# Patient Record
Sex: FEMALE | Race: WHITE | Employment: OTHER | URBAN - METROPOLITAN AREA
[De-identification: names, ages, dates, MRNs, and addresses within clinical notes are randomized per-mention and may not be internally consistent; named-entity substitution may affect disease eponyms.]

---

## 2017-08-09 ENCOUNTER — CASE MANAGEMENT (OUTPATIENT)
Dept: EMERGENCY DEPT | Age: 57
End: 2017-08-09

## 2018-09-13 ENCOUNTER — HOSPITAL ENCOUNTER (INPATIENT)
Age: 58
LOS: 4 days | Discharge: HOME | DRG: 291 | End: 2018-09-17
Attending: EMERGENCY MEDICINE | Admitting: INTERNAL MEDICINE
Payer: MEDICARE

## 2018-09-13 ENCOUNTER — APPOINTMENT (OUTPATIENT)
Dept: GENERAL RADIOLOGY | Age: 58
DRG: 291 | End: 2018-09-13
Payer: MEDICARE

## 2018-09-13 DIAGNOSIS — G89.29 OTHER CHRONIC PAIN: ICD-10-CM

## 2018-09-13 DIAGNOSIS — J81.0 ACUTE PULMONARY EDEMA (HCC): ICD-10-CM

## 2018-09-13 DIAGNOSIS — I50.9 ACUTE CONGESTIVE HEART FAILURE, UNSPECIFIED HEART FAILURE TYPE (HCC): ICD-10-CM

## 2018-09-13 DIAGNOSIS — J96.00 ACUTE RESPIRATORY FAILURE, UNSPECIFIED WHETHER WITH HYPOXIA OR HYPERCAPNIA (HCC): Primary | ICD-10-CM

## 2018-09-13 PROBLEM — R06.02 SHORTNESS OF BREATH: Status: ACTIVE | Noted: 2018-09-13

## 2018-09-13 LAB
A/G RATIO: 0.7 (ref 1.1–2.2)
ALBUMIN SERPL-MCNC: 3 G/DL (ref 3.4–5)
ALP BLD-CCNC: 235 U/L (ref 40–129)
ALT SERPL-CCNC: 23 U/L (ref 10–40)
ANION GAP SERPL CALCULATED.3IONS-SCNC: 11 MMOL/L (ref 3–16)
AST SERPL-CCNC: 32 U/L (ref 15–37)
BASOPHILS ABSOLUTE: 0.2 K/UL (ref 0–0.2)
BASOPHILS RELATIVE PERCENT: 1.2 %
BILIRUB SERPL-MCNC: 0.5 MG/DL (ref 0–1)
BUN BLDV-MCNC: 10 MG/DL (ref 7–20)
CALCIUM SERPL-MCNC: 8.9 MG/DL (ref 8.3–10.6)
CHLORIDE BLD-SCNC: 100 MMOL/L (ref 99–110)
CO2: 25 MMOL/L (ref 21–32)
CREAT SERPL-MCNC: 0.6 MG/DL (ref 0.6–1.1)
EOSINOPHILS ABSOLUTE: 0.1 K/UL (ref 0–0.6)
EOSINOPHILS RELATIVE PERCENT: 0.4 %
GFR AFRICAN AMERICAN: >60
GFR NON-AFRICAN AMERICAN: >60
GLOBULIN: 4.2 G/DL
GLUCOSE BLD-MCNC: 147 MG/DL (ref 70–99)
HCT VFR BLD CALC: 33 % (ref 36–48)
HEMOGLOBIN: 10.5 G/DL (ref 12–16)
LACTIC ACID: 1.4 MMOL/L (ref 0.4–2)
LYMPHOCYTES ABSOLUTE: 1.2 K/UL (ref 1–5.1)
LYMPHOCYTES RELATIVE PERCENT: 9.2 %
MCH RBC QN AUTO: 24.9 PG (ref 26–34)
MCHC RBC AUTO-ENTMCNC: 31.9 G/DL (ref 31–36)
MCV RBC AUTO: 78.1 FL (ref 80–100)
MONOCYTES ABSOLUTE: 0.4 K/UL (ref 0–1.3)
MONOCYTES RELATIVE PERCENT: 3.3 %
NEUTROPHILS ABSOLUTE: 11.4 K/UL (ref 1.7–7.7)
NEUTROPHILS RELATIVE PERCENT: 85.9 %
PDW BLD-RTO: 17.9 % (ref 12.4–15.4)
PLATELET # BLD: 283 K/UL (ref 135–450)
PMV BLD AUTO: 8.5 FL (ref 5–10.5)
POTASSIUM SERPL-SCNC: 4.1 MMOL/L (ref 3.5–5.1)
PRO-BNP: 2053 PG/ML (ref 0–124)
RBC # BLD: 4.22 M/UL (ref 4–5.2)
SODIUM BLD-SCNC: 136 MMOL/L (ref 136–145)
TOTAL PROTEIN: 7.2 G/DL (ref 6.4–8.2)
WBC # BLD: 13.3 K/UL (ref 4–11)

## 2018-09-13 PROCEDURE — 94664 DEMO&/EVAL PT USE INHALER: CPT

## 2018-09-13 PROCEDURE — 83880 ASSAY OF NATRIURETIC PEPTIDE: CPT

## 2018-09-13 PROCEDURE — 83605 ASSAY OF LACTIC ACID: CPT

## 2018-09-13 PROCEDURE — 85025 COMPLETE CBC W/AUTO DIFF WBC: CPT

## 2018-09-13 PROCEDURE — 96361 HYDRATE IV INFUSION ADD-ON: CPT

## 2018-09-13 PROCEDURE — 2700000000 HC OXYGEN THERAPY PER DAY

## 2018-09-13 PROCEDURE — 99285 EMERGENCY DEPT VISIT HI MDM: CPT

## 2018-09-13 PROCEDURE — 94640 AIRWAY INHALATION TREATMENT: CPT

## 2018-09-13 PROCEDURE — 2580000003 HC RX 258

## 2018-09-13 PROCEDURE — 94761 N-INVAS EAR/PLS OXIMETRY MLT: CPT

## 2018-09-13 PROCEDURE — 6370000000 HC RX 637 (ALT 250 FOR IP): Performed by: EMERGENCY MEDICINE

## 2018-09-13 PROCEDURE — 6360000002 HC RX W HCPCS: Performed by: EMERGENCY MEDICINE

## 2018-09-13 PROCEDURE — 80053 COMPREHEN METABOLIC PANEL: CPT

## 2018-09-13 PROCEDURE — 87040 BLOOD CULTURE FOR BACTERIA: CPT

## 2018-09-13 PROCEDURE — 6370000000 HC RX 637 (ALT 250 FOR IP): Performed by: INTERNAL MEDICINE

## 2018-09-13 PROCEDURE — 96375 TX/PRO/DX INJ NEW DRUG ADDON: CPT

## 2018-09-13 PROCEDURE — 96374 THER/PROPH/DIAG INJ IV PUSH: CPT

## 2018-09-13 PROCEDURE — 1200000000 HC SEMI PRIVATE

## 2018-09-13 PROCEDURE — 71045 X-RAY EXAM CHEST 1 VIEW: CPT

## 2018-09-13 RX ORDER — ROPINIROLE 1 MG/1
2 TABLET, FILM COATED ORAL 3 TIMES DAILY
Status: DISCONTINUED | OUTPATIENT
Start: 2018-09-13 | End: 2018-09-17 | Stop reason: HOSPADM

## 2018-09-13 RX ORDER — ASPIRIN 81 MG/1
81 TABLET ORAL DAILY
Status: DISCONTINUED | OUTPATIENT
Start: 2018-09-14 | End: 2018-09-16

## 2018-09-13 RX ORDER — SODIUM CHLORIDE 0.9 % (FLUSH) 0.9 %
10 SYRINGE (ML) INJECTION PRN
Status: DISCONTINUED | OUTPATIENT
Start: 2018-09-13 | End: 2018-09-17 | Stop reason: HOSPADM

## 2018-09-13 RX ORDER — FUROSEMIDE 10 MG/ML
40 INJECTION INTRAMUSCULAR; INTRAVENOUS 2 TIMES DAILY
Status: DISCONTINUED | OUTPATIENT
Start: 2018-09-14 | End: 2018-09-15

## 2018-09-13 RX ORDER — SODIUM CHLORIDE 0.9 % (FLUSH) 0.9 %
10 SYRINGE (ML) INJECTION EVERY 12 HOURS SCHEDULED
Status: DISCONTINUED | OUTPATIENT
Start: 2018-09-13 | End: 2018-09-17 | Stop reason: HOSPADM

## 2018-09-13 RX ORDER — ONDANSETRON 2 MG/ML
4 INJECTION INTRAMUSCULAR; INTRAVENOUS EVERY 6 HOURS PRN
Status: DISCONTINUED | OUTPATIENT
Start: 2018-09-13 | End: 2018-09-17 | Stop reason: HOSPADM

## 2018-09-13 RX ORDER — FUROSEMIDE 10 MG/ML
60 INJECTION INTRAMUSCULAR; INTRAVENOUS ONCE
Status: COMPLETED | OUTPATIENT
Start: 2018-09-13 | End: 2018-09-13

## 2018-09-13 RX ORDER — DOXYCYCLINE HYCLATE 100 MG
100 TABLET ORAL EVERY 12 HOURS
Status: DISCONTINUED | OUTPATIENT
Start: 2018-09-13 | End: 2018-09-17 | Stop reason: HOSPADM

## 2018-09-13 RX ORDER — HYDRALAZINE HYDROCHLORIDE 50 MG/1
50 TABLET, FILM COATED ORAL 2 TIMES DAILY
Status: DISCONTINUED | OUTPATIENT
Start: 2018-09-13 | End: 2018-09-16

## 2018-09-13 RX ORDER — IPRATROPIUM BROMIDE AND ALBUTEROL SULFATE 2.5; .5 MG/3ML; MG/3ML
1 SOLUTION RESPIRATORY (INHALATION) ONCE
Status: COMPLETED | OUTPATIENT
Start: 2018-09-13 | End: 2018-09-13

## 2018-09-13 RX ORDER — SODIUM CHLORIDE 9 MG/ML
INJECTION, SOLUTION INTRAVENOUS
Status: COMPLETED
Start: 2018-09-13 | End: 2018-09-13

## 2018-09-13 RX ORDER — IPRATROPIUM BROMIDE AND ALBUTEROL SULFATE 2.5; .5 MG/3ML; MG/3ML
1 SOLUTION RESPIRATORY (INHALATION)
Status: DISCONTINUED | OUTPATIENT
Start: 2018-09-14 | End: 2018-09-13

## 2018-09-13 RX ORDER — METOPROLOL TARTRATE 50 MG/1
100 TABLET, FILM COATED ORAL 2 TIMES DAILY
Status: DISCONTINUED | OUTPATIENT
Start: 2018-09-13 | End: 2018-09-17 | Stop reason: HOSPADM

## 2018-09-13 RX ORDER — METHYLPREDNISOLONE SODIUM SUCCINATE 125 MG/2ML
125 INJECTION, POWDER, LYOPHILIZED, FOR SOLUTION INTRAMUSCULAR; INTRAVENOUS ONCE
Status: COMPLETED | OUTPATIENT
Start: 2018-09-13 | End: 2018-09-13

## 2018-09-13 RX ORDER — ATORVASTATIN CALCIUM 40 MG/1
40 TABLET, FILM COATED ORAL DAILY
Status: DISCONTINUED | OUTPATIENT
Start: 2018-09-14 | End: 2018-09-17 | Stop reason: HOSPADM

## 2018-09-13 RX ORDER — FLUOXETINE HYDROCHLORIDE 20 MG/1
40 CAPSULE ORAL DAILY
Status: DISCONTINUED | OUTPATIENT
Start: 2018-09-14 | End: 2018-09-17 | Stop reason: HOSPADM

## 2018-09-13 RX ORDER — ALBUTEROL SULFATE 2.5 MG/3ML
2.5 SOLUTION RESPIRATORY (INHALATION)
Status: DISCONTINUED | OUTPATIENT
Start: 2018-09-13 | End: 2018-09-13

## 2018-09-13 RX ORDER — SODIUM CHLORIDE 9 MG/ML
INJECTION, SOLUTION INTRAVENOUS CONTINUOUS
Status: DISCONTINUED | OUTPATIENT
Start: 2018-09-13 | End: 2018-09-13

## 2018-09-13 RX ORDER — IPRATROPIUM BROMIDE AND ALBUTEROL SULFATE 2.5; .5 MG/3ML; MG/3ML
1 SOLUTION RESPIRATORY (INHALATION)
Status: DISCONTINUED | OUTPATIENT
Start: 2018-09-13 | End: 2018-09-16

## 2018-09-13 RX ORDER — METHYLPREDNISOLONE SODIUM SUCCINATE 40 MG/ML
40 INJECTION, POWDER, LYOPHILIZED, FOR SOLUTION INTRAMUSCULAR; INTRAVENOUS EVERY 8 HOURS
Status: DISCONTINUED | OUTPATIENT
Start: 2018-09-14 | End: 2018-09-14

## 2018-09-13 RX ORDER — ALBUTEROL SULFATE 2.5 MG/3ML
2.5 SOLUTION RESPIRATORY (INHALATION) ONCE
Status: COMPLETED | OUTPATIENT
Start: 2018-09-13 | End: 2018-09-13

## 2018-09-13 RX ORDER — ALBUTEROL SULFATE 2.5 MG/3ML
2.5 SOLUTION RESPIRATORY (INHALATION)
Status: DISCONTINUED | OUTPATIENT
Start: 2018-09-13 | End: 2018-09-17 | Stop reason: HOSPADM

## 2018-09-13 RX ORDER — ALBUTEROL SULFATE 2.5 MG/3ML
2.5 SOLUTION RESPIRATORY (INHALATION)
Status: DISCONTINUED | OUTPATIENT
Start: 2018-10-03 | End: 2018-09-13

## 2018-09-13 RX ORDER — GABAPENTIN 300 MG/1
300 CAPSULE ORAL 3 TIMES DAILY
Status: DISCONTINUED | OUTPATIENT
Start: 2018-09-13 | End: 2018-09-17 | Stop reason: HOSPADM

## 2018-09-13 RX ADMIN — METHYLPREDNISOLONE SODIUM SUCCINATE 125 MG: 125 INJECTION, POWDER, FOR SOLUTION INTRAMUSCULAR; INTRAVENOUS at 20:24

## 2018-09-13 RX ADMIN — IPRATROPIUM BROMIDE AND ALBUTEROL SULFATE 1 AMPULE: .5; 3 SOLUTION RESPIRATORY (INHALATION) at 20:24

## 2018-09-13 RX ADMIN — SODIUM CHLORIDE: 9 INJECTION, SOLUTION INTRAVENOUS at 20:23

## 2018-09-13 RX ADMIN — FUROSEMIDE 60 MG: 10 INJECTION, SOLUTION INTRAMUSCULAR; INTRAVENOUS at 21:20

## 2018-09-13 RX ADMIN — IPRATROPIUM BROMIDE AND ALBUTEROL SULFATE 1 AMPULE: .5; 3 SOLUTION RESPIRATORY (INHALATION) at 23:30

## 2018-09-13 RX ADMIN — ALBUTEROL SULFATE 2.5 MG: 2.5 SOLUTION RESPIRATORY (INHALATION) at 20:24

## 2018-09-13 RX ADMIN — NITROGLYCERIN 0.5 INCH: 20 OINTMENT TOPICAL at 21:20

## 2018-09-14 LAB
ANION GAP SERPL CALCULATED.3IONS-SCNC: 12 MMOL/L (ref 3–16)
BUN BLDV-MCNC: 13 MG/DL (ref 7–20)
CALCIUM SERPL-MCNC: 9 MG/DL (ref 8.3–10.6)
CHLORIDE BLD-SCNC: 101 MMOL/L (ref 99–110)
CO2: 25 MMOL/L (ref 21–32)
CREAT SERPL-MCNC: 0.7 MG/DL (ref 0.6–1.1)
GFR AFRICAN AMERICAN: >60
GFR NON-AFRICAN AMERICAN: >60
GLUCOSE BLD-MCNC: 178 MG/DL (ref 70–99)
HCT VFR BLD CALC: 32.3 % (ref 36–48)
HEMOGLOBIN: 10.5 G/DL (ref 12–16)
LV EF: 58 %
LVEF MODALITY: NORMAL
MCH RBC QN AUTO: 25.2 PG (ref 26–34)
MCHC RBC AUTO-ENTMCNC: 32.5 G/DL (ref 31–36)
MCV RBC AUTO: 77.6 FL (ref 80–100)
PDW BLD-RTO: 17.7 % (ref 12.4–15.4)
PLATELET # BLD: 317 K/UL (ref 135–450)
PMV BLD AUTO: 8.3 FL (ref 5–10.5)
POTASSIUM REFLEX MAGNESIUM: 3.8 MMOL/L (ref 3.5–5.1)
RBC # BLD: 4.17 M/UL (ref 4–5.2)
SODIUM BLD-SCNC: 138 MMOL/L (ref 136–145)
TROPONIN: <0.01 NG/ML
TROPONIN: <0.01 NG/ML
TSH REFLEX FT4: 0.43 UIU/ML (ref 0.27–4.2)
WBC # BLD: 13.2 K/UL (ref 4–11)

## 2018-09-14 PROCEDURE — 93306 TTE W/DOPPLER COMPLETE: CPT

## 2018-09-14 PROCEDURE — 83036 HEMOGLOBIN GLYCOSYLATED A1C: CPT

## 2018-09-14 PROCEDURE — 2580000003 HC RX 258: Performed by: INTERNAL MEDICINE

## 2018-09-14 PROCEDURE — 2060000000 HC ICU INTERMEDIATE R&B

## 2018-09-14 PROCEDURE — 6360000002 HC RX W HCPCS: Performed by: INTERNAL MEDICINE

## 2018-09-14 PROCEDURE — 6370000000 HC RX 637 (ALT 250 FOR IP): Performed by: INTERNAL MEDICINE

## 2018-09-14 PROCEDURE — 83550 IRON BINDING TEST: CPT

## 2018-09-14 PROCEDURE — 94761 N-INVAS EAR/PLS OXIMETRY MLT: CPT

## 2018-09-14 PROCEDURE — 99233 SBSQ HOSP IP/OBS HIGH 50: CPT | Performed by: INTERNAL MEDICINE

## 2018-09-14 PROCEDURE — 94664 DEMO&/EVAL PT USE INHALER: CPT

## 2018-09-14 PROCEDURE — 2700000000 HC OXYGEN THERAPY PER DAY

## 2018-09-14 PROCEDURE — 80048 BASIC METABOLIC PNL TOTAL CA: CPT

## 2018-09-14 PROCEDURE — 84443 ASSAY THYROID STIM HORMONE: CPT

## 2018-09-14 PROCEDURE — 85027 COMPLETE CBC AUTOMATED: CPT

## 2018-09-14 PROCEDURE — 83540 ASSAY OF IRON: CPT

## 2018-09-14 PROCEDURE — 36415 COLL VENOUS BLD VENIPUNCTURE: CPT

## 2018-09-14 PROCEDURE — 84484 ASSAY OF TROPONIN QUANT: CPT

## 2018-09-14 PROCEDURE — 99255 IP/OBS CONSLTJ NEW/EST HI 80: CPT | Performed by: INTERNAL MEDICINE

## 2018-09-14 PROCEDURE — 94640 AIRWAY INHALATION TREATMENT: CPT

## 2018-09-14 RX ORDER — PREDNISONE 20 MG/1
40 TABLET ORAL DAILY
Status: DISCONTINUED | OUTPATIENT
Start: 2018-09-14 | End: 2018-09-17 | Stop reason: HOSPADM

## 2018-09-14 RX ORDER — OXYCODONE AND ACETAMINOPHEN 7.5; 325 MG/1; MG/1
1 TABLET ORAL EVERY 6 HOURS PRN
Status: DISCONTINUED | OUTPATIENT
Start: 2018-09-14 | End: 2018-09-17 | Stop reason: HOSPADM

## 2018-09-14 RX ORDER — GUAIFENESIN 600 MG/1
600 TABLET, EXTENDED RELEASE ORAL 2 TIMES DAILY
Status: DISCONTINUED | OUTPATIENT
Start: 2018-09-14 | End: 2018-09-17 | Stop reason: HOSPADM

## 2018-09-14 RX ADMIN — METOPROLOL TARTRATE 100 MG: 50 TABLET ORAL at 20:29

## 2018-09-14 RX ADMIN — GABAPENTIN 300 MG: 300 CAPSULE ORAL at 00:48

## 2018-09-14 RX ADMIN — IPRATROPIUM BROMIDE AND ALBUTEROL SULFATE 1 AMPULE: .5; 3 SOLUTION RESPIRATORY (INHALATION) at 19:25

## 2018-09-14 RX ADMIN — ROPINIROLE HYDROCHLORIDE 2 MG: 1 TABLET, FILM COATED ORAL at 09:21

## 2018-09-14 RX ADMIN — PREDNISONE 40 MG: 20 TABLET ORAL at 09:21

## 2018-09-14 RX ADMIN — ASPIRIN 81 MG: 81 TABLET, COATED ORAL at 09:21

## 2018-09-14 RX ADMIN — GUAIFENESIN 600 MG: 600 TABLET, EXTENDED RELEASE ORAL at 18:21

## 2018-09-14 RX ADMIN — ROPINIROLE HYDROCHLORIDE 2 MG: 1 TABLET, FILM COATED ORAL at 13:03

## 2018-09-14 RX ADMIN — HYDRALAZINE HYDROCHLORIDE 50 MG: 50 TABLET, FILM COATED ORAL at 09:21

## 2018-09-14 RX ADMIN — METOPROLOL TARTRATE 100 MG: 50 TABLET ORAL at 00:48

## 2018-09-14 RX ADMIN — DOXYCYCLINE HYCLATE 100 MG: 100 TABLET, FILM COATED ORAL at 12:42

## 2018-09-14 RX ADMIN — ROPINIROLE HYDROCHLORIDE 2 MG: 1 TABLET, FILM COATED ORAL at 20:29

## 2018-09-14 RX ADMIN — ESTROGENS, CONJUGATED 0.62 MG: 0.62 TABLET, FILM COATED ORAL at 09:26

## 2018-09-14 RX ADMIN — OXYCODONE HYDROCHLORIDE AND ACETAMINOPHEN 1 TABLET: 7.5; 325 TABLET ORAL at 19:12

## 2018-09-14 RX ADMIN — IPRATROPIUM BROMIDE AND ALBUTEROL SULFATE 1 AMPULE: .5; 3 SOLUTION RESPIRATORY (INHALATION) at 07:16

## 2018-09-14 RX ADMIN — Medication 10 ML: at 09:26

## 2018-09-14 RX ADMIN — DOXYCYCLINE HYCLATE 100 MG: 100 TABLET, FILM COATED ORAL at 00:47

## 2018-09-14 RX ADMIN — OXYCODONE HYDROCHLORIDE AND ACETAMINOPHEN 1 TABLET: 7.5; 325 TABLET ORAL at 06:53

## 2018-09-14 RX ADMIN — GABAPENTIN 300 MG: 300 CAPSULE ORAL at 20:29

## 2018-09-14 RX ADMIN — GABAPENTIN 300 MG: 300 CAPSULE ORAL at 13:03

## 2018-09-14 RX ADMIN — IPRATROPIUM BROMIDE AND ALBUTEROL SULFATE 1 AMPULE: .5; 3 SOLUTION RESPIRATORY (INHALATION) at 23:25

## 2018-09-14 RX ADMIN — Medication 10 ML: at 20:30

## 2018-09-14 RX ADMIN — OXYCODONE HYDROCHLORIDE AND ACETAMINOPHEN 1 TABLET: 7.5; 325 TABLET ORAL at 00:48

## 2018-09-14 RX ADMIN — HYDRALAZINE HYDROCHLORIDE 50 MG: 50 TABLET, FILM COATED ORAL at 00:48

## 2018-09-14 RX ADMIN — OXYCODONE HYDROCHLORIDE AND ACETAMINOPHEN 1 TABLET: 7.5; 325 TABLET ORAL at 13:03

## 2018-09-14 RX ADMIN — FLUOXETINE HYDROCHLORIDE 40 MG: 20 CAPSULE ORAL at 09:21

## 2018-09-14 RX ADMIN — ROPINIROLE HYDROCHLORIDE 2 MG: 1 TABLET, FILM COATED ORAL at 00:47

## 2018-09-14 RX ADMIN — FUROSEMIDE 40 MG: 10 INJECTION, SOLUTION INTRAMUSCULAR; INTRAVENOUS at 09:21

## 2018-09-14 RX ADMIN — FUROSEMIDE 40 MG: 10 INJECTION, SOLUTION INTRAMUSCULAR; INTRAVENOUS at 18:21

## 2018-09-14 RX ADMIN — METOPROLOL TARTRATE 100 MG: 50 TABLET ORAL at 09:21

## 2018-09-14 RX ADMIN — HYDRALAZINE HYDROCHLORIDE 50 MG: 50 TABLET, FILM COATED ORAL at 20:29

## 2018-09-14 RX ADMIN — ENOXAPARIN SODIUM 40 MG: 40 INJECTION SUBCUTANEOUS at 09:21

## 2018-09-14 RX ADMIN — ATORVASTATIN CALCIUM 40 MG: 40 TABLET, FILM COATED ORAL at 09:21

## 2018-09-14 RX ADMIN — DOXYCYCLINE HYCLATE 100 MG: 100 TABLET, FILM COATED ORAL at 23:40

## 2018-09-14 RX ADMIN — GABAPENTIN 300 MG: 300 CAPSULE ORAL at 09:21

## 2018-09-14 RX ADMIN — IPRATROPIUM BROMIDE AND ALBUTEROL SULFATE 1 AMPULE: .5; 3 SOLUTION RESPIRATORY (INHALATION) at 15:09

## 2018-09-14 ASSESSMENT — PAIN DESCRIPTION - DESCRIPTORS
DESCRIPTORS: ACHING;CONSTANT
DESCRIPTORS: SHARP;PENETRATING
DESCRIPTORS: CONSTANT;ACHING

## 2018-09-14 ASSESSMENT — PAIN DESCRIPTION - LOCATION
LOCATION: HIP;BACK
LOCATION: BACK;KNEE;LEG
LOCATION: HIP;BACK
LOCATION: BACK;HIP;KNEE
LOCATION: BACK;KNEE;LEG

## 2018-09-14 ASSESSMENT — PAIN SCALES - GENERAL
PAINLEVEL_OUTOF10: 6
PAINLEVEL_OUTOF10: 9
PAINLEVEL_OUTOF10: 10
PAINLEVEL_OUTOF10: 4
PAINLEVEL_OUTOF10: 8
PAINLEVEL_OUTOF10: 8
PAINLEVEL_OUTOF10: 10

## 2018-09-14 ASSESSMENT — PAIN DESCRIPTION - PROGRESSION
CLINICAL_PROGRESSION: NOT CHANGED
CLINICAL_PROGRESSION: NOT CHANGED

## 2018-09-14 ASSESSMENT — PAIN DESCRIPTION - ORIENTATION
ORIENTATION: LOWER

## 2018-09-14 ASSESSMENT — PAIN DESCRIPTION - PAIN TYPE
TYPE: CHRONIC PAIN

## 2018-09-14 ASSESSMENT — PAIN DESCRIPTION - FREQUENCY
FREQUENCY: CONTINUOUS

## 2018-09-14 ASSESSMENT — PAIN DESCRIPTION - ONSET: ONSET: ON-GOING

## 2018-09-14 NOTE — PROGRESS NOTES
Shift report given to Marcela Schwartz RN. Respiratory therapy with patient at this time. Call light in reach, patient is pleasant and reports feeling better.

## 2018-09-14 NOTE — H&P
mouth 2 times daily as needed   Yes Historical Provider, MD   metoprolol (LOPRESSOR) 100 MG tablet Take 100 mg by mouth 2 times daily   Yes Historical Provider, MD   rOPINIRole (REQUIP) 2 MG tablet Take 2 mg by mouth 3 times daily   Yes Historical Provider, MD   gabapentin (NEURONTIN) 300 MG capsule Take 300 mg by mouth 3 times daily   Yes Historical Provider, MD   aspirin 81 MG EC tablet Take 81 mg by mouth daily   Yes Historical Provider, MD   atorvastatin (LIPITOR) 20 MG tablet Take 40 mg by mouth daily    Yes Historical Provider, MD   omeprazole (PRILOSEC) 20 MG capsule Take 40 mg by mouth Daily    Yes Historical Provider, MD   estrogens, conjugated, (PREMARIN) 0.625 MG tablet Take 0.625 mg by mouth daily. Yes Historical Provider, MD   FLUoxetine (PROZAC) 40 MG capsule Take 40 mg by mouth daily. Yes Historical Provider, MD   methocarbamol (ROBAXIN) 750 MG tablet Take 750 mg by mouth 3 times daily    Yes Historical Provider, MD       Allergies:  Codeine; Cymbalta [duloxetine hcl]; Penicillins; and Sulfa antibiotics    Social History:    TOBACCO:   reports that she has been smoking Cigarettes. She has a 35.00 pack-year smoking history. She has never used smokeless tobacco.  ETOH:   reports that she drinks alcohol. Family History:    History reviewed. No pertinent family history. REVIEW OF SYSTEMS:   Pertinent positives as noted in the HPI. All other systems reviewed and negative. PHYSICAL EXAM:    BP (!) 182/92   Pulse 84   Temp 98.3 °F (36.8 °C) (Oral)   Resp 18   Ht 5' 7\" (1.702 m)   Wt 250 lb 12.8 oz (113.8 kg)   SpO2 92%   BMI 39.28 kg/m²     General appearance:  No apparent distress, appears stated age and cooperative. HEENT:  Normal cephalic, atraumatic without obvious deformity. Pupils equal, round, and reactive to light. Extra ocular muscles intact. Conjunctivae/corneas clear. Neck: Supple, with full range of motion. No jugular venous distention. Trachea midline.   Respiratory:

## 2018-09-14 NOTE — CONSULTS
[address] [facility]  [phone]        Reason for Consultation/Chief Complaint: \"I have been having  Increasing shortness of breath for a week. \"    History of Present Illness:  Lex Hess is a 66 y.o. patient who presented to the hospital with complaints of shortness of breath for a week. Symptoms worsening. Exertion makes it worse. Rest makes it better. No orthopnea. No PND. She finds it hard to take a deep breath due to pressure in chest.  She has cough for 4 weeks got better with antibiotics but worse again she is productive of green sputum occasional flakes of blood. Legs swelling for 2 yrs. This admission chest xray shows pulmonary edema and her EKG shows old septal infarct. Elevated EDP on echo. Pro BNP is elevated. I have been asked to provide consultation regarding further management and testing. Past Medical History:   has a past medical history of Class 2 obesity with serious comorbidity and body mass index (BMI) of 39.0 to 39.9 in adult; Depression; Hyperlipidemia; Hypertension; and Kidney stone. Surgical History:   has a past surgical history that includes Hysterectomy; knee surgery; Foot surgery; Cholecystectomy; and joint replacement. Social History:  30 pack yr smoker now quit denies excessive alcohol. reports that she has been smoking Cigarettes. She has a 35.00 pack-year smoking history. She has never used smokeless tobacco. She reports that she drinks alcohol. She reports that she uses drugs, including Marijuana. Family History:  Father MI age 48  family history is not on file. Home Medications:  Were reviewed and are listed in nursing record. and/or listed below  Prior to Admission medications    Medication Sig Start Date End Date Taking? Authorizing Provider   OxyCODONE ER 9 MG C12A Take 9 mg by mouth. .   Yes Historical Provider, MD   lactobacillus (CULTURELLE) capsule Take 1 capsule by mouth 2 times daily 8/10/17  Yes Nayla Martinez MD albuterol sulfate  (90 Base) MCG/ACT inhaler Inhale 2 puffs into the lungs 4 times daily 8/10/17  Yes Doris Sanders MD   furosemide (LASIX) 20 MG tablet Take 20 mg by mouth daily   Yes Historical Provider, MD   hydrALAZINE (APRESOLINE) 50 MG tablet Take 50 mg by mouth 2 times daily   Yes Historical Provider, MD   oxybutynin (DITROPAN) 5 MG tablet Take 5 mg by mouth 3 times daily   Yes Historical Provider, MD   tiZANidine (ZANAFLEX) 4 MG tablet Take 4 mg by mouth 2 times daily as needed   Yes Historical Provider, MD   metoprolol (LOPRESSOR) 100 MG tablet Take 100 mg by mouth 2 times daily   Yes Historical Provider, MD   rOPINIRole (REQUIP) 2 MG tablet Take 2 mg by mouth 3 times daily   Yes Historical Provider, MD   gabapentin (NEURONTIN) 300 MG capsule Take 300 mg by mouth 3 times daily   Yes Historical Provider, MD   aspirin 81 MG EC tablet Take 81 mg by mouth daily   Yes Historical Provider, MD   atorvastatin (LIPITOR) 20 MG tablet Take 40 mg by mouth daily    Yes Historical Provider, MD   omeprazole (PRILOSEC) 20 MG capsule Take 40 mg by mouth Daily    Yes Historical Provider, MD   estrogens, conjugated, (PREMARIN) 0.625 MG tablet Take 0.625 mg by mouth daily. Yes Historical Provider, MD   FLUoxetine (PROZAC) 40 MG capsule Take 40 mg by mouth daily. Yes Historical Provider, MD   methocarbamol (ROBAXIN) 750 MG tablet Take 750 mg by mouth 3 times daily    Yes Historical Provider, MD        Allergies:  Codeine; Cymbalta [duloxetine hcl];  Penicillins; and Sulfa antibiotics     Review of Systems: 12 point ROS negative in all areas as listed below except as in [place]  Constitutional, EENT, Cardiovascular, pulmonary, GI, , Musculoskeletal, skin, neurological, hematological, endocrine, Psychiatric  Physical Examination:    Vitals:    09/14/18 1200   BP: (!) 171/85   Pulse: 71   Resp: 18   Temp: 98.4 °F (36.9 °C)   SpO2: 92%    Weight: 250 lb 12.8 oz (113.8 kg)         General Appearance:  Alert, cooperative, no distress, appears stated age   Head:  Normocephalic, without obvious abnormality, atraumatic   Eyes:  PERRL, conjunctiva/corneas clear       Nose: Nares normal, no drainage or sinus tenderness   Throat: Lips, mucosa, and tongue normal   Neck: Supple, symmetrical, trachea midline, no adenopathy, thyroid: not enlarged, symmetric, no tenderness/mass/nodules, no carotid bruit or JVD       Lungs:   Diffuse crackles to auscultation bilaterally, respirations unlabored   Chest Wall:  No tenderness or deformity   Heart:  Regular rate and rhythm, S1, S2 normal, no murmur, rub or gallop   Abdomen:   Soft, non-tender, bowel sounds active all four quadrants,  no masses, no organomegaly           Extremities: Extremities normal, atraumatic, no cyanosis or edema   Pulses: 2+ and symmetric   Skin: Skin color, texture, turgor normal, no rashes or lesions   Pysch: Normal mood and affect   Neurologic: Normal gross motor and sensory exam.         Labs  CBC:   Lab Results   Component Value Date    WBC 13.2 09/14/2018    RBC 4.17 09/14/2018    HGB 10.5 09/14/2018    HCT 32.3 09/14/2018    MCV 77.6 09/14/2018    RDW 17.7 09/14/2018     09/14/2018     CMP:    Lab Results   Component Value Date     09/14/2018    K 3.8 09/14/2018     09/14/2018    CO2 25 09/14/2018    BUN 13 09/14/2018    CREATININE 0.7 09/14/2018    GFRAA >60 09/14/2018    GFRAA >60 09/15/2012    AGRATIO 0.7 09/13/2018    LABGLOM >60 09/14/2018    GLUCOSE 178 09/14/2018    PROT 7.2 09/13/2018    PROT 7.5 09/15/2012    CALCIUM 9.0 09/14/2018    BILITOT 0.5 09/13/2018    ALKPHOS 235 09/13/2018    AST 32 09/13/2018    ALT 23 09/13/2018     PT/INR:  No results found for: [email or web address]  Lab Results   Component Value Date    CKTOTAL 27 01/22/2014    TROPONINI <0.01 12/18/2017   ProBNP 2053  Chest xray 9.13.18  Findings likely represent pulmonary edema, though superimposed pneumonia   remains a possibility.        EKG:  I have reviewed EKG with the following interpretation:  Impression:       Final 12/18/2017  7:45 PM 14   Sinus bradycardia   Septal infarct , age undetermined   Abnormal ECG   No previous ECGs available   Confirmed by Sonja Aguero MD, [address] ([id number]) on 12/19/2017 9:45:46 PM      Echo doppler of heart:  9.14.18   Normal left ventricular systolic function with an estimated ejection   fraction of 55-60%.   There is mild concentric left ventricular hypertrophy.   Grade II diastolic dysfunction with elevated filing pressure.   The left atrium is mildly dilated.   Mild mitral regurgitation.   Mild tricuspid regurgitation.   Systolic pulmonary artery pressure (SPAP) estimated at 46 mmHg (RA pressure   8 mmHg), consistent with mild pulmonary hypertension.     Assessment  Acute diastolic CHF  Chest pressure rule out coronary insufficiency  Anemia microcytic hypochromic rule out iron deficiency. Obesity rule out sleep apnea   Possible diabetes  hypertension  Patient Active Problem List   Diagnosis    Shortness of breath    Hypertension    Hyperlipidemia    Depression    Class 2 obesity with serious comorbidity and body mass index (BMI) of 39.0 to 39.9 in adult    Acute respiratory failure (HCC)    Acute pulmonary edema (HCC)         Plan:    I had the opportunity to review the clinical symptoms and presentation of Miguel Chowdhury. I recommend that the patient undergo stress testing  Continue HBP meds diurese  Check thyroid function and iron levels A1 C  Stool occult blood   Consider sleep apnea eval  Low salt diet fluid management    I will address the patient's cardiac risk factors and adjusted pharmacologic treatment as needed. In addition, I have reinforced the need for patient directed risk factor modification. Tobacco use was discussed with the patient and educated on the negative effects. I have asked the patient to not utilize these agents. Thank you for allowing to us to participate in the care or Miguel Chowdhury.  Further evaluation will

## 2018-09-14 NOTE — CARE COORDINATION
Case Management Assessment  Initial Evaluation    Date/Time of Evaluation: 9/14/2018 4:53 PM  Assessment Completed by: Joe Tripp    Patient Name: Erna Ivey  YOB: 1960  Diagnosis: Shortness of breath [R06.02]  Date / Time: 9/13/2018  7:35 PM  Admission status/Date:  9/13  Chart Reviewed: Yes      Patient Interviewed: Yes   Family Interviewed:  No      Hospitalization in the last 30 days:  No    Contacts  : Shiv Foster   Relationship to Patient:spouse  Phone Number: [phone]  Alternate Contact: Kristie Moreno   Relationship to Patient: son   Phone Number: [phone]    Current PCP - Gloria Nicholson    Financial  Commercial    ADLS  Support Systems: Spouse/Significant Other  Transportation: self    Meal Preparation: self     Housing  Home Environment: Lives w/father, daughter, ONEL. IPTA   Steps: 2 story 13 to bedroom. Plans to Return to Present Housing: Yes  Other Identified Issues:     Audrey Restrepo  Currently active with [address] : No  Type of Home Care Services: None  Passport/Waiver : No  Passport/Waiver Services:     Durable Medical Equipment   DME Provider: none  Equipment: walker & cane    Has Home O2 in place on admit:  No  Informed of need to bring portable home O2 tank on day of discharge for nursing to connect prior to leaving:   Not Indicated  Verbalized agreement/Understanding:   Not Indicated    Community Service Affiliation  Dialysis:  No  Outpatient PT/OT: No    Cancer Center: No     CHF Clinic: No     Pulmonary Rehab: No  Pain Clinic: Yes - [address] [place] [address]: No    Wound Clinic: No     Other:     DISCHARGE PLAN: Chart reviewed and met with pt, role of CM explained. Pt lives at home with family & plan is to return home. IPTA & denies needs. CM will follow for possible home O2. Requests [facility] if O2 ordered but states she is hopeful it will not be needed.

## 2018-09-14 NOTE — PROGRESS NOTES
Progress Note    Admit Date:  9/13/2018    Subjective:  Ms. Brianna Rios was admitted for shortness of breath. She is a smoker. She quit smoking 3 years ago. She does not have a history of COPD or CHF. She had normal stress test 3 weeks ago. She has noted some edema in her legs. Denies any chest pain although she did complain of some atypical chest Pain to her PCP and was referred to cardiology. She's had a cough for the last few days. She also noticed increased shortness of breath. No abdominal pain nausea vomiting. She is not on oxygen at home. Objective:   BP (!) 148/94   Pulse 78   Temp 97.5 °F (36.4 °C) (Oral)   Resp 18   Ht 5' 7\" (1.702 m)   Wt 250 lb 12.8 oz (113.8 kg)   SpO2 94%   BMI 39.28 kg/m²            Intake/Output Summary (Last 24 hours) at 09/14/18 1003  Last data filed at 09/14/18 0609   Gross per 24 hour   Intake                0 ml   Output             2945 ml   Net            -2945 ml       Physical Exam:    General appearance: alert, appears stated age and cooperative  Head: Normocephalic, without obvious abnormality, atraumatic  Eyes: conjunctivae/corneas clear. PERRL, EOM's intact.   Neck: no adenopathy, no carotid bruit, no JVD, supple, symmetrical, trachea midline and thyroid not enlarged, symmetric, no tenderness/mass/nodules  Lungs: mild accessory muscle use, diminished BS, no wheezes or crackles  Heart: regular rate and rhythm, S1, S2 normal, no murmur, click, rub or gallop  Abdomen: soft, non-tender; bowel sounds normal; no masses,  no organomegaly  Extremities: extremities normal, atraumatic, no cyanosis + edema  Pulses: 2+ and symmetric  Skin: Skin color, texture, turgor normal. No rashes or lesions  Neurologic: Grossly normal    Scheduled Meds:   predniSONE  40 mg Oral Daily    aspirin  81 mg Oral Daily    atorvastatin  40 mg Oral Daily    estrogens (conjugated)  0.625 mg Oral Daily    FLUoxetine  40 mg Oral Daily    gabapentin  300 mg Oral TID    hydrALAZINE  50 mg

## 2018-09-14 NOTE — ED PROVIDER NOTES
Emergency Physician Note    Chief Complaint  Shortness of Breath (Pt has cough and SOB x3 days. 89% on RA when EMS got to home. Pt placed on 2L but did not bring up per EMS. Pt given breathing treatment en route, on 8L per EMS. 69% on RA here. Pt placed on non-rebreather.)       History of Present Illness  Erna Ivey is a 66 y.o. female who presents to the ED for shortness of breath. Patient had a nonproductive cough and shortness of breath for the past 3 days. EMS reports that she was at 89% when they got there. She is placed on 2 L of nasal cannula but it did not improve. Patient was given a breathing treatment in route and they upped her nasal cannula to 8 L. Patient was at 69% on room air when she arrived here. She improved with nonrebreather and currently on a 5 L nasal cannula. Patient states at home she does not use oxygen nor has she ever been prescribed oxygen at home. She does use inhalers at home but no nebulizers. She does have a history of lower extremity edema for which she takes Lasix which she's never been told that she has congestive heart failure. Patient reports chest tightness of the anterior portion of her chest but other than that no other symptoms. She denies any increased bilateral lower extremity edema    Denies fever, chills, malaise, abdominal pain, nausea, vomiting, diarrhea, headache, sore throat, dysuria, back pain, rash. No palliative/provocative factors. Positives and pertinent negatives as per HPI. All other of the 10 systems were reviewed and are negative. I have reviewed the following from the nursing documentation:      Prior to Admission medications    Medication Sig Start Date End Date Taking?  Authorizing Provider   lactobacillus (CULTURELLE) capsule Take 1 capsule by mouth 2 times daily 8/10/17  Yes Roseline Garcia MD   albuterol sulfate  (90 Base) MCG/ACT inhaler Inhale 2 puffs into the lungs 4 times daily 8/10/17  Yes Roseline Garcia MD extremities x4 with purpose. SKIN: Warm, dry and intact. NEUROLOGIC: Normal mental status. Moving all extremities to command. Alert and oriented x4 without focal deficit or gross sensory deficit. Normal speech. PSYCHIATRIC: Not anxious, normal mood and affect, thoughts are linear and organized, without delusions/hallucinations, responds appropriately to questions      LABS and DIAGNOSTIC RESULTS      RADIOLOGY  X-RAYS:  I have reviewed radiologic plain film image(s). ALL OTHER NON-PLAIN FILM IMAGES SUCH AS CT, ULTRASOUND AND MRI HAVE BEEN READ BY THE RADIOLOGIST. XR CHEST PORTABLE   Final Result   Findings likely represent pulmonary edema, though superimposed pneumonia   remains a possibility.               Chest X-Ray    Interpreted by: Emergency Department Physician    View: Portable chest xray    Findings: Congestive heart failure, Cardiomegaly    LABS  Results for orders placed or performed during the hospital encounter of 09/13/18   CBC Auto Differential   Result Value Ref Range    WBC 13.3 (H) 4.0 - 11.0 K/uL    RBC 4.22 4.00 - 5.20 M/uL    Hemoglobin 10.5 (L) 12.0 - 16.0 g/dL    Hematocrit 33.0 (L) 36.0 - 48.0 %    MCV 78.1 (L) 80.0 - 100.0 fL    MCH 24.9 (L) 26.0 - 34.0 pg    MCHC 31.9 31.0 - 36.0 g/dL    RDW 17.9 (H) 12.4 - 15.4 %    Platelets 408 373 - 703 K/uL    MPV 8.5 5.0 - 10.5 fL    Neutrophils % 85.9 %    Lymphocytes % 9.2 %    Monocytes % 3.3 %    Eosinophils % 0.4 %    Basophils % 1.2 %    Neutrophils # 11.4 (H) 1.7 - 7.7 K/uL    Lymphocytes # 1.2 1.0 - 5.1 K/uL    Monocytes # 0.4 0.0 - 1.3 K/uL    Eosinophils # 0.1 0.0 - 0.6 K/uL    Basophils # 0.2 0.0 - 0.2 K/uL   Comprehensive Metabolic Panel   Result Value Ref Range    Sodium 136 136 - 145 mmol/L    Potassium 4.1 3.5 - 5.1 mmol/L    Chloride 100 99 - 110 mmol/L    CO2 25 21 - 32 mmol/L    Anion Gap 11 3 - 16    Glucose 147 (H) 70 - 99 mg/dL    BUN 10 7 - 20 mg/dL    CREATININE 0.6 0.6 - 1.1 mg/dL    GFR Non-African American >60 >60 GFR African American >60 >60    Calcium 8.9 8.3 - 10.6 mg/dL    Total Protein 7.2 6.4 - 8.2 g/dL    Alb 3.0 (L) 3.4 - 5.0 g/dL    Albumin/Globulin Ratio 0.7 (L) 1.1 - 2.2    Total Bilirubin 0.5 0.0 - 1.0 mg/dL    Alkaline Phosphatase 235 (H) 40 - 129 U/L    ALT 23 10 - 40 U/L    AST 32 15 - 37 U/L    Globulin 4.2 g/dL   Lactic acid, plasma   Result Value Ref Range    Lactic Acid 1.4 0.4 - 2.0 mmol/L   Brain Natriuretic Peptide   Result Value Ref Range    Pro-BNP 2,053 (H) 0 - 124 pg/mL       PROCEDURES      MEDICAL DECISION MAKING    Results for orders placed or performed during the hospital encounter of 09/13/18   CBC Auto Differential   Result Value Ref Range    WBC 13.3 (H) 4.0 - 11.0 K/uL    RBC 4.22 4.00 - 5.20 M/uL    Hemoglobin 10.5 (L) 12.0 - 16.0 g/dL    Hematocrit 33.0 (L) 36.0 - 48.0 %    MCV 78.1 (L) 80.0 - 100.0 fL    MCH 24.9 (L) 26.0 - 34.0 pg    MCHC 31.9 31.0 - 36.0 g/dL    RDW 17.9 (H) 12.4 - 15.4 %    Platelets 446 278 - 980 K/uL    MPV 8.5 5.0 - 10.5 fL    Neutrophils % 85.9 %    Lymphocytes % 9.2 %    Monocytes % 3.3 %    Eosinophils % 0.4 %    Basophils % 1.2 %    Neutrophils # 11.4 (H) 1.7 - 7.7 K/uL    Lymphocytes # 1.2 1.0 - 5.1 K/uL    Monocytes # 0.4 0.0 - 1.3 K/uL    Eosinophils # 0.1 0.0 - 0.6 K/uL    Basophils # 0.2 0.0 - 0.2 K/uL   Comprehensive Metabolic Panel   Result Value Ref Range    Sodium 136 136 - 145 mmol/L    Potassium 4.1 3.5 - 5.1 mmol/L    Chloride 100 99 - 110 mmol/L    CO2 25 21 - 32 mmol/L    Anion Gap 11 3 - 16    Glucose 147 (H) 70 - 99 mg/dL    BUN 10 7 - 20 mg/dL    CREATININE 0.6 0.6 - 1.1 mg/dL    GFR Non-African American >60 >60    GFR African American >60 >60    Calcium 8.9 8.3 - 10.6 mg/dL    Total Protein 7.2 6.4 - 8.2 g/dL    Alb 3.0 (L) 3.4 - 5.0 g/dL    Albumin/Globulin Ratio 0.7 (L) 1.1 - 2.2    Total Bilirubin 0.5 0.0 - 1.0 mg/dL    Alkaline Phosphatase 235 (H) 40 - 129 U/L    ALT 23 10 - 40 U/L    AST 32 15 - 37 U/L    Globulin 4.2 g/dL   Lactic acid,

## 2018-09-14 NOTE — PROGRESS NOTES
Hand off report given to Katya Mcghee RN and TUCKER Levin. Pt is in stable condition at this time. Call light and bed side table within reach. Care transferred at this time.  Electronically signed by James Medeiros RN on 9/14/2018 at 7:11 PM

## 2018-09-14 NOTE — PROGRESS NOTES
Pain medication given at this time. Will f/u approppriately. Call light in reach. Resting quietly, telemetry showing NSR, HR 80's.

## 2018-09-14 NOTE — PROGRESS NOTES
Pt family member has been in and out of the room several times t/o shift, bringing in drinks each time. Pt denies the drinks are for her however, family member is no longer in room and Pt has bottle of coke.  Electronically signed by Geovanna Magana RN on 9/14/2018 at 6:51 PM

## 2018-09-14 NOTE — PROGRESS NOTES
RESPIRATORY THERAPY ASSESSMENT    Name:  Erna Ivey  Medical Record Number:  [id number]  Age: 66 y.o. Gender: female  : 1960  Today's Date:  2018  Room:  0232/0232-01    Assessment     Is the patient being admitted for a COPD or Asthma exacerbation? No   (If yes the patient will be seen every 4 hours for the first 24 hours and then reassessed)    Patient Admission Diagnosis      Allergies  Allergies   Allergen Reactions    Codeine     Cymbalta [Duloxetine Hcl] Other (See Comments)     SI thoughts     Penicillins Itching    Sulfa Antibiotics        Minimum Predicted Vital Capacity:     MPVT    432       Actual Vital Capacity:      MPVT 1000          Pulmonary History:No history  Home Oxygen Therapy:  room air  Home Respiratory Therapy:Albuterol   Current Respiratory Therapy:  duoneb Q4WA          Respiratory Severity Index(RSI)   Patients with orders for inhalation medications, oxygen, or any therapeutic treatment modality will be placed on Respiratory Protocol. They will be assessed with the first treatment and at least every 72 hours thereafter. The following severity scale will be used to determine frequency of treatment intervention.     Smoking History: Mild Exacerbation = 3    Social History  Social History   Substance Use Topics    Smoking status: Current Some Day Smoker     Packs/day: 1.00     Years: 35.00     Types: Cigarettes    Smokeless tobacco: Never Used      Comment: using chantix    Alcohol use Yes      Comment: occasionally       Recent Surgical History: None = 0  Past Surgical History  Past Surgical History:   Procedure Laterality Date    CHOLECYSTECTOMY      FOOT SURGERY      HYSTERECTOMY      JOINT REPLACEMENT      KNEE SURGERY         Level of Consciousness: Alert, Oriented, and Cooperative = 0    Level of Activity: Mostly sedentary, minimal walking = 2    Respiratory Pattern: Dyspnea with exertion;Irregular pattern;or RR less than 6 = 2    Breath Sounds: via Hand Held Nebulizer [facility]) to patients when ANY of the following criteria are met  a. Incognizant or uncooperative          b. Patients treated with HHN at Home        c. Unable to demonstrate proper use of MDI with spacer     d. RR > 30 bpm   5. Bronchodilators will be delivered via Metered Dose Inhaler (MDI), HHN, Aerogen to intubated patients on mechanical ventilation. 6. Inhalation medication orders will be delivered and/or substituted as outlined below. Aerosolized Medications Ordering and Administration Guidelines:    1. All Medications will be ordered by a physician, and their frequency and/or modality will be adjusted as defined by the patients Respiratory Severity Index (RSI) score. 2. If the patient does not have documented COPD, consider discontinuing anticholinergics when RSI is less than 9.  3. If the bronchospasm worsens (increased RSI), then the bronchodilator frequency can be increased to a maximum of every 4 hours. If greater than every 4 hours is required, the physician will be contacted. 4. If the bronchospasm improves, the frequency of the bronchodilator can be decreased, based on the patient's RSI, but not less than home treatment regimen frequency. 5. Bronchodilator(s) will be discontinued if patient has a RSI less than 9 and has received no scheduled or as needed treatment for 72  Hrs. Patients Ordered on a Mucolytic Agent:    1. Must always be administered with a bronchodilator. 2. Discontinue if patient experiences worsened bronchospasm, or secretions have lessened to the point that the patient is able to clear them with a cough. Anti-inflammatory and Combination Medications:    1. If the patient lacks prior history of lung disease, is not using inhaled anti-inflammatory medication at home, and lacks wheezing by examination or by history for at least 24 hours, contact physician for possible discontinuation.

## 2018-09-15 ENCOUNTER — APPOINTMENT (OUTPATIENT)
Dept: NUCLEAR MEDICINE | Age: 58
DRG: 291 | End: 2018-09-15
Payer: MEDICARE

## 2018-09-15 LAB
ESTIMATED AVERAGE GLUCOSE: 125.5 MG/DL
HBA1C MFR BLD: 6 %
IRON SATURATION: 9 % (ref 15–50)
IRON: 26 UG/DL (ref 37–145)
LV EF: 69 %
LVEF MODALITY: NORMAL
TOTAL IRON BINDING CAPACITY: 281 UG/DL (ref 260–445)

## 2018-09-15 PROCEDURE — 93017 CV STRESS TEST TRACING ONLY: CPT

## 2018-09-15 PROCEDURE — 6370000000 HC RX 637 (ALT 250 FOR IP): Performed by: INTERNAL MEDICINE

## 2018-09-15 PROCEDURE — 2700000000 HC OXYGEN THERAPY PER DAY

## 2018-09-15 PROCEDURE — 99233 SBSQ HOSP IP/OBS HIGH 50: CPT | Performed by: INTERNAL MEDICINE

## 2018-09-15 PROCEDURE — 6360000002 HC RX W HCPCS: Performed by: INTERNAL MEDICINE

## 2018-09-15 PROCEDURE — A9502 TC99M TETROFOSMIN: HCPCS | Performed by: INTERNAL MEDICINE

## 2018-09-15 PROCEDURE — 2580000003 HC RX 258: Performed by: INTERNAL MEDICINE

## 2018-09-15 PROCEDURE — 2060000000 HC ICU INTERMEDIATE R&B

## 2018-09-15 PROCEDURE — 94762 N-INVAS EAR/PLS OXIMTRY CONT: CPT

## 2018-09-15 PROCEDURE — 78452 HT MUSCLE IMAGE SPECT MULT: CPT

## 2018-09-15 PROCEDURE — G0328 FECAL BLOOD SCRN IMMUNOASSAY: HCPCS

## 2018-09-15 PROCEDURE — 3430000000 HC RX DIAGNOSTIC RADIOPHARMACEUTICAL: Performed by: INTERNAL MEDICINE

## 2018-09-15 PROCEDURE — 94640 AIRWAY INHALATION TREATMENT: CPT

## 2018-09-15 RX ORDER — FUROSEMIDE 40 MG/1
40 TABLET ORAL DAILY
Status: DISCONTINUED | OUTPATIENT
Start: 2018-09-15 | End: 2018-09-17 | Stop reason: HOSPADM

## 2018-09-15 RX ADMIN — METOPROLOL TARTRATE 100 MG: 50 TABLET ORAL at 10:42

## 2018-09-15 RX ADMIN — FUROSEMIDE 40 MG: 40 TABLET ORAL at 10:42

## 2018-09-15 RX ADMIN — METOPROLOL TARTRATE 100 MG: 50 TABLET ORAL at 20:12

## 2018-09-15 RX ADMIN — OXYCODONE HYDROCHLORIDE AND ACETAMINOPHEN 1 TABLET: 7.5; 325 TABLET ORAL at 07:34

## 2018-09-15 RX ADMIN — DOXYCYCLINE HYCLATE 100 MG: 100 TABLET, FILM COATED ORAL at 10:41

## 2018-09-15 RX ADMIN — IPRATROPIUM BROMIDE AND ALBUTEROL SULFATE 1 AMPULE: .5; 3 SOLUTION RESPIRATORY (INHALATION) at 20:06

## 2018-09-15 RX ADMIN — ESTROGENS, CONJUGATED 0.62 MG: 0.62 TABLET, FILM COATED ORAL at 10:43

## 2018-09-15 RX ADMIN — REGADENOSON 0.4 MG: 0.08 INJECTION, SOLUTION INTRAVENOUS at 08:50

## 2018-09-15 RX ADMIN — ROPINIROLE HYDROCHLORIDE 2 MG: 1 TABLET, FILM COATED ORAL at 14:45

## 2018-09-15 RX ADMIN — IPRATROPIUM BROMIDE AND ALBUTEROL SULFATE 1 AMPULE: .5; 3 SOLUTION RESPIRATORY (INHALATION) at 15:37

## 2018-09-15 RX ADMIN — ROPINIROLE HYDROCHLORIDE 2 MG: 1 TABLET, FILM COATED ORAL at 10:41

## 2018-09-15 RX ADMIN — TETROFOSMIN 11 MILLICURIE: 0.23 INJECTION, POWDER, LYOPHILIZED, FOR SOLUTION INTRAVENOUS at 07:59

## 2018-09-15 RX ADMIN — Medication 10 ML: at 20:12

## 2018-09-15 RX ADMIN — HYDRALAZINE HYDROCHLORIDE 50 MG: 50 TABLET, FILM COATED ORAL at 10:41

## 2018-09-15 RX ADMIN — ROPINIROLE HYDROCHLORIDE 2 MG: 1 TABLET, FILM COATED ORAL at 20:12

## 2018-09-15 RX ADMIN — GABAPENTIN 300 MG: 300 CAPSULE ORAL at 20:11

## 2018-09-15 RX ADMIN — Medication 10 ML: at 10:42

## 2018-09-15 RX ADMIN — GUAIFENESIN 600 MG: 600 TABLET, EXTENDED RELEASE ORAL at 20:12

## 2018-09-15 RX ADMIN — FLUOXETINE HYDROCHLORIDE 40 MG: 20 CAPSULE ORAL at 10:41

## 2018-09-15 RX ADMIN — OXYCODONE HYDROCHLORIDE AND ACETAMINOPHEN 1 TABLET: 7.5; 325 TABLET ORAL at 15:00

## 2018-09-15 RX ADMIN — ATORVASTATIN CALCIUM 40 MG: 40 TABLET, FILM COATED ORAL at 10:41

## 2018-09-15 RX ADMIN — OXYCODONE HYDROCHLORIDE AND ACETAMINOPHEN 1 TABLET: 7.5; 325 TABLET ORAL at 21:26

## 2018-09-15 RX ADMIN — IPRATROPIUM BROMIDE AND ALBUTEROL SULFATE 1 AMPULE: .5; 3 SOLUTION RESPIRATORY (INHALATION) at 06:57

## 2018-09-15 RX ADMIN — IPRATROPIUM BROMIDE AND ALBUTEROL SULFATE 1 AMPULE: .5; 3 SOLUTION RESPIRATORY (INHALATION) at 23:30

## 2018-09-15 RX ADMIN — TETROFOSMIN 35 MILLICURIE: 0.23 INJECTION, POWDER, LYOPHILIZED, FOR SOLUTION INTRAVENOUS at 08:50

## 2018-09-15 RX ADMIN — GABAPENTIN 300 MG: 300 CAPSULE ORAL at 10:42

## 2018-09-15 RX ADMIN — HYDRALAZINE HYDROCHLORIDE 50 MG: 50 TABLET, FILM COATED ORAL at 20:11

## 2018-09-15 RX ADMIN — OXYCODONE HYDROCHLORIDE AND ACETAMINOPHEN 1 TABLET: 7.5; 325 TABLET ORAL at 01:19

## 2018-09-15 RX ADMIN — DOXYCYCLINE HYCLATE 100 MG: 100 TABLET, FILM COATED ORAL at 23:54

## 2018-09-15 RX ADMIN — ENOXAPARIN SODIUM 40 MG: 40 INJECTION SUBCUTANEOUS at 10:42

## 2018-09-15 RX ADMIN — PREDNISONE 40 MG: 20 TABLET ORAL at 10:41

## 2018-09-15 RX ADMIN — IPRATROPIUM BROMIDE AND ALBUTEROL SULFATE 1 AMPULE: .5; 3 SOLUTION RESPIRATORY (INHALATION) at 10:55

## 2018-09-15 RX ADMIN — GUAIFENESIN 600 MG: 600 TABLET, EXTENDED RELEASE ORAL at 10:41

## 2018-09-15 RX ADMIN — MAGNESIUM HYDROXIDE 30 ML: 400 SUSPENSION ORAL at 20:30

## 2018-09-15 RX ADMIN — GABAPENTIN 300 MG: 300 CAPSULE ORAL at 14:45

## 2018-09-15 RX ADMIN — ASPIRIN 81 MG: 81 TABLET, COATED ORAL at 10:42

## 2018-09-15 ASSESSMENT — PAIN SCALES - GENERAL
PAINLEVEL_OUTOF10: 8
PAINLEVEL_OUTOF10: 9
PAINLEVEL_OUTOF10: 8
PAINLEVEL_OUTOF10: 8

## 2018-09-15 ASSESSMENT — PAIN DESCRIPTION - ORIENTATION
ORIENTATION: LOWER
ORIENTATION: LOWER

## 2018-09-15 ASSESSMENT — PAIN DESCRIPTION - FREQUENCY: FREQUENCY: CONTINUOUS

## 2018-09-15 ASSESSMENT — PAIN DESCRIPTION - LOCATION
LOCATION: BACK;LEG
LOCATION: BACK

## 2018-09-15 ASSESSMENT — PAIN DESCRIPTION - PAIN TYPE
TYPE: CHRONIC PAIN
TYPE: CHRONIC PAIN

## 2018-09-15 ASSESSMENT — PAIN DESCRIPTION - PROGRESSION: CLINICAL_PROGRESSION: NOT CHANGED

## 2018-09-15 ASSESSMENT — PAIN DESCRIPTION - DESCRIPTORS: DESCRIPTORS: CONSTANT;ACHING

## 2018-09-15 ASSESSMENT — PAIN - FUNCTIONAL ASSESSMENT: PAIN_FUNCTIONAL_ASSESSMENT: 0-10

## 2018-09-15 ASSESSMENT — PAIN DESCRIPTION - ONSET: ONSET: ON-GOING

## 2018-09-15 NOTE — PROGRESS NOTES
Pt back from stress test. Ok to re order diet per Dr Wojciech Ochoa. Scheduled medications given per MAR. VSS on 4 lpm. Denies any chest pain or sob. Educated on fluid restriction, visual aid placed in pt's room. Verbalized understanding. Attempted to educate on CHF, pt not seemingly ready for education at this time, will re attempt later today. Sig other at bedside. Will monitor.  Asher Holt RN

## 2018-09-15 NOTE — PROGRESS NOTES
Patient awake and quiet in bed.  at bedside visiting. No s/s of distress noted. Pt rates pain a 6/10 when reassessed after PRN pain med intervention completed. Pt satisfied with this. She states her pain is a chronic pain she manages at home as well. Shift assessment completed. Call light within reach. Will continue to monitor.

## 2018-09-15 NOTE — PLAN OF CARE
Problem: OXYGENATION/RESPIRATORY FUNCTION  Goal: Patient will maintain patent airway  Outcome: Ongoing  Patient's EF (Ejection Fraction) is greater than 40%    Patient has a past medical history of Class 2 obesity with serious comorbidity and body mass index (BMI) of 39.0 to 39.9 in adult; Depression; Hyperlipidemia; Hypertension; and Kidney stone. Comorbidities reviewed and education provided. Patient and family's stated goal of care: better understand heart failure and disease management prior to discharge    Patient's current functional capacity:  Marked limitation of physical activity. Comfortable at rest. Less than ordinary activity causes fatigue, palpitation, or dyspnea. Pt sitting in bed at this time on 4 L O2. Pt with complaints of shortness of breath. Pt without lower extremity edema. Patient's weights and intake/output reviewed:    Patient Vitals for the past 96 hrs (Last 3 readings):   Weight   09/15/18 0335 248 lb 9.6 oz (112.8 kg)   09/13/18 2230 250 lb 12.8 oz (113.8 kg)   09/13/18 1945 250 lb (113.4 kg)       Intake/Output Summary (Last 24 hours) at 09/15/18 1525  Last data filed at 09/15/18 1359   Gross per 24 hour   Intake              923 ml   Output             2775 ml   Net            -1852 ml       Patient provided with education on CHF signs/symptoms, causes, discharge medications, daily weights, low sodium diet, activity, and follow-up. Notified patient to call the doctor post discharge if patient experiences shortness of breath, chest pain, swelling, cough, or weight gain of three pounds in a day/five pounds in a week. Also notified patient to call the doctor with dizziness, increased fatigue, decreased or difficulty urinating. Pt verbalized understanding. No additional questions at this time.     Education Time: 10 Minutes

## 2018-09-15 NOTE — PROGRESS NOTES
Shift handoff given to [facility], [facility]. Care transferred.

## 2018-09-15 NOTE — PROGRESS NOTES
Progress Note    Admit Date:  9/13/2018       Admitted for shortness of breath. She is a smoker. She quit smoking 3 years ago. She does not have a history of COPD or CHF. She had normal stress test in the past . She has noted some edema in her legs. Denies any chest pain although she did complain of some atypical chest pain to her PCP and was referred to cardiology. She's had a cough for the last few days. She also noticed increased shortness of breath. No abdominal pain nausea vomiting. She is not on oxygen at home. Subjective:  Ms. Jenkins Epp remain short of breath, remains on oxygen 4 L. Does not use any home O2. Seen by cardiology, diuresed well with IV Lasix, negative 4.8 L, weight decreased 2 pounds since admission. Stress test pending today  Being also treated for COPD exacerbation, on nebulizers and steroids  Echocardiogram showed grade 2 diastolic dysfunction normal EF. Objective:   BP (!) 184/92   Pulse 74   Temp 97.1 °F (36.2 °C) (Oral)   Resp 16   Ht 5' 7\" (1.702 m)   Wt 248 lb 9.6 oz (112.8 kg)   SpO2 90%   BMI 38.94 kg/m²          Intake/Output Summary (Last 24 hours) at 09/15/18 1311  Last data filed at 09/15/18 1045   Gross per 24 hour   Intake              687 ml   Output             2175 ml   Net            -1488 ml       Physical Exam:    General appearance: alert, appears stated age and cooperative  Head: Normocephalic, without obvious abnormality, atraumatic  Eyes: conjunctivae/corneas clear. PERRL, EOM's intact.   Neck: no adenopathy, no carotid bruit, no JVD, supple, symmetrical, trachea midline and thyroid not enlarged, symmetric, no tenderness/mass/nodules  Lungs: No accessory muscle use, diminished BS, no wheezes or crackles  Heart: regular rate and rhythm, S1, S2 normal, no murmur, click, rub or gallop  Abdomen: soft, non-tender; bowel sounds normal; no masses,  no organomegaly  Extremities: extremities normal, atraumatic, no cyanosis , no leg edema today  Pulses: 2+ and symmetric  Skin: Skin color, texture, turgor normal. No rashes or lesions  Neurologic: Grossly normal    Scheduled Meds:   furosemide  40 mg Oral Daily    predniSONE  40 mg Oral Daily    guaiFENesin  600 mg Oral BID    aspirin  81 mg Oral Daily    atorvastatin  40 mg Oral Daily    estrogens (conjugated)  0.625 mg Oral Daily    FLUoxetine  40 mg Oral Daily    gabapentin  300 mg Oral TID    hydrALAZINE  50 mg Oral BID    metoprolol  100 mg Oral BID    rOPINIRole  2 mg Oral TID    sodium chloride flush  10 mL Intravenous 2 times per day    enoxaparin  40 mg Subcutaneous Daily    doxycycline hyclate  100 mg Oral Q12H    ipratropium-albuterol  1 ampule Inhalation Q4H While awake       Continuous Infusions:      PRN Meds:  oxyCODONE-acetaminophen, sodium chloride flush, magnesium hydroxide, ondansetron, albuterol      Data:  CBC:   Recent Labs      09/13/18 2005 09/14/18   0548   WBC  13.3*  13.2*   HGB  10.5*  10.5*   HCT  33.0*  32.3*   MCV  78.1*  77.6*   PLT  283  317     BMP:   Recent Labs      09/13/18 2005 09/14/18   0548   NA  136  138   K  4.1  3.8   CL  100  101   CO2  25  25   BUN  10  13   CREATININE  0.6  0.7     LIVER PROFILE:   Recent Labs      09/13/18 2005   AST  32   ALT  23   BILITOT  0.5   ALKPHOS  235*     Results for Lukas Wellington (MRN [id number]) as of 9/14/2018 09:53   Ref. Range 9/13/2018 20:25   Pro-BNP Latest Ref Range: 0 - 124 pg/mL 2,053 (H)     PT/INR: No results for input(s): PROTIME, INR in the last 72 hours. Cultures: pending      Radiology    Stress/Rest NM Myocardial SPECT RX   Final Result      XR CHEST PORTABLE   Final Result   Findings likely represent pulmonary edema, though superimposed pneumonia   remains a possibility.             Stress test 3/2015 negative    Assessment:  Principal Problem:    Shortness of breath  Active Problems:    Hypertension    Hyperlipidemia    Depression    Class 2 obesity with serious comorbidity and body mass index (BMI) of 39.0 to 39.9 in adult    Acute respiratory failure (HCC)    Acute pulmonary edema (HCC)    Acute congestive heart failure (HCC)    Iron deficiency anemia  Resolved Problems:    * No resolved hospital problems. *      Plan:      #  Acute hypoxic respiratory failure   Patient presented to the emergency room with shortness of breath or cough. She is a smoker. She does not carry a history of asthma or COPD. She has no history of CHF. Chest x-ray showed pulmonary edema. BNP was elevated . She does not have a fever. Her white cell count is mildly elevated. History of tobacco abuse . She is admitted to the hospital for further workup. -Multifactorial .  Secondary to COPD exacerbation and acute diastolic CHF  -Continues to require supplemental oxygen 4 L. Wean as tolerated. # COPD exacerbation  -  on nebulizers and steroids. Continue empiric antibiotics doxycycline     # Acute diastolic CHF  - Seen by cardiology  - Echocardiogram showed grade 2 diastolic dysfunction normal EF.  -TSH normal  -  diuresed well with IV Lasix, negative 4.8 L, weight decreased 2 pounds since admission. Switch to oral Lasix today. - Stress test pending today  - Monitor I&O. #  Hypertension. Under fair control.  - She takes Lopressor and hydralazine at home. Hydralazine may be causing some of her peripheral edema. #  Hyperlipidemia.   -Continue with Lipitor. #  Depression.   -Continue with Prozac. #  Tobacco abuse   - Counseled on cessation of smoking. She has not smoked in 3 weeks. #  Obesity. -BMI 39. Weight loss recommended. - probable LISA , will need outpt sleep study      # Anemia  - iron studies pending     #  Lovenox for DVT prophylaxis.       DIET LOW SODIUM 2 GM; 1500 ml   Full Code      Thora Crigler, MD 9/15/2018 1:11 PM

## 2018-09-15 NOTE — PROGRESS NOTES
AM assessment completed. NPO for stress test, PRN pain medication given per request. VSS on 4 lpm. A/O x4. Denies any needs, call light in reach. Will monitor. Taken off floor for stress testing.

## 2018-09-15 NOTE — PROGRESS NOTES
Pt had Lexiscan Myoview stress test, pt janeen well , pt had no chest pain, report called to [facility] on pcu aware pt in good condition, resp easy/even.

## 2018-09-16 LAB — OCCULT BLOOD SCREENING: NORMAL

## 2018-09-16 PROCEDURE — 6370000000 HC RX 637 (ALT 250 FOR IP): Performed by: INTERNAL MEDICINE

## 2018-09-16 PROCEDURE — G0328 FECAL BLOOD SCRN IMMUNOASSAY: HCPCS

## 2018-09-16 PROCEDURE — 6360000002 HC RX W HCPCS: Performed by: INTERNAL MEDICINE

## 2018-09-16 PROCEDURE — 2700000000 HC OXYGEN THERAPY PER DAY

## 2018-09-16 PROCEDURE — 2580000003 HC RX 258: Performed by: INTERNAL MEDICINE

## 2018-09-16 PROCEDURE — 94640 AIRWAY INHALATION TREATMENT: CPT

## 2018-09-16 PROCEDURE — 2060000000 HC ICU INTERMEDIATE R&B

## 2018-09-16 PROCEDURE — 99233 SBSQ HOSP IP/OBS HIGH 50: CPT | Performed by: INTERNAL MEDICINE

## 2018-09-16 PROCEDURE — 94762 N-INVAS EAR/PLS OXIMTRY CONT: CPT

## 2018-09-16 RX ORDER — HYDRALAZINE HYDROCHLORIDE 50 MG/1
50 TABLET, FILM COATED ORAL EVERY 8 HOURS SCHEDULED
Status: DISCONTINUED | OUTPATIENT
Start: 2018-09-16 | End: 2018-09-17 | Stop reason: HOSPADM

## 2018-09-16 RX ORDER — SPIRONOLACTONE 25 MG/1
25 TABLET ORAL DAILY
Status: DISCONTINUED | OUTPATIENT
Start: 2018-09-16 | End: 2018-09-17 | Stop reason: HOSPADM

## 2018-09-16 RX ORDER — FERROUS SULFATE 325(65) MG
325 TABLET ORAL 2 TIMES DAILY WITH MEALS
Status: DISCONTINUED | OUTPATIENT
Start: 2018-09-16 | End: 2018-09-17 | Stop reason: HOSPADM

## 2018-09-16 RX ORDER — LISINOPRIL 10 MG/1
10 TABLET ORAL DAILY
Status: DISCONTINUED | OUTPATIENT
Start: 2018-09-16 | End: 2018-09-16

## 2018-09-16 RX ORDER — IPRATROPIUM BROMIDE AND ALBUTEROL SULFATE 2.5; .5 MG/3ML; MG/3ML
1 SOLUTION RESPIRATORY (INHALATION) 3 TIMES DAILY
Status: DISCONTINUED | OUTPATIENT
Start: 2018-09-17 | End: 2018-09-17 | Stop reason: HOSPADM

## 2018-09-16 RX ADMIN — ROPINIROLE HYDROCHLORIDE 2 MG: 1 TABLET, FILM COATED ORAL at 08:09

## 2018-09-16 RX ADMIN — LISINOPRIL 10 MG: 10 TABLET ORAL at 08:09

## 2018-09-16 RX ADMIN — Medication 10 ML: at 08:10

## 2018-09-16 RX ADMIN — HYDRALAZINE HYDROCHLORIDE 50 MG: 50 TABLET, FILM COATED ORAL at 21:27

## 2018-09-16 RX ADMIN — DOXYCYCLINE HYCLATE 100 MG: 100 TABLET, FILM COATED ORAL at 22:59

## 2018-09-16 RX ADMIN — FLUOXETINE HYDROCHLORIDE 40 MG: 20 CAPSULE ORAL at 08:09

## 2018-09-16 RX ADMIN — ROPINIROLE HYDROCHLORIDE 2 MG: 1 TABLET, FILM COATED ORAL at 21:27

## 2018-09-16 RX ADMIN — GUAIFENESIN 600 MG: 600 TABLET, EXTENDED RELEASE ORAL at 21:27

## 2018-09-16 RX ADMIN — IPRATROPIUM BROMIDE AND ALBUTEROL SULFATE 1 AMPULE: .5; 3 SOLUTION RESPIRATORY (INHALATION) at 10:47

## 2018-09-16 RX ADMIN — Medication 10 ML: at 21:27

## 2018-09-16 RX ADMIN — ATORVASTATIN CALCIUM 40 MG: 40 TABLET, FILM COATED ORAL at 08:09

## 2018-09-16 RX ADMIN — DOXYCYCLINE HYCLATE 100 MG: 100 TABLET, FILM COATED ORAL at 10:44

## 2018-09-16 RX ADMIN — GUAIFENESIN 600 MG: 600 TABLET, EXTENDED RELEASE ORAL at 08:09

## 2018-09-16 RX ADMIN — IPRATROPIUM BROMIDE AND ALBUTEROL SULFATE 1 AMPULE: .5; 3 SOLUTION RESPIRATORY (INHALATION) at 06:42

## 2018-09-16 RX ADMIN — GABAPENTIN 300 MG: 300 CAPSULE ORAL at 21:27

## 2018-09-16 RX ADMIN — FERROUS SULFATE TAB 325 MG (65 MG ELEMENTAL FE) 325 MG: 325 (65 FE) TAB at 16:50

## 2018-09-16 RX ADMIN — OXYCODONE HYDROCHLORIDE AND ACETAMINOPHEN 1 TABLET: 7.5; 325 TABLET ORAL at 16:50

## 2018-09-16 RX ADMIN — HYDRALAZINE HYDROCHLORIDE 50 MG: 50 TABLET, FILM COATED ORAL at 08:09

## 2018-09-16 RX ADMIN — GABAPENTIN 300 MG: 300 CAPSULE ORAL at 13:01

## 2018-09-16 RX ADMIN — METOPROLOL TARTRATE 100 MG: 50 TABLET ORAL at 08:09

## 2018-09-16 RX ADMIN — METOPROLOL TARTRATE 100 MG: 50 TABLET ORAL at 21:27

## 2018-09-16 RX ADMIN — HYDRALAZINE HYDROCHLORIDE 50 MG: 50 TABLET, FILM COATED ORAL at 15:08

## 2018-09-16 RX ADMIN — OXYCODONE HYDROCHLORIDE AND ACETAMINOPHEN 1 TABLET: 7.5; 325 TABLET ORAL at 10:43

## 2018-09-16 RX ADMIN — IPRATROPIUM BROMIDE AND ALBUTEROL SULFATE 1 AMPULE: .5; 3 SOLUTION RESPIRATORY (INHALATION) at 14:51

## 2018-09-16 RX ADMIN — FERROUS SULFATE TAB 325 MG (65 MG ELEMENTAL FE) 325 MG: 325 (65 FE) TAB at 10:44

## 2018-09-16 RX ADMIN — IPRATROPIUM BROMIDE AND ALBUTEROL SULFATE 1 AMPULE: .5; 3 SOLUTION RESPIRATORY (INHALATION) at 19:32

## 2018-09-16 RX ADMIN — GABAPENTIN 300 MG: 300 CAPSULE ORAL at 08:09

## 2018-09-16 RX ADMIN — ROPINIROLE HYDROCHLORIDE 2 MG: 1 TABLET, FILM COATED ORAL at 13:01

## 2018-09-16 RX ADMIN — PREDNISONE 40 MG: 20 TABLET ORAL at 08:09

## 2018-09-16 RX ADMIN — FUROSEMIDE 40 MG: 40 TABLET ORAL at 08:09

## 2018-09-16 RX ADMIN — SPIRONOLACTONE 25 MG: 25 TABLET ORAL at 08:09

## 2018-09-16 RX ADMIN — OXYCODONE HYDROCHLORIDE AND ACETAMINOPHEN 1 TABLET: 7.5; 325 TABLET ORAL at 04:23

## 2018-09-16 RX ADMIN — OXYCODONE HYDROCHLORIDE AND ACETAMINOPHEN 1 TABLET: 7.5; 325 TABLET ORAL at 22:59

## 2018-09-16 RX ADMIN — ENOXAPARIN SODIUM 40 MG: 40 INJECTION SUBCUTANEOUS at 08:09

## 2018-09-16 ASSESSMENT — PAIN DESCRIPTION - ORIENTATION
ORIENTATION: MID
ORIENTATION: MID
ORIENTATION: LOWER

## 2018-09-16 ASSESSMENT — PAIN SCALES - GENERAL
PAINLEVEL_OUTOF10: 8
PAINLEVEL_OUTOF10: 8
PAINLEVEL_OUTOF10: 5
PAINLEVEL_OUTOF10: 8
PAINLEVEL_OUTOF10: 7

## 2018-09-16 ASSESSMENT — PAIN DESCRIPTION - DESCRIPTORS
DESCRIPTORS: ACHING;CONSTANT
DESCRIPTORS: ACHING;CONSTANT
DESCRIPTORS: ACHING

## 2018-09-16 ASSESSMENT — PAIN DESCRIPTION - ONSET
ONSET: ON-GOING
ONSET: ON-GOING

## 2018-09-16 ASSESSMENT — PAIN DESCRIPTION - PROGRESSION
CLINICAL_PROGRESSION: GRADUALLY WORSENING
CLINICAL_PROGRESSION: NOT CHANGED

## 2018-09-16 ASSESSMENT — PAIN DESCRIPTION - PAIN TYPE
TYPE: CHRONIC PAIN
TYPE: CHRONIC PAIN
TYPE: ACUTE PAIN;CHRONIC PAIN

## 2018-09-16 ASSESSMENT — PAIN DESCRIPTION - LOCATION
LOCATION: BACK;HEAD
LOCATION: BACK;HEAD
LOCATION: BACK;HIP

## 2018-09-16 ASSESSMENT — PAIN DESCRIPTION - FREQUENCY
FREQUENCY: CONTINUOUS

## 2018-09-16 NOTE — PROGRESS NOTES
[address] Daily Progress Note      Admit Date:  9/13/2018    Reason for Consultation/Chief Complaint: \"I have been having  Increasing shortness of breath for a week. \"Today she reports she is not short of breath or chest pain. She is still wearing oxygen by [place]. Cardiac stress test neg for ischemia BP is noted to be high  This is primary cause of heart failure. She was just put on premarin which will not help the uncontrolled HBP. History of Present Illness:  Ophelia Lilly is a 66 y.o. patient who presented to the hospital with complaints of shortness of breath for a week. Symptoms worsening. Exertion makes it worse. Rest makes it better. No orthopnea. No PND. She finds it hard to take a deep breath due to pressure in chest.  She has cough for 4 weeks got better with antibiotics but worse again she is productive of green sputum occasional flakes of blood. Legs swelling for 2 yrs. This admission chest xray shows pulmonary edema and her EKG shows old septal infarct. Elevated EDP on echo. Pro BNP is elevated. I have been asked to provide consultation regarding further management and testing.   ROS:  12 point ROS negative in all areas as listed below except as in [address]  Constitutional, EENT, Cardiovascular, pulmonary, GI, , Musculoskeletal, skin, neurological, hematological, endocrine, Psychiatric    Past Medical History:   Diagnosis Date    Class 2 obesity with serious comorbidity and body mass index (BMI) of 39.0 to 39.9 in adult 9/14/2018    Depression     Hyperlipidemia     Hypertension     Kidney stone      Past Surgical History:   Procedure Laterality Date    CHOLECYSTECTOMY      FOOT SURGERY      HYSTERECTOMY      JOINT REPLACEMENT      KNEE SURGERY         Objective:   BP (!) 201/95   Pulse 72   Temp 97.4 °F (36.3 °C) (Oral)   Resp 16   Ht 5' 7\" (1.702 m)   Wt 249 lb 11.2 oz (113.3 kg)   SpO2 98%   BMI 39.11 kg/m²       Intake/Output Summary (Last 24 hours) at 09/16/18 32   ALT  23   BILITOT  0.5   ALKPHOS  235*     PT/INR: No results for input(s): PROTIME, INR in the last 72 hours. APTT: No results for input(s): APTT in the last 72 hours. BNP:  No results for input(s): BNP in the last 72 hours. IMAGING:  Nuclear stress test 9.15.18    Summary   Normal LV size and systolic function.   Left ventricular ejection fraction of 69 %.   There is a very small sized mild intensity fixed defect of the anteroapical   wall. This is consistent with breast attenuation artifact.   No evidence of myocardium at risk for significant reversible ischemia.     Echo doppler of heart 9.14.18   Summary   Normal left ventricular systolic function with an estimated ejection   fraction of 55-60%.   There is mild concentric left ventricular hypertrophy.   Grade II diastolic dysfunction with elevated filing pressure.   The left atrium is mildly dilated.   Mild mitral regurgitation.   Mild tricuspid regurgitation.   Systolic pulmonary artery pressure (SPAP) estimated at 46 mmHg (RA pressure   8 mmHg), consistent with mild pulmonary hypertension. Assessment:  Acute diastolic CHF  COPD exacerbation  Hypertension refractory  Hyperlipidemia  Obesity  Body habitus is concerning for sleep apnea  Iron def anemia  Patient Active Problem List    Diagnosis Date Noted    Class 2 obesity with serious comorbidity and body mass index (BMI) of 39.0 to 39.9 in adult 09/14/2018    Hypertension     Hyperlipidemia     Depression     Acute respiratory failure (HCC)     Acute pulmonary edema (HCC)     Acute congestive heart failure (HCC)     Iron deficiency anemia     Shortness of breath 09/13/2018       Plan:  1. Iron replacement stool x1 neg for Blood  2. She is euthyroid  3. Does not appear to be fluid overloaded,  on Po lasix double dose than at home  4. She needs to be on a low salt low calorie diet  5.  Start Aldactone, stop premarin discussed with patient she had no objection, add lisinopril  Consider out

## 2018-09-16 NOTE — PROGRESS NOTES
2+ and symmetric  Skin: Skin color, texture, turgor normal. No rashes or lesions  Neurologic: Grossly normal    Scheduled Meds:   spironolactone  25 mg Oral Daily    ferrous sulfate  325 mg Oral BID WC    hydrALAZINE  50 mg Oral 3 times per day    furosemide  40 mg Oral Daily    predniSONE  40 mg Oral Daily    guaiFENesin  600 mg Oral BID    atorvastatin  40 mg Oral Daily    FLUoxetine  40 mg Oral Daily    gabapentin  300 mg Oral TID    metoprolol  100 mg Oral BID    rOPINIRole  2 mg Oral TID    sodium chloride flush  10 mL Intravenous 2 times per day    enoxaparin  40 mg Subcutaneous Daily    doxycycline hyclate  100 mg Oral Q12H    ipratropium-albuterol  1 ampule Inhalation Q4H While awake       Continuous Infusions:      PRN Meds:  oxyCODONE-acetaminophen, sodium chloride flush, magnesium hydroxide, ondansetron, albuterol      Data:  CBC:   Recent Labs      09/13/18 2005 09/14/18   0548   WBC  13.3*  13.2*   HGB  10.5*  10.5*   HCT  33.0*  32.3*   MCV  78.1*  77.6*   PLT  283  317     BMP:   Recent Labs      09/13/18 2005 09/14/18   0548   NA  136  138   K  4.1  3.8   CL  100  101   CO2  25  25   BUN  10  13   CREATININE  0.6  0.7     LIVER PROFILE:   Recent Labs      09/13/18 2005   AST  32   ALT  23   BILITOT  0.5   ALKPHOS  235*     Results for Lowella Boys (MRN [id number]) as of 9/14/2018 09:53   Ref. Range 9/13/2018 20:25   Pro-BNP Latest Ref Range: 0 - 124 pg/mL 2,053 (H)     PT/INR: No results for input(s): PROTIME, INR in the last 72 hours. Iron studies   Iron   26     Iron Saturation   9     TIBC   281         Blood Cultures:  NGTD      Radiology  Stress/Rest NM Myocardial SPECT RX   Final Result      Summary   Normal LV size and systolic function.   Left ventricular ejection fraction of 69 %.   There is a very small sized mild intensity fixed defect of the anteroapical   wall.  This is consistent with breast attenuation artifact.   No evidence of myocardium at risk for to oral Lasix now. - Stress test  negative  - Monitor I&O. #  Hypertension  - poorly controlled   - continue lopressor, increase dose of hydralazine   - premarin discontinued   - LISINOPRIL WAS ADDED TODAY, PATIENT NOW RECALLS THAT SHE HAD ALLERGIC REACTION TO LISINOPRIL WITH TONGUE SWELLING . DC LISINOPRIL, ADDED AS AN ALLERGY     #  Hyperlipidemia  -Continue with Lipitor. #  Depression  -Continue with Prozac. #  Tobacco abuse   - Counseled on cessation of smoking. She has not smoked in 3 weeks. #  Obesity  -BMI 39. Weight loss recommended. -probable LISA , will need outpt sleep study      # Anemia  - iron studies show iron deficiency anemia   - stool occult neg   - start ferrous sulfate     #  Lovenox for DVT prophylaxis.       DIET LOW SODIUM 2 GM; 1500 ml   Full Code       DC in AM if sats and BP improve       Raul Bermudez MD 9/16/2018 2:17 PM

## 2018-09-16 NOTE — PROGRESS NOTES
Patient sitting up in bed, watching tv. No s/s of distress noted. Pt rates chronic low back and Bilateral hip pain a 7/10 when assessed. Pt had PRN med for pain earlier. Repositioned for comfort with pillow support. Call light within reach. Shift assessment completed. Will continue to monitor.

## 2018-09-16 NOTE — PROGRESS NOTES
Bedside report given to [facility], RN care transferred.  Sandy Worley

## 2018-09-16 NOTE — PROGRESS NOTES
Patient resting in bed quietly. Denies any needs at this time. Call light in reach. Will monitor. Reviewed several videos with patient on CHF (daily weights and low Na+ diet). Pt verbalized understanding.

## 2018-09-17 VITALS
WEIGHT: 249.9 LBS | RESPIRATION RATE: 16 BRPM | TEMPERATURE: 98.1 F | OXYGEN SATURATION: 95 % | SYSTOLIC BLOOD PRESSURE: 130 MMHG | BODY MASS INDEX: 39.22 KG/M2 | HEART RATE: 68 BPM | DIASTOLIC BLOOD PRESSURE: 82 MMHG | HEIGHT: 67 IN

## 2018-09-17 PROBLEM — R06.02 SHORTNESS OF BREATH: Status: RESOLVED | Noted: 2018-09-13 | Resolved: 2018-09-17

## 2018-09-17 LAB — OCCULT BLOOD SCREENING: NORMAL

## 2018-09-17 PROCEDURE — 6370000000 HC RX 637 (ALT 250 FOR IP): Performed by: INTERNAL MEDICINE

## 2018-09-17 PROCEDURE — 2580000003 HC RX 258: Performed by: INTERNAL MEDICINE

## 2018-09-17 PROCEDURE — 6360000002 HC RX W HCPCS: Performed by: INTERNAL MEDICINE

## 2018-09-17 PROCEDURE — 94761 N-INVAS EAR/PLS OXIMETRY MLT: CPT

## 2018-09-17 PROCEDURE — 2700000000 HC OXYGEN THERAPY PER DAY

## 2018-09-17 PROCEDURE — 99238 HOSP IP/OBS DSCHRG MGMT 30/<: CPT | Performed by: INTERNAL MEDICINE

## 2018-09-17 PROCEDURE — 94640 AIRWAY INHALATION TREATMENT: CPT

## 2018-09-17 RX ORDER — PREDNISONE 10 MG/1
TABLET ORAL
Qty: 18 TABLET | Refills: 0 | Status: SHIPPED | OUTPATIENT
Start: 2018-09-17 | End: 2019-07-17

## 2018-09-17 RX ORDER — FERROUS SULFATE 325(65) MG
325 TABLET ORAL 2 TIMES DAILY WITH MEALS
Qty: 60 TABLET | Refills: 3 | Status: SHIPPED | OUTPATIENT
Start: 2018-09-17 | End: 2019-07-17

## 2018-09-17 RX ORDER — FUROSEMIDE 40 MG/1
40 TABLET ORAL DAILY
Qty: 30 TABLET | Refills: 3 | Status: SHIPPED | OUTPATIENT
Start: 2018-09-18

## 2018-09-17 RX ORDER — HYDRALAZINE HYDROCHLORIDE 50 MG/1
50 TABLET, FILM COATED ORAL EVERY 8 HOURS SCHEDULED
Qty: 90 TABLET | Refills: 3 | Status: SHIPPED | OUTPATIENT
Start: 2018-09-17 | End: 2019-10-02

## 2018-09-17 RX ORDER — SPIRONOLACTONE 25 MG/1
25 TABLET ORAL DAILY
Qty: 30 TABLET | Refills: 3 | Status: SHIPPED | OUTPATIENT
Start: 2018-09-18

## 2018-09-17 RX ORDER — OXYCODONE AND ACETAMINOPHEN 10; 325 MG/1; MG/1
1 TABLET ORAL EVERY 6 HOURS PRN
COMMUNITY
Start: 2018-09-17 | End: 2019-08-06

## 2018-09-17 RX ORDER — DOXYCYCLINE HYCLATE 100 MG
100 TABLET ORAL EVERY 12 HOURS
Qty: 6 TABLET | Refills: 0 | Status: SHIPPED | OUTPATIENT
Start: 2018-09-17 | End: 2018-09-20

## 2018-09-17 RX ADMIN — IPRATROPIUM BROMIDE AND ALBUTEROL SULFATE 1 AMPULE: .5; 3 SOLUTION RESPIRATORY (INHALATION) at 06:41

## 2018-09-17 RX ADMIN — HYDRALAZINE HYDROCHLORIDE 50 MG: 50 TABLET, FILM COATED ORAL at 05:17

## 2018-09-17 RX ADMIN — Medication 10 ML: at 08:15

## 2018-09-17 RX ADMIN — ROPINIROLE HYDROCHLORIDE 2 MG: 1 TABLET, FILM COATED ORAL at 13:56

## 2018-09-17 RX ADMIN — SPIRONOLACTONE 25 MG: 25 TABLET ORAL at 08:15

## 2018-09-17 RX ADMIN — FERROUS SULFATE TAB 325 MG (65 MG ELEMENTAL FE) 325 MG: 325 (65 FE) TAB at 08:14

## 2018-09-17 RX ADMIN — HYDRALAZINE HYDROCHLORIDE 50 MG: 50 TABLET, FILM COATED ORAL at 13:56

## 2018-09-17 RX ADMIN — GABAPENTIN 300 MG: 300 CAPSULE ORAL at 08:14

## 2018-09-17 RX ADMIN — PREDNISONE 40 MG: 20 TABLET ORAL at 08:14

## 2018-09-17 RX ADMIN — METOPROLOL TARTRATE 100 MG: 50 TABLET ORAL at 08:14

## 2018-09-17 RX ADMIN — ENOXAPARIN SODIUM 40 MG: 40 INJECTION SUBCUTANEOUS at 08:15

## 2018-09-17 RX ADMIN — FLUOXETINE HYDROCHLORIDE 40 MG: 20 CAPSULE ORAL at 08:14

## 2018-09-17 RX ADMIN — DOXYCYCLINE HYCLATE 100 MG: 100 TABLET, FILM COATED ORAL at 11:36

## 2018-09-17 RX ADMIN — IPRATROPIUM BROMIDE AND ALBUTEROL SULFATE 1 AMPULE: .5; 3 SOLUTION RESPIRATORY (INHALATION) at 13:11

## 2018-09-17 RX ADMIN — GABAPENTIN 300 MG: 300 CAPSULE ORAL at 13:56

## 2018-09-17 RX ADMIN — ROPINIROLE HYDROCHLORIDE 2 MG: 1 TABLET, FILM COATED ORAL at 08:14

## 2018-09-17 RX ADMIN — FUROSEMIDE 40 MG: 40 TABLET ORAL at 08:14

## 2018-09-17 RX ADMIN — MAGNESIUM HYDROXIDE 30 ML: 400 SUSPENSION ORAL at 08:21

## 2018-09-17 RX ADMIN — OXYCODONE HYDROCHLORIDE AND ACETAMINOPHEN 1 TABLET: 7.5; 325 TABLET ORAL at 11:36

## 2018-09-17 RX ADMIN — ATORVASTATIN CALCIUM 40 MG: 40 TABLET, FILM COATED ORAL at 08:14

## 2018-09-17 RX ADMIN — OXYCODONE HYDROCHLORIDE AND ACETAMINOPHEN 1 TABLET: 7.5; 325 TABLET ORAL at 05:28

## 2018-09-17 RX ADMIN — GUAIFENESIN 600 MG: 600 TABLET, EXTENDED RELEASE ORAL at 08:15

## 2018-09-17 ASSESSMENT — PAIN SCALES - GENERAL
PAINLEVEL_OUTOF10: 3
PAINLEVEL_OUTOF10: 7

## 2018-09-17 NOTE — PROGRESS NOTES
Shift assessment complete, see flow sheets. Patient is alert and oriented, c/o heartburn at this time. MOM given at this time. Vital signs stable, call light within reach. Will continue to monitor. Patient educated on need for 2 more occult stool specimens, clean hat placed in toilet.

## 2018-09-17 NOTE — PLAN OF CARE
Problem: Pain:  Goal: Pain level will decrease  Pain level will decrease   Outcome: Ongoing  PRN pain medication given for c/o chronic pain, see mar. Problem: Falls - Risk of:  Goal: Will remain free from falls  Will remain free from falls   Outcome: Ongoing  Bed in lowest position. Side rails x2. Nonskid socks on when out of bed. Room free of clutter. Call light in reach. Problem: OXYGENATION/RESPIRATORY FUNCTION  Goal: Patient will maintain patent airway  Outcome: Ongoing  Patient's EF (Ejection Fraction) is greater than 40%    Patient has a past medical history of Class 2 obesity with serious comorbidity and body mass index (BMI) of 39.0 to 39.9 in adult; Depression; Hyperlipidemia; Hypertension; and Kidney stone. Comorbidities reviewed and education provided. Patient and family's stated goal of care: increase activity tolerance prior to discharge    Patient's current functional capacity:  Slight limitation of physical activity. Comfortable at rest. Ordinary physical activity results in fatigue, palpitation, dyspnea. Pt resting in bed at this time on 1 L O2. Pt denies shortness of breath. Pt without lower extremity edema. Patient's weights and intake/output reviewed:    Patient Vitals for the past 96 hrs (Last 3 readings):   Weight   09/16/18 0432 249 lb 11.2 oz (113.3 kg)   09/15/18 0335 248 lb 9.6 oz (112.8 kg)   09/13/18 2230 250 lb 12.8 oz (113.8 kg)       Intake/Output Summary (Last 24 hours) at 09/16/18 2314  Last data filed at 09/16/18 2257   Gross per 24 hour   Intake             1470 ml   Output             2501 ml   Net            -1031 ml       Patient provided with education on CHF signs/symptoms, causes, discharge medications, daily weights, low sodium diet, activity, and follow-up. Notified patient to call the doctor post discharge if patient experiences shortness of breath, chest pain, swelling, cough, or weight gain of three pounds in a day/five pounds in a week.  Also notified

## 2018-09-17 NOTE — PROGRESS NOTES
RN attempted to wean pt of oxygen. Pt tolerated weaning to 0.5L from 1 L. When oxygen was removed pts Spo2 sustained in high 80s, pt returned to 1L. Will continue to monitor.

## 2018-09-17 NOTE — CARE COORDINATION
INTERDISCIPLINARY PLAN OF CARE CONFERENCE and Dc Note    Date/Time: 2018 11:10 AM  Completed by: Silvano Maharaj      Patient Name:  Carina Ireland  YOB: 1960  Admitting Diagnosis: Shortness of breath [R06.02]     Admit Date/Time:  2018  7:35 PM    Chart reviewed. Interdisciplinary team met to discuss patient progress and discharge plans. Disciplines included Case Management, Nursing, and Dietitian. Current Status:stable    Anticipated Discharge Date: tbd  Expected D/C Disposition:  Home  Confirmed plan with patient and/or family Yes  Discharge Plan Comments: Chart reviewed and role of dcp explained. Pt is from house with family and continues with plan to return. Pt on RA SpO2 95%. Pt denies HHC at this time. Home O2 in place on admit: No  Pt informed of need to bring portable home O2 tank on day of discharge for nursing to connect prior to leaving:  Not Indicated  Verbalized agreement/Understanding:  Not Indicated    DISCHARGE ORDER  Date/Time 2018 1:59 PM  Completed by: Silvano Maharaj    Patient Name: Carina Ireland    : 1960  Admitting Diagnosis: Shortness of breath [R06.02]  Admit Date/Time: 2018  7:35 PM    Noted discharge order. Confirmed discharge plan with patient / family (pt): Yes   Discharge Plan: Chart reviewed. Pt is returning home with family. Pt is IPTA. Denies HHC at this time. No dc needs voiced or identified. Discharge timeout done  with Sue Celeste RN. All discharge needs and concerns addressed.

## 2018-09-17 NOTE — PROGRESS NOTES
Patient's EF (Ejection Fraction) is greater than 40%    Patient has a past medical history of Class 2 obesity with serious comorbidity and body mass index (BMI) of 39.0 to 39.9 in adult; Depression; Hyperlipidemia; Hypertension; and Kidney stone. Comorbidities reviewed and education provided. Patient and family's stated goal of care: better understand heart failure and disease management prior to discharge    Patient's current functional capacity:  Slight limitation of physical activity. Comfortable at rest. Ordinary physical activity results in fatigue, palpitation, dyspnea. Pt resting in bed at this time on 1 L O2. Pt denies shortness of breath. Pt with nonpitting lower extremity edema. Patient's weights and intake/output reviewed:    Patient Vitals for the past 96 hrs (Last 3 readings):   Weight   09/17/18 0345 249 lb 14.4 oz (113.4 kg)   09/16/18 0432 249 lb 11.2 oz (113.3 kg)   09/15/18 0335 248 lb 9.6 oz (112.8 kg)       Intake/Output Summary (Last 24 hours) at 09/17/18 0939  Last data filed at 09/17/18 0843   Gross per 24 hour   Intake             1305 ml   Output             3200 ml   Net            -1895 ml       Patient provided with education on CHF signs/symptoms, causes, discharge medications, daily weights, low sodium diet, activity, and follow-up. Notified patient to call the doctor post discharge if patient experiences shortness of breath, chest pain, swelling, cough, or weight gain of three pounds in a day/five pounds in a week. Also notified patient to call the doctor with dizziness, increased fatigue, decreased or difficulty urinating. Pt with no evidence of learning. No additional questions at this time.     Education Time: 15 Minutes

## 2018-09-18 ENCOUNTER — TELEPHONE (OUTPATIENT)
Dept: TELEMETRY | Age: 58
End: 2018-09-18

## 2018-09-18 LAB
BLOOD CULTURE, ROUTINE: NORMAL
CULTURE, BLOOD 2: NORMAL

## 2018-09-18 NOTE — DISCHARGE SUMMARY
Name:  Raven Steward  Room:  /0209-10  MRN:    [id number]    Discharge Summary      This discharge summary is in conjunction with a complete physical exam done on the day of discharge. Discharging Physician: Dr. Plata Daughters: 9/13/2018  Discharge:  9/17/2018    HPI taken from admission H&P:    66 y.o. female who presented to the hospital with a chief complaint shortness of breath and cough x 2 days. The patient states that the past 3 days she has had a productive cough with greenish sputum and increased dyspnea on exertion and at rest. Today her symptoms got worse so she decided to call the EMS. She was found to be hypoxic on RA, satting 88%. She was placed on NC and transported to the ED. She remained hypoxic in the ED despite multiple nebulizer treatments. A CXR was obtained that showed interstitial markings and edema. She also had an elevated BNP concerning for CHF. She will be admitted for further medical evaluation. Diagnoses this Admission and Hospital Course     #  Acute hypoxic respiratory failure   Patient presented to the emergency room with shortness of breath or cough. She has H/O tobacco use, quit smoking 3 years ago . She does not carry a history of asthma or COPD. She has no history of CHF. Chest x-ray showed pulmonary edema. BNP was elevated. She does not have a fever. Her white cell count is mildly elevated. She is admitted to the hospital for further workup. - Hypoxia is multifactorial . Secondary to COPD exacerbation and acute diastolic CHF. Management as below   - Sats improving . O2 4 L --> RA at discharge      # COPD exacerbation  -  Placed on nebulizers, steroids and empiric antibiotics doxycycline - finish doxy course and prednisone taper at discharge      # Acute diastolic CHF  - due to Hypertensive Heart Disease   - Seen by cardiology  - Echocardiogram showed grade 2 diastolic dysfunction, LVH, normal EF.  - TSH normal  - Diuresed well with IV Lasix, negative 8.1 L. Switched to oral Lasix   - Stress test  negative     #  Hypertension  - poorly controlled   - continue lopressor, increased dose of hydralazine   - premarin discontinued   - LISINOPRIL WAS ADDED, THEN PATIENT RECALLED THAT SHE HAD ALLERGIC REACTION TO LISINOPRIL WITH TONGUE SWELLING . DISCONTINUED LISINOPRIL, ADDED AS AN ALLERGY      #  Hyperlipidemia  -Continue with Lipitor.     #  Depression  -Continue with Prozac.     #  Tobacco abuse   - Counseled on cessation of smoking. She has not smoked in 3 weeks.     #  Obesity  -BMI 39. Weight loss recommended. -probable LISA , will need outpt sleep study       # Anemia  - iron studies show iron deficiency anemia   - stool occult neg   - started ferrous sulfate        Procedures (Please Review Full Report for Details)  None     Consults    Cardiology     Physical Exam at Discharge:    /82   Pulse 68   Temp 98.1 °F (36.7 °C) (Oral)   Resp 16   Ht 5' 7\" (1.702 m)   Wt 249 lb 14.4 oz (113.4 kg)   SpO2 95%   BMI 39.14 kg/m²   General appearance: alert, appears stated age and cooperative  Head: Normocephalic, without obvious abnormality, atraumatic  Eyes: conjunctivae/corneas clear. PERRL, EOM's intact.   Neck: no adenopathy, no carotid bruit, no JVD, supple, symmetrical, trachea midline and thyroid not enlarged, symmetric, no tenderness/mass/nodules  Lungs: No accessory muscle use, diminished BS, no wheezes or crackles  Heart: regular rate and rhythm, S1, S2 normal, no murmur, click, rub or gallop  Abdomen: soft, non-tender; bowel sounds normal; no masses,  no organomegaly  Extremities: extremities normal, atraumatic, no cyanosis , no leg edema today  Pulses: 2+ and symmetric  Skin: Skin color, texture, turgor normal. No rashes or lesions  Neurologic: Grossly normal     Data:  CBC:        Recent Labs      09/13/18 2005 09/14/18   0548   WBC  13.3*  13.2*   HGB  10.5*  10.5*   HCT  33.0*  32.3*   MCV  78.1*  77.6*   PLT  283  317      BMP:        Recent Labs

## 2018-09-20 NOTE — TELEPHONE ENCOUNTER
3rd Attempt; No Answer- Left HIPAA compliant voicemail with Non-Urgent Heart Failure Resource Line number for call back.     Cee Callaway HF BSN-RN  Heart Failure Clinical Educator  [facility]  Marian Christianson  [phone]

## 2019-02-01 ENCOUNTER — HOSPITAL ENCOUNTER (EMERGENCY)
Age: 59
Discharge: HOME | End: 2019-02-01
Payer: MEDICARE

## 2019-02-01 ENCOUNTER — APPOINTMENT (OUTPATIENT)
Dept: GENERAL RADIOLOGY | Age: 59
End: 2019-02-01
Payer: MEDICARE

## 2019-02-01 VITALS
HEART RATE: 65 BPM | HEIGHT: 64 IN | SYSTOLIC BLOOD PRESSURE: 118 MMHG | OXYGEN SATURATION: 97 % | DIASTOLIC BLOOD PRESSURE: 75 MMHG | RESPIRATION RATE: 16 BRPM | BODY MASS INDEX: 43.54 KG/M2 | WEIGHT: 255 LBS | TEMPERATURE: 98.1 F

## 2019-02-01 DIAGNOSIS — S90.32XA CONTUSION OF LEFT FOOT, INITIAL ENCOUNTER: Primary | ICD-10-CM

## 2019-02-01 PROCEDURE — 99283 EMERGENCY DEPT VISIT LOW MDM: CPT

## 2019-02-01 PROCEDURE — 73630 X-RAY EXAM OF FOOT: CPT

## 2019-02-01 ASSESSMENT — ENCOUNTER SYMPTOMS
SHORTNESS OF BREATH: 0
ABDOMINAL PAIN: 0

## 2019-02-01 ASSESSMENT — PAIN DESCRIPTION - ORIENTATION: ORIENTATION: LEFT

## 2019-02-01 ASSESSMENT — PAIN DESCRIPTION - LOCATION: LOCATION: FOOT

## 2019-02-01 ASSESSMENT — PAIN DESCRIPTION - PAIN TYPE: TYPE: ACUTE PAIN

## 2019-02-01 ASSESSMENT — PAIN SCALES - GENERAL: PAINLEVEL_OUTOF10: 10

## 2019-07-17 ENCOUNTER — APPOINTMENT (OUTPATIENT)
Dept: GENERAL RADIOLOGY | Age: 59
DRG: 871 | End: 2019-07-17
Payer: MEDICARE

## 2019-07-17 ENCOUNTER — HOSPITAL ENCOUNTER (INPATIENT)
Age: 59
LOS: 2 days | Discharge: HOME | DRG: 871 | End: 2019-07-19
Attending: EMERGENCY MEDICINE | Admitting: INTERNAL MEDICINE
Payer: MEDICARE

## 2019-07-17 ENCOUNTER — APPOINTMENT (OUTPATIENT)
Dept: CT IMAGING | Age: 59
DRG: 871 | End: 2019-07-17
Payer: MEDICARE

## 2019-07-17 DIAGNOSIS — R06.4 HYPERVENTILATION: Primary | ICD-10-CM

## 2019-07-17 DIAGNOSIS — E83.42 HYPOMAGNESEMIA: ICD-10-CM

## 2019-07-17 DIAGNOSIS — E87.20 LACTIC ACIDOSIS: ICD-10-CM

## 2019-07-17 DIAGNOSIS — E87.6 HYPOKALEMIA: ICD-10-CM

## 2019-07-17 DIAGNOSIS — E87.3 RESPIRATORY ALKALOSIS: ICD-10-CM

## 2019-07-17 LAB
ALBUMIN SERPL-MCNC: 3.9 G/DL (ref 3.4–5)
ALP BLD-CCNC: 99 U/L (ref 40–129)
ALT SERPL-CCNC: 15 U/L (ref 10–40)
ANION GAP SERPL CALCULATED.3IONS-SCNC: 20 MMOL/L (ref 3–16)
AST SERPL-CCNC: 21 U/L (ref 15–37)
BASE EXCESS ARTERIAL: 6.6 MMOL/L (ref -3–3)
BASOPHILS ABSOLUTE: 0 K/UL (ref 0–0.2)
BASOPHILS RELATIVE PERCENT: 0 %
BILIRUB SERPL-MCNC: 0.8 MG/DL (ref 0–1)
BILIRUBIN DIRECT: <0.2 MG/DL (ref 0–0.3)
BILIRUBIN, INDIRECT: NORMAL MG/DL (ref 0–1)
BUN BLDV-MCNC: 12 MG/DL (ref 7–20)
CALCIUM SERPL-MCNC: 9.3 MG/DL (ref 8.3–10.6)
CARBOXYHEMOGLOBIN ARTERIAL: 1.4 % (ref 0–1.5)
CHLORIDE BLD-SCNC: 96 MMOL/L (ref 99–110)
CO2: 24 MMOL/L (ref 21–32)
CREAT SERPL-MCNC: 1 MG/DL (ref 0.6–1.1)
EOSINOPHILS ABSOLUTE: 0 K/UL (ref 0–0.6)
EOSINOPHILS RELATIVE PERCENT: 0 %
GFR AFRICAN AMERICAN: >60
GFR NON-AFRICAN AMERICAN: 57
GLUCOSE BLD-MCNC: 148 MG/DL (ref 70–99)
HCO3 ARTERIAL: 23.6 MMOL/L (ref 21–29)
HCT VFR BLD CALC: 46.4 % (ref 36–48)
HEMOGLOBIN, ART, EXTENDED: 15.7 G/DL (ref 12–16)
HEMOGLOBIN: 15.7 G/DL (ref 12–16)
LACTIC ACID, SEPSIS: 3.9 MMOL/L (ref 0.4–1.9)
LACTIC ACID, SEPSIS: 5 MMOL/L (ref 0.4–1.9)
LACTIC ACID: 1.7 MMOL/L (ref 0.4–2)
LYMPHOCYTES ABSOLUTE: 3.1 K/UL (ref 1–5.1)
LYMPHOCYTES RELATIVE PERCENT: 28 %
MAGNESIUM: 1.4 MG/DL (ref 1.8–2.4)
MCH RBC QN AUTO: 30.5 PG (ref 26–34)
MCHC RBC AUTO-ENTMCNC: 33.9 G/DL (ref 31–36)
MCV RBC AUTO: 90.1 FL (ref 80–100)
METHEMOGLOBIN ARTERIAL: 0.2 %
MONOCYTES ABSOLUTE: 0.6 K/UL (ref 0–1.3)
MONOCYTES RELATIVE PERCENT: 5 %
NEUTROPHILS ABSOLUTE: 7.5 K/UL (ref 1.7–7.7)
NEUTROPHILS RELATIVE PERCENT: 67 %
O2 CONTENT ARTERIAL: 22 ML/DL
O2 SAT, ARTERIAL: 98.9 %
O2 THERAPY: ABNORMAL
PCO2 ARTERIAL: 18.6 MMHG (ref 35–45)
PDW BLD-RTO: 14.5 % (ref 12.4–15.4)
PH ARTERIAL: 7.72 (ref 7.35–7.45)
PLATELET # BLD: 207 K/UL (ref 135–450)
PLATELET SLIDE REVIEW: ADEQUATE
PMV BLD AUTO: 8.8 FL (ref 5–10.5)
PO2 ARTERIAL: 105 MMHG (ref 75–108)
POTASSIUM REFLEX MAGNESIUM: 2.7 MMOL/L (ref 3.5–5.1)
PRO-BNP: 765 PG/ML (ref 0–124)
PROCALCITONIN: 0.02 NG/ML (ref 0–0.15)
RBC # BLD: 5.14 M/UL (ref 4–5.2)
SLIDE REVIEW: ABNORMAL
SODIUM BLD-SCNC: 140 MMOL/L (ref 136–145)
TCO2 ARTERIAL: 24.2 MMOL/L
TOTAL PROTEIN: 7.7 G/DL (ref 6.4–8.2)
TROPONIN: <0.01 NG/ML
WBC # BLD: 11.2 K/UL (ref 4–11)

## 2019-07-17 PROCEDURE — 80048 BASIC METABOLIC PNL TOTAL CA: CPT

## 2019-07-17 PROCEDURE — 6370000000 HC RX 637 (ALT 250 FOR IP): Performed by: INTERNAL MEDICINE

## 2019-07-17 PROCEDURE — 85025 COMPLETE CBC W/AUTO DIFF WBC: CPT

## 2019-07-17 PROCEDURE — 83880 ASSAY OF NATRIURETIC PEPTIDE: CPT

## 2019-07-17 PROCEDURE — 36415 COLL VENOUS BLD VENIPUNCTURE: CPT

## 2019-07-17 PROCEDURE — 2580000003 HC RX 258: Performed by: INTERNAL MEDICINE

## 2019-07-17 PROCEDURE — 96365 THER/PROPH/DIAG IV INF INIT: CPT

## 2019-07-17 PROCEDURE — 96375 TX/PRO/DX INJ NEW DRUG ADDON: CPT

## 2019-07-17 PROCEDURE — 87801 DETECT AGNT MULT DNA AMPLI: CPT

## 2019-07-17 PROCEDURE — 96361 HYDRATE IV INFUSION ADD-ON: CPT

## 2019-07-17 PROCEDURE — 93005 ELECTROCARDIOGRAM TRACING: CPT | Performed by: EMERGENCY MEDICINE

## 2019-07-17 PROCEDURE — 2500000003 HC RX 250 WO HCPCS: Performed by: INTERNAL MEDICINE

## 2019-07-17 PROCEDURE — 84145 PROCALCITONIN (PCT): CPT

## 2019-07-17 PROCEDURE — 84484 ASSAY OF TROPONIN QUANT: CPT

## 2019-07-17 PROCEDURE — 6360000004 HC RX CONTRAST MEDICATION: Performed by: EMERGENCY MEDICINE

## 2019-07-17 PROCEDURE — 2060000000 HC ICU INTERMEDIATE R&B

## 2019-07-17 PROCEDURE — 74177 CT ABD & PELVIS W/CONTRAST: CPT

## 2019-07-17 PROCEDURE — 71260 CT THORAX DX C+: CPT

## 2019-07-17 PROCEDURE — 2580000003 HC RX 258: Performed by: EMERGENCY MEDICINE

## 2019-07-17 PROCEDURE — 6360000002 HC RX W HCPCS: Performed by: EMERGENCY MEDICINE

## 2019-07-17 PROCEDURE — 99285 EMERGENCY DEPT VISIT HI MDM: CPT

## 2019-07-17 PROCEDURE — 6360000002 HC RX W HCPCS: Performed by: INTERNAL MEDICINE

## 2019-07-17 PROCEDURE — 87077 CULTURE AEROBIC IDENTIFY: CPT

## 2019-07-17 PROCEDURE — 80076 HEPATIC FUNCTION PANEL: CPT

## 2019-07-17 PROCEDURE — 36600 WITHDRAWAL OF ARTERIAL BLOOD: CPT

## 2019-07-17 PROCEDURE — 82803 BLOOD GASES ANY COMBINATION: CPT

## 2019-07-17 PROCEDURE — 6370000000 HC RX 637 (ALT 250 FOR IP): Performed by: EMERGENCY MEDICINE

## 2019-07-17 PROCEDURE — 83605 ASSAY OF LACTIC ACID: CPT

## 2019-07-17 PROCEDURE — 87040 BLOOD CULTURE FOR BACTERIA: CPT

## 2019-07-17 PROCEDURE — 71045 X-RAY EXAM CHEST 1 VIEW: CPT

## 2019-07-17 PROCEDURE — 83735 ASSAY OF MAGNESIUM: CPT

## 2019-07-17 RX ORDER — 0.9 % SODIUM CHLORIDE 0.9 %
1000 INTRAVENOUS SOLUTION INTRAVENOUS ONCE
Status: COMPLETED | OUTPATIENT
Start: 2019-07-17 | End: 2019-07-17

## 2019-07-17 RX ORDER — ACETAMINOPHEN 325 MG/1
650 TABLET ORAL EVERY 4 HOURS PRN
Status: DISCONTINUED | OUTPATIENT
Start: 2019-07-17 | End: 2019-07-19 | Stop reason: HOSPADM

## 2019-07-17 RX ORDER — SODIUM CHLORIDE 0.9 % (FLUSH) 0.9 %
10 SYRINGE (ML) INJECTION EVERY 12 HOURS SCHEDULED
Status: DISCONTINUED | OUTPATIENT
Start: 2019-07-17 | End: 2019-07-19 | Stop reason: HOSPADM

## 2019-07-17 RX ORDER — ALBUTEROL SULFATE 90 UG/1
2 AEROSOL, METERED RESPIRATORY (INHALATION) 4 TIMES DAILY
Status: DISCONTINUED | OUTPATIENT
Start: 2019-07-17 | End: 2019-07-19 | Stop reason: HOSPADM

## 2019-07-17 RX ORDER — ASPIRIN 81 MG/1
81 TABLET ORAL DAILY
Status: DISCONTINUED | OUTPATIENT
Start: 2019-07-17 | End: 2019-07-19 | Stop reason: HOSPADM

## 2019-07-17 RX ORDER — MORPHINE SULFATE 4 MG/ML
4 INJECTION, SOLUTION INTRAMUSCULAR; INTRAVENOUS ONCE
Status: COMPLETED | OUTPATIENT
Start: 2019-07-17 | End: 2019-07-17

## 2019-07-17 RX ORDER — MAGNESIUM SULFATE IN WATER 40 MG/ML
2 INJECTION, SOLUTION INTRAVENOUS ONCE
Status: COMPLETED | OUTPATIENT
Start: 2019-07-17 | End: 2019-07-17

## 2019-07-17 RX ORDER — SODIUM CHLORIDE 0.9 % (FLUSH) 0.9 %
10 SYRINGE (ML) INJECTION PRN
Status: DISCONTINUED | OUTPATIENT
Start: 2019-07-17 | End: 2019-07-19 | Stop reason: HOSPADM

## 2019-07-17 RX ORDER — HYDRALAZINE HYDROCHLORIDE 50 MG/1
50 TABLET, FILM COATED ORAL EVERY 8 HOURS SCHEDULED
Status: DISCONTINUED | OUTPATIENT
Start: 2019-07-17 | End: 2019-07-19 | Stop reason: HOSPADM

## 2019-07-17 RX ORDER — METHYLPREDNISOLONE SODIUM SUCCINATE 125 MG/2ML
125 INJECTION, POWDER, LYOPHILIZED, FOR SOLUTION INTRAMUSCULAR; INTRAVENOUS ONCE
Status: COMPLETED | OUTPATIENT
Start: 2019-07-17 | End: 2019-07-17

## 2019-07-17 RX ORDER — ONDANSETRON 2 MG/ML
4 INJECTION INTRAMUSCULAR; INTRAVENOUS EVERY 6 HOURS PRN
Status: DISCONTINUED | OUTPATIENT
Start: 2019-07-17 | End: 2019-07-19 | Stop reason: HOSPADM

## 2019-07-17 RX ORDER — MAGNESIUM SULFATE 1 G/100ML
1 INJECTION INTRAVENOUS PRN
Status: DISCONTINUED | OUTPATIENT
Start: 2019-07-17 | End: 2019-07-18 | Stop reason: SDUPTHER

## 2019-07-17 RX ORDER — SPIRONOLACTONE 25 MG/1
25 TABLET ORAL DAILY
Status: DISCONTINUED | OUTPATIENT
Start: 2019-07-18 | End: 2019-07-19 | Stop reason: HOSPADM

## 2019-07-17 RX ORDER — FLUOXETINE 10 MG/1
40 CAPSULE ORAL DAILY
Status: DISCONTINUED | OUTPATIENT
Start: 2019-07-17 | End: 2019-07-19 | Stop reason: HOSPADM

## 2019-07-17 RX ORDER — OXYCODONE AND ACETAMINOPHEN 10; 325 MG/1; MG/1
1 TABLET ORAL EVERY 6 HOURS PRN
Status: DISCONTINUED | OUTPATIENT
Start: 2019-07-17 | End: 2019-07-19 | Stop reason: HOSPADM

## 2019-07-17 RX ORDER — POTASSIUM CHLORIDE 7.45 MG/ML
10 INJECTION INTRAVENOUS PRN
Status: DISCONTINUED | OUTPATIENT
Start: 2019-07-17 | End: 2019-07-19 | Stop reason: HOSPADM

## 2019-07-17 RX ORDER — METOPROLOL TARTRATE 50 MG/1
100 TABLET, FILM COATED ORAL 2 TIMES DAILY
Status: DISCONTINUED | OUTPATIENT
Start: 2019-07-17 | End: 2019-07-19 | Stop reason: HOSPADM

## 2019-07-17 RX ORDER — TIZANIDINE 4 MG/1
4 TABLET ORAL 2 TIMES DAILY PRN
Status: DISCONTINUED | OUTPATIENT
Start: 2019-07-17 | End: 2019-07-19 | Stop reason: HOSPADM

## 2019-07-17 RX ORDER — DEXTROSE, SODIUM CHLORIDE, AND POTASSIUM CHLORIDE 5; .45; .15 G/100ML; G/100ML; G/100ML
INJECTION INTRAVENOUS CONTINUOUS
Status: DISCONTINUED | OUTPATIENT
Start: 2019-07-17 | End: 2019-07-18

## 2019-07-17 RX ORDER — OXYBUTYNIN CHLORIDE 5 MG/1
5 TABLET ORAL 3 TIMES DAILY
Status: DISCONTINUED | OUTPATIENT
Start: 2019-07-17 | End: 2019-07-19 | Stop reason: HOSPADM

## 2019-07-17 RX ORDER — POTASSIUM CHLORIDE 7.45 MG/ML
10 INJECTION INTRAVENOUS ONCE
Status: COMPLETED | OUTPATIENT
Start: 2019-07-17 | End: 2019-07-17

## 2019-07-17 RX ORDER — POTASSIUM CHLORIDE 20 MEQ/1
40 TABLET, EXTENDED RELEASE ORAL PRN
Status: DISCONTINUED | OUTPATIENT
Start: 2019-07-17 | End: 2019-07-19 | Stop reason: HOSPADM

## 2019-07-17 RX ORDER — POTASSIUM CHLORIDE 20 MEQ/1
40 TABLET, EXTENDED RELEASE ORAL ONCE
Status: COMPLETED | OUTPATIENT
Start: 2019-07-17 | End: 2019-07-17

## 2019-07-17 RX ORDER — ATORVASTATIN CALCIUM 40 MG/1
40 TABLET, FILM COATED ORAL DAILY
Status: DISCONTINUED | OUTPATIENT
Start: 2019-07-17 | End: 2019-07-19 | Stop reason: HOSPADM

## 2019-07-17 RX ORDER — ROPINIROLE 2 MG/1
2 TABLET, FILM COATED ORAL 4 TIMES DAILY
Status: DISCONTINUED | OUTPATIENT
Start: 2019-07-17 | End: 2019-07-19 | Stop reason: HOSPADM

## 2019-07-17 RX ORDER — POTASSIUM CHLORIDE 7.45 MG/ML
10 INJECTION INTRAVENOUS
Status: COMPLETED | OUTPATIENT
Start: 2019-07-17 | End: 2019-07-17

## 2019-07-17 RX ORDER — PANTOPRAZOLE SODIUM 40 MG/1
40 TABLET, DELAYED RELEASE ORAL
Status: DISCONTINUED | OUTPATIENT
Start: 2019-07-18 | End: 2019-07-19 | Stop reason: HOSPADM

## 2019-07-17 RX ORDER — GABAPENTIN 300 MG/1
300 CAPSULE ORAL 3 TIMES DAILY
Status: DISCONTINUED | OUTPATIENT
Start: 2019-07-17 | End: 2019-07-19 | Stop reason: HOSPADM

## 2019-07-17 RX ADMIN — POTASSIUM CHLORIDE 40 MEQ: 20 TABLET, EXTENDED RELEASE ORAL at 13:10

## 2019-07-17 RX ADMIN — MAGNESIUM SULFATE HEPTAHYDRATE 2 G: 40 INJECTION, SOLUTION INTRAVENOUS at 17:28

## 2019-07-17 RX ADMIN — ASPIRIN 81 MG: 81 TABLET, COATED ORAL at 18:50

## 2019-07-17 RX ADMIN — POTASSIUM CHLORIDE 10 MEQ: 7.46 INJECTION, SOLUTION INTRAVENOUS at 21:56

## 2019-07-17 RX ADMIN — METOPROLOL TARTRATE 100 MG: 50 TABLET ORAL at 20:54

## 2019-07-17 RX ADMIN — IOPAMIDOL 100 ML: 755 INJECTION, SOLUTION INTRAVENOUS at 14:54

## 2019-07-17 RX ADMIN — ROPINIROLE HYDROCHLORIDE 2 MG: 2 TABLET, FILM COATED ORAL at 21:54

## 2019-07-17 RX ADMIN — POTASSIUM CHLORIDE 10 MEQ: 7.46 INJECTION, SOLUTION INTRAVENOUS at 20:53

## 2019-07-17 RX ADMIN — SODIUM CHLORIDE 1000 ML: 9 INJECTION, SOLUTION INTRAVENOUS at 14:38

## 2019-07-17 RX ADMIN — METHYLPREDNISOLONE SODIUM SUCCINATE 125 MG: 125 INJECTION, POWDER, FOR SOLUTION INTRAMUSCULAR; INTRAVENOUS at 17:11

## 2019-07-17 RX ADMIN — POTASSIUM CHLORIDE, DEXTROSE MONOHYDRATE AND SODIUM CHLORIDE: 150; 5; 450 INJECTION, SOLUTION INTRAVENOUS at 18:50

## 2019-07-17 RX ADMIN — Medication 10 ML: at 11:45

## 2019-07-17 RX ADMIN — OXYCODONE HYDROCHLORIDE AND ACETAMINOPHEN 1 TABLET: 10; 325 TABLET ORAL at 18:50

## 2019-07-17 RX ADMIN — POTASSIUM CHLORIDE 10 MEQ: 7.46 INJECTION, SOLUTION INTRAVENOUS at 18:50

## 2019-07-17 RX ADMIN — ATORVASTATIN CALCIUM 40 MG: 40 TABLET, FILM COATED ORAL at 18:50

## 2019-07-17 RX ADMIN — HYDRALAZINE HYDROCHLORIDE 50 MG: 50 TABLET, FILM COATED ORAL at 21:54

## 2019-07-17 RX ADMIN — POTASSIUM CHLORIDE 10 MEQ: 7.46 INJECTION, SOLUTION INTRAVENOUS at 21:55

## 2019-07-17 RX ADMIN — OXYBUTYNIN CHLORIDE 5 MG: 5 TABLET ORAL at 20:54

## 2019-07-17 RX ADMIN — POTASSIUM CHLORIDE 10 MEQ: 7.46 INJECTION, SOLUTION INTRAVENOUS at 13:12

## 2019-07-17 RX ADMIN — FLUOXETINE 40 MG: 10 CAPSULE ORAL at 18:50

## 2019-07-17 RX ADMIN — ONDANSETRON 4 MG: 2 INJECTION INTRAMUSCULAR; INTRAVENOUS at 18:37

## 2019-07-17 RX ADMIN — GABAPENTIN 300 MG: 300 CAPSULE ORAL at 20:54

## 2019-07-17 RX ADMIN — Medication 10 ML: at 20:54

## 2019-07-17 RX ADMIN — MORPHINE SULFATE 4 MG: 4 INJECTION INTRAVENOUS at 14:38

## 2019-07-17 RX ADMIN — SODIUM CHLORIDE 1000 ML: 9 INJECTION, SOLUTION INTRAVENOUS at 13:12

## 2019-07-17 ASSESSMENT — PAIN DESCRIPTION - PROGRESSION
CLINICAL_PROGRESSION: NOT CHANGED

## 2019-07-17 ASSESSMENT — PAIN DESCRIPTION - FREQUENCY: FREQUENCY: CONTINUOUS

## 2019-07-17 ASSESSMENT — PAIN DESCRIPTION - LOCATION
LOCATION: GENERALIZED
LOCATION: BACK

## 2019-07-17 ASSESSMENT — PAIN SCALES - GENERAL
PAINLEVEL_OUTOF10: 10
PAINLEVEL_OUTOF10: 10
PAINLEVEL_OUTOF10: 8
PAINLEVEL_OUTOF10: 10

## 2019-07-17 ASSESSMENT — PAIN DESCRIPTION - DESCRIPTORS: DESCRIPTORS: ACHING;CONSTANT

## 2019-07-17 ASSESSMENT — PAIN - FUNCTIONAL ASSESSMENT: PAIN_FUNCTIONAL_ASSESSMENT: PREVENTS OR INTERFERES SOME ACTIVE ACTIVITIES AND ADLS

## 2019-07-17 ASSESSMENT — PAIN DESCRIPTION - PAIN TYPE: TYPE: CHRONIC PAIN;ACUTE PAIN

## 2019-07-17 ASSESSMENT — PAIN DESCRIPTION - ONSET: ONSET: ON-GOING

## 2019-07-17 NOTE — ED NOTES
Lab called with panics, pH = 7.721 and PCO2=18.6. Dr. Efren Cooper made aware at this time.       Rogerio Horton RN  07/17/19 4860

## 2019-07-17 NOTE — ED PROVIDER NOTES
[address]      Pt Name: Rocio Buchanan  MRN: [id number]  [place] 1960  Date of evaluation: 7/17/2019  Provider: Maryellen Lira DO    CHIEF COMPLAINT       Chief Complaint   Patient presents with    Shortness of Breath     past 3 days increased with exertion. pt was 90% per squad gave a duoneb in route unable to catch breath put on cpap. pt arrived on cpap    Emesis     past 3 days         HISTORY OF PRESENT ILLNESS   (Location/Symptom, Timing/Onset, Context/Setting, Quality, Duration, Modifying Factors, Severity)  Note limiting factors. Rocio Buchanan is a 64 y.o. female with a history of grade 2 diastolic dysfunction, morbid obesity who presents to the emergency department with complaint of shortness of breath. Patient reports that started last night prior to going to sleep. She is not able to sleep secondary to the shortness of breath. EMS arrived and state that she was satting around 90% on room air and was given a DuoNeb but patient continued to be tachypneic so placed on CPAP. Patient reports improvement on CPAP. She also reports she is unable to keep anything down the past 3 days and has been vomiting. Reports it is nonbloody nonbilious. Denies chest pain, cough, fever. Has used her inhaler at home with little relief. Nursing Notes were reviewed.       PAST MEDICAL HISTORY     Past Medical History:   Diagnosis Date    Class 2 obesity with serious comorbidity and body mass index (BMI) of 39.0 to 39.9 in adult 9/14/2018    Depression     Hyperlipidemia     Hypertension     Kidney stone          SURGICAL HISTORY       Past Surgical History:   Procedure Laterality Date    CHOLECYSTECTOMY      FACIAL COSMETIC SURGERY      FOOT SURGERY      HYSTERECTOMY      JOINT REPLACEMENT      KNEE SURGERY           CURRENT MEDICATIONS       Current Discharge Medication List      CONTINUE these medications which have NOT CHANGED    Details   hydrALAZINE (APRESOLINE) 50 MG tablet Take 1 tablet by mouth every 8 hours  Qty: 90 tablet, Refills: 3      furosemide (LASIX) 40 MG tablet Take 1 tablet by mouth daily  Qty: 30 tablet, Refills: 3      oxyCODONE-acetaminophen (PERCOCET)  MG per tablet Take 1 tablet by mouth every 6 hours as needed for Pain. .    Associated Diagnoses: Other chronic pain      spironolactone (ALDACTONE) 25 MG tablet Take 1 tablet by mouth daily  Qty: 30 tablet, Refills: 3      albuterol sulfate  (90 Base) MCG/ACT inhaler Inhale 2 puffs into the lungs 4 times daily  Qty: 1 Inhaler, Refills: 0      oxybutynin (DITROPAN) 5 MG tablet Take 5 mg by mouth 3 times daily      tiZANidine (ZANAFLEX) 4 MG tablet Take 4 mg by mouth 2 times daily as needed      metoprolol (LOPRESSOR) 100 MG tablet Take 100 mg by mouth 2 times daily      rOPINIRole (REQUIP) 2 MG tablet Take 2 mg by mouth 4 times daily       gabapentin (NEURONTIN) 300 MG capsule Take 300 mg by mouth 4 times daily. aspirin 81 MG EC tablet Take 81 mg by mouth daily      atorvastatin (LIPITOR) 20 MG tablet Take 40 mg by mouth daily       omeprazole (PRILOSEC) 20 MG capsule Take 40 mg by mouth Daily       FLUoxetine (PROZAC) 40 MG capsule Take 40 mg by mouth daily. ALLERGIES     Lisinopril; Codeine; Cymbalta [duloxetine hcl]; Penicillins; and Sulfa antibiotics    FAMILY HISTORY     History reviewed. No pertinent family history.        SOCIAL HISTORY       Social History     Socioeconomic History    Marital status:      Spouse name: None    Number of children: None    Years of education: None    Highest education level: None   Occupational History    None   Social Needs    Financial resource strain: None    Food insecurity:     Worry: None     Inability: None    Transportation needs:     Medical: None     Non-medical: None   Tobacco Use    Smoking status: Current Some Day Smoker     Packs/day: 1.00     Years: 35.00     Pack years: 35.00     Types: normocephalic, atraumatic, oropharynx moist, nares patent  Neck: normal range of motion, no tenderness, trachea midline, no stridor  Eyes: PERRLA, EOMI, conjunctiva normal  Respiratory: normal breath sounds, non labored breathing pattern  Cardiovascular: normal heart rate, normal rhythm  GI: nontender, bowel sounds normal, soft, nondistended, no pulsatile masses  Musculoskeletal: intact distal pulses, no clubbing, cyanosis, or edema. Good range of motion  Back: no tenderness  Integument: warm, dry, no erythema, no rash, < 2 second cap refill  Neurologic: alert and oriented ×3, no focal deficits appreciated    DIAGNOSTIC RESULTS     EKG: Normal sinus rhythm, rate 75, normal QRS, normal QT, no ST depression or elevation, normal T wave, EKG from December 18, 2017 shows bradycardia at 64 but otherwise no gross changes    All EKG's are interpreted by the Emergency Department Physician who either signs or Co-signs this chart in the absence of a cardiologist.      RADIOLOGY:   Interpretation per the Radiologist below, if available at the time of this note:    CT ABDOMEN PELVIS W IV CONTRAST Additional Contrast? None   Final Result   No acute intra-abdominal abnormality or explanation for pain. Small hiatal hernia. Nonobstructing nephrolithiasis on the left. CT Chest Pulmonary Embolism W Contrast   Final Result   No evidence of pulmonary embolus. Patchy ground-glass airspace disease nonspecific. This could represent   residual from pulmonary edema given prior chest film findings. Suspect underlying interstitial lung disease in the periphery of the lungs   bilaterally upper lobe predominant with small cystic air spaces and small   nodules. Several small nodules are present largest measuring 5 mm mean diameter. See   recommendation referenced below. This is likely benign since this is   essentially unchanged from prior CT 2 years earlier. RECOMMENDATIONS:   Multiple pulmonary nodules. unchanged from previous CT. Rest of lab work shows hypokalemia and hypomagnesemia which will be replaced here in the emergency room. Discussed with the hospitalist and will admit for electrolyte replacement and evaluation of tachypnea. CONSULTS:  None      FINAL IMPRESSION      1. Hyperventilation    2. Respiratory alkalosis    3. Hypokalemia    4. Lactic acidosis    5.  Hypomagnesemia          DISPOSITION/PLAN   DISPOSITION Admitted 07/17/2019 05:30:05 PM      PATIENT REFERRED TO:  Bo Singletary            DISCHARGE MEDICATIONS:  Current Discharge Medication List             (Please note that portions of this note were completed with a voice recognition program.  Efforts were made to edit the dictations but occasionally words are mis-transcribed.)    Lilo Craft DO (electronically signed)  Attending Emergency Physician      Lilo Craft DO  07/17/19 0865

## 2019-07-17 NOTE — ED NOTES
8773- Perfect serve sent Dr. Yessy Berry for admission        [phone]- Call was returned by Dr. Yessy Berry and spoke to Dr. Kleber Matias  07/17/19 Haja  07/17/19 0402

## 2019-07-17 NOTE — CARE COORDINATION
Case Management Assessment  Initial Evaluation    Date/Time of Evaluation: 7/17/2019 4:43 PM  Assessment Completed by: Kayla Rosenthal    Patient Name: Jeff Dubose  YOB: 1960  Diagnosis: No admission diagnoses are documented for this encounter. Date / Time: 7/17/2019 11:39 AM  Admission status/Date: Inpatient 7/17/2019  Chart Reviewed: Yes      Patient Interviewed: Yes   Family Interviewed:  No      Hospitalization in the last 30 days:  No    Contacts  :   Deysi Herrera  Relationship to Patient: SO  Phone Number:   DP  Alternate Contact:   Sarah Pedersen  Relationship to Patient:   son  Phone Number:   [phone]    Met with: patient and SO  Current PCP: Aaron Burns MD    [address] required for SNF : Y, N        3 night stay required - Y, N    ADLS  Support Systems:    Transportation: SO    Meal Preparation: self/SO    Housing  Home Environment: W/SO  Steps: NA  Plans to Return to Present Housing: Yes  Other Identified Issues: NA    Home Care Information  Currently active with [address] : No     Passport/Waiver : No  :                      Phone Number:    Passport/Waiver Services: NA          Durable Medical Equipment   DME Provider: LU[facility] ELENA  Equipment: NONE  Walker__Cane__RTS__ BSC__Shower Chair__  02__ HHN__ CPAP__  BiPap__  Hospital Bed__ W/C___ Other__________      Has Home O2 in place on admit:  No  Informed of need to bring portable home O2 tank on day of discharge for nursing to connect prior to leaving:   No  Verbalized agreement/Understanding:   No    Community Service Affiliation  Dialysis:  No    · Name:  · Location  · Dialysis Schedule:  · Phone:   · Fax: Outpatient PT/OT: No    Cancer Center: No     CHF Clinic: No     Pulmonary Rehab: No  Pain Clinic: No  Community Mental Health: No    Wound Clinic: No     Other:     DISCHARGE PLAN: Chart reviewed. Met with pt and SO at bedside and explained the role of the CM. Plan: IPTA.  Gilles

## 2019-07-17 NOTE — ED NOTES
Dr. Harsh Spencer at bed side updating pt and family on disposition at this time. Will continue to monitor.       Onel Helms RN  07/17/19 1393

## 2019-07-18 ENCOUNTER — APPOINTMENT (OUTPATIENT)
Dept: GENERAL RADIOLOGY | Age: 59
DRG: 871 | End: 2019-07-18
Payer: MEDICARE

## 2019-07-18 PROBLEM — E87.20 LACTIC ACIDOSIS: Status: ACTIVE | Noted: 2019-07-18

## 2019-07-18 PROBLEM — R11.2 NAUSEA & VOMITING: Status: ACTIVE | Noted: 2019-07-18

## 2019-07-18 LAB
ANION GAP SERPL CALCULATED.3IONS-SCNC: 11 MMOL/L (ref 3–16)
BASOPHILS ABSOLUTE: 0.1 K/UL (ref 0–0.2)
BASOPHILS RELATIVE PERCENT: 0.4 %
BUN BLDV-MCNC: 12 MG/DL (ref 7–20)
CALCIUM SERPL-MCNC: 8.8 MG/DL (ref 8.3–10.6)
CHLORIDE BLD-SCNC: 102 MMOL/L (ref 99–110)
CO2: 23 MMOL/L (ref 21–32)
CREAT SERPL-MCNC: 0.7 MG/DL (ref 0.6–1.1)
EKG ATRIAL RATE: 75 BPM
EKG DIAGNOSIS: NORMAL
EKG P AXIS: 54 DEGREES
EKG P-R INTERVAL: 148 MS
EKG Q-T INTERVAL: 428 MS
EKG QRS DURATION: 84 MS
EKG QTC CALCULATION (BAZETT): 477 MS
EKG R AXIS: 15 DEGREES
EKG T AXIS: 20 DEGREES
EKG VENTRICULAR RATE: 75 BPM
EOSINOPHILS ABSOLUTE: 0 K/UL (ref 0–0.6)
EOSINOPHILS RELATIVE PERCENT: 0 %
GFR AFRICAN AMERICAN: >60
GFR NON-AFRICAN AMERICAN: >60
GLUCOSE BLD-MCNC: 148 MG/DL (ref 70–99)
HCT VFR BLD CALC: 42.6 % (ref 36–48)
HEMOGLOBIN: 14.1 G/DL (ref 12–16)
LACTIC ACID: 1 MMOL/L (ref 0.4–2)
LACTIC ACID: 2.2 MMOL/L (ref 0.4–2)
LACTIC ACID: 3.1 MMOL/L (ref 0.4–2)
LACTIC ACID: 4.7 MMOL/L (ref 0.4–2)
LYMPHOCYTES ABSOLUTE: 1.5 K/UL (ref 1–5.1)
LYMPHOCYTES RELATIVE PERCENT: 13 %
MAGNESIUM: 2.3 MG/DL (ref 1.8–2.4)
MCH RBC QN AUTO: 30.4 PG (ref 26–34)
MCHC RBC AUTO-ENTMCNC: 33.2 G/DL (ref 31–36)
MCV RBC AUTO: 91.6 FL (ref 80–100)
MONOCYTES ABSOLUTE: 0.4 K/UL (ref 0–1.3)
MONOCYTES RELATIVE PERCENT: 3.5 %
NEUTROPHILS ABSOLUTE: 9.9 K/UL (ref 1.7–7.7)
NEUTROPHILS RELATIVE PERCENT: 83.1 %
PDW BLD-RTO: 14.9 % (ref 12.4–15.4)
PLATELET # BLD: 181 K/UL (ref 135–450)
PMV BLD AUTO: 8.9 FL (ref 5–10.5)
POTASSIUM REFLEX MAGNESIUM: 4.2 MMOL/L (ref 3.5–5.1)
RBC # BLD: 4.65 M/UL (ref 4–5.2)
SODIUM BLD-SCNC: 136 MMOL/L (ref 136–145)
WBC # BLD: 11.9 K/UL (ref 4–11)

## 2019-07-18 PROCEDURE — 93010 ELECTROCARDIOGRAM REPORT: CPT | Performed by: INTERNAL MEDICINE

## 2019-07-18 PROCEDURE — 83735 ASSAY OF MAGNESIUM: CPT

## 2019-07-18 PROCEDURE — 83036 HEMOGLOBIN GLYCOSYLATED A1C: CPT

## 2019-07-18 PROCEDURE — 80048 BASIC METABOLIC PNL TOTAL CA: CPT

## 2019-07-18 PROCEDURE — 2580000003 HC RX 258: Performed by: INTERNAL MEDICINE

## 2019-07-18 PROCEDURE — 94761 N-INVAS EAR/PLS OXIMETRY MLT: CPT

## 2019-07-18 PROCEDURE — 6370000000 HC RX 637 (ALT 250 FOR IP): Performed by: INTERNAL MEDICINE

## 2019-07-18 PROCEDURE — 85025 COMPLETE CBC W/AUTO DIFF WBC: CPT

## 2019-07-18 PROCEDURE — 36415 COLL VENOUS BLD VENIPUNCTURE: CPT

## 2019-07-18 PROCEDURE — 6370000000 HC RX 637 (ALT 250 FOR IP): Performed by: PHYSICIAN ASSISTANT

## 2019-07-18 PROCEDURE — 73630 X-RAY EXAM OF FOOT: CPT

## 2019-07-18 PROCEDURE — 6360000002 HC RX W HCPCS: Performed by: INTERNAL MEDICINE

## 2019-07-18 PROCEDURE — 94150 VITAL CAPACITY TEST: CPT

## 2019-07-18 PROCEDURE — 83605 ASSAY OF LACTIC ACID: CPT

## 2019-07-18 PROCEDURE — 99223 1ST HOSP IP/OBS HIGH 75: CPT | Performed by: INTERNAL MEDICINE

## 2019-07-18 PROCEDURE — 94640 AIRWAY INHALATION TREATMENT: CPT

## 2019-07-18 PROCEDURE — 2500000003 HC RX 250 WO HCPCS: Performed by: INTERNAL MEDICINE

## 2019-07-18 PROCEDURE — 2060000000 HC ICU INTERMEDIATE R&B

## 2019-07-18 RX ORDER — PREDNISONE 20 MG/1
40 TABLET ORAL DAILY
Status: DISCONTINUED | OUTPATIENT
Start: 2019-07-18 | End: 2019-07-19 | Stop reason: HOSPADM

## 2019-07-18 RX ORDER — SODIUM CHLORIDE 9 MG/ML
INJECTION, SOLUTION INTRAVENOUS CONTINUOUS
Status: DISCONTINUED | OUTPATIENT
Start: 2019-07-18 | End: 2019-07-19

## 2019-07-18 RX ORDER — MAGNESIUM SULFATE 1 G/100ML
1 INJECTION INTRAVENOUS PRN
Status: DISCONTINUED | OUTPATIENT
Start: 2019-07-18 | End: 2019-07-19 | Stop reason: HOSPADM

## 2019-07-18 RX ADMIN — OXYCODONE HYDROCHLORIDE AND ACETAMINOPHEN 1 TABLET: 10; 325 TABLET ORAL at 09:38

## 2019-07-18 RX ADMIN — OXYBUTYNIN CHLORIDE 5 MG: 5 TABLET ORAL at 13:23

## 2019-07-18 RX ADMIN — Medication 10 ML: at 21:56

## 2019-07-18 RX ADMIN — ROPINIROLE HYDROCHLORIDE 2 MG: 2 TABLET, FILM COATED ORAL at 13:24

## 2019-07-18 RX ADMIN — OXYCODONE HYDROCHLORIDE AND ACETAMINOPHEN 1 TABLET: 10; 325 TABLET ORAL at 15:32

## 2019-07-18 RX ADMIN — OXYCODONE HYDROCHLORIDE AND ACETAMINOPHEN 1 TABLET: 10; 325 TABLET ORAL at 02:39

## 2019-07-18 RX ADMIN — GABAPENTIN 300 MG: 300 CAPSULE ORAL at 13:23

## 2019-07-18 RX ADMIN — Medication 2 PUFF: at 00:34

## 2019-07-18 RX ADMIN — Medication 2 PUFF: at 06:50

## 2019-07-18 RX ADMIN — HYDRALAZINE HYDROCHLORIDE 50 MG: 50 TABLET, FILM COATED ORAL at 21:55

## 2019-07-18 RX ADMIN — ROPINIROLE HYDROCHLORIDE 2 MG: 2 TABLET, FILM COATED ORAL at 21:57

## 2019-07-18 RX ADMIN — ONDANSETRON 4 MG: 2 INJECTION INTRAMUSCULAR; INTRAVENOUS at 10:09

## 2019-07-18 RX ADMIN — ROPINIROLE HYDROCHLORIDE 2 MG: 2 TABLET, FILM COATED ORAL at 16:57

## 2019-07-18 RX ADMIN — POTASSIUM CHLORIDE, DEXTROSE MONOHYDRATE AND SODIUM CHLORIDE: 150; 5; 450 INJECTION, SOLUTION INTRAVENOUS at 08:10

## 2019-07-18 RX ADMIN — METOPROLOL TARTRATE 100 MG: 50 TABLET ORAL at 09:38

## 2019-07-18 RX ADMIN — FLUOXETINE 40 MG: 10 CAPSULE ORAL at 09:38

## 2019-07-18 RX ADMIN — Medication 2 PUFF: at 11:09

## 2019-07-18 RX ADMIN — PANTOPRAZOLE SODIUM 40 MG: 40 TABLET, DELAYED RELEASE ORAL at 07:03

## 2019-07-18 RX ADMIN — ATORVASTATIN CALCIUM 40 MG: 40 TABLET, FILM COATED ORAL at 09:38

## 2019-07-18 RX ADMIN — ROPINIROLE HYDROCHLORIDE 2 MG: 2 TABLET, FILM COATED ORAL at 09:39

## 2019-07-18 RX ADMIN — ASPIRIN 81 MG: 81 TABLET, COATED ORAL at 09:38

## 2019-07-18 RX ADMIN — GABAPENTIN 300 MG: 300 CAPSULE ORAL at 21:55

## 2019-07-18 RX ADMIN — SODIUM CHLORIDE: 9 INJECTION, SOLUTION INTRAVENOUS at 13:18

## 2019-07-18 RX ADMIN — HYDRALAZINE HYDROCHLORIDE 50 MG: 50 TABLET, FILM COATED ORAL at 07:03

## 2019-07-18 RX ADMIN — METOPROLOL TARTRATE 100 MG: 50 TABLET ORAL at 21:55

## 2019-07-18 RX ADMIN — PREDNISONE 40 MG: 20 TABLET ORAL at 11:51

## 2019-07-18 RX ADMIN — HYDRALAZINE HYDROCHLORIDE 50 MG: 50 TABLET, FILM COATED ORAL at 13:23

## 2019-07-18 RX ADMIN — Medication 2 PUFF: at 19:58

## 2019-07-18 RX ADMIN — OXYBUTYNIN CHLORIDE 5 MG: 5 TABLET ORAL at 21:55

## 2019-07-18 RX ADMIN — Medication 2 PUFF: at 15:07

## 2019-07-18 RX ADMIN — SPIRONOLACTONE 25 MG: 25 TABLET ORAL at 09:38

## 2019-07-18 RX ADMIN — ENOXAPARIN SODIUM 40 MG: 40 INJECTION SUBCUTANEOUS at 09:38

## 2019-07-18 RX ADMIN — OXYCODONE HYDROCHLORIDE AND ACETAMINOPHEN 1 TABLET: 10; 325 TABLET ORAL at 21:55

## 2019-07-18 RX ADMIN — GABAPENTIN 300 MG: 300 CAPSULE ORAL at 09:38

## 2019-07-18 RX ADMIN — OXYBUTYNIN CHLORIDE 5 MG: 5 TABLET ORAL at 09:40

## 2019-07-18 ASSESSMENT — PAIN DESCRIPTION - PAIN TYPE
TYPE: CHRONIC PAIN

## 2019-07-18 ASSESSMENT — PAIN DESCRIPTION - FREQUENCY
FREQUENCY: CONTINUOUS

## 2019-07-18 ASSESSMENT — PAIN DESCRIPTION - ORIENTATION
ORIENTATION: LOWER

## 2019-07-18 ASSESSMENT — PAIN SCALES - GENERAL
PAINLEVEL_OUTOF10: 3
PAINLEVEL_OUTOF10: 8
PAINLEVEL_OUTOF10: 8
PAINLEVEL_OUTOF10: 9
PAINLEVEL_OUTOF10: 3
PAINLEVEL_OUTOF10: 8

## 2019-07-18 ASSESSMENT — PAIN DESCRIPTION - ONSET
ONSET: ON-GOING

## 2019-07-18 ASSESSMENT — PAIN DESCRIPTION - PROGRESSION
CLINICAL_PROGRESSION: GRADUALLY WORSENING
CLINICAL_PROGRESSION: NOT CHANGED
CLINICAL_PROGRESSION: NOT CHANGED

## 2019-07-18 ASSESSMENT — PAIN - FUNCTIONAL ASSESSMENT
PAIN_FUNCTIONAL_ASSESSMENT: PREVENTS OR INTERFERES SOME ACTIVE ACTIVITIES AND ADLS
PAIN_FUNCTIONAL_ASSESSMENT: PREVENTS OR INTERFERES SOME ACTIVE ACTIVITIES AND ADLS
PAIN_FUNCTIONAL_ASSESSMENT: ACTIVITIES ARE NOT PREVENTED

## 2019-07-18 ASSESSMENT — PAIN DESCRIPTION - LOCATION
LOCATION: BACK

## 2019-07-18 ASSESSMENT — PAIN DESCRIPTION - DESCRIPTORS
DESCRIPTORS: ACHING
DESCRIPTORS: ACHING;CONSTANT
DESCRIPTORS: ACHING;CONSTANT
DESCRIPTORS: ACHING

## 2019-07-18 NOTE — H&P
Hospital Medicine History & Physical      PCP: Dimitry Alfredo    Date of Admission: 7/17/2019    Date of Service: Pt seen/examined on 7/18/2019    Chief Complaint:    Chief Complaint   Patient presents with    Shortness of Breath     past 3 days increased with exertion. pt was 90% per squad gave a duoneb in route unable to catch breath put on cpap. pt arrived on cpap    Emesis     past 3 days         History Of Present Illness: The patient is a 64 y.o. female with depression, HLD, HTN who presented to [facility] ED with complaint of SOB. Patient states that 3 days ago she started with N/V/D and SOB. She denies any associated abdominal pain and has a chronic cough from smoking but denies any CP as well. She states that she felt severely dehydrated and felt as though she couldn't catch her breath so they called EMS. She denies any fevers or sick contacts at home. She states that her diarrhea has resolved and she has not had any further episodes of vomiting but is still nauseous. No longer feels SOB. Past Medical History:        Diagnosis Date    Class 2 obesity with serious comorbidity and body mass index (BMI) of 39.0 to 39.9 in adult 9/14/2018    Depression     Hyperlipidemia     Hypertension     Kidney stone        Past Surgical History:        Procedure Laterality Date    CHOLECYSTECTOMY      FACIAL COSMETIC SURGERY      FOOT SURGERY      HYSTERECTOMY      JOINT REPLACEMENT      KNEE SURGERY         Medications Prior to Admission:    Prior to Admission medications    Medication Sig Start Date End Date Taking? Authorizing Provider   hydrALAZINE (APRESOLINE) 50 MG tablet Take 1 tablet by mouth every 8 hours 9/17/18  Yes Cem Kaur MD   furosemide (LASIX) 40 MG tablet Take 1 tablet by mouth daily 9/18/18  Yes Cem Kaur MD   oxyCODONE-acetaminophen (PERCOCET)  MG per tablet Take 1 tablet by mouth every 6 hours as needed for Pain. . 9/17/18  Yes Cem Kaur MD   spironolactone Psychiatric/Behavorial: Negative for anxiety    PHYSICAL EXAM:    BP (!) 184/85   Pulse 83   Temp 98 °F (36.7 °C) (Oral)   Resp 18   Ht 5' 7\" (1.702 m)   Wt 253 lb 8 oz (115 kg)   SpO2 90%   BMI 39.70 kg/m²   Gen: No distress. Alert. Eyes: PERRL. No sclera icterus. No conjunctival injection. ENT: No discharge. Pharynx clear. Dry mucous membranes   Neck: No JVD. No Carotid Bruit. Trachea midline. Resp: No accessory muscle use. No crackles. + wheezes. Faint rhonchi. CV: Regular rate. Regular rhythm. No murmur. No rub. No edema. Capillary Refill: Brisk,< 3 seconds   Peripheral Pulses: +2 palpable, equal bilaterally   GI: Non-tender. Non-distended. No masses. No organomegaly. Normal bowel sounds. No hernia. Skin: Warm and dry. Ecchymosis to the R foot/toes with TTP  M/S: TTP to dorsum of R foot   Neuro: Awake. Grossly nonfocal    Psych: Oriented x 3. No anxiety or agitation. CBC:   Recent Labs     07/17/19  1212 07/18/19  0659   WBC 11.2* 11.9*   HGB 15.7 14.1   HCT 46.4 42.6   MCV 90.1 91.6    181     BMP:   Recent Labs     07/17/19  1212 07/18/19  0659    136   K 2.7* 4.2   CL 96* 102   CO2 24 23   BUN 12 12   CREATININE 1.0 0.7     LIVER PROFILE:   Recent Labs     07/17/19  1212   AST 21   ALT 15   BILIDIR <0.2   BILITOT 0.8   ALKPHOS 99       CARDIAC ENZYMES  Recent Labs     07/17/19  1212   TROPONINI <0.01     Lactic Acid, Sepsis 5.0High Panic       Procalcitonin 0.02      Pro-BNP 765High       Lactic Acid, Sepsis 3. 9High       Lactic Acid 1.7      CULTURES  Blood Cx: pending     EKG:  I have reviewed the EKG with the following interpretation:   NSR, normal axis, normal interval, non-specific T wave changes with no acute ST segment changes concerning for acute ischemia     RADIOLOGY  CT ABDOMEN PELVIS W IV CONTRAST Additional Contrast? None   Final Result   No acute intra-abdominal abnormality or explanation for pain. Small hiatal hernia.       Nonobstructing

## 2019-07-18 NOTE — PROGRESS NOTES
 JOINT REPLACEMENT      KNEE SURGERY         Level of Consciousness: Alert, Oriented, and Cooperative = 0    Level of Activity: Walking unassisted = 0    Respiratory Pattern: Regular Pattern; RR 8-20 = 0    Breath Sounds: Diminshed bilaterally and/or crackles = 2    Sputum   ,  , Sputum How Obtained: Spontaneous cough  Cough: Strong, spontaneous, non-productive = 0    Vital Signs   BP (!) 169/81   Pulse 80   Temp 99.2 °F (37.3 °C) (Oral)   Resp 16   Ht 5' 7\" (1.702 m)   Wt 250 lb (113.4 kg)   SpO2 94%   BMI 39.16 kg/m²   SPO2 (COPD values may differ): Greater than or equal to 92% on room air = 0    Peak Flow (asthma only): not applicable = 0    RSI: 0-4 = See once and convert to home regimen or discontinue        Plan       Goals: medication delivery    Patient/caregiver was educated on the proper method of use for Respiratory Care Devices:  Yes      Level of patient/caregiver understanding able to:   ? Verbalize understanding   ? Demonstrate understanding       ? Teach back        ? Needs reinforcement       ? No available caregiver               ? Other:     Response to education:  Good     Is patient being placed on Home Treatment Regimen? Yes     Does the patient have everything they need prior to discharge? NA     Comments: Chart reviewed and patient assessed. Plan of Care: Albuterol 2PUFFS QID. Electronically signed by Krish Torres RCP on 7/18/2019 at 1:15 AM    Respiratory Protocol Guidelines     1. Assessment and treatment by Respiratory Therapy will be initiated for medication and therapeutic interventions upon initiation of aerosolized medication. 2. Physician will be contacted for respiratory rate (RR) greater than 35 breaths per minute. Therapy will be held for heart rate (HR) greater than 140 beats per minute, pending direction from physician. 3. Bronchodilators will be administered via Metered Dose Inhaler (MDI) with spacer when the following criteria are met:  a.  Alert and cooperative     b. HR < 140 bpm  c. RR < 30 bpm                d. Can demonstrate a 2-3 second inspiratory hold  4. Bronchodilators will be administered via Hand Held Nebulizer ZAYRA [facility]) to patients when ANY of the following criteria are met  a. Incognizant or uncooperative          b. Patients treated with HHN at Home        c. Unable to demonstrate proper use of MDI with spacer     d. RR > 30 bpm   5. Bronchodilators will be delivered via Metered Dose Inhaler (MDI), HHN, Aerogen to intubated patients on mechanical ventilation. 6. Inhalation medication orders will be delivered and/or substituted as outlined below. Aerosolized Medications Ordering and Administration Guidelines:    1. All Medications will be ordered by a physician, and their frequency and/or modality will be adjusted as defined by the patients Respiratory Severity Index (RSI) score. 2. If the patient does not have documented COPD, consider discontinuing anticholinergics when RSI is less than 9.  3. If the bronchospasm worsens (increased RSI), then the bronchodilator frequency can be increased to a maximum of every 4 hours. If greater than every 4 hours is required, the physician will be contacted. 4. If the bronchospasm improves, the frequency of the bronchodilator can be decreased, based on the patient's RSI, but not less than home treatment regimen frequency. 5. Bronchodilator(s) will be discontinued if patient has a RSI less than 9 and has received no scheduled or as needed treatment for 72  Hrs. Patients Ordered on a Mucolytic Agent:    1. Must always be administered with a bronchodilator. 2. Discontinue if patient experiences worsened bronchospasm, or secretions have lessened to the point that the patient is able to clear them with a cough. Anti-inflammatory and Combination Medications:    1.  If the patient lacks prior history of lung disease, is not using inhaled anti-inflammatory medication at home, and lacks wheezing by

## 2019-07-19 VITALS
OXYGEN SATURATION: 95 % | TEMPERATURE: 97.8 F | HEART RATE: 65 BPM | RESPIRATION RATE: 20 BRPM | WEIGHT: 254.4 LBS | BODY MASS INDEX: 39.93 KG/M2 | SYSTOLIC BLOOD PRESSURE: 180 MMHG | DIASTOLIC BLOOD PRESSURE: 98 MMHG | HEIGHT: 67 IN

## 2019-07-19 LAB
ANION GAP SERPL CALCULATED.3IONS-SCNC: 14 MMOL/L (ref 3–16)
BASOPHILS ABSOLUTE: 0 K/UL (ref 0–0.2)
BASOPHILS RELATIVE PERCENT: 0.2 %
BUN BLDV-MCNC: 13 MG/DL (ref 7–20)
CALCIUM SERPL-MCNC: 8.9 MG/DL (ref 8.3–10.6)
CHLORIDE BLD-SCNC: 106 MMOL/L (ref 99–110)
CO2: 19 MMOL/L (ref 21–32)
CREAT SERPL-MCNC: 0.8 MG/DL (ref 0.6–1.1)
EOSINOPHILS ABSOLUTE: 0 K/UL (ref 0–0.6)
EOSINOPHILS RELATIVE PERCENT: 0 %
ESTIMATED AVERAGE GLUCOSE: 122.6 MG/DL
GFR AFRICAN AMERICAN: >60
GFR NON-AFRICAN AMERICAN: >60
GLUCOSE BLD-MCNC: 101 MG/DL (ref 70–99)
HBA1C MFR BLD: 5.9 %
HCT VFR BLD CALC: 41.5 % (ref 36–48)
HEMOGLOBIN: 13.4 G/DL (ref 12–16)
LACTIC ACID: 1.7 MMOL/L (ref 0.4–2)
LYMPHOCYTES ABSOLUTE: 3.1 K/UL (ref 1–5.1)
LYMPHOCYTES RELATIVE PERCENT: 23.8 %
MCH RBC QN AUTO: 30.2 PG (ref 26–34)
MCHC RBC AUTO-ENTMCNC: 32.4 G/DL (ref 31–36)
MCV RBC AUTO: 93.3 FL (ref 80–100)
MONOCYTES ABSOLUTE: 1 K/UL (ref 0–1.3)
MONOCYTES RELATIVE PERCENT: 7.4 %
NEUTROPHILS ABSOLUTE: 9 K/UL (ref 1.7–7.7)
NEUTROPHILS RELATIVE PERCENT: 68.6 %
PDW BLD-RTO: 15.2 % (ref 12.4–15.4)
PLATELET # BLD: 162 K/UL (ref 135–450)
PMV BLD AUTO: 8.9 FL (ref 5–10.5)
POTASSIUM REFLEX MAGNESIUM: 4.5 MMOL/L (ref 3.5–5.1)
RBC # BLD: 4.45 M/UL (ref 4–5.2)
REPORT: NORMAL
SODIUM BLD-SCNC: 139 MMOL/L (ref 136–145)
WBC # BLD: 13.1 K/UL (ref 4–11)

## 2019-07-19 PROCEDURE — 2580000003 HC RX 258: Performed by: INTERNAL MEDICINE

## 2019-07-19 PROCEDURE — 6370000000 HC RX 637 (ALT 250 FOR IP): Performed by: INTERNAL MEDICINE

## 2019-07-19 PROCEDURE — 1800000000 HC LEAVE OF ABSENCE

## 2019-07-19 PROCEDURE — 85025 COMPLETE CBC W/AUTO DIFF WBC: CPT

## 2019-07-19 PROCEDURE — 6370000000 HC RX 637 (ALT 250 FOR IP): Performed by: PHYSICIAN ASSISTANT

## 2019-07-19 PROCEDURE — 36415 COLL VENOUS BLD VENIPUNCTURE: CPT

## 2019-07-19 PROCEDURE — 6360000002 HC RX W HCPCS: Performed by: INTERNAL MEDICINE

## 2019-07-19 PROCEDURE — 99238 HOSP IP/OBS DSCHRG MGMT 30/<: CPT | Performed by: INTERNAL MEDICINE

## 2019-07-19 PROCEDURE — 94640 AIRWAY INHALATION TREATMENT: CPT

## 2019-07-19 PROCEDURE — 80048 BASIC METABOLIC PNL TOTAL CA: CPT

## 2019-07-19 RX ORDER — NIFEDIPINE 30 MG/1
30 TABLET, EXTENDED RELEASE ORAL DAILY
Status: DISCONTINUED | OUTPATIENT
Start: 2019-07-19 | End: 2019-07-19 | Stop reason: HOSPADM

## 2019-07-19 RX ORDER — PREDNISONE 10 MG/1
TABLET ORAL
Qty: 30 TABLET | Refills: 0 | Status: ON HOLD | OUTPATIENT
Start: 2019-07-19 | End: 2019-08-01 | Stop reason: SDUPTHER

## 2019-07-19 RX ORDER — AZITHROMYCIN 250 MG/1
250 TABLET, FILM COATED ORAL DAILY
Status: DISCONTINUED | OUTPATIENT
Start: 2019-07-20 | End: 2019-07-19 | Stop reason: HOSPADM

## 2019-07-19 RX ORDER — AZITHROMYCIN 250 MG/1
250 TABLET, FILM COATED ORAL DAILY
Qty: 4 TABLET | Refills: 0 | Status: SHIPPED | OUTPATIENT
Start: 2019-07-20 | End: 2019-07-24

## 2019-07-19 RX ORDER — NIFEDIPINE 30 MG/1
30 TABLET, FILM COATED, EXTENDED RELEASE ORAL DAILY
Qty: 30 TABLET | Refills: 0 | Status: SHIPPED | OUTPATIENT
Start: 2019-07-19 | End: 2019-08-06

## 2019-07-19 RX ORDER — ONDANSETRON 4 MG/1
4 TABLET, FILM COATED ORAL EVERY 8 HOURS PRN
Qty: 20 TABLET | Refills: 0 | Status: SHIPPED | OUTPATIENT
Start: 2019-07-19 | End: 2019-12-07

## 2019-07-19 RX ORDER — FUROSEMIDE 40 MG/1
40 TABLET ORAL DAILY
Status: DISCONTINUED | OUTPATIENT
Start: 2019-07-19 | End: 2019-07-19 | Stop reason: HOSPADM

## 2019-07-19 RX ORDER — AZITHROMYCIN 250 MG/1
500 TABLET, FILM COATED ORAL DAILY
Status: COMPLETED | OUTPATIENT
Start: 2019-07-19 | End: 2019-07-19

## 2019-07-19 RX ADMIN — GABAPENTIN 300 MG: 300 CAPSULE ORAL at 12:21

## 2019-07-19 RX ADMIN — HYDRALAZINE HYDROCHLORIDE 50 MG: 50 TABLET, FILM COATED ORAL at 12:21

## 2019-07-19 RX ADMIN — METOPROLOL TARTRATE 100 MG: 50 TABLET ORAL at 09:15

## 2019-07-19 RX ADMIN — HYDRALAZINE HYDROCHLORIDE 50 MG: 50 TABLET, FILM COATED ORAL at 05:18

## 2019-07-19 RX ADMIN — FUROSEMIDE 40 MG: 40 TABLET ORAL at 14:43

## 2019-07-19 RX ADMIN — OXYCODONE HYDROCHLORIDE AND ACETAMINOPHEN 1 TABLET: 10; 325 TABLET ORAL at 11:50

## 2019-07-19 RX ADMIN — GABAPENTIN 300 MG: 300 CAPSULE ORAL at 09:15

## 2019-07-19 RX ADMIN — ATORVASTATIN CALCIUM 40 MG: 40 TABLET, FILM COATED ORAL at 09:15

## 2019-07-19 RX ADMIN — ROPINIROLE HYDROCHLORIDE 2 MG: 2 TABLET, FILM COATED ORAL at 12:20

## 2019-07-19 RX ADMIN — SODIUM CHLORIDE: 9 INJECTION, SOLUTION INTRAVENOUS at 01:50

## 2019-07-19 RX ADMIN — FLUOXETINE 40 MG: 10 CAPSULE ORAL at 09:14

## 2019-07-19 RX ADMIN — Medication 2 PUFF: at 11:17

## 2019-07-19 RX ADMIN — OXYBUTYNIN CHLORIDE 5 MG: 5 TABLET ORAL at 09:14

## 2019-07-19 RX ADMIN — PANTOPRAZOLE SODIUM 40 MG: 40 TABLET, DELAYED RELEASE ORAL at 05:18

## 2019-07-19 RX ADMIN — PREDNISONE 40 MG: 20 TABLET ORAL at 09:14

## 2019-07-19 RX ADMIN — Medication 2 PUFF: at 07:26

## 2019-07-19 RX ADMIN — AZITHROMYCIN MONOHYDRATE 500 MG: 250 TABLET ORAL at 12:36

## 2019-07-19 RX ADMIN — Medication 2 PUFF: at 15:43

## 2019-07-19 RX ADMIN — ASPIRIN 81 MG: 81 TABLET, COATED ORAL at 09:15

## 2019-07-19 RX ADMIN — NIFEDIPINE 30 MG: 30 TABLET, FILM COATED, EXTENDED RELEASE ORAL at 14:43

## 2019-07-19 RX ADMIN — OXYCODONE HYDROCHLORIDE AND ACETAMINOPHEN 1 TABLET: 10; 325 TABLET ORAL at 05:18

## 2019-07-19 RX ADMIN — OXYBUTYNIN CHLORIDE 5 MG: 5 TABLET ORAL at 12:21

## 2019-07-19 RX ADMIN — SPIRONOLACTONE 25 MG: 25 TABLET ORAL at 09:15

## 2019-07-19 RX ADMIN — ENOXAPARIN SODIUM 40 MG: 40 INJECTION SUBCUTANEOUS at 09:15

## 2019-07-19 RX ADMIN — ROPINIROLE HYDROCHLORIDE 2 MG: 2 TABLET, FILM COATED ORAL at 09:14

## 2019-07-19 ASSESSMENT — PAIN DESCRIPTION - PAIN TYPE
TYPE: CHRONIC PAIN
TYPE: CHRONIC PAIN

## 2019-07-19 ASSESSMENT — PAIN DESCRIPTION - DESCRIPTORS
DESCRIPTORS: ACHING
DESCRIPTORS: ACHING

## 2019-07-19 ASSESSMENT — PAIN SCALES - GENERAL
PAINLEVEL_OUTOF10: 8
PAINLEVEL_OUTOF10: 8

## 2019-07-19 ASSESSMENT — PAIN DESCRIPTION - PROGRESSION: CLINICAL_PROGRESSION: NOT CHANGED

## 2019-07-19 ASSESSMENT — PAIN DESCRIPTION - LOCATION
LOCATION: BACK
LOCATION: BACK;HIP

## 2019-07-19 ASSESSMENT — PAIN DESCRIPTION - ORIENTATION
ORIENTATION: LOWER
ORIENTATION: LOWER

## 2019-07-19 ASSESSMENT — PAIN DESCRIPTION - ONSET: ONSET: ON-GOING

## 2019-07-19 ASSESSMENT — PAIN - FUNCTIONAL ASSESSMENT: PAIN_FUNCTIONAL_ASSESSMENT: PREVENTS OR INTERFERES SOME ACTIVE ACTIVITIES AND ADLS

## 2019-07-19 ASSESSMENT — PAIN DESCRIPTION - FREQUENCY: FREQUENCY: CONTINUOUS

## 2019-07-19 NOTE — PROGRESS NOTES
Critical lab values sent to hospitalist, not on any antibiotics at this time. Awaiting orders.    Gram stain Aerobic bottle:   Gram positive cocci in clusters   resembling Staphylococcus

## 2019-07-19 NOTE — DISCHARGE SUMMARY
Name:  Aylin Null  Room:  /8243-42  MRN:    [id number]    Discharge Summary      This discharge summary is in conjunction with a complete physical exam done on the day of discharge. Discharging Physician: Dr. Turner   Discharge:  2019     HPI taken from admission H&P:    The patient is a 64 y.o. female with depression, HLD, HTN who presented to [facility] ED with complaint of SOB. Patient states that 3 days ago she started with N/V/D and SOB. She denies any associated abdominal pain and has a chronic cough from smoking but denies any CP as well. She states that she felt severely dehydrated and felt as though she couldn't catch her breath so they called EMS. She denies any fevers or sick contacts at home. She states that her diarrhea has resolved and she has not had any further episodes of vomiting but is still nauseous. No longer feels SOB.      Diagnoses this Admission and Hospital Course   Acute hypoxic respiratory failure  Respiratory Alkalosis   - pH on arrival 7.721, PCO2:18.6   - does not use supplemental O2 at home   - Required CPAP on EMS arrival   - labs consistent with respiratory alkalosis, likely 2/2 to hyperventilation   - Off CPAP, now on RA   - CTPA negative for PE, does show stable pulmonary nodules, and possible ILD--will refer for OP pulmonology follow up and patient aware of results   - Smoker with some mild wheezing, will start PO Prednisone-->D/c home on steroid taper  - Still coughing, will add Azithromycin to treat for acute bronchitis     N/V/D  - symptoms started 3 days ago   - Labs wnl  - No abdominal pain, CT A/P wnl   - PRN anti-emetics-->D/c home on Zofran      Hypokalemia   Hypomagnesemia   - sliding scale replacement      Lactic acidosis   - Was at 5 on arrival   - Resolved with IVF  - likely 2/2 N/V/D      Chronic dCHF   - Grade II DD on last echo   - No clinical evidence of volume overload   - Home diuretics held 2/2 electrolyte MG delayed release capsule  Commonly known as:  PRILOSEC     oxybutynin 5 MG tablet  Commonly known as:  DITROPAN  Notes to patient:  Oxybutynin (Ditropan®, Ditropan XL®, Oxytrol®)  Use: increases how much urine the bladder can hold,   decreases pain caused by spasm. Side effects: feeling lightheaded or sleepy, blurred vision,     constipation, dry mouth. PERCOCET  MG per tablet  Generic drug:  oxyCODONE-acetaminophen     PROZAC 40 MG capsule  Generic drug:  FLUoxetine     rOPINIRole 2 MG tablet  Commonly known as:  REQUIP     spironolactone 25 MG tablet  Commonly known as:  ALDACTONE  Take 1 tablet by mouth daily     tiZANidine 4 MG tablet  Commonly known as:  [place] taking these medications    ferrous sulfate 325 (65 Fe) MG tablet           Where to Get Your Medications      These medications were sent to [place] [id number]    Phone:  [phone]   · NIFEdipine 30 MG extended release tablet     You can get these medications from any pharmacy    Bring a paper prescription for each of these medications  · azithromycin 250 MG tablet  · ondansetron 4 MG tablet  · predniSONE 10 MG tablet       Discharged in stable condition to home    Follow Up: Follow up with PCP in 1 week, pulmonology OP      ALICIA Hope M.D.

## 2019-07-20 LAB
BLOOD CULTURE, ROUTINE: ABNORMAL
BLOOD CULTURE, ROUTINE: ABNORMAL
ORGANISM: ABNORMAL

## 2019-07-20 PROCEDURE — 1800000000 HC LEAVE OF ABSENCE

## 2019-07-21 PROCEDURE — 1800000000 HC LEAVE OF ABSENCE

## 2019-07-22 ENCOUNTER — TELEPHONE (OUTPATIENT)
Dept: PULMONOLOGY | Age: 59
End: 2019-07-22

## 2019-07-22 LAB — CULTURE, BLOOD 2: NORMAL

## 2019-07-22 PROCEDURE — 1800000000 HC LEAVE OF ABSENCE

## 2019-07-22 NOTE — TELEPHONE ENCOUNTER
Message   Received: Yesterday   Message Contents   Mojgan Anguiano MD  [facility], [place]             See this week    Previous Messages      ----- Message -----   From: Jamarcus Cloud MD   Sent: 7/20/2019  12:10 PM EDT   To: Mojgan Anguiano MD, [facility]     New nodule referral from ER.     ----- Message -----   From: Saul Reyes MD   Sent: 7/19/2019   5:53 PM EDT   To: Jamarcus Cloud MD

## 2019-07-23 PROCEDURE — 1800000000 HC LEAVE OF ABSENCE

## 2019-07-24 PROCEDURE — 1800000000 HC LEAVE OF ABSENCE

## 2019-07-25 PROCEDURE — 1800000000 HC LEAVE OF ABSENCE

## 2019-07-26 PROCEDURE — 1800000000 HC LEAVE OF ABSENCE

## 2019-07-27 ENCOUNTER — APPOINTMENT (OUTPATIENT)
Dept: GENERAL RADIOLOGY | Age: 59
DRG: 871 | End: 2019-07-27
Payer: MEDICARE

## 2019-07-27 ENCOUNTER — HOSPITAL ENCOUNTER (INPATIENT)
Age: 59
LOS: 5 days | Discharge: HOME | DRG: 871 | End: 2019-08-01
Attending: EMERGENCY MEDICINE | Admitting: INTERNAL MEDICINE
Payer: MEDICARE

## 2019-07-27 ENCOUNTER — APPOINTMENT (OUTPATIENT)
Dept: CT IMAGING | Age: 59
DRG: 871 | End: 2019-07-27
Payer: MEDICARE

## 2019-07-27 DIAGNOSIS — J44.1 COPD EXACERBATION (HCC): Primary | ICD-10-CM

## 2019-07-27 PROBLEM — R09.02 HYPOXIA: Status: ACTIVE | Noted: 2019-07-27

## 2019-07-27 LAB
A/G RATIO: 0.7 (ref 1.1–2.2)
ALBUMIN SERPL-MCNC: 2.9 G/DL (ref 3.4–5)
ALP BLD-CCNC: 86 U/L (ref 40–129)
ALT SERPL-CCNC: 16 U/L (ref 10–40)
ANION GAP SERPL CALCULATED.3IONS-SCNC: 15 MMOL/L (ref 3–16)
AST SERPL-CCNC: 18 U/L (ref 15–37)
BASE EXCESS ARTERIAL: 1.3 MMOL/L (ref -3–3)
BASOPHILS ABSOLUTE: 0 K/UL (ref 0–0.2)
BASOPHILS RELATIVE PERCENT: 0 %
BILIRUB SERPL-MCNC: 1.1 MG/DL (ref 0–1)
BUN BLDV-MCNC: 14 MG/DL (ref 7–20)
CALCIUM SERPL-MCNC: 9.8 MG/DL (ref 8.3–10.6)
CARBOXYHEMOGLOBIN ARTERIAL: 1.7 % (ref 0–1.5)
CHLORIDE BLD-SCNC: 98 MMOL/L (ref 99–110)
CO2: 22 MMOL/L (ref 21–32)
CREAT SERPL-MCNC: 0.8 MG/DL (ref 0.6–1.1)
D DIMER: 2996 NG/ML DDU (ref 0–229)
EKG ATRIAL RATE: 61 BPM
EKG DIAGNOSIS: NORMAL
EKG P AXIS: 44 DEGREES
EKG P-R INTERVAL: 136 MS
EKG Q-T INTERVAL: 422 MS
EKG QRS DURATION: 90 MS
EKG QTC CALCULATION (BAZETT): 424 MS
EKG R AXIS: -2 DEGREES
EKG T AXIS: 23 DEGREES
EKG VENTRICULAR RATE: 61 BPM
EOSINOPHILS ABSOLUTE: 0 K/UL (ref 0–0.6)
EOSINOPHILS RELATIVE PERCENT: 0.1 %
GFR AFRICAN AMERICAN: >60
GFR NON-AFRICAN AMERICAN: >60
GLOBULIN: 4.3 G/DL
GLUCOSE BLD-MCNC: 149 MG/DL (ref 70–99)
HCO3 ARTERIAL: 21.9 MMOL/L (ref 21–29)
HCT VFR BLD CALC: 43.6 % (ref 36–48)
HEMOGLOBIN, ART, EXTENDED: 15.7 G/DL (ref 12–16)
HEMOGLOBIN: 14.8 G/DL (ref 12–16)
LACTIC ACID, SEPSIS: 2.2 MMOL/L (ref 0.4–1.9)
LACTIC ACID, SEPSIS: 2.4 MMOL/L (ref 0.4–1.9)
LYMPHOCYTES ABSOLUTE: 0.8 K/UL (ref 1–5.1)
LYMPHOCYTES RELATIVE PERCENT: 5.5 %
MAGNESIUM: 1.5 MG/DL (ref 1.8–2.4)
MCH RBC QN AUTO: 30.6 PG (ref 26–34)
MCHC RBC AUTO-ENTMCNC: 33.8 G/DL (ref 31–36)
MCV RBC AUTO: 90.6 FL (ref 80–100)
METHEMOGLOBIN ARTERIAL: 0.3 %
MONOCYTES ABSOLUTE: 0.3 K/UL (ref 0–1.3)
MONOCYTES RELATIVE PERCENT: 2.3 %
NEUTROPHILS ABSOLUTE: 13.8 K/UL (ref 1.7–7.7)
NEUTROPHILS RELATIVE PERCENT: 92.1 %
O2 CONTENT ARTERIAL: 21 ML/DL
O2 SAT, ARTERIAL: 95.3 %
O2 THERAPY: ABNORMAL
PCO2 ARTERIAL: 25.7 MMHG (ref 35–45)
PDW BLD-RTO: 14.9 % (ref 12.4–15.4)
PH ARTERIAL: 7.55 (ref 7.35–7.45)
PLATELET # BLD: 253 K/UL (ref 135–450)
PMV BLD AUTO: 8.7 FL (ref 5–10.5)
PO2 ARTERIAL: 65 MMHG (ref 75–108)
POTASSIUM REFLEX MAGNESIUM: 3.5 MMOL/L (ref 3.5–5.1)
PRO-BNP: 187 PG/ML (ref 0–124)
RBC # BLD: 4.81 M/UL (ref 4–5.2)
SODIUM BLD-SCNC: 135 MMOL/L (ref 136–145)
TCO2 ARTERIAL: 22.7 MMOL/L
TOTAL PROTEIN: 7.2 G/DL (ref 6.4–8.2)
TROPONIN: <0.01 NG/ML
TROPONIN: <0.01 NG/ML
WBC # BLD: 15 K/UL (ref 4–11)

## 2019-07-27 PROCEDURE — 2700000000 HC OXYGEN THERAPY PER DAY

## 2019-07-27 PROCEDURE — 2060000000 HC ICU INTERMEDIATE R&B

## 2019-07-27 PROCEDURE — 85379 FIBRIN DEGRADATION QUANT: CPT

## 2019-07-27 PROCEDURE — 2580000003 HC RX 258

## 2019-07-27 PROCEDURE — 6360000002 HC RX W HCPCS: Performed by: NURSE PRACTITIONER

## 2019-07-27 PROCEDURE — 87040 BLOOD CULTURE FOR BACTERIA: CPT

## 2019-07-27 PROCEDURE — 87641 MR-STAPH DNA AMP PROBE: CPT

## 2019-07-27 PROCEDURE — 6360000004 HC RX CONTRAST MEDICATION: Performed by: INTERNAL MEDICINE

## 2019-07-27 PROCEDURE — 2580000003 HC RX 258: Performed by: NURSE PRACTITIONER

## 2019-07-27 PROCEDURE — 6370000000 HC RX 637 (ALT 250 FOR IP): Performed by: INTERNAL MEDICINE

## 2019-07-27 PROCEDURE — 96361 HYDRATE IV INFUSION ADD-ON: CPT

## 2019-07-27 PROCEDURE — 96374 THER/PROPH/DIAG INJ IV PUSH: CPT

## 2019-07-27 PROCEDURE — 94640 AIRWAY INHALATION TREATMENT: CPT

## 2019-07-27 PROCEDURE — 71260 CT THORAX DX C+: CPT

## 2019-07-27 PROCEDURE — 94150 VITAL CAPACITY TEST: CPT

## 2019-07-27 PROCEDURE — 82803 BLOOD GASES ANY COMBINATION: CPT

## 2019-07-27 PROCEDURE — 84484 ASSAY OF TROPONIN QUANT: CPT

## 2019-07-27 PROCEDURE — 36415 COLL VENOUS BLD VENIPUNCTURE: CPT

## 2019-07-27 PROCEDURE — 85025 COMPLETE CBC W/AUTO DIFF WBC: CPT

## 2019-07-27 PROCEDURE — 99285 EMERGENCY DEPT VISIT HI MDM: CPT

## 2019-07-27 PROCEDURE — 93005 ELECTROCARDIOGRAM TRACING: CPT | Performed by: NURSE PRACTITIONER

## 2019-07-27 PROCEDURE — 6370000000 HC RX 637 (ALT 250 FOR IP)

## 2019-07-27 PROCEDURE — 80053 COMPREHEN METABOLIC PANEL: CPT

## 2019-07-27 PROCEDURE — 6370000000 HC RX 637 (ALT 250 FOR IP): Performed by: NURSE PRACTITIONER

## 2019-07-27 PROCEDURE — 83735 ASSAY OF MAGNESIUM: CPT

## 2019-07-27 PROCEDURE — 71045 X-RAY EXAM CHEST 1 VIEW: CPT

## 2019-07-27 PROCEDURE — 6360000002 HC RX W HCPCS: Performed by: INTERNAL MEDICINE

## 2019-07-27 PROCEDURE — 83605 ASSAY OF LACTIC ACID: CPT

## 2019-07-27 PROCEDURE — 2580000003 HC RX 258: Performed by: INTERNAL MEDICINE

## 2019-07-27 PROCEDURE — 83880 ASSAY OF NATRIURETIC PEPTIDE: CPT

## 2019-07-27 PROCEDURE — 94761 N-INVAS EAR/PLS OXIMETRY MLT: CPT

## 2019-07-27 PROCEDURE — 93010 ELECTROCARDIOGRAM REPORT: CPT | Performed by: INTERNAL MEDICINE

## 2019-07-27 RX ORDER — ATORVASTATIN CALCIUM 40 MG/1
40 TABLET, FILM COATED ORAL DAILY
Status: DISCONTINUED | OUTPATIENT
Start: 2019-07-27 | End: 2019-07-27

## 2019-07-27 RX ORDER — TIZANIDINE 4 MG/1
4 TABLET ORAL 2 TIMES DAILY PRN
Status: DISCONTINUED | OUTPATIENT
Start: 2019-07-27 | End: 2019-08-01 | Stop reason: HOSPADM

## 2019-07-27 RX ORDER — ASPIRIN 81 MG/1
81 TABLET ORAL DAILY
Status: DISCONTINUED | OUTPATIENT
Start: 2019-07-27 | End: 2019-08-01 | Stop reason: HOSPADM

## 2019-07-27 RX ORDER — 0.9 % SODIUM CHLORIDE 0.9 %
1000 INTRAVENOUS SOLUTION INTRAVENOUS ONCE
Status: COMPLETED | OUTPATIENT
Start: 2019-07-27 | End: 2019-07-27

## 2019-07-27 RX ORDER — GABAPENTIN 300 MG/1
300 CAPSULE ORAL 4 TIMES DAILY
Status: DISCONTINUED | OUTPATIENT
Start: 2019-07-27 | End: 2019-08-01 | Stop reason: HOSPADM

## 2019-07-27 RX ORDER — OXYCODONE HYDROCHLORIDE AND ACETAMINOPHEN 5; 325 MG/1; MG/1
1 TABLET ORAL ONCE
Status: COMPLETED | OUTPATIENT
Start: 2019-07-27 | End: 2019-07-27

## 2019-07-27 RX ORDER — OXYBUTYNIN CHLORIDE 5 MG/1
5 TABLET ORAL 3 TIMES DAILY
Status: DISCONTINUED | OUTPATIENT
Start: 2019-07-27 | End: 2019-08-01 | Stop reason: HOSPADM

## 2019-07-27 RX ORDER — IPRATROPIUM BROMIDE AND ALBUTEROL SULFATE 2.5; .5 MG/3ML; MG/3ML
1 SOLUTION RESPIRATORY (INHALATION)
Status: DISCONTINUED | OUTPATIENT
Start: 2019-07-27 | End: 2019-07-27

## 2019-07-27 RX ORDER — SODIUM CHLORIDE 9 MG/ML
INJECTION, SOLUTION INTRAVENOUS
Status: DISPENSED
Start: 2019-07-27 | End: 2019-07-28

## 2019-07-27 RX ORDER — ONDANSETRON 2 MG/ML
4 INJECTION INTRAMUSCULAR; INTRAVENOUS EVERY 6 HOURS PRN
Status: DISCONTINUED | OUTPATIENT
Start: 2019-07-27 | End: 2019-08-01 | Stop reason: HOSPADM

## 2019-07-27 RX ORDER — PANTOPRAZOLE SODIUM 40 MG/1
40 TABLET, DELAYED RELEASE ORAL
Status: DISCONTINUED | OUTPATIENT
Start: 2019-07-28 | End: 2019-08-01 | Stop reason: HOSPADM

## 2019-07-27 RX ORDER — SPIRONOLACTONE 25 MG/1
25 TABLET ORAL DAILY
Status: DISCONTINUED | OUTPATIENT
Start: 2019-07-27 | End: 2019-07-28

## 2019-07-27 RX ORDER — HYDRALAZINE HYDROCHLORIDE 50 MG/1
50 TABLET, FILM COATED ORAL EVERY 8 HOURS SCHEDULED
Status: DISCONTINUED | OUTPATIENT
Start: 2019-07-27 | End: 2019-08-01 | Stop reason: HOSPADM

## 2019-07-27 RX ORDER — OXYCODONE AND ACETAMINOPHEN 10; 325 MG/1; MG/1
1 TABLET ORAL EVERY 6 HOURS PRN
Status: DISCONTINUED | OUTPATIENT
Start: 2019-07-27 | End: 2019-08-01 | Stop reason: HOSPADM

## 2019-07-27 RX ORDER — SODIUM CHLORIDE 0.9 % (FLUSH) 0.9 %
10 SYRINGE (ML) INJECTION EVERY 12 HOURS SCHEDULED
Status: DISCONTINUED | OUTPATIENT
Start: 2019-07-27 | End: 2019-08-01 | Stop reason: HOSPADM

## 2019-07-27 RX ORDER — IPRATROPIUM BROMIDE AND ALBUTEROL SULFATE 2.5; .5 MG/3ML; MG/3ML
SOLUTION RESPIRATORY (INHALATION)
Status: COMPLETED
Start: 2019-07-27 | End: 2019-07-27

## 2019-07-27 RX ORDER — FUROSEMIDE 40 MG/1
40 TABLET ORAL DAILY
Status: DISCONTINUED | OUTPATIENT
Start: 2019-07-27 | End: 2019-08-01 | Stop reason: HOSPADM

## 2019-07-27 RX ORDER — IPRATROPIUM BROMIDE AND ALBUTEROL SULFATE 2.5; .5 MG/3ML; MG/3ML
2 SOLUTION RESPIRATORY (INHALATION) ONCE
Status: COMPLETED | OUTPATIENT
Start: 2019-07-27 | End: 2019-07-27

## 2019-07-27 RX ORDER — ATORVASTATIN CALCIUM 40 MG/1
40 TABLET, FILM COATED ORAL NIGHTLY
Status: DISCONTINUED | OUTPATIENT
Start: 2019-07-27 | End: 2019-08-01 | Stop reason: HOSPADM

## 2019-07-27 RX ORDER — FLUOXETINE 10 MG/1
40 CAPSULE ORAL DAILY
Status: DISCONTINUED | OUTPATIENT
Start: 2019-07-27 | End: 2019-08-01 | Stop reason: HOSPADM

## 2019-07-27 RX ORDER — SODIUM CHLORIDE 0.9 % (FLUSH) 0.9 %
10 SYRINGE (ML) INJECTION PRN
Status: DISCONTINUED | OUTPATIENT
Start: 2019-07-27 | End: 2019-08-01 | Stop reason: HOSPADM

## 2019-07-27 RX ORDER — METHYLPREDNISOLONE SODIUM SUCCINATE 125 MG/2ML
125 INJECTION, POWDER, LYOPHILIZED, FOR SOLUTION INTRAMUSCULAR; INTRAVENOUS ONCE
Status: COMPLETED | OUTPATIENT
Start: 2019-07-27 | End: 2019-07-27

## 2019-07-27 RX ORDER — METHYLPREDNISOLONE SODIUM SUCCINATE 125 MG/2ML
INJECTION, POWDER, LYOPHILIZED, FOR SOLUTION INTRAMUSCULAR; INTRAVENOUS
Status: DISCONTINUED
Start: 2019-07-27 | End: 2019-07-27

## 2019-07-27 RX ORDER — MAGNESIUM SULFATE IN WATER 40 MG/ML
4 INJECTION, SOLUTION INTRAVENOUS ONCE
Status: COMPLETED | OUTPATIENT
Start: 2019-07-27 | End: 2019-07-27

## 2019-07-27 RX ORDER — LEVOFLOXACIN 5 MG/ML
500 INJECTION, SOLUTION INTRAVENOUS EVERY 24 HOURS
Status: DISCONTINUED | OUTPATIENT
Start: 2019-07-27 | End: 2019-07-30

## 2019-07-27 RX ORDER — ROPINIROLE 2 MG/1
2 TABLET, FILM COATED ORAL 4 TIMES DAILY
Status: DISCONTINUED | OUTPATIENT
Start: 2019-07-27 | End: 2019-08-01 | Stop reason: HOSPADM

## 2019-07-27 RX ORDER — SODIUM CHLORIDE 0.9 % (FLUSH) 0.9 %
SYRINGE (ML) INJECTION
Status: COMPLETED
Start: 2019-07-27 | End: 2019-07-27

## 2019-07-27 RX ORDER — IPRATROPIUM BROMIDE AND ALBUTEROL SULFATE 2.5; .5 MG/3ML; MG/3ML
1 SOLUTION RESPIRATORY (INHALATION) EVERY 4 HOURS
Status: DISCONTINUED | OUTPATIENT
Start: 2019-07-27 | End: 2019-07-28

## 2019-07-27 RX ORDER — METHYLPREDNISOLONE SODIUM SUCCINATE 40 MG/ML
40 INJECTION, POWDER, LYOPHILIZED, FOR SOLUTION INTRAMUSCULAR; INTRAVENOUS EVERY 8 HOURS
Status: DISCONTINUED | OUTPATIENT
Start: 2019-07-27 | End: 2019-07-28

## 2019-07-27 RX ORDER — METOPROLOL TARTRATE 50 MG/1
100 TABLET, FILM COATED ORAL 2 TIMES DAILY
Status: DISCONTINUED | OUTPATIENT
Start: 2019-07-27 | End: 2019-08-01 | Stop reason: HOSPADM

## 2019-07-27 RX ORDER — ACETAMINOPHEN 325 MG/1
650 TABLET ORAL EVERY 6 HOURS PRN
Status: DISCONTINUED | OUTPATIENT
Start: 2019-07-27 | End: 2019-08-01 | Stop reason: HOSPADM

## 2019-07-27 RX ADMIN — IPRATROPIUM BROMIDE AND ALBUTEROL SULFATE 1 AMPULE: .5; 3 SOLUTION RESPIRATORY (INHALATION) at 20:24

## 2019-07-27 RX ADMIN — GABAPENTIN 300 MG: 300 CAPSULE ORAL at 20:57

## 2019-07-27 RX ADMIN — OXYCODONE HYDROCHLORIDE AND ACETAMINOPHEN 1 TABLET: 5; 325 TABLET ORAL at 14:55

## 2019-07-27 RX ADMIN — ROPINIROLE HYDROCHLORIDE 2 MG: 2 TABLET, FILM COATED ORAL at 20:58

## 2019-07-27 RX ADMIN — ATORVASTATIN CALCIUM 40 MG: 40 TABLET, FILM COATED ORAL at 20:57

## 2019-07-27 RX ADMIN — IOPAMIDOL 85 ML: 755 INJECTION, SOLUTION INTRAVENOUS at 20:32

## 2019-07-27 RX ADMIN — IPRATROPIUM BROMIDE AND ALBUTEROL SULFATE 1 AMPULE: .5; 3 SOLUTION RESPIRATORY (INHALATION) at 15:18

## 2019-07-27 RX ADMIN — ENOXAPARIN SODIUM 40 MG: 40 INJECTION SUBCUTANEOUS at 16:22

## 2019-07-27 RX ADMIN — METHYLPREDNISOLONE SODIUM SUCCINATE 40 MG: 40 INJECTION, POWDER, FOR SOLUTION INTRAMUSCULAR; INTRAVENOUS at 20:50

## 2019-07-27 RX ADMIN — TIZANIDINE 4 MG: 4 TABLET ORAL at 22:40

## 2019-07-27 RX ADMIN — GABAPENTIN 300 MG: 300 CAPSULE ORAL at 16:22

## 2019-07-27 RX ADMIN — IPRATROPIUM BROMIDE AND ALBUTEROL SULFATE 1 AMPULE: .5; 3 SOLUTION RESPIRATORY (INHALATION) at 22:49

## 2019-07-27 RX ADMIN — MAGNESIUM SULFATE HEPTAHYDRATE 4 G: 40 INJECTION, SOLUTION INTRAVENOUS at 16:56

## 2019-07-27 RX ADMIN — HYDRALAZINE HYDROCHLORIDE 50 MG: 50 TABLET, FILM COATED ORAL at 22:39

## 2019-07-27 RX ADMIN — SODIUM CHLORIDE 1000 ML: 9 INJECTION, SOLUTION INTRAVENOUS at 12:22

## 2019-07-27 RX ADMIN — Medication 1.5 G: at 20:54

## 2019-07-27 RX ADMIN — Medication 10 ML: at 18:12

## 2019-07-27 RX ADMIN — IPRATROPIUM BROMIDE AND ALBUTEROL SULFATE 2 AMPULE: .5; 3 SOLUTION RESPIRATORY (INHALATION) at 12:10

## 2019-07-27 RX ADMIN — OXYBUTYNIN CHLORIDE 5 MG: 5 TABLET ORAL at 20:57

## 2019-07-27 RX ADMIN — IPRATROPIUM BROMIDE AND ALBUTEROL SULFATE 2 AMPULE: 2.5; .5 SOLUTION RESPIRATORY (INHALATION) at 12:10

## 2019-07-27 RX ADMIN — ROPINIROLE HYDROCHLORIDE 2 MG: 2 TABLET, FILM COATED ORAL at 16:58

## 2019-07-27 RX ADMIN — IOPAMIDOL 85 ML: 755 INJECTION, SOLUTION INTRAVENOUS at 19:53

## 2019-07-27 RX ADMIN — OXYCODONE HYDROCHLORIDE AND ACETAMINOPHEN 1 TABLET: 10; 325 TABLET ORAL at 20:57

## 2019-07-27 RX ADMIN — METHYLPREDNISOLONE SODIUM SUCCINATE 125 MG: 125 INJECTION, POWDER, FOR SOLUTION INTRAMUSCULAR; INTRAVENOUS at 12:20

## 2019-07-27 RX ADMIN — HYDRALAZINE HYDROCHLORIDE 50 MG: 50 TABLET, FILM COATED ORAL at 16:22

## 2019-07-27 RX ADMIN — LEVOFLOXACIN 500 MG: 5 INJECTION, SOLUTION INTRAVENOUS at 18:12

## 2019-07-27 RX ADMIN — Medication 10 ML: at 20:51

## 2019-07-27 RX ADMIN — METOPROLOL TARTRATE 100 MG: 50 TABLET ORAL at 20:57

## 2019-07-27 ASSESSMENT — PAIN DESCRIPTION - DESCRIPTORS: DESCRIPTORS: ACHING

## 2019-07-27 ASSESSMENT — PAIN SCALES - GENERAL
PAINLEVEL_OUTOF10: 8
PAINLEVEL_OUTOF10: 4
PAINLEVEL_OUTOF10: 10
PAINLEVEL_OUTOF10: 8

## 2019-07-27 ASSESSMENT — ENCOUNTER SYMPTOMS
DIARRHEA: 0
CHEST TIGHTNESS: 1
VOMITING: 0
COUGH: 1
NAUSEA: 0
ABDOMINAL PAIN: 0
SHORTNESS OF BREATH: 1

## 2019-07-27 ASSESSMENT — PAIN DESCRIPTION - LOCATION
LOCATION: BACK
LOCATION: BACK

## 2019-07-27 ASSESSMENT — PAIN DESCRIPTION - PAIN TYPE
TYPE: CHRONIC PAIN
TYPE: CHRONIC PAIN

## 2019-07-27 ASSESSMENT — PAIN DESCRIPTION - FREQUENCY: FREQUENCY: CONTINUOUS

## 2019-07-27 ASSESSMENT — PAIN DESCRIPTION - ORIENTATION: ORIENTATION: LOWER

## 2019-07-27 NOTE — ED PROVIDER NOTES
old records, review of multiple laboratory and radiographic studies, and specialist consultations, excluding separately billable procedures and family discussion, secondary to presenting complaint of acute respiratory distress with hypoxia, COPD exacerbation requiring nebulizer treatments, IV medications and admission for further management. Please refer to advanced practice provider note for additional information, final assessment, and disposition.      Michael Rousseau MD  07/27/19 1400

## 2019-07-27 NOTE — ED NOTES
Report given to [facility] RN  Pt went via   A&O  Vitals stable      Bhakti Romero RN  07/27/19 2506

## 2019-07-27 NOTE — PROGRESS NOTES
criteria are met:  a. Alert and cooperative     b. HR < 140 bpm  c. RR < 30 bpm                d. Can demonstrate a 2-3 second inspiratory hold  4. Bronchodilators will be administered via Hand Held Nebulizer ZAYRA [facility]) to patients when ANY of the following criteria are met  a. Incognizant or uncooperative          b. Patients treated with HHN at Home        c. Unable to demonstrate proper use of MDI with spacer     d. RR > 30 bpm   5. Bronchodilators will be delivered via Metered Dose Inhaler (MDI), HHN, Aerogen to intubated patients on mechanical ventilation. 6. Inhalation medication orders will be delivered and/or substituted as outlined below. Aerosolized Medications Ordering and Administration Guidelines:    1. All Medications will be ordered by a physician, and their frequency and/or modality will be adjusted as defined by the patients Respiratory Severity Index (RSI) score. 2. If the patient does not have documented COPD, consider discontinuing anticholinergics when RSI is less than 9.  3. If the bronchospasm worsens (increased RSI), then the bronchodilator frequency can be increased to a maximum of every 4 hours. If greater than every 4 hours is required, the physician will be contacted. 4. If the bronchospasm improves, the frequency of the bronchodilator can be decreased, based on the patient's RSI, but not less than home treatment regimen frequency. 5. Bronchodilator(s) will be discontinued if patient has a RSI less than 9 and has received no scheduled or as needed treatment for 72  Hrs. Patients Ordered on a Mucolytic Agent:    1. Must always be administered with a bronchodilator. 2. Discontinue if patient experiences worsened bronchospasm, or secretions have lessened to the point that the patient is able to clear them with a cough. Anti-inflammatory and Combination Medications:    1.  If the patient lacks prior history of lung disease, is not using inhaled anti-inflammatory medication at home,

## 2019-07-27 NOTE — ED PROVIDER NOTES
Negative for chills and fever. HENT: Negative. Respiratory: Positive for cough, chest tightness and shortness of breath. Cardiovascular: Negative for chest pain, palpitations and leg swelling. Gastrointestinal: Negative for abdominal pain, diarrhea, nausea and vomiting. Musculoskeletal: Negative. Negative for arthralgias and myalgias. Skin: Negative. Neurological: Negative for dizziness, weakness and headaches. Psychiatric/Behavioral: Negative. Positives and Pertinent negatives as per HPI. Except as noted abovein the ROS, all other systems were reviewed and negative. PAST MEDICAL HISTORY     Past Medical History:   Diagnosis Date    Class 2 obesity with serious comorbidity and body mass index (BMI) of 39.0 to 39.9 in adult 9/14/2018    Depression     Hyperlipidemia     Hypertension     Kidney stone          SURGICAL HISTORY     Past Surgical History:   Procedure Laterality Date    CHOLECYSTECTOMY      FACIAL COSMETIC SURGERY      FOOT SURGERY      HYSTERECTOMY      JOINT REPLACEMENT      KNEE SURGERY           CURRENTMEDICATIONS       Previous Medications    ALBUTEROL SULFATE  (90 BASE) MCG/ACT INHALER    Inhale 2 puffs into the lungs 4 times daily    ASPIRIN 81 MG EC TABLET    Take 81 mg by mouth daily    ATORVASTATIN (LIPITOR) 20 MG TABLET    Take 40 mg by mouth daily     FLUOXETINE (PROZAC) 40 MG CAPSULE    Take 40 mg by mouth daily. FUROSEMIDE (LASIX) 40 MG TABLET    Take 1 tablet by mouth daily    GABAPENTIN (NEURONTIN) 300 MG CAPSULE    Take 300 mg by mouth 4 times daily.      HYDRALAZINE (APRESOLINE) 50 MG TABLET    Take 1 tablet by mouth every 8 hours    METOPROLOL (LOPRESSOR) 100 MG TABLET    Take 100 mg by mouth 2 times daily    NIFEDIPINE (ADALAT CC) 30 MG EXTENDED RELEASE TABLET    Take 1 tablet by mouth daily    OMEPRAZOLE (PRILOSEC) 20 MG CAPSULE    Take 40 mg by mouth Daily     ONDANSETRON (ZOFRAN) 4 MG TABLET    Take 1 tablet by mouth every 8 hours as needed for Nausea    OXYBUTYNIN (DITROPAN) 5 MG TABLET    Take 5 mg by mouth 3 times daily    OXYCODONE-ACETAMINOPHEN (PERCOCET)  MG PER TABLET    Take 1 tablet by mouth every 6 hours as needed for Pain. Claudio Haley PREDNISONE (DELTASONE) 10 MG TABLET    4 tablets once daily for 3 days then 3 tablets once daily for 3 days then 2 tablets once daily for 3 days then 1 tablet once daily for 3 days. ROPINIROLE (REQUIP) 2 MG TABLET    Take 2 mg by mouth 4 times daily     SPIRONOLACTONE (ALDACTONE) 25 MG TABLET    Take 1 tablet by mouth daily    TIZANIDINE (ZANAFLEX) 4 MG TABLET    Take 4 mg by mouth 2 times daily as needed         ALLERGIES     Lisinopril; Codeine; Cymbalta [duloxetine hcl]; Penicillins; and Sulfa antibiotics    FAMILYHISTORY     History reviewed. No pertinent family history. SOCIAL HISTORY       Social History     Socioeconomic History    Marital status:       Spouse name: None    Number of children: None    Years of education: None    Highest education level: None   Occupational History    None   Social Needs    Financial resource strain: None    Food insecurity:     Worry: None     Inability: None    Transportation needs:     Medical: None     Non-medical: None   Tobacco Use    Smoking status: Former Smoker     Packs/day: 1.00     Years: 35.00     Pack years: 35.00     Types: Cigarettes     Last attempt to quit: 7/26/2019    Smokeless tobacco: Never Used    Tobacco comment: using chantix   Substance and Sexual Activity    Alcohol use: Yes     Comment: occasionally    Drug use: Not Currently     Types: Marijuana     Comment: occassionally    Sexual activity: None   Lifestyle    Physical activity:     Days per week: None     Minutes per session: None    Stress: None   Relationships    Social connections:     Talks on phone: None     Gets together: None     Attends Buddhism service: None     Active member of club or organization: None     Attends meetings of clubs dictation. EKG: All EKG's are interpreted by the Emergency Department Physician in the absence of a cardiologist.  Please see their note for interpretation of EKG. RADIOLOGY:   Non-plain film images such as CT, Ultrasound and MRI are read by the radiologist. Plain radiographic images are visualized andpreliminarily interpreted by the  ED Provider with the below findings:        Interpretation [facility] Radiologist below, if available at the time of this note:    XR CHEST PORTABLE   Final Result   Cardiomegaly with pulmonary vascular congestion           No results found. PROCEDURES   Unless otherwise noted below, none     Procedures    CRITICAL CARE TIME   N/A    CONSULTS:  IP CONSULT TO HOSPITALIST  IP CONSULT TO PULMONOLOGY      EMERGENCY DEPARTMENT COURSE and DIFFERENTIAL DIAGNOSIS/MDM:   Vitals:    Vitals:    07/27/19 1245 07/27/19 1304 07/27/19 1324 07/27/19 1344   BP: 134/72 (!) 154/89 (!) 164/82 (!) 142/102   Pulse: 70 75 74 73   Resp: 26 29 27 15   Temp:       TempSrc:       SpO2: 92% 92% (!) 85% (!) 87%   Weight:       Height:           Patient was given thefollowing medications:  Medications   0.9 % sodium chloride bolus (0 mLs Intravenous Stopped 7/27/19 1323)   methylPREDNISolone sodium (SOLU-MEDROL) injection 125 mg (125 mg Intravenous Given 7/27/19 1220)   ipratropium-albuterol (DUONEB) nebulizer solution 2 ampule (2 ampules Inhalation Given 7/27/19 1210)       Presented to the emergency department with complaints of cough and shortness of breath. She was just here at this hospital and admitted last week. She was discharged with Zithromax and a prednisone taper. She is still taking her prednisone but I did did complete the course of Zithromax. She presented to the emergency department tachypneic and on nonrebreather mask. Will be attempted to place her on room air her oxygen saturation did quickly desaturate to 77%. She was placed on 5 L nasal cannula.   When I listen to her she had GFR African American >60 >60    Calcium 9.8 8.3 - 10.6 mg/dL    Total Protein 7.2 6.4 - 8.2 g/dL    Alb 2.9 (L) 3.4 - 5.0 g/dL    Albumin/Globulin Ratio 0.7 (L) 1.1 - 2.2    Total Bilirubin 1.1 (H) 0.0 - 1.0 mg/dL    Alkaline Phosphatase 86 40 - 129 U/L    ALT 16 10 - 40 U/L    AST 18 15 - 37 U/L    Globulin 4.3 g/dL   Troponin   Result Value Ref Range    Troponin <0.01 <0.01 ng/mL   Brain Natriuretic Peptide   Result Value Ref Range    Pro- (H) 0 - 124 pg/mL   Lactate, Sepsis   Result Value Ref Range    Lactic Acid, Sepsis 2.4 (H) 0.4 - 1.9 mmol/L   Blood gas, arterial   Result Value Ref Range    pH, Arterial 7.549 (H) 7.350 - 7.450    pCO2, Arterial 25.7 (L) 35.0 - 45.0 mmHg    pO2, Arterial 65.0 (L) 75.0 - 108.0 mmHg    HCO3, Arterial 21.9 21.0 - 29.0 mmol/L    Base Excess, Arterial 1.3 -3.0 - 3.0 mmol/L    Hemoglobin, Art, Extended 15.7 12.0 - 16.0 g/dL    O2 Sat, Arterial 95.3 >92 %    Carboxyhgb, Arterial 1.7 (H) 0.0 - 1.5 %    Methemoglobin, Arterial 0.3 <1.5 %    TCO2, Arterial 22.7 Not Established mmol/L    O2 Content, Arterial 21 Not Established mL/dL    O2 Therapy Unknown    Magnesium   Result Value Ref Range    Magnesium 1.50 (L) 1.80 - 2.40 mg/dL   EKG 12 Lead   Result Value Ref Range    Ventricular Rate 61 BPM    Atrial Rate 61 BPM    P-R Interval 136 ms    QRS Duration 90 ms    Q-T Interval 422 ms    QTc Calculation (Bazett) 424 ms    P Axis 44 degrees    R Axis -2 degrees    T Axis 23 degrees    Diagnosis       Normal sinus rhythmMinimal voltage criteria for LVH, may be normal variantBorderline ECGNo previous ECGs available       I estimate there is LOW risk for PULMONARY EMBOLISM, ACUTE CORONARY SYNDROME, OR THORACIC AORTIC DISSECTION, thus I consider the discharge disposition reasonable. Agustina Quesada and I have discussed the diagnosis and risks, and we agree with discharging home to follow-up with their primary doctor.  We also discussed returning to the Emergency Department immediately if new or worsening symptoms occur. We have discussed the symptoms which are most concerning (e.g., bloody sputum, fever, worsening pain or shortness of breath, vomiting) that necessitate immediate return. FINAL Impression    1. COPD exacerbation ([facility])        Blood pressure (!) 142/102, pulse 73, temperature 98.4 °F (36.9 °C), temperature source Oral, resp. rate 15, height 5' 7\" (1.702 m), weight 250 lb (113.4 kg), SpO2 (!) 87 %.         FINAL IMPRESSION      1. COPD exacerbation ([facility] [address]          DISPOSITION/PLAN   DISPOSITION Admitted 07/27/2019 01:49:03 PM      PATIENT REFERREDTO:  Jarvis Collier            DISCHARGE MEDICATIONS:  New Prescriptions    No medications on file       DISCONTINUED MEDICATIONS:  Discontinued Medications    No medications on file              (Please note that portions ofthis note were completed with a voice recognition program.  Efforts were made to edit the dictations but occasionally words are mis-transcribed.)    HAIDER Garcia CNP (electronically signed)            HAIDER Garcia CNP  07/27/19 8160

## 2019-07-27 NOTE — PROGRESS NOTES
Pharmacy note:  Vancomycin Day #  1  Patient is a 65 yo female being treated for pneumonia. She was started on Levaquin. Pt was also started on Vancomycin to cover gram positive organisms, including MRSA. WBC           BUN/SCr            Calc. CrCl                Ht.             Wt.  15                14/0.8                 100 ml/min              5'7\"            112 kg    Adjusted dosing weight = 81.8 kg. Based on patient's age, weight, and renal function will start Vancomycin 1500 mg IVPB q12h and check trough at steady state. Will adjust as necessary. Vale Tamayo Pharm. D. 7/27/2019 6:29 PM

## 2019-07-27 NOTE — PROGRESS NOTES
This RN spoke with Lamine Seo from lab regarding patient's lactate level was ordered to be drawn 2 hours after initial result at 1220, lab stated they would be up to draw the labs ordered.

## 2019-07-27 NOTE — H&P
cultures. Tobacco use - . Nicotine patch 7       HTN - cont PTA regimen       Low Mg - replete 4g IV      DVT Prophylaxis: lovenox  Diet: DIET GENERAL;  Code Status: Full Code    Dispo - inpatient. Reynaldo Hartley MD    Thank you Khushboo Antoine for the opportunity to be involved in this patient's care. If you have any questions or concerns please feel free to contact me at [phone].

## 2019-07-27 NOTE — ED NOTES
Pt admitted to nurse about smoking a syntectic marijuana for 1 month        Raven Parry RN  07/27/19 7537

## 2019-07-28 LAB
ANION GAP SERPL CALCULATED.3IONS-SCNC: 13 MMOL/L (ref 3–16)
BUN BLDV-MCNC: 18 MG/DL (ref 7–20)
CALCIUM SERPL-MCNC: 9 MG/DL (ref 8.3–10.6)
CHLORIDE BLD-SCNC: 94 MMOL/L (ref 99–110)
CO2: 23 MMOL/L (ref 21–32)
CREAT SERPL-MCNC: 0.8 MG/DL (ref 0.6–1.1)
GFR AFRICAN AMERICAN: >60
GFR NON-AFRICAN AMERICAN: >60
GLUCOSE BLD-MCNC: 224 MG/DL (ref 70–99)
MRSA SCREEN RT-PCR: ABNORMAL
MRSA SCREEN RT-PCR: ABNORMAL
ORGANISM: ABNORMAL
POTASSIUM SERPL-SCNC: 3.9 MMOL/L (ref 3.5–5.1)
SODIUM BLD-SCNC: 130 MMOL/L (ref 136–145)
TROPONIN: <0.01 NG/ML

## 2019-07-28 PROCEDURE — 2700000000 HC OXYGEN THERAPY PER DAY

## 2019-07-28 PROCEDURE — 80048 BASIC METABOLIC PNL TOTAL CA: CPT

## 2019-07-28 PROCEDURE — 94761 N-INVAS EAR/PLS OXIMETRY MLT: CPT

## 2019-07-28 PROCEDURE — 6360000002 HC RX W HCPCS: Performed by: INTERNAL MEDICINE

## 2019-07-28 PROCEDURE — 99222 1ST HOSP IP/OBS MODERATE 55: CPT | Performed by: INTERNAL MEDICINE

## 2019-07-28 PROCEDURE — 6370000000 HC RX 637 (ALT 250 FOR IP): Performed by: INTERNAL MEDICINE

## 2019-07-28 PROCEDURE — 36415 COLL VENOUS BLD VENIPUNCTURE: CPT

## 2019-07-28 PROCEDURE — 2580000003 HC RX 258: Performed by: INTERNAL MEDICINE

## 2019-07-28 PROCEDURE — 94640 AIRWAY INHALATION TREATMENT: CPT

## 2019-07-28 PROCEDURE — 2060000000 HC ICU INTERMEDIATE R&B

## 2019-07-28 RX ORDER — LANOLIN ALCOHOL/MO/W.PET/CERES
3 CREAM (GRAM) TOPICAL NIGHTLY PRN
Status: DISCONTINUED | OUTPATIENT
Start: 2019-07-28 | End: 2019-08-01 | Stop reason: HOSPADM

## 2019-07-28 RX ORDER — METHYLPREDNISOLONE SODIUM SUCCINATE 40 MG/ML
40 INJECTION, POWDER, LYOPHILIZED, FOR SOLUTION INTRAMUSCULAR; INTRAVENOUS EVERY 12 HOURS
Status: DISCONTINUED | OUTPATIENT
Start: 2019-07-28 | End: 2019-08-01

## 2019-07-28 RX ORDER — IPRATROPIUM BROMIDE AND ALBUTEROL SULFATE 2.5; .5 MG/3ML; MG/3ML
1 SOLUTION RESPIRATORY (INHALATION)
Status: DISCONTINUED | OUTPATIENT
Start: 2019-07-28 | End: 2019-08-01 | Stop reason: HOSPADM

## 2019-07-28 RX ADMIN — TIZANIDINE 4 MG: 4 TABLET ORAL at 21:57

## 2019-07-28 RX ADMIN — HYDRALAZINE HYDROCHLORIDE 50 MG: 50 TABLET, FILM COATED ORAL at 05:34

## 2019-07-28 RX ADMIN — IPRATROPIUM BROMIDE AND ALBUTEROL SULFATE 1 AMPULE: .5; 3 SOLUTION RESPIRATORY (INHALATION) at 23:09

## 2019-07-28 RX ADMIN — IPRATROPIUM BROMIDE AND ALBUTEROL SULFATE 1 AMPULE: .5; 3 SOLUTION RESPIRATORY (INHALATION) at 10:50

## 2019-07-28 RX ADMIN — OXYBUTYNIN CHLORIDE 5 MG: 5 TABLET ORAL at 07:56

## 2019-07-28 RX ADMIN — GABAPENTIN 300 MG: 300 CAPSULE ORAL at 16:08

## 2019-07-28 RX ADMIN — GABAPENTIN 300 MG: 300 CAPSULE ORAL at 13:16

## 2019-07-28 RX ADMIN — OXYCODONE HYDROCHLORIDE AND ACETAMINOPHEN 1 TABLET: 10; 325 TABLET ORAL at 05:34

## 2019-07-28 RX ADMIN — TIZANIDINE 4 MG: 4 TABLET ORAL at 07:55

## 2019-07-28 RX ADMIN — FUROSEMIDE 40 MG: 40 TABLET ORAL at 07:56

## 2019-07-28 RX ADMIN — HYDRALAZINE HYDROCHLORIDE 50 MG: 50 TABLET, FILM COATED ORAL at 13:16

## 2019-07-28 RX ADMIN — SPIRONOLACTONE 25 MG: 25 TABLET ORAL at 07:56

## 2019-07-28 RX ADMIN — GABAPENTIN 300 MG: 300 CAPSULE ORAL at 07:56

## 2019-07-28 RX ADMIN — METOPROLOL TARTRATE 100 MG: 50 TABLET ORAL at 21:57

## 2019-07-28 RX ADMIN — HYDRALAZINE HYDROCHLORIDE 50 MG: 50 TABLET, FILM COATED ORAL at 21:57

## 2019-07-28 RX ADMIN — METOPROLOL TARTRATE 100 MG: 50 TABLET ORAL at 07:56

## 2019-07-28 RX ADMIN — ASPIRIN 81 MG: 81 TABLET, COATED ORAL at 07:55

## 2019-07-28 RX ADMIN — ATORVASTATIN CALCIUM 40 MG: 40 TABLET, FILM COATED ORAL at 21:57

## 2019-07-28 RX ADMIN — PANTOPRAZOLE SODIUM 40 MG: 40 TABLET, DELAYED RELEASE ORAL at 05:34

## 2019-07-28 RX ADMIN — LEVOFLOXACIN 500 MG: 5 INJECTION, SOLUTION INTRAVENOUS at 17:30

## 2019-07-28 RX ADMIN — Medication 1.5 G: at 21:56

## 2019-07-28 RX ADMIN — OXYCODONE HYDROCHLORIDE AND ACETAMINOPHEN 1 TABLET: 10; 325 TABLET ORAL at 21:57

## 2019-07-28 RX ADMIN — GABAPENTIN 300 MG: 300 CAPSULE ORAL at 21:56

## 2019-07-28 RX ADMIN — IPRATROPIUM BROMIDE AND ALBUTEROL SULFATE 1 AMPULE: .5; 3 SOLUTION RESPIRATORY (INHALATION) at 15:15

## 2019-07-28 RX ADMIN — IPRATROPIUM BROMIDE AND ALBUTEROL SULFATE 1 AMPULE: .5; 3 SOLUTION RESPIRATORY (INHALATION) at 19:28

## 2019-07-28 RX ADMIN — METHYLPREDNISOLONE SODIUM SUCCINATE 40 MG: 40 INJECTION, POWDER, FOR SOLUTION INTRAMUSCULAR; INTRAVENOUS at 16:08

## 2019-07-28 RX ADMIN — IPRATROPIUM BROMIDE AND ALBUTEROL SULFATE 1 AMPULE: .5; 3 SOLUTION RESPIRATORY (INHALATION) at 06:52

## 2019-07-28 RX ADMIN — OXYBUTYNIN CHLORIDE 5 MG: 5 TABLET ORAL at 21:57

## 2019-07-28 RX ADMIN — ROPINIROLE HYDROCHLORIDE 2 MG: 2 TABLET, FILM COATED ORAL at 09:33

## 2019-07-28 RX ADMIN — ACETAMINOPHEN 650 MG: 325 TABLET ORAL at 16:30

## 2019-07-28 RX ADMIN — ENOXAPARIN SODIUM 40 MG: 40 INJECTION SUBCUTANEOUS at 07:55

## 2019-07-28 RX ADMIN — ROPINIROLE HYDROCHLORIDE 2 MG: 2 TABLET, FILM COATED ORAL at 21:58

## 2019-07-28 RX ADMIN — Medication 10 ML: at 07:54

## 2019-07-28 RX ADMIN — OXYBUTYNIN CHLORIDE 5 MG: 5 TABLET ORAL at 13:16

## 2019-07-28 RX ADMIN — FLUOXETINE 40 MG: 10 CAPSULE ORAL at 07:55

## 2019-07-28 RX ADMIN — ROPINIROLE HYDROCHLORIDE 2 MG: 2 TABLET, FILM COATED ORAL at 13:17

## 2019-07-28 RX ADMIN — Medication 10 ML: at 17:29

## 2019-07-28 RX ADMIN — METHYLPREDNISOLONE SODIUM SUCCINATE 40 MG: 40 INJECTION, POWDER, FOR SOLUTION INTRAMUSCULAR; INTRAVENOUS at 04:01

## 2019-07-28 RX ADMIN — MELATONIN 3 MG ORAL TABLET 3 MG: 3 TABLET ORAL at 21:57

## 2019-07-28 RX ADMIN — Medication 1.5 G: at 08:02

## 2019-07-28 RX ADMIN — ROPINIROLE HYDROCHLORIDE 2 MG: 2 TABLET, FILM COATED ORAL at 16:10

## 2019-07-28 RX ADMIN — Medication 10 ML: at 21:55

## 2019-07-28 RX ADMIN — OXYCODONE HYDROCHLORIDE AND ACETAMINOPHEN 1 TABLET: 10; 325 TABLET ORAL at 13:16

## 2019-07-28 ASSESSMENT — PAIN SCALES - GENERAL
PAINLEVEL_OUTOF10: 9
PAINLEVEL_OUTOF10: 5
PAINLEVEL_OUTOF10: 9
PAINLEVEL_OUTOF10: 7
PAINLEVEL_OUTOF10: 8

## 2019-07-28 ASSESSMENT — PAIN DESCRIPTION - ORIENTATION
ORIENTATION: LOWER
ORIENTATION: MID

## 2019-07-28 ASSESSMENT — PAIN DESCRIPTION - LOCATION
LOCATION: BACK
LOCATION: HEAD

## 2019-07-28 ASSESSMENT — PAIN DESCRIPTION - DESCRIPTORS
DESCRIPTORS: ACHING
DESCRIPTORS: ACHING

## 2019-07-28 ASSESSMENT — PAIN DESCRIPTION - PAIN TYPE
TYPE: CHRONIC PAIN
TYPE: ACUTE PAIN

## 2019-07-28 ASSESSMENT — PAIN DESCRIPTION - ONSET: ONSET: ON-GOING

## 2019-07-28 ASSESSMENT — PAIN - FUNCTIONAL ASSESSMENT: PAIN_FUNCTIONAL_ASSESSMENT: ACTIVITIES ARE NOT PREVENTED

## 2019-07-28 NOTE — CONSULTS
Patient is being seen at the request of Dr. Venu Moon for a consultation for hypoxia, ILD    HISTORY OF PRESENT ILLNESS: This is a 65 yo WF with a h/o tobacco abuse and multiple hospitalization with hypoxemic respiratory failure due to varying causes such as pulmonary edema and pneumonia. She was treated 7/17/19 to 7/19/19 here for acute hypoxemic respiratory failure & sent home with steroids. She was initially better, but now returned to the emergency department with a 4 day h/o progressively worsening hypoxemia at home, no associated cough, similar to prior episodes of hypoxemia. She feels well today, but is on 6 L supplemental oxygen. PAST MEDICAL HISTORY:  Past Medical History:   Diagnosis Date    Class 2 obesity with serious comorbidity and body mass index (BMI) of 39.0 to 39.9 in adult 9/14/2018    Depression     History of left hip replacement 02/12/2018    Hyperlipidemia     Hypertension     Kidney stone      PAST SURGICAL HISTORY:  Past Surgical History:   Procedure Laterality Date    CHOLECYSTECTOMY      FACIAL COSMETIC SURGERY      FOOT SURGERY      HYSTERECTOMY      JOINT REPLACEMENT      KNEE SURGERY         FAMILY HISTORY:  family history is not on file. SOCIAL HISTORY:   reports that she quit smoking 2 days ago. Her smoking use included cigarettes. She has a 35.00 pack-year smoking history.  She has never used smokeless tobacco.    Scheduled Meds:   aspirin  81 mg Oral Daily    FLUoxetine  40 mg Oral Daily    furosemide  40 mg Oral Daily    gabapentin  300 mg Oral 4x Daily    hydrALAZINE  50 mg Oral 3 times per day    metoprolol  100 mg Oral BID    pantoprazole  40 mg Oral QAM AC    oxybutynin  5 mg Oral TID    rOPINIRole  2 mg Oral 4x Daily    spironolactone  25 mg Oral Daily    methylPREDNISolone  40 mg Intravenous Q8H    sodium chloride flush  10 mL Intravenous 2 times per day    enoxaparin  40 mg Subcutaneous Daily    ipratropium-albuterol  1 ampule Inhalation Q4H    atorvastatin  40 mg Oral Nightly    nicotine  1 patch Transdermal Daily    levofloxacin  500 mg Intravenous Q24H    vancomycin  1,500 mg Intravenous Q12H     Continuous Infusions:   sodium chloride       PRN Meds:  oxyCODONE-acetaminophen, tiZANidine, sodium chloride flush, magnesium hydroxide, ondansetron, acetaminophen    ALLERGIES:  Patient is allergic to lisinopril; codeine; cymbalta [duloxetine hcl]; penicillins; and sulfa antibiotics. REVIEW OF SYSTEMS:  Constitutional: Negative for fever  HENT: Negative for sore throat  Eyes: Negative for redness   Respiratory: + for dyspnea   Cardiovascular: Negative for chest pain  Gastrointestinal: Negative for vomiting, diarrhea   Genitourinary: Negative for hematuria   Musculoskeletal: Negative for arthralgias   Skin: Negative for rash  Neurological: Negative for syncope  Hematological: Negative for adenopathy  Psychiatric/Behavorial: Negative for anxiety    PHYSICAL EXAM:  Blood pressure (!) 140/79, pulse 76, temperature 98.4 °F (36.9 °C), temperature source Oral, resp. rate 20, height 5' 7\" (1.702 m), weight 249 lb 6.4 oz (113.1 kg), SpO2 91 %.' on 6 L  Gen: No distress. Eyes: PERRL. No sclera icterus. No conjunctival injection. ENT: No discharge. Pharynx clear. Neck: Trachea midline. No obvious mass. Resp: No accessory muscle use. Bibasilar crackles. No wheezes. No rhonchi. No dullness on percussion. CV: Regular rate. Regular rhythm. No murmur or rub. No edema. Peripheral pulses are 2+. Capillary refill is less than 3 seconds. GI: Non-tender. Non-distended. No hernia. Skin: Warm and dry. No nodule on exposed extremities. Lymph: No cervical LAD. No supraclavicular LAD. M/S: No cyanosis. No joint deformity. No clubbing. Neuro: Awake. Alert. Moves all four extremities. Psych: Oriented x 3. No anxiety.      LABS:  CBC:   Recent Labs     07/27/19  1220   WBC 15.0*   HGB 14.8   HCT 43.6   MCV 90.6        BMP:   Recent Labs  See   recommendation referenced below.  This is likely benign since this is   essentially unchanged from prior CT 2 years earlier. CTPA 12/18/17  FINDINGS:   Pulmonary Arteries: There is suboptimal opacification of the pulmonary   arteries due to contrast bolus timing.  No evidence for central or proximal   segmental filling defect to suggest embolus.  The distal segmental and   subsegmental vessels are not well evaluated.       Mediastinum: Borderline prominent right hilar lymph node identified measuring   1 cm in short axis.  The heart and pericardium demonstrate no acute   abnormality.  There is no acute abnormality of the thoracic aorta.  Periphery   calcified left lower lobe nodule measuring 2.4 cm.  If not previously worked   up, dedicated thyroid ultrasound is recommended.  Small hiatal hernia.       Lungs/pleura: Lungs demonstrate patchy, ill-defined ground-glass opacities   throughout the lungs bilaterally.  No superimposed fluid collection, pleural   effusion, or pneumothorax.  Central airways are patent and clear.  .       Upper Abdomen: Limited images of the upper abdomen are unremarkable.  Small   hiatal hernia.  Status post cholecystectomy.       Soft Tissues/Bones: No acute bone or soft tissue abnormality.           Impression   No evidence for central or proximal segmental pulmonary embolus.  No evidence   for right heart strain or pulmonary infarction.       Patchy ground-glass opacities throughout the lungs bilaterally which appear   improved as compared to 08/08/2017 examination.      CTPA 8/8/17  FINDINGS:   Pulmonary Arteries: Pulmonary arteries are adequately opacified for   evaluation.  No evidence of intraluminal filling defect to suggest pulmonary   embolism.  Main pulmonary artery is normal in caliber.       Mediastinum: There are enlarged hilar lymph nodes bilaterally, measuring 2.4   x 1.8 cm on the right and 2.5 x 1.5 cm on the left.  AP window lymph node   measures 2.3 x 1.5 cm.

## 2019-07-28 NOTE — PROGRESS NOTES
Informed pt her fall score rates high and that fall precautions are to be in place. Pt refuses bed alarm and refuses to call out for help with ambulation. Risk of fall discussed with patient, pt verbalized understanding and continues to decline precautions.

## 2019-07-28 NOTE — PROGRESS NOTES
FOOT SURGERY      HYSTERECTOMY      JOINT REPLACEMENT      KNEE SURGERY         Level of Consciousness: Alert, Oriented, and Cooperative = 0    Level of Activity: Walking with assistance = 1    Respiratory Pattern: Dyspnea with exertion;Irregular pattern;or RR less than 6 = 2    Breath Sounds: Diminshed bilaterally and/or crackles = 2    Sputum  Sputum Color: Brown,  , Sputum How Obtained: Spontaneous cough  Cough: Strong, productive = 1    Vital Signs   /78   Pulse 78   Temp 97.9 °F (36.6 °C) (Oral)   Resp 20   Ht 5' 7\" (1.702 m)   Wt 249 lb 6.4 oz (113.1 kg)   SpO2 92%   BMI 39.06 kg/m²   SPO2 (COPD values may differ): 86-87% on room air or greater than 92% on FiO2 35- 50% = 3    Peak Flow (asthma only): not applicable = 0    RSI: 07-75 = Q6H or QID and Q4HPRN for dyspnea        Plan       Goals: medication delivery    Patient/caregiver was educated on the proper method of use for Respiratory Care Devices:  Yes      Level of patient/caregiver understanding able to:   ? Verbalize understanding   ? Demonstrate understanding       ? Teach back        ? Needs reinforcement       ? No available caregiver               ? Other:     Response to education:  Excellent     Is patient being placed on Home Treatment Regimen? No     Does the patient have everything they need prior to discharge? NA     Comments: Chart and home meds reviewed    Plan of Care: Change the patient to Q4hwa    Electronically signed by Meir Foster RCP on 7/28/2019 at 3:18 PM    Respiratory Protocol Guidelines     1. Assessment and treatment by Respiratory Therapy will be initiated for medication and therapeutic interventions upon initiation of aerosolized medication. 2. Physician will be contacted for respiratory rate (RR) greater than 35 breaths per minute. Therapy will be held for heart rate (HR) greater than 140 beats per minute, pending direction from physician.   3. Bronchodilators will be administered via Metered Dose inhaled anti-inflammatory medication at home, and lacks wheezing by examination or by history for at least 24 hours, contact physician for possible discontinuation.

## 2019-07-28 NOTE — PROGRESS NOTES
resolved. Etiology not entirely clear - suspect progression of COPD. She appears fairly euvolemic this admit  There is a question of ILD on recent imaging form last admit. PE ruled out with CTA  Possible pneumonic process  Plan:               - solumedrol.               - duo neb              - pulm consult.            COPD w AE - as above.         Sepsis POA due to HCAP with recent hospitalization possible MRSA Pneumonia. MRAS swab positive. Resp culture pending. Levaquin and Vanc empiric pending cultures.        Tobacco use - .    Nicotine patch 7mg         HTN - cont PTA regimen         Low Mg - repleted 4g IV        DVT Prophylaxis: lovenox  Diet: DIET GENERAL;  Code Status: Full Code     Dispo - inpatient.          Christina Blanco MD

## 2019-07-29 PROBLEM — E66.812 CLASS 2 OBESITY DUE TO EXCESS CALORIES WITH BODY MASS INDEX (BMI) OF 39.0 TO 39.9 IN ADULT: Status: ACTIVE | Noted: 2018-09-14

## 2019-07-29 PROBLEM — E66.09 CLASS 2 OBESITY DUE TO EXCESS CALORIES WITH BODY MASS INDEX (BMI) OF 39.0 TO 39.9 IN ADULT: Status: ACTIVE | Noted: 2018-09-14

## 2019-07-29 LAB
ANION GAP SERPL CALCULATED.3IONS-SCNC: 11 MMOL/L (ref 3–16)
BUN BLDV-MCNC: 19 MG/DL (ref 7–20)
CALCIUM SERPL-MCNC: 9.2 MG/DL (ref 8.3–10.6)
CHLORIDE BLD-SCNC: 95 MMOL/L (ref 99–110)
CO2: 26 MMOL/L (ref 21–32)
CREAT SERPL-MCNC: 0.8 MG/DL (ref 0.6–1.1)
GFR AFRICAN AMERICAN: >60
GFR NON-AFRICAN AMERICAN: >60
GLUCOSE BLD-MCNC: 132 MG/DL (ref 70–99)
LV EF: 55 %
LVEF MODALITY: NORMAL
POTASSIUM SERPL-SCNC: 4.2 MMOL/L (ref 3.5–5.1)
PROCALCITONIN: 0.04 NG/ML (ref 0–0.15)
SODIUM BLD-SCNC: 132 MMOL/L (ref 136–145)
VANCOMYCIN RANDOM: 13.8 UG/ML
VANCOMYCIN TROUGH: 20.9 UG/ML (ref 10–20)

## 2019-07-29 PROCEDURE — 99232 SBSQ HOSP IP/OBS MODERATE 35: CPT | Performed by: PHYSICIAN ASSISTANT

## 2019-07-29 PROCEDURE — 94761 N-INVAS EAR/PLS OXIMETRY MLT: CPT

## 2019-07-29 PROCEDURE — 80202 ASSAY OF VANCOMYCIN: CPT

## 2019-07-29 PROCEDURE — 2060000000 HC ICU INTERMEDIATE R&B

## 2019-07-29 PROCEDURE — 36415 COLL VENOUS BLD VENIPUNCTURE: CPT

## 2019-07-29 PROCEDURE — 6370000000 HC RX 637 (ALT 250 FOR IP): Performed by: INTERNAL MEDICINE

## 2019-07-29 PROCEDURE — 6370000000 HC RX 637 (ALT 250 FOR IP): Performed by: PHYSICIAN ASSISTANT

## 2019-07-29 PROCEDURE — 2700000000 HC OXYGEN THERAPY PER DAY

## 2019-07-29 PROCEDURE — 6360000002 HC RX W HCPCS: Performed by: INTERNAL MEDICINE

## 2019-07-29 PROCEDURE — 94640 AIRWAY INHALATION TREATMENT: CPT

## 2019-07-29 PROCEDURE — 2580000003 HC RX 258: Performed by: INTERNAL MEDICINE

## 2019-07-29 PROCEDURE — 80048 BASIC METABOLIC PNL TOTAL CA: CPT

## 2019-07-29 PROCEDURE — 99233 SBSQ HOSP IP/OBS HIGH 50: CPT | Performed by: INTERNAL MEDICINE

## 2019-07-29 PROCEDURE — 93306 TTE W/DOPPLER COMPLETE: CPT

## 2019-07-29 PROCEDURE — 84145 PROCALCITONIN (PCT): CPT

## 2019-07-29 RX ORDER — MAGNESIUM HYDROXIDE/ALUMINUM HYDROXICE/SIMETHICONE 120; 1200; 1200 MG/30ML; MG/30ML; MG/30ML
30 SUSPENSION ORAL EVERY 6 HOURS PRN
Status: DISCONTINUED | OUTPATIENT
Start: 2019-07-29 | End: 2019-08-01 | Stop reason: HOSPADM

## 2019-07-29 RX ORDER — GUAIFENESIN 600 MG/1
600 TABLET, EXTENDED RELEASE ORAL 2 TIMES DAILY
Status: DISCONTINUED | OUTPATIENT
Start: 2019-07-29 | End: 2019-08-01 | Stop reason: HOSPADM

## 2019-07-29 RX ADMIN — FUROSEMIDE 40 MG: 40 TABLET ORAL at 09:02

## 2019-07-29 RX ADMIN — Medication 1.5 G: at 15:43

## 2019-07-29 RX ADMIN — HYDRALAZINE HYDROCHLORIDE 50 MG: 50 TABLET, FILM COATED ORAL at 06:25

## 2019-07-29 RX ADMIN — ATORVASTATIN CALCIUM 40 MG: 40 TABLET, FILM COATED ORAL at 20:53

## 2019-07-29 RX ADMIN — TIZANIDINE 4 MG: 4 TABLET ORAL at 20:58

## 2019-07-29 RX ADMIN — ALUMINUM HYDROXIDE, MAGNESIUM HYDROXIDE, AND SIMETHICONE 30 ML: 200; 200; 20 SUSPENSION ORAL at 10:58

## 2019-07-29 RX ADMIN — IPRATROPIUM BROMIDE AND ALBUTEROL SULFATE 1 AMPULE: .5; 3 SOLUTION RESPIRATORY (INHALATION) at 15:07

## 2019-07-29 RX ADMIN — ROPINIROLE HYDROCHLORIDE 2 MG: 2 TABLET, FILM COATED ORAL at 17:01

## 2019-07-29 RX ADMIN — LEVOFLOXACIN 500 MG: 5 INJECTION, SOLUTION INTRAVENOUS at 18:27

## 2019-07-29 RX ADMIN — ROPINIROLE HYDROCHLORIDE 2 MG: 2 TABLET, FILM COATED ORAL at 21:04

## 2019-07-29 RX ADMIN — METOPROLOL TARTRATE 100 MG: 50 TABLET ORAL at 20:54

## 2019-07-29 RX ADMIN — OXYCODONE HYDROCHLORIDE AND ACETAMINOPHEN 1 TABLET: 10; 325 TABLET ORAL at 12:48

## 2019-07-29 RX ADMIN — ASPIRIN 81 MG: 81 TABLET, COATED ORAL at 09:02

## 2019-07-29 RX ADMIN — IPRATROPIUM BROMIDE AND ALBUTEROL SULFATE 1 AMPULE: .5; 3 SOLUTION RESPIRATORY (INHALATION) at 10:59

## 2019-07-29 RX ADMIN — IPRATROPIUM BROMIDE AND ALBUTEROL SULFATE 1 AMPULE: .5; 3 SOLUTION RESPIRATORY (INHALATION) at 20:34

## 2019-07-29 RX ADMIN — ENOXAPARIN SODIUM 40 MG: 40 INJECTION SUBCUTANEOUS at 09:03

## 2019-07-29 RX ADMIN — IPRATROPIUM BROMIDE AND ALBUTEROL SULFATE 1 AMPULE: .5; 3 SOLUTION RESPIRATORY (INHALATION) at 23:42

## 2019-07-29 RX ADMIN — MELATONIN 3 MG ORAL TABLET 3 MG: 3 TABLET ORAL at 20:58

## 2019-07-29 RX ADMIN — OXYCODONE HYDROCHLORIDE AND ACETAMINOPHEN 1 TABLET: 10; 325 TABLET ORAL at 06:25

## 2019-07-29 RX ADMIN — FLUOXETINE 40 MG: 10 CAPSULE ORAL at 09:02

## 2019-07-29 RX ADMIN — ROPINIROLE HYDROCHLORIDE 2 MG: 2 TABLET, FILM COATED ORAL at 12:49

## 2019-07-29 RX ADMIN — HYDRALAZINE HYDROCHLORIDE 50 MG: 50 TABLET, FILM COATED ORAL at 20:53

## 2019-07-29 RX ADMIN — OXYBUTYNIN CHLORIDE 5 MG: 5 TABLET ORAL at 09:02

## 2019-07-29 RX ADMIN — HYDRALAZINE HYDROCHLORIDE 50 MG: 50 TABLET, FILM COATED ORAL at 14:38

## 2019-07-29 RX ADMIN — ROPINIROLE HYDROCHLORIDE 2 MG: 2 TABLET, FILM COATED ORAL at 09:03

## 2019-07-29 RX ADMIN — GABAPENTIN 300 MG: 300 CAPSULE ORAL at 12:48

## 2019-07-29 RX ADMIN — OXYCODONE HYDROCHLORIDE AND ACETAMINOPHEN 1 TABLET: 10; 325 TABLET ORAL at 20:58

## 2019-07-29 RX ADMIN — GUAIFENESIN 600 MG: 600 TABLET, EXTENDED RELEASE ORAL at 20:54

## 2019-07-29 RX ADMIN — GABAPENTIN 300 MG: 300 CAPSULE ORAL at 20:53

## 2019-07-29 RX ADMIN — PANTOPRAZOLE SODIUM 40 MG: 40 TABLET, DELAYED RELEASE ORAL at 06:25

## 2019-07-29 RX ADMIN — METHYLPREDNISOLONE SODIUM SUCCINATE 40 MG: 40 INJECTION, POWDER, FOR SOLUTION INTRAMUSCULAR; INTRAVENOUS at 15:43

## 2019-07-29 RX ADMIN — GABAPENTIN 300 MG: 300 CAPSULE ORAL at 09:02

## 2019-07-29 RX ADMIN — OXYBUTYNIN CHLORIDE 5 MG: 5 TABLET ORAL at 20:54

## 2019-07-29 RX ADMIN — METHYLPREDNISOLONE SODIUM SUCCINATE 40 MG: 40 INJECTION, POWDER, FOR SOLUTION INTRAMUSCULAR; INTRAVENOUS at 06:25

## 2019-07-29 RX ADMIN — GUAIFENESIN 600 MG: 600 TABLET, EXTENDED RELEASE ORAL at 14:38

## 2019-07-29 RX ADMIN — METOPROLOL TARTRATE 100 MG: 50 TABLET ORAL at 09:02

## 2019-07-29 RX ADMIN — GABAPENTIN 300 MG: 300 CAPSULE ORAL at 17:01

## 2019-07-29 RX ADMIN — Medication 10 ML: at 20:54

## 2019-07-29 RX ADMIN — OXYBUTYNIN CHLORIDE 5 MG: 5 TABLET ORAL at 14:38

## 2019-07-29 RX ADMIN — IPRATROPIUM BROMIDE AND ALBUTEROL SULFATE 1 AMPULE: .5; 3 SOLUTION RESPIRATORY (INHALATION) at 07:02

## 2019-07-29 ASSESSMENT — PAIN DESCRIPTION - PAIN TYPE: TYPE: CHRONIC PAIN

## 2019-07-29 ASSESSMENT — PAIN DESCRIPTION - DESCRIPTORS: DESCRIPTORS: DISCOMFORT

## 2019-07-29 ASSESSMENT — PAIN SCALES - GENERAL
PAINLEVEL_OUTOF10: 8
PAINLEVEL_OUTOF10: 8
PAINLEVEL_OUTOF10: 7
PAINLEVEL_OUTOF10: 8

## 2019-07-29 ASSESSMENT — PAIN DESCRIPTION - ONSET: ONSET: ON-GOING

## 2019-07-29 ASSESSMENT — PAIN DESCRIPTION - LOCATION: LOCATION: HIP;BACK

## 2019-07-29 ASSESSMENT — PAIN DESCRIPTION - FREQUENCY: FREQUENCY: CONTINUOUS

## 2019-07-29 ASSESSMENT — PAIN DESCRIPTION - ORIENTATION: ORIENTATION: RIGHT;LEFT;LOWER

## 2019-07-29 NOTE — PROGRESS NOTES
Hospitalist Progress Note      PCP: Jakub Kruse    Date of Admission: 7/27/2019    Chief Complaint: worsening dyspnea. Hypoxia. Subjective:     Reports she is still feeling SOB. Having a more productive cough today. Upset about the idea of the supplemental O2. Reports she is motivated to quit smoking now. Medications:  Reviewed    Infusion Medications   Scheduled Medications    vancomycin (VANCOCIN) intermittent dosing (placeholder)   Other RX Placeholder    methylPREDNISolone  40 mg Intravenous Q12H    ipratropium-albuterol  1 ampule Inhalation Q4H WA    aspirin  81 mg Oral Daily    FLUoxetine  40 mg Oral Daily    furosemide  40 mg Oral Daily    gabapentin  300 mg Oral 4x Daily    hydrALAZINE  50 mg Oral 3 times per day    metoprolol  100 mg Oral BID    pantoprazole  40 mg Oral QAM AC    oxybutynin  5 mg Oral TID    rOPINIRole  2 mg Oral 4x Daily    sodium chloride flush  10 mL Intravenous 2 times per day    enoxaparin  40 mg Subcutaneous Daily    atorvastatin  40 mg Oral Nightly    nicotine  1 patch Transdermal Daily    levofloxacin  500 mg Intravenous Q24H     PRN Meds: aluminum & magnesium hydroxide-simethicone, melatonin, oxyCODONE-acetaminophen, tiZANidine, sodium chloride flush, magnesium hydroxide, ondansetron, acetaminophen      Intake/Output Summary (Last 24 hours) at 7/29/2019 1316  Last data filed at 7/29/2019 0920  Gross per 24 hour   Intake 820 ml   Output 2950 ml   Net -2130 ml       Physical Exam Performed:    BP (!) 146/104   Pulse 70   Temp 97.1 °F (36.2 °C) (Oral)   Resp 18   Ht 5' 7\" (1.702 m)   Wt 250 lb 8 oz (113.6 kg)   SpO2 94%   BMI 39.23 kg/m²     General appearance: No apparent distress, appears stated age and cooperative. HEENT: Pupils equal, round, and reactive to light. Conjunctivae/corneas clear. Neck: Supple, with full range of motion.   Respiratory:  Normal respiratory effort, on 5L NC O2, + crackles in bilateral bases R >L, no wheezes or

## 2019-07-29 NOTE — CARE COORDINATION
INTERDISCIPLINARY PLAN OF CARE CONFERENCE    Date/Time: 7/29/2019 9:58 AM  Completed by: Mikey Delgado, Case Management      Patient Name:  Tony Joseph  YOB: 1960  Admitting Diagnosis: Hypoxia [R09.02]     Admit Date/Time:  7/27/2019 12:09 PM    Chart reviewed. Interdisciplinary team met to discuss patient progress and discharge plans. Disciplines included Case Management, Nursing, and Dietitian. Current Status: Inpatient    PT/OT recommendation:    Anticipated Discharge Date: TBD  Expected D/C Disposition:  Home  Confirmed plan with patient and/or family Yes  Discharge Plan Comments: Chart reviewed. Met with pt at bedside and explained the role of the CM. Plan: Pt would like to return home. Denies any HC needs. She is hoping to return home without needing home O2. She currently is living with and providing care for her father who is in hospice care at this time. She is independent with ADLS and normally king not require oxygen. +CM following.     Home O2 in place on admit: No  Pt informed of need to bring portable home O2 tank on day of discharge for nursing to connect prior to leaving:  No  Verbalized agreement/Understanding:  No

## 2019-07-29 NOTE — PLAN OF CARE
Problem: Falls - Risk of:  Goal: Will remain free from falls  Description  Will remain free from falls  Outcome: Ongoing  Note:   Pt continues to refuse fall precautions. Pt is agreeable to wearing non-skid socks.   Goal: Absence of physical injury  Description  Absence of physical injury  Outcome: Ongoing     Problem: Infection:  Goal: Will remain free from infection  Description  Will remain free from infection  Outcome: Ongoing     Problem: Safety:  Goal: Free from accidental physical injury  Description  Free from accidental physical injury  Outcome: Ongoing  Goal: Free from intentional harm  Description  Free from intentional harm  Outcome: Ongoing     Problem: Daily Care:  Goal: Daily care needs are met  Description  Daily care needs are met  Outcome: Ongoing     Problem: Pain:  Goal: Patient's pain/discomfort is manageable  Description  Patient's pain/discomfort is manageable  Outcome: Ongoing  Note:   PRN percocet for chronic pain Q6 hr     Problem: Skin Integrity:  Goal: Skin integrity will stabilize  Description  Skin integrity will stabilize  Outcome: Ongoing     Problem: Discharge Planning:  Goal: Patients continuum of care needs are met  Description  Patients continuum of care needs are met  Outcome: Ongoing

## 2019-07-29 NOTE — PROGRESS NOTES
Pt c/o pain after receiving IV medication. Pt's arm became red and slightly puffy. IV removed at this time and ice pack given to pt for comfort.

## 2019-07-29 NOTE — PROGRESS NOTES
Pharmacy Vancomycin Consult     Vancomycin Day: 3  Current Dosinmg q12h    Temp max:      Recent Labs     19  0417 19  0719   BUN 18 19       Recent Labs     19  0417 19  0719   CREATININE 0.8 0.8       Recent Labs     19  1220   WBC 15.0*         Intake/Output Summary (Last 24 hours) at 2019 1154  Last data filed at 2019 0920  Gross per 24 hour   Intake 1060 ml   Output 2950 ml   Net -1890 ml       Culture Date      Source                       Results  Nasal-MRSA    Ht Readings from Last 1 Encounters:   19 5' 7\" (1.702 m)        Wt Readings from Last 1 Encounters:   19 250 lb 8 oz (113.6 kg)         Body mass index is 39.23 kg/m². Estimated Creatinine Clearance: 101 mL/min (based on SCr of 0.8 mg/dL).     Trough: 20.9    Assessment/Plan:  Hold for now, get level at 1400 today, if less than 18mcg/ml will change to q18h interval  Sarah Rosenberg Pharm D [id number] AM  .

## 2019-07-30 LAB
ANION GAP SERPL CALCULATED.3IONS-SCNC: 9 MMOL/L (ref 3–16)
BUN BLDV-MCNC: 23 MG/DL (ref 7–20)
CALCIUM SERPL-MCNC: 9.4 MG/DL (ref 8.3–10.6)
CHLORIDE BLD-SCNC: 97 MMOL/L (ref 99–110)
CO2: 30 MMOL/L (ref 21–32)
CREAT SERPL-MCNC: 0.9 MG/DL (ref 0.6–1.1)
GFR AFRICAN AMERICAN: >60
GFR NON-AFRICAN AMERICAN: >60
GLUCOSE BLD-MCNC: 172 MG/DL (ref 70–99)
POTASSIUM SERPL-SCNC: 4.7 MMOL/L (ref 3.5–5.1)
SODIUM BLD-SCNC: 136 MMOL/L (ref 136–145)

## 2019-07-30 PROCEDURE — 6370000000 HC RX 637 (ALT 250 FOR IP): Performed by: INTERNAL MEDICINE

## 2019-07-30 PROCEDURE — 2700000000 HC OXYGEN THERAPY PER DAY

## 2019-07-30 PROCEDURE — 94669 MECHANICAL CHEST WALL OSCILL: CPT

## 2019-07-30 PROCEDURE — 6370000000 HC RX 637 (ALT 250 FOR IP): Performed by: PHYSICIAN ASSISTANT

## 2019-07-30 PROCEDURE — 6360000002 HC RX W HCPCS: Performed by: INTERNAL MEDICINE

## 2019-07-30 PROCEDURE — 2580000003 HC RX 258: Performed by: INTERNAL MEDICINE

## 2019-07-30 PROCEDURE — 36415 COLL VENOUS BLD VENIPUNCTURE: CPT

## 2019-07-30 PROCEDURE — 80048 BASIC METABOLIC PNL TOTAL CA: CPT

## 2019-07-30 PROCEDURE — 99232 SBSQ HOSP IP/OBS MODERATE 35: CPT | Performed by: INTERNAL MEDICINE

## 2019-07-30 PROCEDURE — 94640 AIRWAY INHALATION TREATMENT: CPT

## 2019-07-30 PROCEDURE — 99233 SBSQ HOSP IP/OBS HIGH 50: CPT | Performed by: INTERNAL MEDICINE

## 2019-07-30 PROCEDURE — 2060000000 HC ICU INTERMEDIATE R&B

## 2019-07-30 PROCEDURE — 94761 N-INVAS EAR/PLS OXIMETRY MLT: CPT

## 2019-07-30 RX ORDER — LEVOFLOXACIN 500 MG/1
500 TABLET, FILM COATED ORAL EVERY EVENING
Status: DISCONTINUED | OUTPATIENT
Start: 2019-07-30 | End: 2019-08-01 | Stop reason: HOSPADM

## 2019-07-30 RX ADMIN — GABAPENTIN 300 MG: 300 CAPSULE ORAL at 21:48

## 2019-07-30 RX ADMIN — IPRATROPIUM BROMIDE AND ALBUTEROL SULFATE 1 AMPULE: .5; 3 SOLUTION RESPIRATORY (INHALATION) at 06:54

## 2019-07-30 RX ADMIN — IPRATROPIUM BROMIDE AND ALBUTEROL SULFATE 1 AMPULE: .5; 3 SOLUTION RESPIRATORY (INHALATION) at 23:32

## 2019-07-30 RX ADMIN — Medication 1.5 G: at 11:45

## 2019-07-30 RX ADMIN — OXYCODONE HYDROCHLORIDE AND ACETAMINOPHEN 1 TABLET: 10; 325 TABLET ORAL at 03:07

## 2019-07-30 RX ADMIN — METHYLPREDNISOLONE SODIUM SUCCINATE 40 MG: 40 INJECTION, POWDER, FOR SOLUTION INTRAMUSCULAR; INTRAVENOUS at 17:11

## 2019-07-30 RX ADMIN — OXYCODONE HYDROCHLORIDE AND ACETAMINOPHEN 1 TABLET: 10; 325 TABLET ORAL at 09:07

## 2019-07-30 RX ADMIN — ASPIRIN 81 MG: 81 TABLET, COATED ORAL at 08:59

## 2019-07-30 RX ADMIN — OXYBUTYNIN CHLORIDE 5 MG: 5 TABLET ORAL at 08:59

## 2019-07-30 RX ADMIN — METHYLPREDNISOLONE SODIUM SUCCINATE 40 MG: 40 INJECTION, POWDER, FOR SOLUTION INTRAMUSCULAR; INTRAVENOUS at 03:07

## 2019-07-30 RX ADMIN — METOPROLOL TARTRATE 100 MG: 50 TABLET ORAL at 08:59

## 2019-07-30 RX ADMIN — IPRATROPIUM BROMIDE AND ALBUTEROL SULFATE 1 AMPULE: .5; 3 SOLUTION RESPIRATORY (INHALATION) at 19:29

## 2019-07-30 RX ADMIN — PANTOPRAZOLE SODIUM 40 MG: 40 TABLET, DELAYED RELEASE ORAL at 05:18

## 2019-07-30 RX ADMIN — FUROSEMIDE 40 MG: 40 TABLET ORAL at 08:59

## 2019-07-30 RX ADMIN — FLUOXETINE 40 MG: 10 CAPSULE ORAL at 08:59

## 2019-07-30 RX ADMIN — IPRATROPIUM BROMIDE AND ALBUTEROL SULFATE 1 AMPULE: .5; 3 SOLUTION RESPIRATORY (INHALATION) at 15:06

## 2019-07-30 RX ADMIN — OXYBUTYNIN CHLORIDE 5 MG: 5 TABLET ORAL at 14:50

## 2019-07-30 RX ADMIN — TIZANIDINE 4 MG: 4 TABLET ORAL at 21:54

## 2019-07-30 RX ADMIN — HYDRALAZINE HYDROCHLORIDE 50 MG: 50 TABLET, FILM COATED ORAL at 21:48

## 2019-07-30 RX ADMIN — OXYBUTYNIN CHLORIDE 5 MG: 5 TABLET ORAL at 21:48

## 2019-07-30 RX ADMIN — GUAIFENESIN 600 MG: 600 TABLET, EXTENDED RELEASE ORAL at 08:59

## 2019-07-30 RX ADMIN — HYDRALAZINE HYDROCHLORIDE 50 MG: 50 TABLET, FILM COATED ORAL at 14:50

## 2019-07-30 RX ADMIN — MELATONIN 3 MG ORAL TABLET 3 MG: 3 TABLET ORAL at 21:48

## 2019-07-30 RX ADMIN — OXYCODONE HYDROCHLORIDE AND ACETAMINOPHEN 1 TABLET: 10; 325 TABLET ORAL at 14:50

## 2019-07-30 RX ADMIN — ROPINIROLE HYDROCHLORIDE 2 MG: 2 TABLET, FILM COATED ORAL at 22:04

## 2019-07-30 RX ADMIN — Medication 10 ML: at 09:00

## 2019-07-30 RX ADMIN — GABAPENTIN 300 MG: 300 CAPSULE ORAL at 17:11

## 2019-07-30 RX ADMIN — LEVOFLOXACIN 500 MG: 500 TABLET, FILM COATED ORAL at 17:11

## 2019-07-30 RX ADMIN — GABAPENTIN 300 MG: 300 CAPSULE ORAL at 08:59

## 2019-07-30 RX ADMIN — GABAPENTIN 300 MG: 300 CAPSULE ORAL at 14:50

## 2019-07-30 RX ADMIN — ENOXAPARIN SODIUM 40 MG: 40 INJECTION SUBCUTANEOUS at 08:59

## 2019-07-30 RX ADMIN — HYDRALAZINE HYDROCHLORIDE 50 MG: 50 TABLET, FILM COATED ORAL at 05:18

## 2019-07-30 RX ADMIN — ROPINIROLE HYDROCHLORIDE 2 MG: 2 TABLET, FILM COATED ORAL at 17:11

## 2019-07-30 RX ADMIN — ATORVASTATIN CALCIUM 40 MG: 40 TABLET, FILM COATED ORAL at 21:48

## 2019-07-30 RX ADMIN — OXYCODONE HYDROCHLORIDE AND ACETAMINOPHEN 1 TABLET: 10; 325 TABLET ORAL at 21:48

## 2019-07-30 RX ADMIN — METOPROLOL TARTRATE 100 MG: 50 TABLET ORAL at 21:48

## 2019-07-30 RX ADMIN — GUAIFENESIN 600 MG: 600 TABLET, EXTENDED RELEASE ORAL at 21:48

## 2019-07-30 RX ADMIN — ROPINIROLE HYDROCHLORIDE 2 MG: 2 TABLET, FILM COATED ORAL at 09:11

## 2019-07-30 RX ADMIN — Medication 10 ML: at 21:49

## 2019-07-30 RX ADMIN — ROPINIROLE HYDROCHLORIDE 2 MG: 2 TABLET, FILM COATED ORAL at 14:50

## 2019-07-30 RX ADMIN — IPRATROPIUM BROMIDE AND ALBUTEROL SULFATE 1 AMPULE: .5; 3 SOLUTION RESPIRATORY (INHALATION) at 11:16

## 2019-07-30 ASSESSMENT — PAIN DESCRIPTION - DESCRIPTORS
DESCRIPTORS: DISCOMFORT
DESCRIPTORS: CONSTANT;ACHING

## 2019-07-30 ASSESSMENT — PAIN DESCRIPTION - LOCATION
LOCATION: HIP;BACK
LOCATION: BACK

## 2019-07-30 ASSESSMENT — PAIN DESCRIPTION - PROGRESSION
CLINICAL_PROGRESSION: NOT CHANGED
CLINICAL_PROGRESSION: NOT CHANGED

## 2019-07-30 ASSESSMENT — PAIN SCALES - GENERAL
PAINLEVEL_OUTOF10: 8
PAINLEVEL_OUTOF10: 8
PAINLEVEL_OUTOF10: 7
PAINLEVEL_OUTOF10: 7
PAINLEVEL_OUTOF10: 8
PAINLEVEL_OUTOF10: 8
PAINLEVEL_OUTOF10: 6

## 2019-07-30 ASSESSMENT — PAIN DESCRIPTION - ORIENTATION
ORIENTATION: RIGHT;LEFT;LOWER;MID
ORIENTATION: RIGHT;LEFT;MID

## 2019-07-30 ASSESSMENT — PAIN DESCRIPTION - ONSET
ONSET: ON-GOING
ONSET: ON-GOING

## 2019-07-30 ASSESSMENT — PAIN DESCRIPTION - PAIN TYPE
TYPE: CHRONIC PAIN
TYPE: CHRONIC PAIN

## 2019-07-30 ASSESSMENT — PAIN - FUNCTIONAL ASSESSMENT
PAIN_FUNCTIONAL_ASSESSMENT: ACTIVITIES ARE NOT PREVENTED
PAIN_FUNCTIONAL_ASSESSMENT: ACTIVITIES ARE NOT PREVENTED

## 2019-07-30 ASSESSMENT — PAIN DESCRIPTION - FREQUENCY
FREQUENCY: CONTINUOUS
FREQUENCY: CONTINUOUS

## 2019-07-30 NOTE — PROGRESS NOTES
synthetic cannabinoids.  Could also be ILD exacerbation or atypical infection  · Possible COPD exacerbation  · Abnormal Chest CT: peripheral GGO and some peripheral cystic changes suspicious for ILD but possibly related to drug use as well  · Tobacco abuse  · Opiate abuse - snorting pills - denies use in greater than 1 month  · Daily inhalation of synthetic cannabinoids (\"spice\")      PLAN:   Supplemental oxygen to maintain SaO2 >92%; wean as tolerated   · Lasix  · Antibiotics: levaquin and vancomycin  · Steroid taper  · TTE is normal  · Outpatient PFT and ILD work up  · She has tried patches and chantix in the past for smoking cessation  · The pt would like to quit smoking \"spice\" although she is concerned she may need rehab

## 2019-07-30 NOTE — PLAN OF CARE
Problem: Falls - Risk of:  Goal: Will remain free from falls  Description  Will remain free from falls  7/29/2019 2123 by Thiago Flores RN  Outcome: Ongoing  7/29/2019 1552 by Olaf Chaney RN  Outcome: Ongoing  Goal: Absence of physical injury  Description  Absence of physical injury  7/29/2019 2123 by Thiago Flores RN  Outcome: Ongoing  7/29/2019 1552 by Olaf Chaney RN  Outcome: Ongoing     Problem: Infection:  Goal: Will remain free from infection  Description  Will remain free from infection  Outcome: Ongoing     Problem: Safety:  Goal: Free from accidental physical injury  Description  Free from accidental physical injury  Outcome: Ongoing  Goal: Free from intentional harm  Description  Free from intentional harm  Outcome: Ongoing     Problem: Daily Care:  Goal: Daily care needs are met  Description  Daily care needs are met  Outcome: Ongoing     Problem: Pain:  Goal: Patient's pain/discomfort is manageable  Description  Patient's pain/discomfort is manageable  Outcome: Ongoing     Problem: Skin Integrity:  Goal: Skin integrity will stabilize  Description  Skin integrity will stabilize  Outcome: Ongoing     Problem: Discharge Planning:  Goal: Patients continuum of care needs are met  Description  Patients continuum of care needs are met  Outcome: Ongoing

## 2019-07-30 NOTE — PROGRESS NOTES
Hospitalist Progress Note      PCP: Jakub Kruse    Date of Admission: 7/27/2019    Chief Complaint: worsening dyspnea. Hypoxia. Subjective:   C/O persistent dyspnea. Unable to cough up sputum   sats improved. Was on O2 6 L - > 5 L - > 4 L today . not on home o2    + H/O tobacco use      Reports she is motivated to quit smoking now. I have explained to her that she will likely need supplemental O2 on DC        Medications:  Reviewed        Infusion Medications   Scheduled Medications    vancomycin (VANCOCIN) intermittent dosing (placeholder)   Other RX Placeholder    guaiFENesin  600 mg Oral BID    vancomycin  1,500 mg Intravenous Q18H    methylPREDNISolone  40 mg Intravenous Q12H    ipratropium-albuterol  1 ampule Inhalation Q4H WA    aspirin  81 mg Oral Daily    FLUoxetine  40 mg Oral Daily    furosemide  40 mg Oral Daily    gabapentin  300 mg Oral 4x Daily    hydrALAZINE  50 mg Oral 3 times per day    metoprolol  100 mg Oral BID    pantoprazole  40 mg Oral QAM AC    oxybutynin  5 mg Oral TID    rOPINIRole  2 mg Oral 4x Daily    sodium chloride flush  10 mL Intravenous 2 times per day    enoxaparin  40 mg Subcutaneous Daily    atorvastatin  40 mg Oral Nightly    nicotine  1 patch Transdermal Daily    levofloxacin  500 mg Intravenous Q24H     PRN Meds: aluminum & magnesium hydroxide-simethicone, melatonin, oxyCODONE-acetaminophen, tiZANidine, sodium chloride flush, magnesium hydroxide, ondansetron, acetaminophen      Intake/Output Summary (Last 24 hours) at 7/30/2019 1007  Last data filed at 7/30/2019 2846  Gross per 24 hour   Intake 1070 ml   Output 3375 ml   Net -2305 ml       Physical Exam Performed:    BP (!) 154/74   Pulse 68   Temp 98 °F (36.7 °C) (Oral)   Resp 18   Ht 5' 7\" (1.702 m)   Wt 248 lb 6.4 oz (112.7 kg)   SpO2 90%   BMI 38.90 kg/m²     General appearance: No apparent distress, appears stated age and cooperative.   HEENT: Pupils equal, round, and reactive to light. Conjunctivae/corneas clear. Neck: Supple, with full range of motion. Respiratory:  Normal respiratory effort, on 4L NC O2, diminished breath sounds, No wheezes or rhonchi  Cardiovascular: Regular rate and rhythm with normal S1/S2 without murmurs, rubs or gallops. Abdomen: Soft, non-tender, non-distended with normal bowel sounds. Musculoskeletal: No clubbing, cyanosis or edema bilaterally. Full range of motion without deformity. Skin: Skin color, texture, turgor normal.  No rashes or lesions. Neurologic:  Neurovascularly intact without any focal sensory/motor deficits. Cranial nerves: II-XII intact, grossly non-focal.  Psychiatric: Alert and oriented  Capillary Refill: Brisk,< 3 seconds   Peripheral Pulses: +2 palpable, equal bilaterally       Labs:   Recent Labs     07/27/19  1220   WBC 15.0*   HGB 14.8   HCT 43.6        Recent Labs     07/28/19  0417 07/29/19  0719 07/30/19  0430   * 132* 136   K 3.9 4.2 4.7   CL 94* 95* 97*   CO2 23 26 30   BUN 18 19 23*   CREATININE 0.8 0.8 0.9   CALCIUM 9.0 9.2 9.4     Recent Labs     07/27/19  1220   AST 18   ALT 16   BILITOT 1.1*   ALKPHOS 86       Recent Labs     07/27/19  1220 07/27/19  1727 07/27/19  2333   TROPONINI <0.01 <0.01 <0.01     Radiology:  CT CHEST PULMONARY EMBOLISM W CONTRAST   Final Result   Motion limited study. No gross evidence of central or proximal segmental   pulmonary embolus. Worsening bilateral ground-glass pulmonary opacities are nonspecific and   could be related to pulmonary edema versus an atypical infection.          XR CHEST PORTABLE   Final Result   Cardiomegaly with pulmonary vascular congestion            ECHO  Summary   LV systolic function is normal with ejection fraction estimated at 55 %.   No regional wall motion abnormalities are noted.   There is mild concentric left ventricular hypertrophy.   Normal diastolic function with normal filling pressure.   The left atrium is mildly dilated in size.   Aortic valve appears mildly sclerotic but opens adequately.   Mild mitral and tricuspid regurgitation.   Systolic pulmonary artery pressure (SPAP) is estimated at 39mmHg (right   atrial pressure 8mmHg). Assessment/Plan:    Active Hospital Problems    Diagnosis    Abnormal chest CT [R93.89]    HCAP (healthcare-associated pneumonia) [J18.9]    Sepsis ([facility] [address] [A41.9]    Chronic diastolic heart failure (HCC) [I50.32]    Hypomagnesemia [E83.42]    Acute hypoxemic respiratory failure (HCC) [J96.01]    Hypoxia [R09.02]    Class 2 obesity due to excess calories with body mass index (BMI) of 39.0 to 39.9 in adult [E66.09, Z68.39]     Acute Hypoxic  RF    - not on home O2  - Sats in 60-80 range on RA prior to admit per patient report   Increased work of breathing, tachypnea on admit   - required O2 upto 6 L. Wean o2 as tolerated-- > 5 L -- > 4 L    - Etiology - suspect progression of COPD. - There is a question of ILD on recent imaging form last admit. - Was discharged on 7/19 after similar presentation with resp alkalosis   - PE ruled out with CTA  - Possible pneumonic process  - pulm consult   - cont supplemental O2, solumedrol, duo neb  - Added Mucinex   - encourage accapella       COPD w AE   - as above.      Sepsis POA due to HCAP   - with recent hospitalization possible MRSA Pneumonia  -Concern for gram negative organism   - MRSA swab positive. - Resp culture pending.    - Levaquin and Vanc empiric pending cultures. Chronic dCHF   - Echo normal EF  - No volume overload on exam        Tobacco Abuse   - Recommend cessation   - PRN Nicotine patch/gum     HTN   - cont Lopressor and Hydralazine       Hypomagnesemia   - repleted     Obesity  - Body mass index is 38.9 kg/m². - Complicating assessment and treatment.  Placing patient at risk for multiple co-morbidities as well as early death and contributing to the patient's presentation.   - Counseled on weight loss.       DVT Prophylaxis: lovenox  Diet: DIET

## 2019-07-30 NOTE — FLOWSHEET NOTE
07/29/19 2046   Vital Signs   Temp 98.9 °F (37.2 °C)   Temp Source Oral   Pulse 68   Heart Rate Source Monitor   Resp 18   BP (!) 178/94   Level of Consciousness 0   MEWS Score 1   Pain Assessment   Pain Assessment 0-10   Pain Level 8   Patient's Stated Pain Goal 4   Pain Type Chronic pain   Pain Location Hip;Back   Pain Orientation Right;Left;Lower   Pain Descriptors Discomfort   Pain Frequency Continuous   Pain Onset On-going   Oxygen Therapy   SpO2 92 %   O2 Device Nasal cannula   O2 Flow Rate (L/min) 4 L/min   Pt. Resting in bed. Call light in reach. Shift assessment completed see flow sheet. Denies any needs at this time. Will continue to monitor.

## 2019-07-31 LAB
ANION GAP SERPL CALCULATED.3IONS-SCNC: 12 MMOL/L (ref 3–16)
BUN BLDV-MCNC: 31 MG/DL (ref 7–20)
CALCIUM SERPL-MCNC: 9.3 MG/DL (ref 8.3–10.6)
CHLORIDE BLD-SCNC: 93 MMOL/L (ref 99–110)
CO2: 29 MMOL/L (ref 21–32)
CREAT SERPL-MCNC: 0.9 MG/DL (ref 0.6–1.1)
GFR AFRICAN AMERICAN: >60
GFR NON-AFRICAN AMERICAN: >60
GLUCOSE BLD-MCNC: 151 MG/DL (ref 70–99)
POTASSIUM SERPL-SCNC: 4.3 MMOL/L (ref 3.5–5.1)
SODIUM BLD-SCNC: 134 MMOL/L (ref 136–145)
VANCOMYCIN TROUGH: 12.1 UG/ML (ref 10–20)

## 2019-07-31 PROCEDURE — 80048 BASIC METABOLIC PNL TOTAL CA: CPT

## 2019-07-31 PROCEDURE — 2060000000 HC ICU INTERMEDIATE R&B

## 2019-07-31 PROCEDURE — 94761 N-INVAS EAR/PLS OXIMETRY MLT: CPT

## 2019-07-31 PROCEDURE — 94669 MECHANICAL CHEST WALL OSCILL: CPT

## 2019-07-31 PROCEDURE — 6370000000 HC RX 637 (ALT 250 FOR IP): Performed by: PHYSICIAN ASSISTANT

## 2019-07-31 PROCEDURE — 2580000003 HC RX 258: Performed by: INTERNAL MEDICINE

## 2019-07-31 PROCEDURE — 2700000000 HC OXYGEN THERAPY PER DAY

## 2019-07-31 PROCEDURE — 6370000000 HC RX 637 (ALT 250 FOR IP): Performed by: INTERNAL MEDICINE

## 2019-07-31 PROCEDURE — 99233 SBSQ HOSP IP/OBS HIGH 50: CPT | Performed by: INTERNAL MEDICINE

## 2019-07-31 PROCEDURE — 94150 VITAL CAPACITY TEST: CPT

## 2019-07-31 PROCEDURE — 6360000002 HC RX W HCPCS: Performed by: INTERNAL MEDICINE

## 2019-07-31 PROCEDURE — 99232 SBSQ HOSP IP/OBS MODERATE 35: CPT | Performed by: INTERNAL MEDICINE

## 2019-07-31 PROCEDURE — 80202 ASSAY OF VANCOMYCIN: CPT

## 2019-07-31 PROCEDURE — 94640 AIRWAY INHALATION TREATMENT: CPT

## 2019-07-31 PROCEDURE — 36415 COLL VENOUS BLD VENIPUNCTURE: CPT

## 2019-07-31 RX ADMIN — GABAPENTIN 300 MG: 300 CAPSULE ORAL at 14:27

## 2019-07-31 RX ADMIN — METHYLPREDNISOLONE SODIUM SUCCINATE 40 MG: 40 INJECTION, POWDER, FOR SOLUTION INTRAMUSCULAR; INTRAVENOUS at 04:11

## 2019-07-31 RX ADMIN — GUAIFENESIN 600 MG: 600 TABLET, EXTENDED RELEASE ORAL at 20:52

## 2019-07-31 RX ADMIN — ASPIRIN 81 MG: 81 TABLET, COATED ORAL at 09:23

## 2019-07-31 RX ADMIN — TIZANIDINE 4 MG: 4 TABLET ORAL at 22:29

## 2019-07-31 RX ADMIN — IPRATROPIUM BROMIDE AND ALBUTEROL SULFATE 1 AMPULE: .5; 3 SOLUTION RESPIRATORY (INHALATION) at 19:38

## 2019-07-31 RX ADMIN — OXYCODONE HYDROCHLORIDE AND ACETAMINOPHEN 1 TABLET: 10; 325 TABLET ORAL at 10:51

## 2019-07-31 RX ADMIN — IPRATROPIUM BROMIDE AND ALBUTEROL SULFATE 1 AMPULE: .5; 3 SOLUTION RESPIRATORY (INHALATION) at 11:05

## 2019-07-31 RX ADMIN — GUAIFENESIN 600 MG: 600 TABLET, EXTENDED RELEASE ORAL at 09:23

## 2019-07-31 RX ADMIN — ROPINIROLE HYDROCHLORIDE 2 MG: 2 TABLET, FILM COATED ORAL at 09:34

## 2019-07-31 RX ADMIN — OXYBUTYNIN CHLORIDE 5 MG: 5 TABLET ORAL at 09:23

## 2019-07-31 RX ADMIN — IPRATROPIUM BROMIDE AND ALBUTEROL SULFATE 1 AMPULE: .5; 3 SOLUTION RESPIRATORY (INHALATION) at 15:09

## 2019-07-31 RX ADMIN — GABAPENTIN 300 MG: 300 CAPSULE ORAL at 17:18

## 2019-07-31 RX ADMIN — FUROSEMIDE 40 MG: 40 TABLET ORAL at 09:23

## 2019-07-31 RX ADMIN — HYDRALAZINE HYDROCHLORIDE 50 MG: 50 TABLET, FILM COATED ORAL at 14:27

## 2019-07-31 RX ADMIN — Medication 1.5 G: at 22:29

## 2019-07-31 RX ADMIN — METHYLPREDNISOLONE SODIUM SUCCINATE 40 MG: 40 INJECTION, POWDER, FOR SOLUTION INTRAMUSCULAR; INTRAVENOUS at 17:18

## 2019-07-31 RX ADMIN — IPRATROPIUM BROMIDE AND ALBUTEROL SULFATE 1 AMPULE: .5; 3 SOLUTION RESPIRATORY (INHALATION) at 22:57

## 2019-07-31 RX ADMIN — ROPINIROLE HYDROCHLORIDE 2 MG: 2 TABLET, FILM COATED ORAL at 14:27

## 2019-07-31 RX ADMIN — OXYCODONE HYDROCHLORIDE AND ACETAMINOPHEN 1 TABLET: 10; 325 TABLET ORAL at 19:26

## 2019-07-31 RX ADMIN — IPRATROPIUM BROMIDE AND ALBUTEROL SULFATE 1 AMPULE: .5; 3 SOLUTION RESPIRATORY (INHALATION) at 06:58

## 2019-07-31 RX ADMIN — ROPINIROLE HYDROCHLORIDE 2 MG: 2 TABLET, FILM COATED ORAL at 20:51

## 2019-07-31 RX ADMIN — ATORVASTATIN CALCIUM 40 MG: 40 TABLET, FILM COATED ORAL at 20:51

## 2019-07-31 RX ADMIN — Medication 10 ML: at 09:23

## 2019-07-31 RX ADMIN — ENOXAPARIN SODIUM 40 MG: 40 INJECTION SUBCUTANEOUS at 09:23

## 2019-07-31 RX ADMIN — LEVOFLOXACIN 500 MG: 500 TABLET, FILM COATED ORAL at 17:18

## 2019-07-31 RX ADMIN — GABAPENTIN 300 MG: 300 CAPSULE ORAL at 20:51

## 2019-07-31 RX ADMIN — METOPROLOL TARTRATE 100 MG: 50 TABLET ORAL at 09:23

## 2019-07-31 RX ADMIN — ROPINIROLE HYDROCHLORIDE 2 MG: 2 TABLET, FILM COATED ORAL at 17:19

## 2019-07-31 RX ADMIN — GABAPENTIN 300 MG: 300 CAPSULE ORAL at 09:23

## 2019-07-31 RX ADMIN — PANTOPRAZOLE SODIUM 40 MG: 40 TABLET, DELAYED RELEASE ORAL at 04:11

## 2019-07-31 RX ADMIN — MELATONIN 3 MG ORAL TABLET 3 MG: 3 TABLET ORAL at 22:29

## 2019-07-31 RX ADMIN — OXYCODONE HYDROCHLORIDE AND ACETAMINOPHEN 1 TABLET: 10; 325 TABLET ORAL at 04:22

## 2019-07-31 RX ADMIN — Medication 1.5 G: at 04:11

## 2019-07-31 RX ADMIN — OXYBUTYNIN CHLORIDE 5 MG: 5 TABLET ORAL at 14:27

## 2019-07-31 RX ADMIN — FLUOXETINE 40 MG: 10 CAPSULE ORAL at 09:23

## 2019-07-31 RX ADMIN — OXYBUTYNIN CHLORIDE 5 MG: 5 TABLET ORAL at 20:51

## 2019-07-31 RX ADMIN — Medication 10 ML: at 20:52

## 2019-07-31 RX ADMIN — HYDRALAZINE HYDROCHLORIDE 50 MG: 50 TABLET, FILM COATED ORAL at 22:29

## 2019-07-31 RX ADMIN — METOPROLOL TARTRATE 100 MG: 50 TABLET ORAL at 20:51

## 2019-07-31 RX ADMIN — HYDRALAZINE HYDROCHLORIDE 50 MG: 50 TABLET, FILM COATED ORAL at 04:11

## 2019-07-31 ASSESSMENT — PAIN SCALES - GENERAL
PAINLEVEL_OUTOF10: 9
PAINLEVEL_OUTOF10: 5
PAINLEVEL_OUTOF10: 9
PAINLEVEL_OUTOF10: 7
PAINLEVEL_OUTOF10: 7
PAINLEVEL_OUTOF10: 5

## 2019-07-31 ASSESSMENT — PAIN DESCRIPTION - ONSET: ONSET: ON-GOING

## 2019-07-31 ASSESSMENT — PAIN - FUNCTIONAL ASSESSMENT: PAIN_FUNCTIONAL_ASSESSMENT: ACTIVITIES ARE NOT PREVENTED

## 2019-07-31 ASSESSMENT — PAIN DESCRIPTION - PAIN TYPE: TYPE: CHRONIC PAIN

## 2019-07-31 ASSESSMENT — PAIN DESCRIPTION - ORIENTATION: ORIENTATION: LOWER;MID

## 2019-07-31 ASSESSMENT — PAIN DESCRIPTION - LOCATION: LOCATION: BACK

## 2019-07-31 ASSESSMENT — PAIN DESCRIPTION - FREQUENCY: FREQUENCY: CONTINUOUS

## 2019-07-31 ASSESSMENT — PAIN DESCRIPTION - PROGRESSION: CLINICAL_PROGRESSION: NOT CHANGED

## 2019-07-31 ASSESSMENT — PAIN DESCRIPTION - DESCRIPTORS: DESCRIPTORS: ACHING;CONSTANT

## 2019-07-31 NOTE — PROGRESS NOTES
tricuspid regurgitation.   Systolic pulmonary artery pressure (SPAP) is estimated at 39mmHg (right   atrial pressure 8mmHg). Assessment/Plan:    Active Hospital Problems    Diagnosis    Abnormal chest CT [R93.89]    HCAP (healthcare-associated pneumonia) [J18.9]    Sepsis ([facility] [address] [A41.9]    Chronic diastolic heart failure (HCC) [I50.32]    Hypomagnesemia [E83.42]    Acute hypoxemic respiratory failure (HCC) [J96.01]    Hypoxia [R09.02]    Class 2 obesity due to excess calories with body mass index (BMI) of 39.0 to 39.9 in adult [E66.09, Z68.39]       Acute Hypoxic  RF    - not on home O2  - Sats in 60-80 range on RA prior to admit per patient report   Increased work of breathing, tachypnea on admit   - required O2 upto 6 L. Wean o2 as tolerated-- > 5 L -- > 4 L -- > 2 L   - Etiology - suspect progression of COPD. - There is a question of ILD on recent imaging form last admit. - Was discharged on 7/19 after similar presentation with resp alkalosis   - PE ruled out with CTA  - Possible pneumonic process  - pulm consult   - cont supplemental O2, solumedrol, duo neb  - Added Mucinex   - encourage accapella  - she is improving. Continue to wean O2, likely DC in AM        COPD w AE   - as above.      Sepsis POA due to HCAP   - with recent hospitalization possible MRSA Pneumonia  -Concern for gram negative organism   - MRSA swab positive. - Resp culture pending.    - Levaquin and Vanc empiric pending cultures. Chronic dCHF   - Echo normal EF  - No volume overload on exam        Tobacco Abuse   - Recommend cessation   - PRN Nicotine patch/gum     HTN   - cont Lopressor and Hydralazine       Hypomagnesemia   - repleted     Obesity  - Body mass index is 38.72 kg/m². - Complicating assessment and treatment.  Placing patient at risk for multiple co-morbidities as well as early death and contributing to the patient's presentation.   - Counseled on weight loss.       DVT Prophylaxis: lovenox  Diet: DIET

## 2019-07-31 NOTE — PROGRESS NOTES
greater than 140 beats per minute, pending direction from physician. 3. Bronchodilators will be administered via Metered Dose Inhaler (MDI) with spacer when the following criteria are met:  a. Alert and cooperative     b. HR < 140 bpm  c. RR < 30 bpm                d. Can demonstrate a 2-3 second inspiratory hold  4. Bronchodilators will be administered via Hand Held Nebulizer [facility]) to patients when ANY of the following criteria are met  a. Incognizant or uncooperative          b. Patients treated with HHN at Home        c. Unable to demonstrate proper use of MDI with spacer     d. RR > 30 bpm   5. Bronchodilators will be delivered via Metered Dose Inhaler (MDI), HHN, Aerogen to intubated patients on mechanical ventilation. 6. Inhalation medication orders will be delivered and/or substituted as outlined below. Aerosolized Medications Ordering and Administration Guidelines:    1. All Medications will be ordered by a physician, and their frequency and/or modality will be adjusted as defined by the patients Respiratory Severity Index (RSI) score. 2. If the patient does not have documented COPD, consider discontinuing anticholinergics when RSI is less than 9.  3. If the bronchospasm worsens (increased RSI), then the bronchodilator frequency can be increased to a maximum of every 4 hours. If greater than every 4 hours is required, the physician will be contacted. 4. If the bronchospasm improves, the frequency of the bronchodilator can be decreased, based on the patient's RSI, but not less than home treatment regimen frequency. 5. Bronchodilator(s) will be discontinued if patient has a RSI less than 9 and has received no scheduled or as needed treatment for 72  Hrs. Patients Ordered on a Mucolytic Agent:    1. Must always be administered with a bronchodilator.     2. Discontinue if patient experiences worsened bronchospasm, or secretions have lessened to the point that the patient is able to clear them with a

## 2019-08-01 VITALS
SYSTOLIC BLOOD PRESSURE: 150 MMHG | HEIGHT: 67 IN | RESPIRATION RATE: 18 BRPM | BODY MASS INDEX: 38.92 KG/M2 | HEART RATE: 64 BPM | OXYGEN SATURATION: 94 % | DIASTOLIC BLOOD PRESSURE: 87 MMHG | TEMPERATURE: 97.2 F | WEIGHT: 248 LBS

## 2019-08-01 LAB
ANION GAP SERPL CALCULATED.3IONS-SCNC: 10 MMOL/L (ref 3–16)
BLOOD CULTURE, ROUTINE: NORMAL
BUN BLDV-MCNC: 29 MG/DL (ref 7–20)
CALCIUM SERPL-MCNC: 9.2 MG/DL (ref 8.3–10.6)
CHLORIDE BLD-SCNC: 92 MMOL/L (ref 99–110)
CO2: 31 MMOL/L (ref 21–32)
CREAT SERPL-MCNC: 0.9 MG/DL (ref 0.6–1.1)
GFR AFRICAN AMERICAN: >60
GFR NON-AFRICAN AMERICAN: >60
GLUCOSE BLD-MCNC: 182 MG/DL (ref 70–99)
POTASSIUM SERPL-SCNC: 4.3 MMOL/L (ref 3.5–5.1)
SODIUM BLD-SCNC: 133 MMOL/L (ref 136–145)

## 2019-08-01 PROCEDURE — 80048 BASIC METABOLIC PNL TOTAL CA: CPT

## 2019-08-01 PROCEDURE — 36415 COLL VENOUS BLD VENIPUNCTURE: CPT

## 2019-08-01 PROCEDURE — 2700000000 HC OXYGEN THERAPY PER DAY

## 2019-08-01 PROCEDURE — 6370000000 HC RX 637 (ALT 250 FOR IP): Performed by: INTERNAL MEDICINE

## 2019-08-01 PROCEDURE — 6370000000 HC RX 637 (ALT 250 FOR IP): Performed by: PHYSICIAN ASSISTANT

## 2019-08-01 PROCEDURE — 94640 AIRWAY INHALATION TREATMENT: CPT

## 2019-08-01 PROCEDURE — 6360000002 HC RX W HCPCS: Performed by: INTERNAL MEDICINE

## 2019-08-01 PROCEDURE — 94669 MECHANICAL CHEST WALL OSCILL: CPT

## 2019-08-01 PROCEDURE — 99233 SBSQ HOSP IP/OBS HIGH 50: CPT | Performed by: INTERNAL MEDICINE

## 2019-08-01 PROCEDURE — 2580000003 HC RX 258: Performed by: INTERNAL MEDICINE

## 2019-08-01 PROCEDURE — 94761 N-INVAS EAR/PLS OXIMETRY MLT: CPT

## 2019-08-01 PROCEDURE — 99238 HOSP IP/OBS DSCHRG MGMT 30/<: CPT | Performed by: INTERNAL MEDICINE

## 2019-08-01 RX ORDER — LEVOFLOXACIN 500 MG/1
500 TABLET, FILM COATED ORAL EVERY EVENING
Qty: 5 TABLET | Refills: 0 | Status: SHIPPED | OUTPATIENT
Start: 2019-08-01 | End: 2019-08-06 | Stop reason: ALTCHOICE

## 2019-08-01 RX ORDER — PREDNISONE 10 MG/1
TABLET ORAL
Qty: 30 TABLET | Refills: 0 | Status: ON HOLD | OUTPATIENT
Start: 2019-08-01 | End: 2019-08-12 | Stop reason: HOSPADM

## 2019-08-01 RX ORDER — NIFEDIPINE 30 MG/1
30 TABLET, EXTENDED RELEASE ORAL DAILY
Status: DISCONTINUED | OUTPATIENT
Start: 2019-08-01 | End: 2019-08-01 | Stop reason: HOSPADM

## 2019-08-01 RX ORDER — GUAIFENESIN 600 MG/1
600 TABLET, EXTENDED RELEASE ORAL 2 TIMES DAILY
Qty: 60 TABLET | Refills: 1 | Status: SHIPPED | OUTPATIENT
Start: 2019-08-01 | End: 2019-12-07

## 2019-08-01 RX ORDER — SODIUM CHLORIDE 9 MG/ML
INJECTION, SOLUTION INTRAVENOUS
Status: DISCONTINUED
Start: 2019-08-01 | End: 2019-08-01 | Stop reason: HOSPADM

## 2019-08-01 RX ORDER — IPRATROPIUM BROMIDE AND ALBUTEROL SULFATE 2.5; .5 MG/3ML; MG/3ML
3 SOLUTION RESPIRATORY (INHALATION) EVERY 4 HOURS PRN
Qty: 360 ML | Refills: 1 | Status: SHIPPED | OUTPATIENT
Start: 2019-08-01 | End: 2021-10-26

## 2019-08-01 RX ORDER — SPIRONOLACTONE 25 MG/1
25 TABLET ORAL DAILY
Status: DISCONTINUED | OUTPATIENT
Start: 2019-08-01 | End: 2019-08-01 | Stop reason: HOSPADM

## 2019-08-01 RX ORDER — PREDNISONE 20 MG/1
40 TABLET ORAL DAILY
Status: DISCONTINUED | OUTPATIENT
Start: 2019-08-02 | End: 2019-08-01 | Stop reason: HOSPADM

## 2019-08-01 RX ADMIN — IPRATROPIUM BROMIDE AND ALBUTEROL SULFATE 1 AMPULE: .5; 3 SOLUTION RESPIRATORY (INHALATION) at 15:32

## 2019-08-01 RX ADMIN — METOPROLOL TARTRATE 100 MG: 50 TABLET ORAL at 08:42

## 2019-08-01 RX ADMIN — FLUOXETINE 40 MG: 10 CAPSULE ORAL at 08:43

## 2019-08-01 RX ADMIN — NIFEDIPINE 30 MG: 30 TABLET, FILM COATED, EXTENDED RELEASE ORAL at 10:37

## 2019-08-01 RX ADMIN — METHYLPREDNISOLONE SODIUM SUCCINATE 40 MG: 40 INJECTION, POWDER, FOR SOLUTION INTRAMUSCULAR; INTRAVENOUS at 06:41

## 2019-08-01 RX ADMIN — PANTOPRAZOLE SODIUM 40 MG: 40 TABLET, DELAYED RELEASE ORAL at 06:41

## 2019-08-01 RX ADMIN — ASPIRIN 81 MG: 81 TABLET, COATED ORAL at 08:42

## 2019-08-01 RX ADMIN — GUAIFENESIN 600 MG: 600 TABLET, EXTENDED RELEASE ORAL at 08:42

## 2019-08-01 RX ADMIN — HYDRALAZINE HYDROCHLORIDE 50 MG: 50 TABLET, FILM COATED ORAL at 06:41

## 2019-08-01 RX ADMIN — OXYCODONE HYDROCHLORIDE AND ACETAMINOPHEN 1 TABLET: 10; 325 TABLET ORAL at 11:21

## 2019-08-01 RX ADMIN — GABAPENTIN 300 MG: 300 CAPSULE ORAL at 08:43

## 2019-08-01 RX ADMIN — FUROSEMIDE 40 MG: 40 TABLET ORAL at 08:42

## 2019-08-01 RX ADMIN — IPRATROPIUM BROMIDE AND ALBUTEROL SULFATE 1 AMPULE: .5; 3 SOLUTION RESPIRATORY (INHALATION) at 11:18

## 2019-08-01 RX ADMIN — HYDRALAZINE HYDROCHLORIDE 50 MG: 50 TABLET, FILM COATED ORAL at 13:15

## 2019-08-01 RX ADMIN — ROPINIROLE HYDROCHLORIDE 2 MG: 2 TABLET, FILM COATED ORAL at 13:15

## 2019-08-01 RX ADMIN — OXYCODONE HYDROCHLORIDE AND ACETAMINOPHEN 1 TABLET: 10; 325 TABLET ORAL at 02:36

## 2019-08-01 RX ADMIN — SPIRONOLACTONE 25 MG: 25 TABLET ORAL at 10:37

## 2019-08-01 RX ADMIN — VANCOMYCIN HYDROCHLORIDE 1000 MG: 1 INJECTION, POWDER, LYOPHILIZED, FOR SOLUTION INTRAVENOUS at 13:29

## 2019-08-01 RX ADMIN — Medication 10 ML: at 08:42

## 2019-08-01 RX ADMIN — IPRATROPIUM BROMIDE AND ALBUTEROL SULFATE 1 AMPULE: .5; 3 SOLUTION RESPIRATORY (INHALATION) at 07:00

## 2019-08-01 RX ADMIN — Medication 10 ML: at 13:41

## 2019-08-01 RX ADMIN — OXYBUTYNIN CHLORIDE 5 MG: 5 TABLET ORAL at 13:15

## 2019-08-01 RX ADMIN — ENOXAPARIN SODIUM 40 MG: 40 INJECTION SUBCUTANEOUS at 08:43

## 2019-08-01 RX ADMIN — OXYBUTYNIN CHLORIDE 5 MG: 5 TABLET ORAL at 08:43

## 2019-08-01 RX ADMIN — GABAPENTIN 300 MG: 300 CAPSULE ORAL at 13:15

## 2019-08-01 RX ADMIN — ROPINIROLE HYDROCHLORIDE 2 MG: 2 TABLET, FILM COATED ORAL at 08:43

## 2019-08-01 ASSESSMENT — PAIN - FUNCTIONAL ASSESSMENT: PAIN_FUNCTIONAL_ASSESSMENT: ACTIVITIES ARE NOT PREVENTED

## 2019-08-01 ASSESSMENT — PAIN DESCRIPTION - DESCRIPTORS
DESCRIPTORS: ACHING
DESCRIPTORS: ACHING;DISCOMFORT

## 2019-08-01 ASSESSMENT — PAIN DESCRIPTION - ORIENTATION
ORIENTATION: LOWER
ORIENTATION: LOWER

## 2019-08-01 ASSESSMENT — PAIN SCALES - GENERAL
PAINLEVEL_OUTOF10: 8
PAINLEVEL_OUTOF10: 8

## 2019-08-01 ASSESSMENT — PAIN DESCRIPTION - LOCATION
LOCATION: BACK;HIP
LOCATION: BACK

## 2019-08-01 ASSESSMENT — PAIN DESCRIPTION - PROGRESSION: CLINICAL_PROGRESSION: NOT CHANGED

## 2019-08-01 ASSESSMENT — PAIN DESCRIPTION - PAIN TYPE
TYPE: CHRONIC PAIN
TYPE: CHRONIC PAIN

## 2019-08-01 ASSESSMENT — PAIN DESCRIPTION - FREQUENCY: FREQUENCY: CONTINUOUS

## 2019-08-01 ASSESSMENT — PAIN DESCRIPTION - ONSET: ONSET: ON-GOING

## 2019-08-01 NOTE — DISCHARGE SUMMARY
Name:  [facility]  Room:  /7335-44  MRN:    [id number]    Discharge Summary      This discharge summary is in conjunction with a complete physical exam done on the day of discharge. Discharging Physician: Dr. Nakia Nesbitt: 7/27/2019  Discharge:   8/1/2019    HPI taken from admission H&P:    The patient is a 64 y.o. female life long smoker ppd, obesity, HTN, who presents with increasing dyspnea. Pt was recently admitted and treated for dehydration, SOB, fluid overload. She reports she felt well when she returned home.      On Wednesday felt fairly acute onset dyspnea. Reports her o2 was low in 70s and 80s on home pulse oxymeter - not sure how accurante it was and so went to see her PCP. At PCP office O2 sats in 80s - was getting set up for home O2 by her PCP but this never actually happened - [place] in [place] and is a [address] resident - this created some sort of insurance conflict.      Today she decided to use her father's oxygen - running it at 3l/min and was still getting sats of 70s and 80s so she decided to come to ED.      Diagnoses this Admission and Hospital Course   Acute Hypoxic  RF    - not on home O2  - Sats in 60-80 range on RA prior to admit per patient report   Increased work of breathing, tachypnea on admit .  placed on supplemental oxygen  - -Etiology - suspect progression of COPD. - There is a question of ILD on recent imaging form last admit. - Was discharged on 7/19 after similar presentation with resp alkalosis   - PE ruled out with CTA  - pulm consult--reported to pulm that she has been smoking spice   - cont supplemental O2, solumedrol, duo neb, added Mucinex ,  encouraged accapella  - She required O2 upto 6 L. Wean o2 as tolerated-- > 5 L -- > 4 L -- > 2 L .oxygen saturations on room air 83% today , needs home oxygen. Need for this has been discussed discussed with patient face-to-face. D/C home on 2L O2    COPD w AE   -Management as above.   -Discharge home on oxygen,

## 2019-08-01 NOTE — CONSULTS
7-31-19 (2130) vancomycin trough 12.1. Please change vancomycin to 1000 mg ivpb q12h starting at 1400 on 8-1-19. Please recheck vancomycin trough 8-3-19 at 0130. [facility]. Ph.  8/1/2019 6:31 AM

## 2019-08-01 NOTE — PLAN OF CARE
Problem: Falls - Risk of:  Goal: Will remain free from falls  Description  Will remain free from falls  Outcome: Ongoing  Note:   Pt continues to refuse fall precautions. Goal: Absence of physical injury  Description  Absence of physical injury  Outcome: Ongoing     Problem: Infection:  Goal: Will remain free from infection  Description  Will remain free from infection  Outcome: Ongoing     Problem: Safety:  Goal: Free from accidental physical injury  Description  Free from accidental physical injury  Outcome: Ongoing  Goal: Free from intentional harm  Description  Free from intentional harm  Outcome: Ongoing     Problem: Daily Care:  Goal: Daily care needs are met  Description  Daily care needs are met  Outcome: Ongoing     Problem: Pain:  Goal: Patient's pain/discomfort is manageable  Description  Patient's pain/discomfort is manageable  Outcome: Ongoing  Goal: Pain level will decrease  Description  Pain level will decrease  Outcome: Ongoing  Goal: Control of acute pain  Description  Control of acute pain  Outcome: Ongoing  Goal: Control of chronic pain  Description  Control of chronic pain  Outcome: Ongoing  Note:   PRN percocet, zanaflex     Problem: Skin Integrity:  Goal: Skin integrity will stabilize  Description  Skin integrity will stabilize  Outcome: Ongoing     Problem: Discharge Planning:  Goal: Patients continuum of care needs are met  Description  Patients continuum of care needs are met  Outcome: Ongoing     Problem: OXYGENATION/RESPIRATORY FUNCTION  Goal: Patient will maintain patent airway  Outcome: Ongoing  Note:     Patient's EF (Ejection Fraction) is greater than 40%    Patient's weights and intake/output reviewed:    Patient's Last Weight: 248 lbs obtained by standing scale. Difference of 1 lbs more than last documented weight.       Intake/Output Summary (Last 24 hours) at 8/1/2019 0339  Last data filed at 8/1/2019 0236  Gross per 24 hour   Intake 900 ml   Output 4400 ml   Net -3500 ml

## 2019-08-01 NOTE — PROGRESS NOTES
Discharge instructions reviewed and all questions answered. Pt has no other needs at this time. Pt is waiting for meds to beds.

## 2019-08-02 LAB
CULTURE, BLOOD 2: ABNORMAL
CULTURE, BLOOD 2: ABNORMAL
ORGANISM: ABNORMAL

## 2019-08-06 ENCOUNTER — APPOINTMENT (OUTPATIENT)
Dept: GENERAL RADIOLOGY | Age: 59
DRG: 189 | End: 2019-08-06
Payer: MEDICARE

## 2019-08-06 ENCOUNTER — HOSPITAL ENCOUNTER (INPATIENT)
Age: 59
LOS: 5 days | Discharge: HOME | DRG: 189 | End: 2019-08-12
Attending: EMERGENCY MEDICINE | Admitting: INTERNAL MEDICINE
Payer: MEDICARE

## 2019-08-06 DIAGNOSIS — J44.1 COPD EXACERBATION (HCC): ICD-10-CM

## 2019-08-06 DIAGNOSIS — R09.02 HYPOXEMIA REQUIRING SUPPLEMENTAL OXYGEN: Primary | ICD-10-CM

## 2019-08-06 DIAGNOSIS — Z99.81 HYPOXEMIA REQUIRING SUPPLEMENTAL OXYGEN: Primary | ICD-10-CM

## 2019-08-06 LAB
A/G RATIO: 1 (ref 1.1–2.2)
ALBUMIN SERPL-MCNC: 3.5 G/DL (ref 3.4–5)
ALP BLD-CCNC: 80 U/L (ref 40–129)
ALT SERPL-CCNC: 23 U/L (ref 10–40)
ANION GAP SERPL CALCULATED.3IONS-SCNC: 14 MMOL/L (ref 3–16)
AST SERPL-CCNC: 16 U/L (ref 15–37)
BASE EXCESS ARTERIAL: 4.6 MMOL/L (ref -3–3)
BASOPHILS ABSOLUTE: 0.1 K/UL (ref 0–0.2)
BASOPHILS RELATIVE PERCENT: 0.6 %
BILIRUB SERPL-MCNC: 0.9 MG/DL (ref 0–1)
BUN BLDV-MCNC: 19 MG/DL (ref 7–20)
CALCIUM SERPL-MCNC: 8.8 MG/DL (ref 8.3–10.6)
CARBOXYHEMOGLOBIN ARTERIAL: 1.8 % (ref 0–1.5)
CHLORIDE BLD-SCNC: 93 MMOL/L (ref 99–110)
CO2: 26 MMOL/L (ref 21–32)
CREAT SERPL-MCNC: 1 MG/DL (ref 0.6–1.1)
EOSINOPHILS ABSOLUTE: 0 K/UL (ref 0–0.6)
EOSINOPHILS RELATIVE PERCENT: 0.1 %
GFR AFRICAN AMERICAN: >60
GFR NON-AFRICAN AMERICAN: 57
GLOBULIN: 3.6 G/DL
GLUCOSE BLD-MCNC: 197 MG/DL (ref 70–99)
HCO3 ARTERIAL: 25.3 MMOL/L (ref 21–29)
HCT VFR BLD CALC: 41.9 % (ref 36–48)
HEMOGLOBIN, ART, EXTENDED: 14.8 G/DL (ref 12–16)
HEMOGLOBIN: 14.2 G/DL (ref 12–16)
LYMPHOCYTES ABSOLUTE: 2.2 K/UL (ref 1–5.1)
LYMPHOCYTES RELATIVE PERCENT: 15.8 %
MAGNESIUM: 2 MG/DL (ref 1.8–2.4)
MCH RBC QN AUTO: 30.1 PG (ref 26–34)
MCHC RBC AUTO-ENTMCNC: 33.8 G/DL (ref 31–36)
MCV RBC AUTO: 89.3 FL (ref 80–100)
METHEMOGLOBIN ARTERIAL: 0.1 %
MONOCYTES ABSOLUTE: 0.5 K/UL (ref 0–1.3)
MONOCYTES RELATIVE PERCENT: 3.7 %
NEUTROPHILS ABSOLUTE: 10.9 K/UL (ref 1.7–7.7)
NEUTROPHILS RELATIVE PERCENT: 79.8 %
O2 CONTENT ARTERIAL: 20 ML/DL
O2 SAT, ARTERIAL: 95.9 %
O2 THERAPY: ABNORMAL
PCO2 ARTERIAL: 27.3 MMHG (ref 35–45)
PDW BLD-RTO: 14.9 % (ref 12.4–15.4)
PH ARTERIAL: 7.58 (ref 7.35–7.45)
PLATELET # BLD: 256 K/UL (ref 135–450)
PMV BLD AUTO: 8.7 FL (ref 5–10.5)
PO2 ARTERIAL: 66.6 MMHG (ref 75–108)
POTASSIUM REFLEX MAGNESIUM: 3.1 MMOL/L (ref 3.5–5.1)
RBC # BLD: 4.7 M/UL (ref 4–5.2)
SODIUM BLD-SCNC: 133 MMOL/L (ref 136–145)
TCO2 ARTERIAL: 26.1 MMOL/L
TOTAL PROTEIN: 7.1 G/DL (ref 6.4–8.2)
WBC # BLD: 13.6 K/UL (ref 4–11)

## 2019-08-06 PROCEDURE — 6370000000 HC RX 637 (ALT 250 FOR IP): Performed by: EMERGENCY MEDICINE

## 2019-08-06 PROCEDURE — 93005 ELECTROCARDIOGRAM TRACING: CPT | Performed by: EMERGENCY MEDICINE

## 2019-08-06 PROCEDURE — 85025 COMPLETE CBC W/AUTO DIFF WBC: CPT

## 2019-08-06 PROCEDURE — 96361 HYDRATE IV INFUSION ADD-ON: CPT

## 2019-08-06 PROCEDURE — 84145 PROCALCITONIN (PCT): CPT

## 2019-08-06 PROCEDURE — 71045 X-RAY EXAM CHEST 1 VIEW: CPT

## 2019-08-06 PROCEDURE — 83735 ASSAY OF MAGNESIUM: CPT

## 2019-08-06 PROCEDURE — 36415 COLL VENOUS BLD VENIPUNCTURE: CPT

## 2019-08-06 PROCEDURE — 36600 WITHDRAWAL OF ARTERIAL BLOOD: CPT

## 2019-08-06 PROCEDURE — 83880 ASSAY OF NATRIURETIC PEPTIDE: CPT

## 2019-08-06 PROCEDURE — G0480 DRUG TEST DEF 1-7 CLASSES: HCPCS

## 2019-08-06 PROCEDURE — 99285 EMERGENCY DEPT VISIT HI MDM: CPT

## 2019-08-06 PROCEDURE — 85610 PROTHROMBIN TIME: CPT

## 2019-08-06 PROCEDURE — 96374 THER/PROPH/DIAG INJ IV PUSH: CPT

## 2019-08-06 PROCEDURE — 2580000003 HC RX 258: Performed by: EMERGENCY MEDICINE

## 2019-08-06 PROCEDURE — 82803 BLOOD GASES ANY COMBINATION: CPT

## 2019-08-06 PROCEDURE — 80053 COMPREHEN METABOLIC PANEL: CPT

## 2019-08-06 PROCEDURE — 6360000002 HC RX W HCPCS: Performed by: EMERGENCY MEDICINE

## 2019-08-06 RX ORDER — SODIUM CHLORIDE 9 MG/ML
INJECTION, SOLUTION INTRAVENOUS CONTINUOUS
Status: DISCONTINUED | OUTPATIENT
Start: 2019-08-06 | End: 2019-08-07

## 2019-08-06 RX ORDER — IPRATROPIUM BROMIDE AND ALBUTEROL SULFATE 2.5; .5 MG/3ML; MG/3ML
1 SOLUTION RESPIRATORY (INHALATION) EVERY 5 MIN PRN
Status: DISCONTINUED | OUTPATIENT
Start: 2019-08-06 | End: 2019-08-07

## 2019-08-06 RX ORDER — METHYLPREDNISOLONE SODIUM SUCCINATE 125 MG/2ML
125 INJECTION, POWDER, LYOPHILIZED, FOR SOLUTION INTRAMUSCULAR; INTRAVENOUS ONCE
Status: COMPLETED | OUTPATIENT
Start: 2019-08-06 | End: 2019-08-06

## 2019-08-06 RX ADMIN — IPRATROPIUM BROMIDE AND ALBUTEROL SULFATE 1 AMPULE: .5; 3 SOLUTION RESPIRATORY (INHALATION) at 23:35

## 2019-08-06 RX ADMIN — SODIUM CHLORIDE: 9 INJECTION, SOLUTION INTRAVENOUS at 23:35

## 2019-08-06 RX ADMIN — METHYLPREDNISOLONE SODIUM SUCCINATE 125 MG: 125 INJECTION, POWDER, FOR SOLUTION INTRAMUSCULAR; INTRAVENOUS at 23:35

## 2019-08-06 ASSESSMENT — PAIN DESCRIPTION - LOCATION: LOCATION: BACK

## 2019-08-06 ASSESSMENT — PAIN DESCRIPTION - PAIN TYPE: TYPE: CHRONIC PAIN

## 2019-08-06 ASSESSMENT — PAIN SCALES - GENERAL: PAINLEVEL_OUTOF10: 9

## 2019-08-07 PROBLEM — J96.21 ACUTE ON CHRONIC RESPIRATORY FAILURE WITH HYPOXIA (HCC): Status: ACTIVE | Noted: 2019-08-07

## 2019-08-07 LAB
ACETAMINOPHEN LEVEL: <5 UG/ML (ref 10–30)
AMPHETAMINE SCREEN, URINE: ABNORMAL
ANION GAP SERPL CALCULATED.3IONS-SCNC: 14 MMOL/L (ref 3–16)
BARBITURATE SCREEN URINE: ABNORMAL
BENZODIAZEPINE SCREEN, URINE: ABNORMAL
BILIRUBIN URINE: NEGATIVE
BLOOD, URINE: NEGATIVE
BUN BLDV-MCNC: 18 MG/DL (ref 7–20)
CALCIUM SERPL-MCNC: 9.2 MG/DL (ref 8.3–10.6)
CANNABINOID SCREEN URINE: ABNORMAL
CHLORIDE BLD-SCNC: 96 MMOL/L (ref 99–110)
CLARITY: CLEAR
CO2: 25 MMOL/L (ref 21–32)
COCAINE METABOLITE SCREEN URINE: ABNORMAL
COLOR: YELLOW
CREAT SERPL-MCNC: 1 MG/DL (ref 0.6–1.1)
EKG ATRIAL RATE: 66 BPM
EKG DIAGNOSIS: NORMAL
EKG P AXIS: 41 DEGREES
EKG P-R INTERVAL: 132 MS
EKG Q-T INTERVAL: 420 MS
EKG QRS DURATION: 92 MS
EKG QTC CALCULATION (BAZETT): 440 MS
EKG R AXIS: 11 DEGREES
EKG T AXIS: 37 DEGREES
EKG VENTRICULAR RATE: 66 BPM
GFR AFRICAN AMERICAN: >60
GFR NON-AFRICAN AMERICAN: 57
GLUCOSE BLD-MCNC: 184 MG/DL (ref 70–99)
GLUCOSE URINE: NEGATIVE MG/DL
INR BLD: 1.04 (ref 0.86–1.14)
KETONES, URINE: NEGATIVE MG/DL
LEUKOCYTE ESTERASE, URINE: NEGATIVE
Lab: ABNORMAL
METHADONE SCREEN, URINE: ABNORMAL
MICROSCOPIC EXAMINATION: NORMAL
NITRITE, URINE: NEGATIVE
OPIATE SCREEN URINE: ABNORMAL
OXYCODONE URINE: POSITIVE
PH UA: 5.5
PH UA: 5.5 (ref 5–8)
PHENCYCLIDINE SCREEN URINE: ABNORMAL
POTASSIUM REFLEX MAGNESIUM: 3.7 MMOL/L (ref 3.5–5.1)
PRO-BNP: 169 PG/ML (ref 0–124)
PROCALCITONIN: 0.05 NG/ML (ref 0–0.15)
PROPOXYPHENE SCREEN: ABNORMAL
PROTEIN UA: NEGATIVE MG/DL
PROTHROMBIN TIME: 11.8 SEC (ref 9.8–13)
SODIUM BLD-SCNC: 135 MMOL/L (ref 136–145)
SPECIFIC GRAVITY UA: <=1.005 (ref 1–1.03)
URINE REFLEX TO CULTURE: NORMAL
URINE TYPE: NORMAL
UROBILINOGEN, URINE: 0.2 E.U./DL

## 2019-08-07 PROCEDURE — 2700000000 HC OXYGEN THERAPY PER DAY

## 2019-08-07 PROCEDURE — 81003 URINALYSIS AUTO W/O SCOPE: CPT

## 2019-08-07 PROCEDURE — 6360000002 HC RX W HCPCS: Performed by: INTERNAL MEDICINE

## 2019-08-07 PROCEDURE — 6370000000 HC RX 637 (ALT 250 FOR IP): Performed by: INTERNAL MEDICINE

## 2019-08-07 PROCEDURE — 80048 BASIC METABOLIC PNL TOTAL CA: CPT

## 2019-08-07 PROCEDURE — 94150 VITAL CAPACITY TEST: CPT

## 2019-08-07 PROCEDURE — 1200000000 HC SEMI PRIVATE

## 2019-08-07 PROCEDURE — 36415 COLL VENOUS BLD VENIPUNCTURE: CPT

## 2019-08-07 PROCEDURE — 93010 ELECTROCARDIOGRAM REPORT: CPT | Performed by: INTERNAL MEDICINE

## 2019-08-07 PROCEDURE — 94761 N-INVAS EAR/PLS OXIMETRY MLT: CPT

## 2019-08-07 PROCEDURE — 80307 DRUG TEST PRSMV CHEM ANLYZR: CPT

## 2019-08-07 PROCEDURE — 94640 AIRWAY INHALATION TREATMENT: CPT

## 2019-08-07 PROCEDURE — 2580000003 HC RX 258: Performed by: INTERNAL MEDICINE

## 2019-08-07 PROCEDURE — 99233 SBSQ HOSP IP/OBS HIGH 50: CPT | Performed by: INTERNAL MEDICINE

## 2019-08-07 RX ORDER — GABAPENTIN 300 MG/1
300 CAPSULE ORAL 4 TIMES DAILY
Status: DISCONTINUED | OUTPATIENT
Start: 2019-08-07 | End: 2019-08-12 | Stop reason: HOSPADM

## 2019-08-07 RX ORDER — SODIUM CHLORIDE 0.9 % (FLUSH) 0.9 %
10 SYRINGE (ML) INJECTION PRN
Status: DISCONTINUED | OUTPATIENT
Start: 2019-08-07 | End: 2019-08-12 | Stop reason: HOSPADM

## 2019-08-07 RX ORDER — IPRATROPIUM BROMIDE AND ALBUTEROL SULFATE 2.5; .5 MG/3ML; MG/3ML
1 SOLUTION RESPIRATORY (INHALATION)
Status: DISCONTINUED | OUTPATIENT
Start: 2019-08-07 | End: 2019-08-07

## 2019-08-07 RX ORDER — ATORVASTATIN CALCIUM 10 MG/1
40 TABLET, FILM COATED ORAL DAILY
Status: DISCONTINUED | OUTPATIENT
Start: 2019-08-07 | End: 2019-08-12 | Stop reason: HOSPADM

## 2019-08-07 RX ORDER — LANOLIN ALCOHOL/MO/W.PET/CERES
3 CREAM (GRAM) TOPICAL NIGHTLY PRN
Status: DISCONTINUED | OUTPATIENT
Start: 2019-08-07 | End: 2019-08-12 | Stop reason: HOSPADM

## 2019-08-07 RX ORDER — METOPROLOL TARTRATE 50 MG/1
100 TABLET, FILM COATED ORAL 2 TIMES DAILY
Status: DISCONTINUED | OUTPATIENT
Start: 2019-08-07 | End: 2019-08-12 | Stop reason: HOSPADM

## 2019-08-07 RX ORDER — ROPINIROLE 1 MG/1
1 TABLET, FILM COATED ORAL 3 TIMES DAILY
Status: DISCONTINUED | OUTPATIENT
Start: 2019-08-07 | End: 2019-08-12 | Stop reason: HOSPADM

## 2019-08-07 RX ORDER — ONDANSETRON 2 MG/ML
4 INJECTION INTRAMUSCULAR; INTRAVENOUS EVERY 6 HOURS PRN
Status: DISCONTINUED | OUTPATIENT
Start: 2019-08-07 | End: 2019-08-12 | Stop reason: HOSPADM

## 2019-08-07 RX ORDER — FLUOXETINE HYDROCHLORIDE 20 MG/1
40 CAPSULE ORAL DAILY
Status: DISCONTINUED | OUTPATIENT
Start: 2019-08-07 | End: 2019-08-12 | Stop reason: HOSPADM

## 2019-08-07 RX ORDER — GUAIFENESIN 600 MG/1
600 TABLET, EXTENDED RELEASE ORAL 2 TIMES DAILY
Status: DISCONTINUED | OUTPATIENT
Start: 2019-08-07 | End: 2019-08-12 | Stop reason: HOSPADM

## 2019-08-07 RX ORDER — ALBUTEROL SULFATE 2.5 MG/3ML
2.5 SOLUTION RESPIRATORY (INHALATION)
Status: DISCONTINUED | OUTPATIENT
Start: 2019-08-07 | End: 2019-08-12 | Stop reason: HOSPADM

## 2019-08-07 RX ORDER — PANTOPRAZOLE SODIUM 40 MG/1
40 TABLET, DELAYED RELEASE ORAL
Status: DISCONTINUED | OUTPATIENT
Start: 2019-08-07 | End: 2019-08-12 | Stop reason: HOSPADM

## 2019-08-07 RX ORDER — OXYBUTYNIN CHLORIDE 5 MG/1
5 TABLET ORAL 3 TIMES DAILY
Status: DISCONTINUED | OUTPATIENT
Start: 2019-08-07 | End: 2019-08-12 | Stop reason: HOSPADM

## 2019-08-07 RX ORDER — ASPIRIN 81 MG/1
81 TABLET ORAL DAILY
Status: DISCONTINUED | OUTPATIENT
Start: 2019-08-07 | End: 2019-08-12 | Stop reason: HOSPADM

## 2019-08-07 RX ORDER — IPRATROPIUM BROMIDE AND ALBUTEROL SULFATE 2.5; .5 MG/3ML; MG/3ML
1 SOLUTION RESPIRATORY (INHALATION)
Status: DISCONTINUED | OUTPATIENT
Start: 2019-08-07 | End: 2019-08-12 | Stop reason: HOSPADM

## 2019-08-07 RX ORDER — SODIUM CHLORIDE 0.9 % (FLUSH) 0.9 %
10 SYRINGE (ML) INJECTION EVERY 12 HOURS SCHEDULED
Status: DISCONTINUED | OUTPATIENT
Start: 2019-08-07 | End: 2019-08-12 | Stop reason: HOSPADM

## 2019-08-07 RX ORDER — POTASSIUM CHLORIDE 7.45 MG/ML
10 INJECTION INTRAVENOUS PRN
Status: DISCONTINUED | OUTPATIENT
Start: 2019-08-07 | End: 2019-08-12 | Stop reason: HOSPADM

## 2019-08-07 RX ORDER — HYDRALAZINE HYDROCHLORIDE 25 MG/1
50 TABLET, FILM COATED ORAL EVERY 8 HOURS SCHEDULED
Status: DISCONTINUED | OUTPATIENT
Start: 2019-08-07 | End: 2019-08-12 | Stop reason: HOSPADM

## 2019-08-07 RX ORDER — SODIUM CHLORIDE 9 MG/ML
INJECTION, SOLUTION INTRAVENOUS CONTINUOUS
Status: DISCONTINUED | OUTPATIENT
Start: 2019-08-07 | End: 2019-08-08

## 2019-08-07 RX ORDER — POTASSIUM CHLORIDE 20 MEQ/1
40 TABLET, EXTENDED RELEASE ORAL PRN
Status: DISCONTINUED | OUTPATIENT
Start: 2019-08-07 | End: 2019-08-12 | Stop reason: HOSPADM

## 2019-08-07 RX ORDER — PREDNISONE 20 MG/1
40 TABLET ORAL DAILY
Status: DISCONTINUED | OUTPATIENT
Start: 2019-08-07 | End: 2019-08-10

## 2019-08-07 RX ORDER — DOXYCYCLINE HYCLATE 100 MG
100 TABLET ORAL EVERY 12 HOURS
Status: DISCONTINUED | OUTPATIENT
Start: 2019-08-07 | End: 2019-08-07

## 2019-08-07 RX ORDER — MAGNESIUM SULFATE 1 G/100ML
1 INJECTION INTRAVENOUS PRN
Status: DISCONTINUED | OUTPATIENT
Start: 2019-08-07 | End: 2019-08-12 | Stop reason: HOSPADM

## 2019-08-07 RX ORDER — IBUPROFEN 800 MG/1
800 TABLET ORAL ONCE
Status: COMPLETED | OUTPATIENT
Start: 2019-08-07 | End: 2019-08-07

## 2019-08-07 RX ADMIN — ENOXAPARIN SODIUM 40 MG: 40 INJECTION SUBCUTANEOUS at 08:19

## 2019-08-07 RX ADMIN — OXYBUTYNIN CHLORIDE 5 MG: 5 TABLET ORAL at 19:53

## 2019-08-07 RX ADMIN — HYDRALAZINE HYDROCHLORIDE 50 MG: 25 TABLET, FILM COATED ORAL at 13:54

## 2019-08-07 RX ADMIN — GUAIFENESIN 600 MG: 600 TABLET, EXTENDED RELEASE ORAL at 01:58

## 2019-08-07 RX ADMIN — OXYBUTYNIN CHLORIDE 5 MG: 5 TABLET ORAL at 13:54

## 2019-08-07 RX ADMIN — IPRATROPIUM BROMIDE AND ALBUTEROL SULFATE 1 AMPULE: .5; 3 SOLUTION RESPIRATORY (INHALATION) at 22:51

## 2019-08-07 RX ADMIN — GABAPENTIN 300 MG: 300 CAPSULE ORAL at 13:53

## 2019-08-07 RX ADMIN — ROPINIROLE HYDROCHLORIDE 1 MG: 1 TABLET, FILM COATED ORAL at 19:53

## 2019-08-07 RX ADMIN — GUAIFENESIN 600 MG: 600 TABLET, EXTENDED RELEASE ORAL at 21:56

## 2019-08-07 RX ADMIN — FLUOXETINE 40 MG: 20 CAPSULE ORAL at 08:20

## 2019-08-07 RX ADMIN — ASPIRIN 81 MG: 81 TABLET ORAL at 08:19

## 2019-08-07 RX ADMIN — IPRATROPIUM BROMIDE AND ALBUTEROL SULFATE 1 AMPULE: .5; 3 SOLUTION RESPIRATORY (INHALATION) at 11:12

## 2019-08-07 RX ADMIN — METOPROLOL TARTRATE 100 MG: 50 TABLET ORAL at 01:58

## 2019-08-07 RX ADMIN — DOXYCYCLINE HYCLATE 100 MG: 100 TABLET, COATED ORAL at 01:58

## 2019-08-07 RX ADMIN — METOPROLOL TARTRATE 100 MG: 50 TABLET ORAL at 19:53

## 2019-08-07 RX ADMIN — ATORVASTATIN CALCIUM 40 MG: 10 TABLET, FILM COATED ORAL at 08:20

## 2019-08-07 RX ADMIN — METOPROLOL TARTRATE 100 MG: 50 TABLET ORAL at 08:19

## 2019-08-07 RX ADMIN — GABAPENTIN 300 MG: 300 CAPSULE ORAL at 16:17

## 2019-08-07 RX ADMIN — ROPINIROLE HYDROCHLORIDE 1 MG: 1 TABLET, FILM COATED ORAL at 13:54

## 2019-08-07 RX ADMIN — SODIUM CHLORIDE: 9 INJECTION, SOLUTION INTRAVENOUS at 01:58

## 2019-08-07 RX ADMIN — IPRATROPIUM BROMIDE AND ALBUTEROL SULFATE 1 AMPULE: .5; 3 SOLUTION RESPIRATORY (INHALATION) at 07:17

## 2019-08-07 RX ADMIN — GABAPENTIN 300 MG: 300 CAPSULE ORAL at 19:53

## 2019-08-07 RX ADMIN — PREDNISONE 40 MG: 20 TABLET ORAL at 13:53

## 2019-08-07 RX ADMIN — SODIUM CHLORIDE: 9 INJECTION, SOLUTION INTRAVENOUS at 19:53

## 2019-08-07 RX ADMIN — PANTOPRAZOLE SODIUM 40 MG: 40 TABLET, DELAYED RELEASE ORAL at 06:17

## 2019-08-07 RX ADMIN — HYDRALAZINE HYDROCHLORIDE 50 MG: 25 TABLET, FILM COATED ORAL at 06:17

## 2019-08-07 RX ADMIN — ROPINIROLE HYDROCHLORIDE 1 MG: 1 TABLET, FILM COATED ORAL at 08:20

## 2019-08-07 RX ADMIN — OXYBUTYNIN CHLORIDE 5 MG: 5 TABLET ORAL at 08:20

## 2019-08-07 RX ADMIN — GABAPENTIN 300 MG: 300 CAPSULE ORAL at 08:19

## 2019-08-07 RX ADMIN — IPRATROPIUM BROMIDE AND ALBUTEROL SULFATE 1 AMPULE: .5; 3 SOLUTION RESPIRATORY (INHALATION) at 19:06

## 2019-08-07 RX ADMIN — Medication 10 ML: at 08:20

## 2019-08-07 RX ADMIN — IBUPROFEN 800 MG: 800 TABLET, FILM COATED ORAL at 21:53

## 2019-08-07 RX ADMIN — IPRATROPIUM BROMIDE AND ALBUTEROL SULFATE 1 AMPULE: .5; 3 SOLUTION RESPIRATORY (INHALATION) at 15:21

## 2019-08-07 RX ADMIN — GUAIFENESIN 600 MG: 600 TABLET, EXTENDED RELEASE ORAL at 08:19

## 2019-08-07 RX ADMIN — MELATONIN 3 MG ORAL TABLET 3 MG: 3 TABLET ORAL at 23:03

## 2019-08-07 RX ADMIN — HYDRALAZINE HYDROCHLORIDE 50 MG: 25 TABLET, FILM COATED ORAL at 21:53

## 2019-08-07 RX ADMIN — IPRATROPIUM BROMIDE AND ALBUTEROL SULFATE 1 AMPULE: .5; 3 SOLUTION RESPIRATORY (INHALATION) at 02:58

## 2019-08-07 ASSESSMENT — PAIN DESCRIPTION - FREQUENCY: FREQUENCY: CONTINUOUS

## 2019-08-07 ASSESSMENT — PAIN DESCRIPTION - LOCATION
LOCATION: BACK
LOCATION: BACK

## 2019-08-07 ASSESSMENT — PAIN SCALES - GENERAL
PAINLEVEL_OUTOF10: 9
PAINLEVEL_OUTOF10: 0
PAINLEVEL_OUTOF10: 5

## 2019-08-07 ASSESSMENT — PAIN - FUNCTIONAL ASSESSMENT: PAIN_FUNCTIONAL_ASSESSMENT: PREVENTS OR INTERFERES SOME ACTIVE ACTIVITIES AND ADLS

## 2019-08-07 ASSESSMENT — PAIN DESCRIPTION - PROGRESSION: CLINICAL_PROGRESSION: NOT CHANGED

## 2019-08-07 ASSESSMENT — PAIN DESCRIPTION - PAIN TYPE
TYPE: CHRONIC PAIN
TYPE: CHRONIC PAIN

## 2019-08-07 ASSESSMENT — PAIN DESCRIPTION - ONSET: ONSET: ON-GOING

## 2019-08-07 ASSESSMENT — PAIN DESCRIPTION - DESCRIPTORS: DESCRIPTORS: ACHING

## 2019-08-07 ASSESSMENT — PAIN DESCRIPTION - ORIENTATION: ORIENTATION: LOWER

## 2019-08-07 NOTE — CONSULTS
Patient is being seen at the request of Dr. Jes Sanders for a consultation for Acute Respiratory Failure     HISTORY OF PRESENT ILLNESS: This is a 68-year-old white female with a history of recurrent hypoxemic respiratory failure, initially secondary to acute inhalational injury with \" spice. \"  She presented to the emergency department on 8/6/2019 with a several day history of increased shortness of breath, worse with exertion, no associated chest pain. She was seen yesterday in her primary care office and was noted to be 84% on 2 L, she increased her home oxygen to 3 L without significant improvement in her shortness of breath. She has had similar events in the past, thought to be related to acute inhalational injury. However, she is adamant that she has neither snorted anything to specifically include crushed oxycodone, and has not inhaled anything to specifically include spice since her last admission. She does endorse taking oral oxycodone. Admitted 7/27/2019 to 8/1/2019 with acute hypoxemic respiratory failure   Admitted 7/17/19 to 7/19/19 with acute hypoxemic respiratory failure      PAST MEDICAL HISTORY:  Past Medical History:   Diagnosis Date    Class 2 obesity with serious comorbidity and body mass index (BMI) of 39.0 to 39.9 in adult 9/14/2018    Depression     History of left hip replacement 02/12/2018    Hyperlipidemia     Hypertension     Kidney stone     MRSA (methicillin resistant staph aureus) culture positive 07/27/2019    nasal colonization     PAST SURGICAL HISTORY:  Past Surgical History:   Procedure Laterality Date    CHOLECYSTECTOMY      FACIAL COSMETIC SURGERY      FOOT SURGERY      HYSTERECTOMY      JOINT REPLACEMENT      KNEE SURGERY         FAMILY HISTORY:  No early lung disease    SOCIAL HISTORY:   reports that she quit smoking 12 days ago. Her smoking use included cigarettes. She has a 35.00 pack-year smoking history.  She has never used smokeless

## 2019-08-07 NOTE — ED PROVIDER NOTES
and organized, without delusions/hallucinations, responds appropriately to questions      LABS and DIAGNOSTIC RESULTS  EKG  The Ekg interpreted by me shows  normal sinus rhythm with a rate of 66  Axis is   Normal  QTc is  normal  Intervals and Durations are unremarkable. ST Segments: normal  Delta waves, Brugada Syndrome, and Short NM are not present. Prior EKG to compare with was available. No significant changes compared to prior EKG from 7/27/2019      RADIOLOGY  X-RAYS:  I have reviewed radiologic plain film image(s). ALL OTHER NON-PLAIN FILM IMAGES SUCH AS CT, ULTRASOUND AND MRI HAVE BEEN READ BY THE RADIOLOGIST. XR CHEST PORTABLE    (Results Pending)        Chest X-Ray    Interpreted by: Emergency Department Physician    View: Portable chest xray    Findings: No infiltrates, No hemothorax, No pneumothorax, Cardiomegaly, See radiology report    LABS  No results found for this visit on 08/06/19.     PROCEDURES      MEDICAL DECISION MAKING    Procedures/interventions/images ordered for this visit  Orders Placed This Encounter   Procedures    XR CHEST PORTABLE    CBC Auto Differential    Comprehensive Metabolic Panel w/ Reflex to MG    Blood Gas, Arterial    Magnesium    Check Pulse Oximetry while ambulating    Inpatient consult to Hospitalist    Initiate Oxygen Therapy Protocol    EKG 12 Lead    Saline lock IV       Medications ordered for this visit  Orders Placed This Encounter   Medications    0.9 % sodium chloride infusion    ipratropium-albuterol (DUONEB) nebulizer solution 1 ampule    methylPREDNISolone sodium (SOLU-MEDROL) injection 125 mg       Results for orders placed or performed during the hospital encounter of 08/06/19   CBC Auto Differential   Result Value Ref Range    WBC 13.6 (H) 4.0 - 11.0 K/uL    RBC 4.70 4.00 - 5.20 M/uL    Hemoglobin 14.2 12.0 - 16.0 g/dL    Hematocrit 41.9 36.0 - 48.0 %    MCV 89.3 80.0 - 100.0 fL    MCH 30.1 26.0 - 34.0 pg    MCHC 33.8 31.0 - 36.0 g/dL

## 2019-08-07 NOTE — H&P
Hospital Medicine History & Physical      PCP: Анна Marquez    Date of Service: Pt seen/examined on 8/7/19 and admitted on 8/7/19 to Inpatient    Chief Complaint   Patient presents with    Shortness of Breath     Pt states she smokes \"Spice\". Recently admitted for hypoxia. She hasn't smoked it since the admission but states it has messed up her lungs and has caused her shortness of breath. History Of Present Illness: The patient is a 64 y.o. female with PMH below, presents with SOB/YAP, increased O2 demand. Pt has been on 2L O2 since last DC on 8/1. She reports that she had increased it up to 3L and still felt bad. Very little tolerance to exertion. She has been using her nebs except for today. She has hx of COPD and dCHF. She has a hx of polysubstance abuse. She reports using Percocet today recreationally. She also admits to using \"Spice\" (synthetic marijuana). No CP. Past Medical History:        Diagnosis Date    Class 2 obesity with serious comorbidity and body mass index (BMI) of 39.0 to 39.9 in adult 9/14/2018    Depression     History of left hip replacement 02/12/2018    Hyperlipidemia     Hypertension     Kidney stone     MRSA (methicillin resistant staph aureus) culture positive 07/27/2019    nasal colonization       Past Surgical History:        Procedure Laterality Date    CHOLECYSTECTOMY      FACIAL COSMETIC SURGERY      FOOT SURGERY      HYSTERECTOMY      JOINT REPLACEMENT      KNEE SURGERY         Medications Prior to Admission:    Prior to Admission medications    Medication Sig Start Date End Date Taking?  Authorizing Provider   ipratropium-albuterol (DUONEB) 0.5-2.5 (3) MG/3ML SOLN nebulizer solution Inhale 3 mLs into the lungs every 4 hours as needed for Shortness of Breath 8/1/19  Yes Saul Reyes MD   predniSONE (DELTASONE) 10 MG tablet 4 tablets once daily for 3 days then 3 tablets once daily for 3 days then 2 tablets once daily for 3 days reports that she drinks alcohol. Family History:  Reviewed in detail and negative for DM, Early CAD, Cancer (except as below). Positive as follows:    History reviewed. No pertinent family history. REVIEW OF SYSTEMS:   Pertinent positives/negatives as follows: SOB/YAP, increased O2 demand, and as discussed in HPI, otherwise a complete ROS performed and all other systems are negative  PHYSICAL EXAM PERFORMED:    /81   Pulse 71   Temp 98.6 °F (37 °C) (Oral)   Resp 15   Ht 5' 7\" (1.702 m)   Wt 245 lb (111.1 kg)   SpO2 (!) 88%   BMI 38.37 kg/m²     GEN:  A&Ox3, mildly increased work of breathing, Morbidly obese  HEENT:  NC/AT,EOMI, MMM, no erythema/exudates or visible masses. CVS:  Normal S1,S2. RRR. Without M/G/R.   LUNG:   Exp wheezes, diminished at bases. W/o rales or rhonchi. ABD:  Soft, ND/NT, BS+ x4. Without G/R.  EXT: 2+ pulses, no c/c/e. Brisk cap refill. PSY:  Thought process intact, affect appropriate. ANA MARÍA:  CN III-XII intact, moves all 4 spontaneously, sensory grossly intact. SKIN: No rash or lesions on visible skin. Chart review shows recent radiographs:  Xr Foot Right (min 3 Views)    Result Date: 7/18/2019  EXAMINATION: 3 XRAY VIEWS OF THE RIGHT FOOT 7/18/2019 11:10 am COMPARISON: None available HISTORY: ORDERING SYSTEM PROVIDED HISTORY: pain TECHNOLOGIST PROVIDED HISTORY: Reason for exam:->pain Reason for Exam: fell, pain across top of foot near toes Acuity: Acute Type of Exam: Initial FINDINGS: No stress or traumatic fractures are identified. No dislocations are found. Mild degenerative changes are seen at the 1st metatarsophalangeal joint. Lisfranc joint is congruent on these nonweightbearing images. Subtalar joint unremarkable with maintenance of the anterior process of the calcaneus. Mild calcaneal enthesopathy is noted. No soft tissue abnormalities detected. No radiopaque foreign body. No acute abnormality identified.      Ct Abdomen Pelvis W Iv Contrast Additional Contrast? None    Result Date: 7/17/2019  EXAMINATION: CT OF THE ABDOMEN AND PELVIS WITH CONTRAST 7/17/2019 3:13 pm TECHNIQUE: CT of the abdomen and pelvis was performed with the administration of intravenous contrast. Multiplanar reformatted images are provided for review. Dose modulation, iterative reconstruction, and/or weight based adjustment of the mA/kV was utilized to reduce the radiation dose to as low as reasonably achievable. COMPARISON: CT 05/18/2018 HISTORY: ORDERING SYSTEM PROVIDED HISTORY: epigastric abd pain, n/v TECHNOLOGIST PROVIDED HISTORY: If patient is on cardiac monitor and/or pulse ox, they may be taken off cardiac monitor and pulse ox, left on O2 if currently on. All monitors reattached when patient returns to room. Additional Contrast?->None Reason for Exam: emesis, back pain, sob x yesterday Acuity: Acute Type of Exam: Initial FINDINGS: Lower Chest: Reported separately. Organs: Mild intra and extrahepatic biliary ductal dilation is likely due to prior cholecystectomy. Adrenals, spleen and pancreas are normal.  No hydronephrosis. Nonobstructing nephrolithiasis on the left measuring up to 10 mm. Cortical scarring in the kidneys bilaterally. GI/Bowel: Normal appendix. Small hiatal hernia. No evidence of bowel obstruction. Diverticulosis without evidence of diverticulitis. Pelvis: Prior hysterectomy. Bladder is normal. Peritoneum/Retroperitoneum: No evidence of AAA or lymphadenopathy. Bones/Soft Tissues: Partially imaged left hip arthroplasty. No suspicious osseous lesions. No acute intra-abdominal abnormality or explanation for pain. Small hiatal hernia. Nonobstructing nephrolithiasis on the left.      Xr Chest Portable    Result Date: 8/7/2019  EXAMINATION: ONE XRAY VIEW OF THE CHEST 8/6/2019 11:22 pm COMPARISON: Chest radiograph 07/27/2019 HISTORY: ORDERING SYSTEM PROVIDED HISTORY: Shortness of breath TECHNOLOGIST PROVIDED HISTORY: Reason for exam:->Shortness of breath Reason for Exam: Shortness of Breath (Pt states she smokes \"Spice\". Recently admitted for hypoxia. She hasn't smoked it since the admission but states it has messed up her lungs and has caused her shortness of breath. ) FINDINGS: Diffuse interstitial and hazy opacities, similar to prior exam.  Central hilar indistinctness. Stable cardiomediastinal contours allowing for differences in patient positioning. No pneumothorax. Costophrenic sulci are indistinct. No acute osseous abnormality. Interstitial and alveolar opacities could reflect airway inflammation/bronchitis with airspace disease or pulmonary edema. Xr Chest Portable    Result Date: 7/27/2019  EXAMINATION: ONE XRAY VIEW OF THE CHEST 7/27/2019 12:54 pm COMPARISON: 07/17/2019 HISTORY: ORDERING SYSTEM PROVIDED HISTORY: SOB TECHNOLOGIST PROVIDED HISTORY: Reason for exam:->SOB Reason for Exam: Pt. very short of breath Acuity: Acute Type of Exam: Initial FINDINGS: Cardiomegaly. Prominent central pulmonary vessels with indistinct peripheral vessels. Diffuse haziness of the lungs is felt to mostly be due to the patient's body habitus. Costophrenic angles are sharp     Cardiomegaly with pulmonary vascular congestion     Xr Chest Portable    Result Date: 7/17/2019  EXAMINATION: ONE XRAY VIEW OF THE CHEST 7/17/2019 12:06 pm COMPARISON: 09/13/2018 HISTORY: ORDERING SYSTEM PROVIDED HISTORY: sob TECHNOLOGIST PROVIDED HISTORY: Reason for exam:->sob Reason for Exam: SOB x 3 days Acuity: Acute Type of Exam: Initial FINDINGS: There is improved with mild residual pulmonary vascular congestion. The cardiac silhouette is stable. There is no pneumothorax or pleural effusion. 1. Mild residual pulmonary vascular congestion.      Ct Chest Pulmonary Embolism W Contrast    Result Date: 7/27/2019  EXAMINATION: CTA OF THE CHEST 7/27/2019 7:54 pm TECHNIQUE: CTA of the chest was performed after the administration of intravenous contrast.  Multiplanar reformatted images home O2 of 2L. Resp alkalosis, similar presentation last admit. Pulm c/s. Hold tizanidine. 2. aeCOPD, IV solumedrol, PO doxy, PRN/TOMMIE intensive NEB therapy. Check respiratory culture and procalcitonin. Pulm consult. Hold home regimen for now. 3. Polysubstance abuse, admits to using Percocet recreationally today. Added on acetaminophen level to current labs and repeat in am.  LFT's normal.    4. Hypokalemia, 3.1, replacement protocol. She also reports using \"Spice,\" synthetic marijuana. 5. Leukocytosis, 13.6, pt has been on a steroid taper which is the likely cause. Infection also consideration. Chk  PCT. 6. HTN, low normal BP's here. Holding diuretics for gentle IVF for now. 7. RLS, reduced dose of reported Requip for now. 8. CAD, cont ASA and BB. 9. HLD, cont statin. 10. GERD, cont PPI. 11. Chronic dCHF, holding diuretics as above. Caution w/ IVF. Holding diuretics for now. Daily weights. DVT Prophylaxis: Lovenox  Diet: gen  Code Status: Full Code   PT/OT Eval Status: Will order if needed and as patient condition allows  Dispo - Admit to inpatient 2w w/ tele. Ann Baker MD    Thank you Lexie Santos for the opportunity to be involved in this patient's care. If you have any questions or concerns please feel free to contact me via the [employer] at [phone]. This chart was generated using the [address] [place] [address] [address] [employer] system. I created this record but it may contain dictation errors given the limitations of this technology.

## 2019-08-07 NOTE — PROGRESS NOTES
RESPIRATORY THERAPY ASSESSMENT    Name:  Lon Marquez  Medical Record Number:  [id number]  Age: 64 y.o. Gender: female  : 1960  Today's Date:  2019  Room:  0229/0229-02    Assessment     Is the patient being admitted for a COPD or Asthma exacerbation? No   (If yes the patient will be seen every 4 hours for the first 24 hours and then reassessed)    Patient Admission Diagnosis      Allergies  Allergies   Allergen Reactions    Lisinopril Swelling     Tongue swelling    Codeine     Cymbalta [Duloxetine Hcl] Other (See Comments)     SI thoughts     Penicillins Itching    Sulfa Antibiotics        Minimum Predicted Vital Capacity:     918          Actual Vital Capacity:      1470              Pulmonary History:COPD  Home Oxygen Therapy:  2 liters/min via nasal cannula  Home Respiratory Therapy:Albuterol and Albuterol/Ipratropium Bromide HHN   Current Respiratory Therapy:  duoneb q4wa, albuterol q2 prn  Treatment Type: HHN  Medications: Albuterol/Ipratropium    Respiratory Severity Index(RSI)   Patients with orders for inhalation medications, oxygen, or any therapeutic treatment modality will be placed on Respiratory Protocol. They will be assessed with the first treatment and at least every 72 hours thereafter. The following severity scale will be used to determine frequency of treatment intervention.     Smoking History: Pulmonary Disease or Smoking History, Greater than 15 pack year = 2    Social History  Social History     Tobacco Use    Smoking status: Former Smoker     Packs/day: 1.00     Years: 35.00     Pack years: 35.00     Types: Cigarettes     Last attempt to quit: 2019     Years since quittin.0    Smokeless tobacco: Never Used    Tobacco comment: using chantix   Substance Use Topics    Alcohol use: Yes     Comment: occasionally    Drug use: Yes     Types: Marijuana     Comment: occassionally       Recent Surgical History: None = 0  Past Surgical History  Past Surgical direction from physician. 3. Bronchodilators will be administered via Metered Dose Inhaler (MDI) with spacer when the following criteria are met:  a. Alert and cooperative     b. HR < 140 bpm  c. RR < 30 bpm                d. Can demonstrate a 2-3 second inspiratory hold  4. Bronchodilators will be administered via Hand Held Nebulizer ZAYRA [facility] to patients when ANY of the following criteria are met  a. Incognizant or uncooperative          b. Patients treated with HHN at Home        c. Unable to demonstrate proper use of MDI with spacer     d. RR > 30 bpm   5. Bronchodilators will be delivered via Metered Dose Inhaler (MDI), HHN, Aerogen to intubated patients on mechanical ventilation. 6. Inhalation medication orders will be delivered and/or substituted as outlined below. Aerosolized Medications Ordering and Administration Guidelines:    1. All Medications will be ordered by a physician, and their frequency and/or modality will be adjusted as defined by the patients Respiratory Severity Index (RSI) score. 2. If the patient does not have documented COPD, consider discontinuing anticholinergics when RSI is less than 9.  3. If the bronchospasm worsens (increased RSI), then the bronchodilator frequency can be increased to a maximum of every 4 hours. If greater than every 4 hours is required, the physician will be contacted. 4. If the bronchospasm improves, the frequency of the bronchodilator can be decreased, based on the patient's RSI, but not less than home treatment regimen frequency. 5. Bronchodilator(s) will be discontinued if patient has a RSI less than 9 and has received no scheduled or as needed treatment for 72  Hrs. Patients Ordered on a Mucolytic Agent:    1. Must always be administered with a bronchodilator. 2. Discontinue if patient experiences worsened bronchospasm, or secretions have lessened to the point that the patient is able to clear them with a cough.     Anti-inflammatory and Combination Medications:    1. If the patient lacks prior history of lung disease, is not using inhaled anti-inflammatory medication at home, and lacks wheezing by examination or by history for at least 24 hours, contact physician for possible discontinuation.

## 2019-08-07 NOTE — ED NOTES
Perfect serve sent to Dr. Lindsey Thomas at 2300. Jerold Heimlich  08/07/19 0021  Dr. Lindsey Thomas returned call at [facility] and spoke with Dr. Hussain Skelton regarding this patient.       Jerold Heimlich  08/07/19 0040

## 2019-08-07 NOTE — PROGRESS NOTES
Admission assessment completed, see flow sheet. Patient is alert and oriented x 4. Dyspnea with exertion. Patient denies needs at this time. Side rails up x 2, and bed in low position. Call light is within reach. Will continue to monitor.

## 2019-08-07 NOTE — ED NOTES
Decreased oxygen to 2lpm which is supposed to be her routine oxygen requirement. Pt had desat to 88%. Unable to ambulate patient on oxygen due to hypoxia. Dr. Humble Osorio aware.  Pt oxygen demand increased back to 4 lpm.     Abigail Lazaro RN  08/07/19 0764

## 2019-08-07 NOTE — CARE COORDINATION
Case Management Assessment  Initial Evaluation    Date/Time of Evaluation: 8/7/2019 1:04 PM  Assessment Completed by: Queta Giles    Patient Name: Juana Rodriguez  YOB: 1960  Diagnosis: Acute on chronic respiratory failure with hypoxia [facility]) [J96.21]  Date / Time: 8/6/2019 10:44 PM  Admission status/Date:inpatient  Chart Reviewed: Yes      Patient Interviewed: Yes   Family Interviewed:  No      Hospitalization in the last 30 days:  Yes    Contacts  :     Relationship to Patient:   Phone Number:    Alternate Contact:     Relationship to Patient:     Phone Number:    Met with:    Current PCP  [address] required for SNF : Y, N        3 night stay required - Y, N    ADLS  Support Systems: Children, Family Members, Spouse/Significant Other  Transportation: self    Meal Preparation: self    Housing  Home Environment: pt staying with her father  Steps: ramp  Plans to Return to Present Housing: Yes  Other Identified Issues: no    Home Care Information  Currently active with [address] : No  Type of Home Care Services: Nursing Services  Passport/Waiver : No  :                      Phone Number:    Passport/Waiver Services: no          Durable Medical Equipment   DME Provider: Jerman   Equipment: Walker__Cane__RTS__ BSC__Shower Chair__  02_x_ HHN__ CPAP__  BiPap__  Hospital Bed__ W/C___ Other__________      Has Home O2 in place on admit:  Yes  Informed of need to bring portable home O2 tank on day of discharge for nursing to connect prior to leaving:   Yes  Verbalized agreement/Understanding:   Yes    Community Service Affiliation  Dialysis:  No    · Name:  · Location  · Dialysis Schedule:  · Phone:   · Fax: Outpatient PT/OT: No    Cancer Center: No     CHF Clinic: No     Pulmonary Rehab: No  Pain Clinic: No  Community Mental Health: No    Wound Clinic: No     Other: no    DISCHARGE PLAN: Reviewed Chart. Met with the pt.

## 2019-08-08 LAB
A/G RATIO: 1.1 (ref 1.1–2.2)
ALBUMIN SERPL-MCNC: 2.9 G/DL (ref 3.4–5)
ALP BLD-CCNC: 63 U/L (ref 40–129)
ALT SERPL-CCNC: 17 U/L (ref 10–40)
ANION GAP SERPL CALCULATED.3IONS-SCNC: 10 MMOL/L (ref 3–16)
APPEARANCE BAL (LAVAGE): CLEAR
AST SERPL-CCNC: 10 U/L (ref 15–37)
BASOPHILS ABSOLUTE: 0 K/UL (ref 0–0.2)
BASOPHILS RELATIVE PERCENT: 0.1 %
BILIRUB SERPL-MCNC: 0.4 MG/DL (ref 0–1)
BUN BLDV-MCNC: 23 MG/DL (ref 7–20)
CALCIUM SERPL-MCNC: 8.9 MG/DL (ref 8.3–10.6)
CHLORIDE BLD-SCNC: 103 MMOL/L (ref 99–110)
CLOT EVALUATION BAL: ABNORMAL
CO2: 27 MMOL/L (ref 21–32)
COLOR LAVAGE: COLORLESS
CREAT SERPL-MCNC: 0.9 MG/DL (ref 0.6–1.1)
EOSINOPHILS ABSOLUTE: 0 K/UL (ref 0–0.6)
EOSINOPHILS RELATIVE PERCENT: 0.1 %
GFR AFRICAN AMERICAN: >60
GFR NON-AFRICAN AMERICAN: >60
GLOBULIN: 2.7 G/DL
GLUCOSE BLD-MCNC: 141 MG/DL (ref 70–99)
GLUCOSE BLD-MCNC: 149 MG/DL (ref 70–99)
HCT VFR BLD CALC: 39 % (ref 36–48)
HEMOGLOBIN: 13.1 G/DL (ref 12–16)
LYMPHOCYTES ABSOLUTE: 1.4 K/UL (ref 1–5.1)
LYMPHOCYTES RELATIVE PERCENT: 9.7 %
LYMPHOCYTES, BAL: 5 % (ref 5–10)
MACROPHAGES, BAL: 71 % (ref 90–95)
MCH RBC QN AUTO: 30.3 PG (ref 26–34)
MCHC RBC AUTO-ENTMCNC: 33.7 G/DL (ref 31–36)
MCV RBC AUTO: 90.2 FL (ref 80–100)
MONOCYTES ABSOLUTE: 0.9 K/UL (ref 0–1.3)
MONOCYTES RELATIVE PERCENT: 6 %
NEUTROPHILS ABSOLUTE: 12.2 K/UL (ref 1.7–7.7)
NEUTROPHILS RELATIVE PERCENT: 84.1 %
NUMBER OF CELLS COUNTED BAL (LAVAGE): 100
PDW BLD-RTO: 15 % (ref 12.4–15.4)
PERFORMED ON: ABNORMAL
PLATELET # BLD: 203 K/UL (ref 135–450)
PMV BLD AUTO: 8.3 FL (ref 5–10.5)
POTASSIUM REFLEX MAGNESIUM: 4.1 MMOL/L (ref 3.5–5.1)
RBC # BLD: 4.32 M/UL (ref 4–5.2)
RBC, BAL: <1000 /CUMM
SEGMENTED NEUTROPHILS, BAL: 24 % (ref 5–10)
SODIUM BLD-SCNC: 140 MMOL/L (ref 136–145)
TOTAL PROTEIN: 5.6 G/DL (ref 6.4–8.2)
WBC # BLD: 14.5 K/UL (ref 4–11)
WBC/EPI CELLS BAL: 122 /CUMM

## 2019-08-08 PROCEDURE — 2700000000 HC OXYGEN THERAPY PER DAY

## 2019-08-08 PROCEDURE — 94761 N-INVAS EAR/PLS OXIMETRY MLT: CPT

## 2019-08-08 PROCEDURE — 87116 MYCOBACTERIA CULTURE: CPT

## 2019-08-08 PROCEDURE — 99232 SBSQ HOSP IP/OBS MODERATE 35: CPT | Performed by: INTERNAL MEDICINE

## 2019-08-08 PROCEDURE — 87106 FUNGI IDENTIFICATION YEAST: CPT

## 2019-08-08 PROCEDURE — 6370000000 HC RX 637 (ALT 250 FOR IP): Performed by: INTERNAL MEDICINE

## 2019-08-08 PROCEDURE — 7100000011 HC PHASE II RECOVERY - ADDTL 15 MIN: Performed by: INTERNAL MEDICINE

## 2019-08-08 PROCEDURE — 6360000002 HC RX W HCPCS: Performed by: INTERNAL MEDICINE

## 2019-08-08 PROCEDURE — 2709999900 HC NON-CHARGEABLE SUPPLY: Performed by: INTERNAL MEDICINE

## 2019-08-08 PROCEDURE — 1200000000 HC SEMI PRIVATE

## 2019-08-08 PROCEDURE — 87070 CULTURE OTHR SPECIMN AEROBIC: CPT

## 2019-08-08 PROCEDURE — 97161 PT EVAL LOW COMPLEX 20 MIN: CPT

## 2019-08-08 PROCEDURE — 99152 MOD SED SAME PHYS/QHP 5/>YRS: CPT | Performed by: INTERNAL MEDICINE

## 2019-08-08 PROCEDURE — 94640 AIRWAY INHALATION TREATMENT: CPT

## 2019-08-08 PROCEDURE — 89051 BODY FLUID CELL COUNT: CPT

## 2019-08-08 PROCEDURE — 87015 SPECIMEN INFECT AGNT CONCNTJ: CPT

## 2019-08-08 PROCEDURE — 87205 SMEAR GRAM STAIN: CPT

## 2019-08-08 PROCEDURE — 87206 SMEAR FLUORESCENT/ACID STAI: CPT

## 2019-08-08 PROCEDURE — 80053 COMPREHEN METABOLIC PANEL: CPT

## 2019-08-08 PROCEDURE — 97530 THERAPEUTIC ACTIVITIES: CPT

## 2019-08-08 PROCEDURE — 7100000010 HC PHASE II RECOVERY - FIRST 15 MIN: Performed by: INTERNAL MEDICINE

## 2019-08-08 PROCEDURE — 36415 COLL VENOUS BLD VENIPUNCTURE: CPT

## 2019-08-08 PROCEDURE — 3609010800 HC BRONCHOSCOPY ALVEOLAR LAVAGE: Performed by: INTERNAL MEDICINE

## 2019-08-08 PROCEDURE — 85025 COMPLETE CBC W/AUTO DIFF WBC: CPT

## 2019-08-08 PROCEDURE — 88112 CYTOPATH CELL ENHANCE TECH: CPT

## 2019-08-08 PROCEDURE — 0B938ZX DRAINAGE OF RIGHT MAIN BRONCHUS, VIA NATURAL OR ARTIFICIAL OPENING ENDOSCOPIC, DIAGNOSTIC: ICD-10-PCS | Performed by: INTERNAL MEDICINE

## 2019-08-08 PROCEDURE — 0B968ZZ DRAINAGE OF RIGHT LOWER LOBE BRONCHUS, VIA NATURAL OR ARTIFICIAL OPENING ENDOSCOPIC: ICD-10-PCS | Performed by: INTERNAL MEDICINE

## 2019-08-08 PROCEDURE — 94669 MECHANICAL CHEST WALL OSCILL: CPT

## 2019-08-08 PROCEDURE — 31624 DX BRONCHOSCOPE/LAVAGE: CPT | Performed by: INTERNAL MEDICINE

## 2019-08-08 PROCEDURE — 97165 OT EVAL LOW COMPLEX 30 MIN: CPT

## 2019-08-08 PROCEDURE — 0B978ZX DRAINAGE OF LEFT MAIN BRONCHUS, VIA NATURAL OR ARTIFICIAL OPENING ENDOSCOPIC, DIAGNOSTIC: ICD-10-PCS | Performed by: INTERNAL MEDICINE

## 2019-08-08 PROCEDURE — 0B9G8ZX DRAINAGE OF LEFT UPPER LUNG LOBE, VIA NATURAL OR ARTIFICIAL OPENING ENDOSCOPIC, DIAGNOSTIC: ICD-10-PCS | Performed by: INTERNAL MEDICINE

## 2019-08-08 PROCEDURE — 88305 TISSUE EXAM BY PATHOLOGIST: CPT

## 2019-08-08 PROCEDURE — 0B9B8ZZ DRAINAGE OF LEFT LOWER LOBE BRONCHUS, VIA NATURAL OR ARTIFICIAL OPENING ENDOSCOPIC: ICD-10-PCS | Performed by: INTERNAL MEDICINE

## 2019-08-08 PROCEDURE — 88312 SPECIAL STAINS GROUP 1: CPT

## 2019-08-08 PROCEDURE — 87102 FUNGUS ISOLATION CULTURE: CPT

## 2019-08-08 PROCEDURE — 31645 BRNCHSC W/THER ASPIR 1ST: CPT | Performed by: INTERNAL MEDICINE

## 2019-08-08 RX ORDER — IBUPROFEN 600 MG/1
600 TABLET ORAL ONCE
Status: COMPLETED | OUTPATIENT
Start: 2019-08-08 | End: 2019-08-08

## 2019-08-08 RX ORDER — SPIRONOLACTONE 25 MG/1
25 TABLET ORAL DAILY
Status: DISCONTINUED | OUTPATIENT
Start: 2019-08-08 | End: 2019-08-12 | Stop reason: HOSPADM

## 2019-08-08 RX ORDER — MIDAZOLAM HYDROCHLORIDE 5 MG/ML
INJECTION INTRAMUSCULAR; INTRAVENOUS PRN
Status: DISCONTINUED | OUTPATIENT
Start: 2019-08-08 | End: 2019-08-08 | Stop reason: ALTCHOICE

## 2019-08-08 RX ORDER — FUROSEMIDE 40 MG/1
40 TABLET ORAL DAILY
Status: DISCONTINUED | OUTPATIENT
Start: 2019-08-08 | End: 2019-08-12 | Stop reason: HOSPADM

## 2019-08-08 RX ORDER — FENTANYL CITRATE 50 UG/ML
INJECTION, SOLUTION INTRAMUSCULAR; INTRAVENOUS PRN
Status: DISCONTINUED | OUTPATIENT
Start: 2019-08-08 | End: 2019-08-08 | Stop reason: ALTCHOICE

## 2019-08-08 RX ADMIN — HYDRALAZINE HYDROCHLORIDE 50 MG: 25 TABLET, FILM COATED ORAL at 15:59

## 2019-08-08 RX ADMIN — METOPROLOL TARTRATE 100 MG: 50 TABLET ORAL at 12:22

## 2019-08-08 RX ADMIN — ROPINIROLE HYDROCHLORIDE 1 MG: 1 TABLET, FILM COATED ORAL at 16:02

## 2019-08-08 RX ADMIN — IPRATROPIUM BROMIDE AND ALBUTEROL SULFATE 1 AMPULE: .5; 3 SOLUTION RESPIRATORY (INHALATION) at 15:01

## 2019-08-08 RX ADMIN — FLUOXETINE 40 MG: 20 CAPSULE ORAL at 15:59

## 2019-08-08 RX ADMIN — METOPROLOL TARTRATE 100 MG: 50 TABLET ORAL at 23:28

## 2019-08-08 RX ADMIN — IPRATROPIUM BROMIDE AND ALBUTEROL SULFATE 1 AMPULE: .5; 3 SOLUTION RESPIRATORY (INHALATION) at 10:59

## 2019-08-08 RX ADMIN — HYDRALAZINE HYDROCHLORIDE 50 MG: 25 TABLET, FILM COATED ORAL at 06:04

## 2019-08-08 RX ADMIN — IPRATROPIUM BROMIDE AND ALBUTEROL SULFATE 1 AMPULE: .5; 3 SOLUTION RESPIRATORY (INHALATION) at 06:41

## 2019-08-08 RX ADMIN — OXYBUTYNIN CHLORIDE 5 MG: 5 TABLET ORAL at 16:01

## 2019-08-08 RX ADMIN — OXYBUTYNIN CHLORIDE 5 MG: 5 TABLET ORAL at 23:27

## 2019-08-08 RX ADMIN — GABAPENTIN 300 MG: 300 CAPSULE ORAL at 18:03

## 2019-08-08 RX ADMIN — PANTOPRAZOLE SODIUM 40 MG: 40 TABLET, DELAYED RELEASE ORAL at 06:04

## 2019-08-08 RX ADMIN — GUAIFENESIN 600 MG: 600 TABLET, EXTENDED RELEASE ORAL at 15:59

## 2019-08-08 RX ADMIN — ROPINIROLE HYDROCHLORIDE 1 MG: 1 TABLET, FILM COATED ORAL at 23:38

## 2019-08-08 RX ADMIN — SPIRONOLACTONE 25 MG: 25 TABLET ORAL at 15:59

## 2019-08-08 RX ADMIN — FUROSEMIDE 40 MG: 40 TABLET ORAL at 16:02

## 2019-08-08 RX ADMIN — GABAPENTIN 300 MG: 300 CAPSULE ORAL at 23:28

## 2019-08-08 RX ADMIN — ATORVASTATIN CALCIUM 40 MG: 10 TABLET, FILM COATED ORAL at 15:59

## 2019-08-08 RX ADMIN — MELATONIN 3 MG ORAL TABLET 3 MG: 3 TABLET ORAL at 23:38

## 2019-08-08 RX ADMIN — IPRATROPIUM BROMIDE AND ALBUTEROL SULFATE 1 AMPULE: .5; 3 SOLUTION RESPIRATORY (INHALATION) at 22:47

## 2019-08-08 RX ADMIN — IBUPROFEN 600 MG: 600 TABLET, FILM COATED ORAL at 16:02

## 2019-08-08 RX ADMIN — ASPIRIN 81 MG: 81 TABLET ORAL at 16:02

## 2019-08-08 RX ADMIN — GUAIFENESIN 600 MG: 600 TABLET, EXTENDED RELEASE ORAL at 23:28

## 2019-08-08 RX ADMIN — IPRATROPIUM BROMIDE AND ALBUTEROL SULFATE 1 AMPULE: .5; 3 SOLUTION RESPIRATORY (INHALATION) at 20:11

## 2019-08-08 ASSESSMENT — PAIN SCALES - GENERAL: PAINLEVEL_OUTOF10: 9

## 2019-08-08 NOTE — PROGRESS NOTES
Shift report received from [facility]. Pleasant and talkative; a/o x 4. States that she is still dizzy; reinforced use of call light before getting oob; voiced understanding. Call light in reach.

## 2019-08-08 NOTE — PROGRESS NOTES
Pt in bed resting comfortably and appears in no distress. Pt denies any needs at this time. Will continue to monitor. Call light within reach.

## 2019-08-08 NOTE — FLOWSHEET NOTE
Pt AM medications held due to NPO status for procedure today. IV fluids infusing. Pt brother called to check on pt via telephone, RN was given ok by patient to speak to him. AM assessment complete. VSS. Pt states she will continue to rest. Telemetry in place. No needs at this time.  Will continue to monitor.  ]

## 2019-08-08 NOTE — PROGRESS NOTES
exacerbation ([facility] [address]     Class 2 obesity due to excess calories with body mass index (BMI) of 39.0 to 39.9 in adult 09/14/2018    Hypertension     Hyperlipidemia     Depression     Acute pulmonary edema (HCC)     Acute congestive heart failure (HCC)     Iron deficiency anemia        1. Acute on chronic hypoxic respiratory failure. She uses 2 L of oxygen at home. Currently requiring 4 to 5 L.    2.  Acute COPD exacerbation. Continue steroids, inhaled bronchodilators and oxygen. 3.  Abnormal chest CT showing groundglass opacities. Pulmonary consult. Bronchoscopy scheduled for today. 4.  Polysubstance abuse. History of snorting pills. This could be causing the groundglass opacities seen on chest CT. 5.  Morbid obesity. Nutrition consult. 6.  Hypertension. Stable. Continue current medications. 7.  Chronic systolic congestive heart failure. Continue Lasix and Aldactone. Lovenox for DVT prophylaxis.       [facility], [place]

## 2019-08-09 ENCOUNTER — APPOINTMENT (OUTPATIENT)
Dept: MRI IMAGING | Age: 59
DRG: 189 | End: 2019-08-09
Payer: MEDICARE

## 2019-08-09 PROCEDURE — 94761 N-INVAS EAR/PLS OXIMETRY MLT: CPT

## 2019-08-09 PROCEDURE — 2700000000 HC OXYGEN THERAPY PER DAY

## 2019-08-09 PROCEDURE — 97116 GAIT TRAINING THERAPY: CPT

## 2019-08-09 PROCEDURE — 1200000000 HC SEMI PRIVATE

## 2019-08-09 PROCEDURE — 6370000000 HC RX 637 (ALT 250 FOR IP): Performed by: INTERNAL MEDICINE

## 2019-08-09 PROCEDURE — 99232 SBSQ HOSP IP/OBS MODERATE 35: CPT | Performed by: INTERNAL MEDICINE

## 2019-08-09 PROCEDURE — 94669 MECHANICAL CHEST WALL OSCILL: CPT

## 2019-08-09 PROCEDURE — 97110 THERAPEUTIC EXERCISES: CPT

## 2019-08-09 PROCEDURE — 2580000003 HC RX 258: Performed by: INTERNAL MEDICINE

## 2019-08-09 PROCEDURE — 6360000002 HC RX W HCPCS: Performed by: INTERNAL MEDICINE

## 2019-08-09 PROCEDURE — 94640 AIRWAY INHALATION TREATMENT: CPT

## 2019-08-09 PROCEDURE — 70551 MRI BRAIN STEM W/O DYE: CPT

## 2019-08-09 RX ORDER — IBUPROFEN 800 MG/1
800 TABLET ORAL ONCE
Status: COMPLETED | OUTPATIENT
Start: 2019-08-09 | End: 2019-08-09

## 2019-08-09 RX ADMIN — ROPINIROLE HYDROCHLORIDE 1 MG: 1 TABLET, FILM COATED ORAL at 20:13

## 2019-08-09 RX ADMIN — GABAPENTIN 300 MG: 300 CAPSULE ORAL at 20:13

## 2019-08-09 RX ADMIN — METOPROLOL TARTRATE 100 MG: 50 TABLET ORAL at 08:37

## 2019-08-09 RX ADMIN — ATORVASTATIN CALCIUM 40 MG: 10 TABLET, FILM COATED ORAL at 08:37

## 2019-08-09 RX ADMIN — PREDNISONE 40 MG: 20 TABLET ORAL at 08:56

## 2019-08-09 RX ADMIN — GUAIFENESIN 600 MG: 600 TABLET, EXTENDED RELEASE ORAL at 08:38

## 2019-08-09 RX ADMIN — SPIRONOLACTONE 25 MG: 25 TABLET ORAL at 08:37

## 2019-08-09 RX ADMIN — HYDRALAZINE HYDROCHLORIDE 50 MG: 25 TABLET, FILM COATED ORAL at 06:19

## 2019-08-09 RX ADMIN — ENOXAPARIN SODIUM 40 MG: 40 INJECTION SUBCUTANEOUS at 08:39

## 2019-08-09 RX ADMIN — OXYBUTYNIN CHLORIDE 5 MG: 5 TABLET ORAL at 20:14

## 2019-08-09 RX ADMIN — Medication 10 ML: at 08:39

## 2019-08-09 RX ADMIN — HYDRALAZINE HYDROCHLORIDE 50 MG: 25 TABLET, FILM COATED ORAL at 23:11

## 2019-08-09 RX ADMIN — MELATONIN 3 MG ORAL TABLET 3 MG: 3 TABLET ORAL at 23:11

## 2019-08-09 RX ADMIN — OXYBUTYNIN CHLORIDE 5 MG: 5 TABLET ORAL at 08:37

## 2019-08-09 RX ADMIN — Medication 10 ML: at 20:14

## 2019-08-09 RX ADMIN — PANTOPRAZOLE SODIUM 40 MG: 40 TABLET, DELAYED RELEASE ORAL at 06:19

## 2019-08-09 RX ADMIN — IPRATROPIUM BROMIDE AND ALBUTEROL SULFATE 1 AMPULE: .5; 3 SOLUTION RESPIRATORY (INHALATION) at 07:38

## 2019-08-09 RX ADMIN — HYDRALAZINE HYDROCHLORIDE 50 MG: 25 TABLET, FILM COATED ORAL at 13:10

## 2019-08-09 RX ADMIN — OXYBUTYNIN CHLORIDE 5 MG: 5 TABLET ORAL at 13:10

## 2019-08-09 RX ADMIN — IPRATROPIUM BROMIDE AND ALBUTEROL SULFATE 1 AMPULE: .5; 3 SOLUTION RESPIRATORY (INHALATION) at 11:15

## 2019-08-09 RX ADMIN — GUAIFENESIN 600 MG: 600 TABLET, EXTENDED RELEASE ORAL at 20:13

## 2019-08-09 RX ADMIN — IBUPROFEN 800 MG: 800 TABLET, FILM COATED ORAL at 16:06

## 2019-08-09 RX ADMIN — IPRATROPIUM BROMIDE AND ALBUTEROL SULFATE 1 AMPULE: .5; 3 SOLUTION RESPIRATORY (INHALATION) at 14:58

## 2019-08-09 RX ADMIN — FUROSEMIDE 40 MG: 40 TABLET ORAL at 08:38

## 2019-08-09 RX ADMIN — IPRATROPIUM BROMIDE AND ALBUTEROL SULFATE 1 AMPULE: .5; 3 SOLUTION RESPIRATORY (INHALATION) at 19:59

## 2019-08-09 RX ADMIN — ASPIRIN 81 MG: 81 TABLET ORAL at 08:38

## 2019-08-09 RX ADMIN — ROPINIROLE HYDROCHLORIDE 1 MG: 1 TABLET, FILM COATED ORAL at 13:10

## 2019-08-09 RX ADMIN — GABAPENTIN 300 MG: 300 CAPSULE ORAL at 16:06

## 2019-08-09 RX ADMIN — FLUOXETINE 40 MG: 20 CAPSULE ORAL at 08:39

## 2019-08-09 RX ADMIN — GABAPENTIN 300 MG: 300 CAPSULE ORAL at 13:10

## 2019-08-09 RX ADMIN — ROPINIROLE HYDROCHLORIDE 1 MG: 1 TABLET, FILM COATED ORAL at 08:37

## 2019-08-09 RX ADMIN — GABAPENTIN 300 MG: 300 CAPSULE ORAL at 08:39

## 2019-08-09 RX ADMIN — METOPROLOL TARTRATE 100 MG: 50 TABLET ORAL at 20:13

## 2019-08-09 ASSESSMENT — PAIN SCALES - GENERAL: PAINLEVEL_OUTOF10: 10

## 2019-08-09 NOTE — PLAN OF CARE
Problem: SAFETY  Goal: Free from accidental physical injury  8/9/2019 1215 by Nae Valencia RN  Outcome: Ongoing  8/9/2019 0221 by Segundo Nj RN  Outcome: Ongoing     Problem: DAILY CARE  Goal: Daily care needs are met  Outcome: Ongoing     Problem: PAIN  Goal: Patient's pain/discomfort is manageable  Outcome: Ongoing     Problem: SKIN INTEGRITY  Goal: Skin integrity is maintained or improved  Outcome: Ongoing     Problem: KNOWLEDGE DEFICIT  Goal: Patient/S.O. demonstrates understanding of disease process, treatment plan, medications, and discharge instructions.   Outcome: Ongoing     Problem: OXYGENATION/RESPIRATORY FUNCTION  Goal: Patient will maintain patent airway  8/9/2019 1215 by Nae Valencia RN  Outcome: Ongoing  8/9/2019 0221 by Segundo Nj RN  Outcome: Ongoing  Goal: Patient will achieve/maintain normal respiratory rate/effort  Description  Respiratory rate and effort will be within normal limits for the patient  Outcome: Ongoing     Problem: HEMODYNAMIC STATUS  Goal: Patient has stable vital signs and fluid balance  Outcome: Ongoing     Problem: FLUID AND ELECTROLYTE IMBALANCE  Goal: Fluid and electrolyte balance are achieved/maintained  Outcome: Ongoing

## 2019-08-10 LAB
CULTURE, RESPIRATORY: NORMAL
CULTURE, RESPIRATORY: NORMAL
GRAM STAIN RESULT: NORMAL
GRAM STAIN RESULT: NORMAL

## 2019-08-10 PROCEDURE — 6370000000 HC RX 637 (ALT 250 FOR IP): Performed by: INTERNAL MEDICINE

## 2019-08-10 PROCEDURE — 99232 SBSQ HOSP IP/OBS MODERATE 35: CPT | Performed by: INTERNAL MEDICINE

## 2019-08-10 PROCEDURE — 6360000002 HC RX W HCPCS: Performed by: INTERNAL MEDICINE

## 2019-08-10 PROCEDURE — 1200000000 HC SEMI PRIVATE

## 2019-08-10 PROCEDURE — 97116 GAIT TRAINING THERAPY: CPT

## 2019-08-10 PROCEDURE — 94669 MECHANICAL CHEST WALL OSCILL: CPT

## 2019-08-10 PROCEDURE — 97110 THERAPEUTIC EXERCISES: CPT

## 2019-08-10 PROCEDURE — 94640 AIRWAY INHALATION TREATMENT: CPT

## 2019-08-10 PROCEDURE — 94761 N-INVAS EAR/PLS OXIMETRY MLT: CPT

## 2019-08-10 PROCEDURE — 2580000003 HC RX 258: Performed by: INTERNAL MEDICINE

## 2019-08-10 PROCEDURE — 2700000000 HC OXYGEN THERAPY PER DAY

## 2019-08-10 RX ORDER — CEPHALEXIN 250 MG/1
500 CAPSULE ORAL EVERY 8 HOURS SCHEDULED
Status: DISCONTINUED | OUTPATIENT
Start: 2019-08-10 | End: 2019-08-12 | Stop reason: HOSPADM

## 2019-08-10 RX ADMIN — GUAIFENESIN 600 MG: 600 TABLET, EXTENDED RELEASE ORAL at 08:55

## 2019-08-10 RX ADMIN — OXYBUTYNIN CHLORIDE 5 MG: 5 TABLET ORAL at 20:47

## 2019-08-10 RX ADMIN — CEPHALEXIN 500 MG: 250 CAPSULE ORAL at 20:47

## 2019-08-10 RX ADMIN — GUAIFENESIN 600 MG: 600 TABLET, EXTENDED RELEASE ORAL at 20:48

## 2019-08-10 RX ADMIN — SPIRONOLACTONE 25 MG: 25 TABLET ORAL at 08:55

## 2019-08-10 RX ADMIN — ROPINIROLE HYDROCHLORIDE 1 MG: 1 TABLET, FILM COATED ORAL at 12:58

## 2019-08-10 RX ADMIN — HYDRALAZINE HYDROCHLORIDE 50 MG: 25 TABLET, FILM COATED ORAL at 12:58

## 2019-08-10 RX ADMIN — ROPINIROLE HYDROCHLORIDE 1 MG: 1 TABLET, FILM COATED ORAL at 20:48

## 2019-08-10 RX ADMIN — HYDRALAZINE HYDROCHLORIDE 50 MG: 25 TABLET, FILM COATED ORAL at 06:09

## 2019-08-10 RX ADMIN — ATORVASTATIN CALCIUM 40 MG: 10 TABLET, FILM COATED ORAL at 08:55

## 2019-08-10 RX ADMIN — OXYBUTYNIN CHLORIDE 5 MG: 5 TABLET ORAL at 08:55

## 2019-08-10 RX ADMIN — IPRATROPIUM BROMIDE AND ALBUTEROL SULFATE 1 AMPULE: .5; 3 SOLUTION RESPIRATORY (INHALATION) at 19:50

## 2019-08-10 RX ADMIN — ASPIRIN 81 MG: 81 TABLET ORAL at 08:55

## 2019-08-10 RX ADMIN — FUROSEMIDE 40 MG: 40 TABLET ORAL at 08:55

## 2019-08-10 RX ADMIN — OXYBUTYNIN CHLORIDE 5 MG: 5 TABLET ORAL at 12:58

## 2019-08-10 RX ADMIN — PANTOPRAZOLE SODIUM 40 MG: 40 TABLET, DELAYED RELEASE ORAL at 06:09

## 2019-08-10 RX ADMIN — Medication 10 ML: at 08:54

## 2019-08-10 RX ADMIN — METOPROLOL TARTRATE 100 MG: 50 TABLET ORAL at 08:54

## 2019-08-10 RX ADMIN — GABAPENTIN 300 MG: 300 CAPSULE ORAL at 12:58

## 2019-08-10 RX ADMIN — IPRATROPIUM BROMIDE AND ALBUTEROL SULFATE 1 AMPULE: .5; 3 SOLUTION RESPIRATORY (INHALATION) at 00:15

## 2019-08-10 RX ADMIN — HYDRALAZINE HYDROCHLORIDE 50 MG: 25 TABLET, FILM COATED ORAL at 20:48

## 2019-08-10 RX ADMIN — FLUOXETINE 40 MG: 20 CAPSULE ORAL at 08:55

## 2019-08-10 RX ADMIN — Medication 10 ML: at 20:48

## 2019-08-10 RX ADMIN — ROPINIROLE HYDROCHLORIDE 1 MG: 1 TABLET, FILM COATED ORAL at 08:55

## 2019-08-10 RX ADMIN — CEPHALEXIN 500 MG: 250 CAPSULE ORAL at 16:59

## 2019-08-10 RX ADMIN — METOPROLOL TARTRATE 100 MG: 50 TABLET ORAL at 20:47

## 2019-08-10 RX ADMIN — GABAPENTIN 300 MG: 300 CAPSULE ORAL at 08:54

## 2019-08-10 RX ADMIN — GABAPENTIN 300 MG: 300 CAPSULE ORAL at 20:47

## 2019-08-10 RX ADMIN — GABAPENTIN 300 MG: 300 CAPSULE ORAL at 16:59

## 2019-08-10 RX ADMIN — ENOXAPARIN SODIUM 40 MG: 40 INJECTION SUBCUTANEOUS at 08:54

## 2019-08-10 RX ADMIN — IPRATROPIUM BROMIDE AND ALBUTEROL SULFATE 1 AMPULE: .5; 3 SOLUTION RESPIRATORY (INHALATION) at 07:57

## 2019-08-10 RX ADMIN — IPRATROPIUM BROMIDE AND ALBUTEROL SULFATE 1 AMPULE: .5; 3 SOLUTION RESPIRATORY (INHALATION) at 23:22

## 2019-08-10 RX ADMIN — PREDNISONE 40 MG: 20 TABLET ORAL at 08:55

## 2019-08-10 RX ADMIN — MELATONIN 3 MG ORAL TABLET 3 MG: 3 TABLET ORAL at 20:51

## 2019-08-10 RX ADMIN — IPRATROPIUM BROMIDE AND ALBUTEROL SULFATE 1 AMPULE: .5; 3 SOLUTION RESPIRATORY (INHALATION) at 11:23

## 2019-08-10 ASSESSMENT — PAIN SCALES - GENERAL: PAINLEVEL_OUTOF10: 10

## 2019-08-10 ASSESSMENT — PAIN DESCRIPTION - DESCRIPTORS: DESCRIPTORS: ACHING

## 2019-08-10 ASSESSMENT — PAIN DESCRIPTION - LOCATION: LOCATION: BACK

## 2019-08-10 ASSESSMENT — PAIN DESCRIPTION - FREQUENCY: FREQUENCY: CONTINUOUS

## 2019-08-10 ASSESSMENT — PAIN DESCRIPTION - PAIN TYPE: TYPE: CHRONIC PAIN

## 2019-08-10 NOTE — PROGRESS NOTES
RESPIRATORY THERAPY ASSESSMENT    Name:  Bernard Rahman  Medical Record Number:  [id number]  Age: 64 y.o. Gender: female  : 1960  Today's Date:  8/10/2019  Room:  0229/0229-02    Assessment     Is the patient being admitted for a COPD or Asthma exacerbation? No   (If yes the patient will be seen every 4 hours for the first 24 hours and then reassessed)    Patient Admission Diagnosis      Allergies  Allergies   Allergen Reactions    Lisinopril Swelling     Tongue swelling    Codeine     Cymbalta [Duloxetine Hcl] Other (See Comments)     SI thoughts     Penicillins Itching    Sulfa Antibiotics        Minimum Predicted Vital Capacity:     918          Actual Vital Capacity:      1430              Pulmonary History:COPD  Home Oxygen Therapy:  2 liters/min via nasal cannula  Home Respiratory Therapy:Albuterol/Ipratropium Bromide HHN   Current Respiratory Therapy:  Duoneb Q4 W/A, Acapella Q4 W/A  Treatment Type: HHN, Vibratory mucous clearing therapy or intervention  Medications: Albuterol/Ipratropium    Respiratory Severity Index(RSI)   Patients with orders for inhalation medications, oxygen, or any therapeutic treatment modality will be placed on Respiratory Protocol. They will be assessed with the first treatment and at least every 72 hours thereafter. The following severity scale will be used to determine frequency of treatment intervention.     Smoking History: Pulmonary Disease or Smoking History, Greater than 15 pack year = 2    Social History  Social History     Tobacco Use    Smoking status: Former Smoker     Packs/day: 1.00     Years: 35.00     Pack years: 35.00     Types: Cigarettes     Last attempt to quit: 2019     Years since quittin.0    Smokeless tobacco: Never Used    Tobacco comment: using chantix   Substance Use Topics    Alcohol use: Yes     Comment: occasionally    Drug use: Yes     Types: Marijuana, Methamphetamines     Comment: occassionally meth and percocet in past,

## 2019-08-10 NOTE — PROGRESS NOTES
inflammation/bronchitis with airspace disease or pulmonary edema.       MRI brain 8/9/19  Impression   No acute infarct, intracranial hemorrhage, or significant mass effect.  Mild   amount of chronic small vessel ischemic white matter disease.          ASSESSMENT:  · Acute on chronic hypoxemic respiratory failure, presumed secondary to smoking synthetic cannabinoids and snorting pills -however her current worsening is not explained by her given history since she insists she has not snorted or huffed or inhaled any substance other than tobacco  · COPD with acute exacerbation  · Abnormal CT chest with groundglass opacities and cystic changes, chronic ILD versus changes related to snorting pills  · Polysubstance abuse  · RLS  · Chronic CHF  · CAD  · Opiate abuse     PLAN:  · Supplemental oxygen to maintain SaO2 >92%; wean as tolerated    · Prednisone 30 mg daily   · Inhaled bronchodilators   · F/U fiberoptic bronchoscopy cultures  · Tobacco cessation, avoid all inhalational agents   · Home when okay with Internal Medicine with prednisone taper to 10 mg, close f/u in 1-2 weeks with pulmonary on 10 mg prednisone

## 2019-08-10 NOTE — PLAN OF CARE
Problem: SAFETY  Goal: Free from accidental physical injury  8/10/2019 0944 by Haleigh Valentin RN  Outcome: Ongoing  8/9/2019 2323 by Catracho Felix RN  Outcome: Ongoing     Problem: DAILY CARE  Goal: Daily care needs are met  8/10/2019 0944 by Haleigh Valentin RN  Outcome: Ongoing  8/9/2019 2323 by Catracho Felix RN  Outcome: Ongoing  Note:   Patient assisted with ADLs as indicated. Problem: PAIN  Goal: Patient's pain/discomfort is manageable  8/10/2019 0944 by Haleigh Valentin RN  Outcome: Ongoing  8/9/2019 2323 by Catracho Felix RN  Outcome: Ongoing  Note:   Patient denies pain at this time. Instructed to notify staff if experiencing pain. Verbalizes agreement. Problem: SKIN INTEGRITY  Goal: Skin integrity is maintained or improved  8/10/2019 0944 by Haleigh Valentin RN  Outcome: Ongoing  8/9/2019 2323 by Catracho Felix RN  Outcome: Ongoing  Note:   Patient mobile in bed. Instructed on importance of frequent repositioning to maintain skin integrity. Demonstrates understanding. Problem: KNOWLEDGE DEFICIT  Goal: Patient/S.O. demonstrates understanding of disease process, treatment plan, medications, and discharge instructions. 8/10/2019 0944 by Haleigh Valentin RN  Outcome: Ongoing  8/9/2019 2323 by Catracho Felix RN  Outcome: Ongoing     Problem: DISCHARGE BARRIERS  Goal: Patient's continuum of care needs are met  8/10/2019 0944 by Haleigh Valentin RN  Outcome: Ongoing  8/9/2019 2323 by Catracho Felix RN  Outcome: Ongoing     Problem: Breathing Pattern - Ineffective:  Goal: Ability to achieve and maintain a regular respiratory rate will improve  Description  Ability to achieve and maintain a regular respiratory rate will improve  8/10/2019 0944 by Haleigh Valentin RN  Outcome: Ongoing  8/9/2019 2323 by Catracho Felix RN  Outcome: Ongoing     Problem: OXYGENATION/RESPIRATORY FUNCTION  Goal: Patient will maintain patent airway  8/10/2019 0944 by Haleigh Valentin RN  Outcome: Ongoing  8/9/2019 2323 by David Guzman RN  Outcome: Ongoing  Goal: Patient will achieve/maintain normal respiratory rate/effort  Description  Respiratory rate and effort will be within normal limits for the patient  8/10/2019 0944 by Haleigh Garcia RN  Outcome: Ongoing  8/9/2019 2323 by David Guzman RN  Outcome: Ongoing     Problem: HEMODYNAMIC STATUS  Goal: Patient has stable vital signs and fluid balance  8/10/2019 0944 by Haleigh Garcia RN  Outcome: Ongoing  8/9/2019 2323 by David Guzman RN  Outcome: Ongoing     Problem: FLUID AND ELECTROLYTE IMBALANCE  Goal: Fluid and electrolyte balance are achieved/maintained  8/9/2019 2323 by David Guzman RN  Outcome: Ongoing     Problem: ACTIVITY INTOLERANCE/IMPAIRED MOBILITY  Goal: Mobility/activity is maintained at optimum level for patient  8/9/2019 2323 by David Guzman RN  Outcome: Ongoing     Problem: Tobacco Use:  Goal: Inpatient tobacco use cessation counseling participation  Description  Inpatient tobacco use cessation counseling participation  8/9/2019 2323 by David Guzman RN  Outcome: Ongoing     Problem: Falls - Risk of:  Goal: Will remain free from falls  Description  Will remain free from falls  8/9/2019 2323 by David Guzman RN  Outcome: Ongoing  Note:   [place]ange fall-risk light on outside door, fall precautions in place.    Goal: Absence of physical injury  Description  Absence of physical injury  8/9/2019 2323 by David Guzman RN  Outcome: Ongoing

## 2019-08-10 NOTE — PROGRESS NOTES
Admit: 2019    Name:  Lina Logan  Room:  0229/0229-02  MRN:    [id number]     Daily Progress Note for 8/10/2019     Interval History:   Admitted with COPD exacerbation, improved now . Still has  C/o light headedness and dizziness . BP normal here . But she says she is used high  BP readings   C/O inflammation / pain In the gum line . No fever     Scheduled Meds:   [START ON 2019] predniSONE  30 mg Oral Daily    furosemide  40 mg Oral Daily    spironolactone  25 mg Oral Daily    aspirin  81 mg Oral Daily    atorvastatin  40 mg Oral Daily    FLUoxetine  40 mg Oral Daily    gabapentin  300 mg Oral 4x Daily    guaiFENesin  600 mg Oral BID    hydrALAZINE  50 mg Oral 3 times per day    metoprolol  100 mg Oral BID    pantoprazole  40 mg Oral QAM AC    oxybutynin  5 mg Oral TID    rOPINIRole  1 mg Oral TID    sodium chloride flush  10 mL Intravenous 2 times per day    enoxaparin  40 mg Subcutaneous Daily    ipratropium-albuterol  1 ampule Inhalation Q4H WA       Continuous Infusions:      PRN Meds:  sodium chloride flush, magnesium hydroxide, ondansetron, albuterol, potassium chloride **OR** potassium alternative oral replacement **OR** potassium chloride, magnesium sulfate, melatonin       Objective:     Temp  Av.2 °F (36.8 °C)  Min: 97.3 °F (36.3 °C)  Max: 98.8 °F (37.1 °C)  Pulse  Av.3  Min: 62  Max: 72  BP  Min: 115/79  Max: 155/91  SpO2  Av %  Min: 93 %  Max: 97 %  Patient Vitals for the past 4 hrs:   BP Temp Temp src Pulse Resp SpO2   08/10/19 1323 119/75 97.3 °F (36.3 °C) Oral 71 18 93 %   08/10/19 1258 115/79 -- -- -- -- --         Intake/Output Summary (Last 24 hours) at 8/10/2019 1547  Last data filed at 8/10/2019 1318  Gross per 24 hour   Intake 490 ml   Output --   Net 490 ml       Physical Exam:  General:  Awake, alert and oriented.  Appears to be not in any distress  Mucous Membranes:  Pink , anicteric   lower gums edematous/ red/ inflammed   Neck: No JVD, no carotid

## 2019-08-10 NOTE — PROGRESS NOTES
Pt awake in bed talking on phone. No complaints at this time. Medications administered. Call light within reach. Will monitor.

## 2019-08-10 NOTE — PLAN OF CARE
by Cleopatra Chapman RN  Outcome: Ongoing  Goal: Patient will achieve/maintain normal respiratory rate/effort  Description  Respiratory rate and effort will be within normal limits for the patient  8/9/2019 2323 by Lurdes Delgado RN  Outcome: Ongoing  8/9/2019 1215 by Cleopatra Chapman RN  Outcome: Ongoing     Problem: HEMODYNAMIC STATUS  Goal: Patient has stable vital signs and fluid balance  8/9/2019 2323 by Lurdes Delgado RN  Outcome: Ongoing  8/9/2019 1215 by Cleopatra Chapman RN  Outcome: Ongoing     Problem: FLUID AND ELECTROLYTE IMBALANCE  Goal: Fluid and electrolyte balance are achieved/maintained  8/9/2019 2323 by Lurdes Delgado RN  Outcome: Ongoing  8/9/2019 1215 by Cleopatra Chapman RN  Outcome: Ongoing     Problem: ACTIVITY INTOLERANCE/IMPAIRED MOBILITY  Goal: Mobility/activity is maintained at optimum level for patient  8/9/2019 2323 by Lurdes Delgado RN  Outcome: Ongoing  8/9/2019 1215 by Cleopatra Chapman RN  Outcome: Ongoing     Problem: Tobacco Use:  Goal: Inpatient tobacco use cessation counseling participation  Description  Inpatient tobacco use cessation counseling participation  8/9/2019 2323 by Lurdes Delgado RN  Outcome: Ongoing  8/9/2019 1215 by Cleopatra Chapman RN  Outcome: Ongoing     Problem: Falls - Risk of:  Goal: Will remain free from falls  Description  Will remain free from falls  8/9/2019 2323 by Lurdes Delgado RN  Outcome: Ongoing  Note:   [place] fall-risk light on outside door, fall precautions in place.    8/9/2019 1215 by Cleopatra Chapman RN  Outcome: Ongoing  Goal: Absence of physical injury  Description  Absence of physical injury  8/9/2019 2323 by Lurdes Delgado RN  Outcome: Ongoing  8/9/2019 1215 by Cleopatra Chapman RN  Outcome: Ongoing

## 2019-08-11 LAB
ANION GAP SERPL CALCULATED.3IONS-SCNC: 12 MMOL/L (ref 3–16)
BUN BLDV-MCNC: 24 MG/DL (ref 7–20)
CALCIUM SERPL-MCNC: 9.3 MG/DL (ref 8.3–10.6)
CHLORIDE BLD-SCNC: 98 MMOL/L (ref 99–110)
CO2: 27 MMOL/L (ref 21–32)
CREAT SERPL-MCNC: 0.9 MG/DL (ref 0.6–1.1)
GFR AFRICAN AMERICAN: >60
GFR NON-AFRICAN AMERICAN: >60
GLUCOSE BLD-MCNC: 199 MG/DL (ref 70–99)
HCT VFR BLD CALC: 43.2 % (ref 36–48)
HEMOGLOBIN: 14.4 G/DL (ref 12–16)
MAGNESIUM: 2.2 MG/DL (ref 1.8–2.4)
MCH RBC QN AUTO: 29.9 PG (ref 26–34)
MCHC RBC AUTO-ENTMCNC: 33.3 G/DL (ref 31–36)
MCV RBC AUTO: 90 FL (ref 80–100)
PDW BLD-RTO: 15.1 % (ref 12.4–15.4)
PHOSPHORUS: 3 MG/DL (ref 2.5–4.9)
PLATELET # BLD: 214 K/UL (ref 135–450)
PMV BLD AUTO: 7.8 FL (ref 5–10.5)
POTASSIUM SERPL-SCNC: 4.5 MMOL/L (ref 3.5–5.1)
RBC # BLD: 4.8 M/UL (ref 4–5.2)
SODIUM BLD-SCNC: 137 MMOL/L (ref 136–145)
WBC # BLD: 11.1 K/UL (ref 4–11)

## 2019-08-11 PROCEDURE — 85027 COMPLETE CBC AUTOMATED: CPT

## 2019-08-11 PROCEDURE — 6370000000 HC RX 637 (ALT 250 FOR IP): Performed by: INTERNAL MEDICINE

## 2019-08-11 PROCEDURE — 36415 COLL VENOUS BLD VENIPUNCTURE: CPT

## 2019-08-11 PROCEDURE — 83735 ASSAY OF MAGNESIUM: CPT

## 2019-08-11 PROCEDURE — 99232 SBSQ HOSP IP/OBS MODERATE 35: CPT | Performed by: INTERNAL MEDICINE

## 2019-08-11 PROCEDURE — 84100 ASSAY OF PHOSPHORUS: CPT

## 2019-08-11 PROCEDURE — 6360000002 HC RX W HCPCS: Performed by: INTERNAL MEDICINE

## 2019-08-11 PROCEDURE — 1200000000 HC SEMI PRIVATE

## 2019-08-11 PROCEDURE — 94761 N-INVAS EAR/PLS OXIMETRY MLT: CPT

## 2019-08-11 PROCEDURE — 2580000003 HC RX 258: Performed by: INTERNAL MEDICINE

## 2019-08-11 PROCEDURE — 2700000000 HC OXYGEN THERAPY PER DAY

## 2019-08-11 PROCEDURE — 94640 AIRWAY INHALATION TREATMENT: CPT

## 2019-08-11 PROCEDURE — 94669 MECHANICAL CHEST WALL OSCILL: CPT

## 2019-08-11 PROCEDURE — 80048 BASIC METABOLIC PNL TOTAL CA: CPT

## 2019-08-11 RX ORDER — FLUCONAZOLE 100 MG/1
150 TABLET ORAL ONCE
Status: COMPLETED | OUTPATIENT
Start: 2019-08-11 | End: 2019-08-11

## 2019-08-11 RX ADMIN — GUAIFENESIN 600 MG: 600 TABLET, EXTENDED RELEASE ORAL at 08:01

## 2019-08-11 RX ADMIN — ROPINIROLE HYDROCHLORIDE 1 MG: 1 TABLET, FILM COATED ORAL at 20:37

## 2019-08-11 RX ADMIN — GABAPENTIN 300 MG: 300 CAPSULE ORAL at 08:01

## 2019-08-11 RX ADMIN — ASPIRIN 81 MG: 81 TABLET ORAL at 08:00

## 2019-08-11 RX ADMIN — GABAPENTIN 300 MG: 300 CAPSULE ORAL at 13:37

## 2019-08-11 RX ADMIN — FLUOXETINE 40 MG: 20 CAPSULE ORAL at 08:00

## 2019-08-11 RX ADMIN — METOPROLOL TARTRATE 100 MG: 50 TABLET ORAL at 20:37

## 2019-08-11 RX ADMIN — IPRATROPIUM BROMIDE AND ALBUTEROL SULFATE 1 AMPULE: .5; 3 SOLUTION RESPIRATORY (INHALATION) at 15:58

## 2019-08-11 RX ADMIN — HYDRALAZINE HYDROCHLORIDE 50 MG: 25 TABLET, FILM COATED ORAL at 20:37

## 2019-08-11 RX ADMIN — CEPHALEXIN 500 MG: 250 CAPSULE ORAL at 05:31

## 2019-08-11 RX ADMIN — ENOXAPARIN SODIUM 40 MG: 40 INJECTION SUBCUTANEOUS at 08:00

## 2019-08-11 RX ADMIN — OXYBUTYNIN CHLORIDE 5 MG: 5 TABLET ORAL at 13:37

## 2019-08-11 RX ADMIN — ROPINIROLE HYDROCHLORIDE 1 MG: 1 TABLET, FILM COATED ORAL at 08:00

## 2019-08-11 RX ADMIN — SPIRONOLACTONE 25 MG: 25 TABLET ORAL at 08:00

## 2019-08-11 RX ADMIN — IPRATROPIUM BROMIDE AND ALBUTEROL SULFATE 1 AMPULE: .5; 3 SOLUTION RESPIRATORY (INHALATION) at 23:25

## 2019-08-11 RX ADMIN — IPRATROPIUM BROMIDE AND ALBUTEROL SULFATE 1 AMPULE: .5; 3 SOLUTION RESPIRATORY (INHALATION) at 19:43

## 2019-08-11 RX ADMIN — OXYBUTYNIN CHLORIDE 5 MG: 5 TABLET ORAL at 08:01

## 2019-08-11 RX ADMIN — IPRATROPIUM BROMIDE AND ALBUTEROL SULFATE 1 AMPULE: .5; 3 SOLUTION RESPIRATORY (INHALATION) at 07:00

## 2019-08-11 RX ADMIN — HYDRALAZINE HYDROCHLORIDE 50 MG: 25 TABLET, FILM COATED ORAL at 05:31

## 2019-08-11 RX ADMIN — METOPROLOL TARTRATE 100 MG: 50 TABLET ORAL at 08:00

## 2019-08-11 RX ADMIN — CEPHALEXIN 500 MG: 250 CAPSULE ORAL at 13:37

## 2019-08-11 RX ADMIN — IPRATROPIUM BROMIDE AND ALBUTEROL SULFATE 1 AMPULE: .5; 3 SOLUTION RESPIRATORY (INHALATION) at 11:20

## 2019-08-11 RX ADMIN — GABAPENTIN 300 MG: 300 CAPSULE ORAL at 20:37

## 2019-08-11 RX ADMIN — OXYBUTYNIN CHLORIDE 5 MG: 5 TABLET ORAL at 20:38

## 2019-08-11 RX ADMIN — ROPINIROLE HYDROCHLORIDE 1 MG: 1 TABLET, FILM COATED ORAL at 13:37

## 2019-08-11 RX ADMIN — MELATONIN 3 MG ORAL TABLET 3 MG: 3 TABLET ORAL at 20:37

## 2019-08-11 RX ADMIN — FLUCONAZOLE 150 MG: 100 TABLET ORAL at 18:29

## 2019-08-11 RX ADMIN — CEPHALEXIN 500 MG: 250 CAPSULE ORAL at 20:38

## 2019-08-11 RX ADMIN — FUROSEMIDE 40 MG: 40 TABLET ORAL at 08:00

## 2019-08-11 RX ADMIN — PREDNISONE 30 MG: 20 TABLET ORAL at 08:01

## 2019-08-11 RX ADMIN — HYDRALAZINE HYDROCHLORIDE 50 MG: 25 TABLET, FILM COATED ORAL at 13:37

## 2019-08-11 RX ADMIN — PANTOPRAZOLE SODIUM 40 MG: 40 TABLET, DELAYED RELEASE ORAL at 05:31

## 2019-08-11 RX ADMIN — GUAIFENESIN 600 MG: 600 TABLET, EXTENDED RELEASE ORAL at 20:38

## 2019-08-11 RX ADMIN — Medication 10 ML: at 08:00

## 2019-08-11 RX ADMIN — ATORVASTATIN CALCIUM 40 MG: 10 TABLET, FILM COATED ORAL at 08:00

## 2019-08-11 RX ADMIN — GABAPENTIN 300 MG: 300 CAPSULE ORAL at 16:53

## 2019-08-11 RX ADMIN — Medication 10 ML: at 20:41

## 2019-08-11 ASSESSMENT — PAIN DESCRIPTION - FREQUENCY: FREQUENCY: CONTINUOUS

## 2019-08-11 ASSESSMENT — PAIN DESCRIPTION - LOCATION: LOCATION: BACK;HIP

## 2019-08-11 ASSESSMENT — PAIN DESCRIPTION - DESCRIPTORS: DESCRIPTORS: ACHING

## 2019-08-11 ASSESSMENT — PAIN DESCRIPTION - ONSET: ONSET: ON-GOING

## 2019-08-11 ASSESSMENT — PAIN DESCRIPTION - ORIENTATION: ORIENTATION: RIGHT;LEFT

## 2019-08-11 ASSESSMENT — PAIN DESCRIPTION - PAIN TYPE: TYPE: CHRONIC PAIN

## 2019-08-11 ASSESSMENT — PAIN SCALES - GENERAL: PAINLEVEL_OUTOF10: 10

## 2019-08-11 NOTE — PROGRESS NOTES
regurgitation.   Systolic pulmonary artery pressure (SPAP) is estimated at 39mmHg (right   atrial pressure 8mmHg     Assessment & Plan:     Patient Active Problem List    Diagnosis Date Noted    Multiple tracheobronchial mucus plugs     Acute on chronic respiratory failure with hypoxia (HCC) 08/07/2019    Acute on chronic respiratory failure with hypoxemia (HCC)     Abnormal CT of the chest     Abnormal chest CT     HCAP (healthcare-associated pneumonia)     Chronic diastolic congestive heart failure (HCC)     Hypomagnesemia     Acute hypoxemic respiratory failure (HCC)     Hypoxia 07/27/2019    Lactic acidosis 07/18/2019    Nausea & vomiting 07/18/2019    Hyperventilation     Hypokalemia     Respiratory alkalosis 07/17/2019    Acute respiratory failure with hypoxia (HCC)     COPD exacerbation (HCC)     Class 2 obesity due to excess calories with body mass index (BMI) of 39.0 to 39.9 in adult 09/14/2018    Hypertension     Hyperlipidemia     Depression     Acute pulmonary edema (HCC)     Acute congestive heart failure (HCC)     Iron deficiency anemia        1. Acute on chronic hypoxic respiratory failure. Due to COPD exacerbation   -  She uses 2 L of oxygen at home. Was requiring 4 to 5 L. sats improved, at baseline 2 L now     2. Acute COPD exacerbation.    - improved . - Continue steroids, inhaled bronchodilators and oxygen. Taper prednisone   - seen by pulmonology    3. Abnormal chest CT showing groundglass opacities. - Pulmonary consult. S/P Bronchoscopy 8/8- no endobronchial lesion. BAL done. Cultures neg so far . Cytology pending     4. Polysubstance abuse. History of snorting pills. This could be causing the groundglass opacities seen on chest CT. 5.  Morbid obesity. Nutrition consult. 6.  Hypertension  -  BP Stable. Continue current medications. - home meds .    - symptoms of dizziness likely due to relatively low BP in a patient whose SBP has been running 160 to 200

## 2019-08-11 NOTE — PROGRESS NOTES
Pt sitting in bed talking on phone. Scheduled Neurontin given. No needs at this time. Dr. Kate Hylton notified pt was wanting Zanaflex that she takes at home. Call light within reach. Will continue to monitor.

## 2019-08-12 ENCOUNTER — TELEPHONE (OUTPATIENT)
Dept: PULMONOLOGY | Age: 59
End: 2019-08-12

## 2019-08-12 VITALS
OXYGEN SATURATION: 95 % | WEIGHT: 251.6 LBS | RESPIRATION RATE: 16 BRPM | TEMPERATURE: 98.7 F | DIASTOLIC BLOOD PRESSURE: 77 MMHG | HEIGHT: 67 IN | SYSTOLIC BLOOD PRESSURE: 123 MMHG | HEART RATE: 63 BPM | BODY MASS INDEX: 39.49 KG/M2

## 2019-08-12 DIAGNOSIS — R93.89 ABNORMAL CT SCAN: Primary | ICD-10-CM

## 2019-08-12 LAB
ANION GAP SERPL CALCULATED.3IONS-SCNC: 12 MMOL/L (ref 3–16)
BUN BLDV-MCNC: 25 MG/DL (ref 7–20)
CALCIUM SERPL-MCNC: 8.9 MG/DL (ref 8.3–10.6)
CHLORIDE BLD-SCNC: 99 MMOL/L (ref 99–110)
CO2: 28 MMOL/L (ref 21–32)
CREAT SERPL-MCNC: 0.8 MG/DL (ref 0.6–1.1)
GFR AFRICAN AMERICAN: >60
GFR NON-AFRICAN AMERICAN: >60
GLUCOSE BLD-MCNC: 118 MG/DL (ref 70–99)
HCT VFR BLD CALC: 43.9 % (ref 36–48)
HEMOGLOBIN: 14.4 G/DL (ref 12–16)
MCH RBC QN AUTO: 30.2 PG (ref 26–34)
MCHC RBC AUTO-ENTMCNC: 32.9 G/DL (ref 31–36)
MCV RBC AUTO: 91.8 FL (ref 80–100)
PDW BLD-RTO: 15.3 % (ref 12.4–15.4)
PLATELET # BLD: 205 K/UL (ref 135–450)
PMV BLD AUTO: 8.4 FL (ref 5–10.5)
POTASSIUM SERPL-SCNC: 4.3 MMOL/L (ref 3.5–5.1)
RBC # BLD: 4.78 M/UL (ref 4–5.2)
SODIUM BLD-SCNC: 139 MMOL/L (ref 136–145)
WBC # BLD: 10.6 K/UL (ref 4–11)

## 2019-08-12 PROCEDURE — 99232 SBSQ HOSP IP/OBS MODERATE 35: CPT | Performed by: INTERNAL MEDICINE

## 2019-08-12 PROCEDURE — 99238 HOSP IP/OBS DSCHRG MGMT 30/<: CPT | Performed by: INTERNAL MEDICINE

## 2019-08-12 PROCEDURE — 6370000000 HC RX 637 (ALT 250 FOR IP): Performed by: INTERNAL MEDICINE

## 2019-08-12 PROCEDURE — 94669 MECHANICAL CHEST WALL OSCILL: CPT

## 2019-08-12 PROCEDURE — 80048 BASIC METABOLIC PNL TOTAL CA: CPT

## 2019-08-12 PROCEDURE — 97535 SELF CARE MNGMENT TRAINING: CPT

## 2019-08-12 PROCEDURE — 6360000002 HC RX W HCPCS: Performed by: INTERNAL MEDICINE

## 2019-08-12 PROCEDURE — 36415 COLL VENOUS BLD VENIPUNCTURE: CPT

## 2019-08-12 PROCEDURE — 85027 COMPLETE CBC AUTOMATED: CPT

## 2019-08-12 PROCEDURE — 94640 AIRWAY INHALATION TREATMENT: CPT

## 2019-08-12 PROCEDURE — 2580000003 HC RX 258: Performed by: INTERNAL MEDICINE

## 2019-08-12 RX ORDER — MAGNESIUM HYDROXIDE/ALUMINUM HYDROXICE/SIMETHICONE 120; 1200; 1200 MG/30ML; MG/30ML; MG/30ML
30 SUSPENSION ORAL EVERY 6 HOURS PRN
Qty: 1 BOTTLE | Refills: 0 | Status: SHIPPED | OUTPATIENT
Start: 2019-08-12 | End: 2019-12-07

## 2019-08-12 RX ORDER — MAGNESIUM HYDROXIDE/ALUMINUM HYDROXICE/SIMETHICONE 120; 1200; 1200 MG/30ML; MG/30ML; MG/30ML
30 SUSPENSION ORAL EVERY 6 HOURS PRN
Status: DISCONTINUED | OUTPATIENT
Start: 2019-08-12 | End: 2019-08-12 | Stop reason: HOSPADM

## 2019-08-12 RX ORDER — CEPHALEXIN 500 MG/1
500 CAPSULE ORAL EVERY 8 HOURS SCHEDULED
Qty: 21 CAPSULE | Refills: 0 | Status: SHIPPED | OUTPATIENT
Start: 2019-08-12 | End: 2019-08-19

## 2019-08-12 RX ORDER — PREDNISONE 10 MG/1
TABLET ORAL
Qty: 18 TABLET | Refills: 0 | Status: SHIPPED | OUTPATIENT
Start: 2019-08-12 | End: 2019-08-21

## 2019-08-12 RX ADMIN — FUROSEMIDE 40 MG: 40 TABLET ORAL at 08:30

## 2019-08-12 RX ADMIN — PANTOPRAZOLE SODIUM 40 MG: 40 TABLET, DELAYED RELEASE ORAL at 05:42

## 2019-08-12 RX ADMIN — PREDNISONE 30 MG: 20 TABLET ORAL at 08:31

## 2019-08-12 RX ADMIN — GABAPENTIN 300 MG: 300 CAPSULE ORAL at 08:30

## 2019-08-12 RX ADMIN — SPIRONOLACTONE 25 MG: 25 TABLET ORAL at 08:30

## 2019-08-12 RX ADMIN — ROPINIROLE HYDROCHLORIDE 1 MG: 1 TABLET, FILM COATED ORAL at 08:32

## 2019-08-12 RX ADMIN — CEPHALEXIN 500 MG: 250 CAPSULE ORAL at 05:42

## 2019-08-12 RX ADMIN — OXYBUTYNIN CHLORIDE 5 MG: 5 TABLET ORAL at 08:31

## 2019-08-12 RX ADMIN — HYDRALAZINE HYDROCHLORIDE 50 MG: 25 TABLET, FILM COATED ORAL at 05:42

## 2019-08-12 RX ADMIN — ENOXAPARIN SODIUM 40 MG: 40 INJECTION SUBCUTANEOUS at 08:26

## 2019-08-12 RX ADMIN — ATORVASTATIN CALCIUM 40 MG: 10 TABLET, FILM COATED ORAL at 08:30

## 2019-08-12 RX ADMIN — METOPROLOL TARTRATE 100 MG: 50 TABLET ORAL at 08:31

## 2019-08-12 RX ADMIN — IPRATROPIUM BROMIDE AND ALBUTEROL SULFATE 1 AMPULE: .5; 3 SOLUTION RESPIRATORY (INHALATION) at 11:22

## 2019-08-12 RX ADMIN — GABAPENTIN 300 MG: 300 CAPSULE ORAL at 12:15

## 2019-08-12 RX ADMIN — ALUMINUM HYDROXIDE, MAGNESIUM HYDROXIDE, AND SIMETHICONE 30 ML: 200; 200; 20 SUSPENSION ORAL at 12:15

## 2019-08-12 RX ADMIN — FLUOXETINE 40 MG: 20 CAPSULE ORAL at 08:32

## 2019-08-12 RX ADMIN — ASPIRIN 81 MG: 81 TABLET ORAL at 08:32

## 2019-08-12 RX ADMIN — Medication 10 ML: at 08:27

## 2019-08-12 RX ADMIN — GUAIFENESIN 600 MG: 600 TABLET, EXTENDED RELEASE ORAL at 08:30

## 2019-08-12 ASSESSMENT — PAIN SCALES - GENERAL: PAINLEVEL_OUTOF10: 0

## 2019-08-12 NOTE — PROGRESS NOTES
Reviewed d/c instructions with pt and boyfriend, both verbalized understanding. Removed IV tolerated well. Waiting on pt to call out when finished with lunch for transport to assist to vehicle.

## 2019-08-12 NOTE — PROGRESS NOTES
Patient sitting up in bed. Vital signs stable. Patient still complaining of dizziness. Denies any needs at this time. Will continue to monitor. Patient stable to transfer to [facility]. To call staff if ambulating to restroom.

## 2019-08-12 NOTE — PROGRESS NOTES
Pulmonary Progress Note    CC: Shortness of breath    Subjective:   Appears comfortable  Room air    IV line:      Intake/Output Summary (Last 24 hours) at 8/12/2019 1202  Last data filed at 8/11/2019 1308  Gross per 24 hour   Intake 360 ml   Output --   Net 360 ml       Exam:   /77   Pulse 63   Temp 98.7 °F (37.1 °C) (Oral)   Resp 16   Ht 5' 7\" (1.702 m)   Wt 251 lb 9.6 oz (114.1 kg)   SpO2 95%   BMI 39.41 kg/m²  on RA  Gen: No distress. Alert. Eyes: PERRL. No sclera icterus. No conjunctival injection. ENT: No discharge. Pharynx clear. Neck: Trachea midline. Normal thyroid. Resp: No accessory muscle use. Few crackles. No wheezes. No rhonchi. No dullness on percussion. CV: Regular rate. Regular rhythm. No murmur or rub. No edema. GI: Non-tender. Non-distended. No masses. No organomegaly. Normal bowel sounds. No hernia. Skin: Warm and dry. No nodule on exposed extremities. No rash on exposed extremities. Lymph: No cervical LAD. No supraclavicular LAD. M/S: No cyanosis. No joint deformity. No clubbing. Neuro: Awake. Moves all extremities. CN grossly intact. Psych: Oriented x 3. No anxiety or agitation.      Scheduled Meds:   predniSONE  30 mg Oral Daily    cephALEXin  500 mg Oral 3 times per day    furosemide  40 mg Oral Daily    spironolactone  25 mg Oral Daily    aspirin  81 mg Oral Daily    atorvastatin  40 mg Oral Daily    FLUoxetine  40 mg Oral Daily    gabapentin  300 mg Oral 4x Daily    guaiFENesin  600 mg Oral BID    hydrALAZINE  50 mg Oral 3 times per day    metoprolol  100 mg Oral BID    pantoprazole  40 mg Oral QAM AC    oxybutynin  5 mg Oral TID    rOPINIRole  1 mg Oral TID    sodium chloride flush  10 mL Intravenous 2 times per day    enoxaparin  40 mg Subcutaneous Daily    ipratropium-albuterol  1 ampule Inhalation Q4H WA     Continuous Infusions:    PRN Meds:  aluminum & magnesium hydroxide-simethicone, sodium chloride flush, magnesium hydroxide,

## 2019-08-12 NOTE — PROGRESS NOTES
Occupational Therapy Daily Treatment Note    Unit: [address] [place]  Date:  8/12/2019  Patient Name:    Juana Rodriguez  Admitting diagnosis:  Acute on chronic respiratory failure with hypoxia [facility]) [J96.21]  Admit Date:  8/6/2019  Precautions/Restrictions:fall risk, IV, bed/chair alarm and supplemental O2 (2L baseline)        Discharge Recommendations: Home with PRN assist, consider OP pulm rehab  DME needs for discharge: Needs Met       Therapy recommendations for staff:   Assist of 1 with use of No AD for all ambulation to/from bathroom    AM-PAC Score: 21  Home Health S4 Level: Level 1- Standard       Treatment Time:  7266-5333  Treatment number:  2   Total Treatment Time:  24 minutes      Subjective:  Pt sitting up on EOB, reports she just returned to bed from restroom. Agreeable to therapy    Pain   No  Rating: NA  Location:NA  Pain Medicine Status: Denies need      Bed Mobility: IND per report, not observed  Supine to Sit:  Not Tested  Sit to Supine:  Not Tested  Rolling:           Not Tested  Scooting:        Not Tested    Transfer Training:  IND per report, not observed. Pt has been ambulating IND to /from restroom with no AD this date     Activity Tolerance   Pt sitting up on EOB, just returned to bed from using restroom IND. NC doffed prior to going to restroom. Spo2 92-93% on RA sitting EOB  Pt completed therapy session with No adverse symptoms      ADL Training:   Not observed  Pt states she has already bathed and changed her gown earlier this morning. She reports no adverse symptoms. Therapist provided education on use of DME/AE for ADLs for purpose of energy conservation and management of symptoms      Therapeutic Exercise:   Scapular retraction:  x5  Scapular protraction:  x5  Shoulder shrugs:  x5    Patient Education:   Role of OT  Use of AE  Energy conservation techniques   Positions of relief  PLB/diaphramatic breathing    Positioning Needs: In bed, call light and needs in reach.       Family Present:

## 2019-08-12 NOTE — CARE COORDINATION
DISCHARGE ORDER  Date/Time 2019 1:07 PM  Completed by: Jolene Moya, Case Management    Patient Name: Nereida Reid    : 1960  Admitting Diagnosis: Acute on chronic respiratory failure with hypoxia ([facility] [address] [J96.21]  Admit Date/Time: 2019 10:44 PM    Noted discharge order. Confirmed discharge plan with patient / family (pt): Yes   Discharge Plan: Reviewed chart. Role of discharge planner explained and patient verbalized understanding. Discharge order is noted. Pt is being d/c'd to home today. Pt's O2 sats are 95% on RA. Pt is active with [facility] for home O2/HHN. Pt declines HHC. Discharge timeout done  with Bayron Ortiz RN. All discharge needs and concerns addressed. No further discharge needs needed or noted.

## 2019-08-21 ENCOUNTER — HOSPITAL ENCOUNTER (OUTPATIENT)
Dept: GENERAL RADIOLOGY | Age: 59
Discharge: HOME | End: 2019-08-21
Payer: MEDICARE

## 2019-08-21 ENCOUNTER — OFFICE VISIT (OUTPATIENT)
Dept: PULMONOLOGY | Age: 59
End: 2019-08-21
Payer: MEDICARE

## 2019-08-21 ENCOUNTER — HOSPITAL ENCOUNTER (OUTPATIENT)
Age: 59
Discharge: HOME | End: 2019-08-21
Payer: MEDICARE

## 2019-08-21 VITALS
SYSTOLIC BLOOD PRESSURE: 112 MMHG | WEIGHT: 250 LBS | DIASTOLIC BLOOD PRESSURE: 58 MMHG | TEMPERATURE: 98.2 F | RESPIRATION RATE: 16 BRPM | HEIGHT: 67 IN | HEART RATE: 70 BPM | OXYGEN SATURATION: 98 % | BODY MASS INDEX: 39.24 KG/M2

## 2019-08-21 DIAGNOSIS — R93.89 ABNORMAL CT SCAN: ICD-10-CM

## 2019-08-21 DIAGNOSIS — J44.9 CHRONIC OBSTRUCTIVE PULMONARY DISEASE, UNSPECIFIED COPD TYPE (HCC): Primary | ICD-10-CM

## 2019-08-21 DIAGNOSIS — R91.8 ABNORMAL CT SCAN, LUNG: ICD-10-CM

## 2019-08-21 PROCEDURE — 71046 X-RAY EXAM CHEST 2 VIEWS: CPT

## 2019-08-21 PROCEDURE — 99214 OFFICE O/P EST MOD 30 MIN: CPT | Performed by: INTERNAL MEDICINE

## 2019-08-21 RX ORDER — PREDNISONE 1 MG/1
TABLET ORAL
Qty: 60 TABLET | Refills: 1 | Status: SHIPPED | OUTPATIENT
Start: 2019-08-21 | End: 2019-10-02

## 2019-08-21 NOTE — PROGRESS NOTES
Pulmonary Follow-up Note  Chief Complaint: cough, shortness of breath   Referring Provider: hospital f/u    Interval history:   Cough and shortness of breath are about the same as when she left hospital.  No tobacco at all. Long term severe RLS symptoms. Dizziness/lightheadedness. Presenting history:     reports that she quit smoking about 3 weeks ago. Her smoking use included cigarettes. She has a 35.00 pack-year smoking history. She has never used smokeless tobacco.     Review of Systems:    Physical Exam:  Blood pressure (!) 112/58, pulse 70, temperature 98.2 °F (36.8 °C), temperature source Oral, resp. rate 16, height 5' 7\" (1.702 m), weight 250 lb (113.4 kg), SpO2 98 %. Constitutional:  No acute distress. HENT:  Oropharynx is clear and moist.   Eyes: Pupils equal, round, and reactive to light. No scleral icterus. Neck: No tracheal deviation present. Cardiovascular: Normal heart sounds. No lower extremity edema. Pulmonary/Chest: No wheezes. No rhonchi. No rales. No decreased breath sounds. No accessory muscle usage or stridor. Musculoskeletal: No cyanosis. No clubbing. Skin: Skin is warm and dry. Psychiatric: Normal mood and affect. Data:   8/21/19 CXR  Impression   Interval improvement in appearance of multifocal airspace opacity within the   bilateral mid lung zones, likely a combination of pulmonary edema and   superimposed pneumonia. 7/27/19 CT CHEST  FINDINGS:   Pulmonary Arteries: Evaluation is limited by bolus timing and motion.  No   gross evidence of central or proximal segmental pulmonary embolus.       Mediastinum: Cardiomegaly.  No pericardial effusion.  Prominent nonspecific   mediastinal lymph nodes.  No hilar adenopathy.       Lungs/pleura:  Worsening bilateral interstitial ground-glass opacities.  No   pleural effusions.       Upper Abdomen: Limited images of the upper abdomen are unremarkable.       Soft Tissues/Bones: No acute bone or soft tissue abnormality.

## 2019-09-08 LAB
FUNGUS (MYCOLOGY) CULTURE: ABNORMAL
FUNGUS (MYCOLOGY) CULTURE: ABNORMAL
FUNGUS STAIN: ABNORMAL
FUNGUS STAIN: ABNORMAL
ORGANISM: ABNORMAL
ORGANISM: ABNORMAL

## 2019-09-24 LAB
AFB CULTURE (MYCOBACTERIA): NORMAL
AFB CULTURE (MYCOBACTERIA): NORMAL
AFB SMEAR: NORMAL
AFB SMEAR: NORMAL

## 2019-09-30 ENCOUNTER — TELEPHONE (OUTPATIENT)
Dept: PULMONOLOGY | Age: 59
End: 2019-09-30

## 2019-09-30 RX ORDER — PREDNISONE 10 MG/1
TABLET ORAL
Qty: 30 TABLET | Refills: 0 | Status: SHIPPED | OUTPATIENT
Start: 2019-09-30 | End: 2019-10-02

## 2019-09-30 NOTE — TELEPHONE ENCOUNTER
Patient has f/u 10/2/19. Should pt complete CT scan 10/2/19 and PFT. Do you have the following symptoms? Shortness of Breath  yes  Wheezing  yes  Cough  some                  Cough Characteristics:                           Productive    no                           Sputum Color    no                           Hemoptysis   no                           Consistency of sputum   no     Fever:    no  Temp:na  Chills/Sweats:  Sweats at night  What other symptoms are you having?:  Sinus congestion    How long have you had these symptoms? 1 wk     [facility]          Review medications and allergies: Allergies? Allergies   Allergen Reactions    Lisinopril Swelling     Tongue swelling    Codeine      itchy    Cymbalta [Duloxetine Hcl] Other (See Comments)     SI thoughts     Duloxetine      Strong suicidal thoughts    Penicillins Itching     itchy    Sulfa Antibiotics      Unable to urinate                        Currently on Antibiotics? (Drug/Dose/Frequency and how long on?) no                   Systemic Steroids? (Drug/Dose/Frequency and how long on?) finished prednisone 5 mg every other day on Friday  no-finished zpack 2 wks ago given by Dr Russ Wilhelm    ASSESSMENT:8/21/19 Dr Valerie Rios  · Acute on chronic hypoxemic respiratory failure, presumed secondary to smoking synthetic cannabinoids and snorting pills -now tobacco and illicit substance free, seems to be improving  · COPD with acute exacerbation - resolving  · Abnormal CT chest with groundglass opacities and cystic changes, chronic ILD versus changes related to snorting pills.   F/U CXR is improving  · Polysubstance abuse  · RLS - severe  · Chronic CHF  · CAD  · Opiate abuse  · Depression on SSRI/Prozac    · PLAN:8/21/19  · Supplemental oxygen to maintain SaO2 >92%; wean as tolerated    · Prednisone slowly taper down from 10 mg to off  · Inhaled bronchodilators   · CT CHEST no IV dye in 6 weeks  · PFT with inhaled

## 2019-10-02 ENCOUNTER — HOSPITAL ENCOUNTER (OUTPATIENT)
Age: 59
Discharge: HOME | End: 2019-10-02
Payer: MEDICARE

## 2019-10-02 ENCOUNTER — OFFICE VISIT (OUTPATIENT)
Dept: PULMONOLOGY | Age: 59
End: 2019-10-02
Payer: MEDICARE

## 2019-10-02 ENCOUNTER — HOSPITAL ENCOUNTER (OUTPATIENT)
Dept: PULMONOLOGY | Age: 59
Discharge: HOME | End: 2019-10-02
Payer: MEDICARE

## 2019-10-02 ENCOUNTER — HOSPITAL ENCOUNTER (OUTPATIENT)
Dept: CT IMAGING | Age: 59
Discharge: HOME | End: 2019-10-02
Payer: MEDICARE

## 2019-10-02 VITALS
DIASTOLIC BLOOD PRESSURE: 74 MMHG | HEIGHT: 67 IN | SYSTOLIC BLOOD PRESSURE: 122 MMHG | HEART RATE: 58 BPM | WEIGHT: 262 LBS | BODY MASS INDEX: 41.12 KG/M2 | RESPIRATION RATE: 16 BRPM | TEMPERATURE: 97.9 F | OXYGEN SATURATION: 93 %

## 2019-10-02 VITALS — OXYGEN SATURATION: 97 %

## 2019-10-02 DIAGNOSIS — J84.9 ILD (INTERSTITIAL LUNG DISEASE) (HCC): Primary | ICD-10-CM

## 2019-10-02 DIAGNOSIS — J44.9 CHRONIC OBSTRUCTIVE PULMONARY DISEASE, UNSPECIFIED COPD TYPE (HCC): ICD-10-CM

## 2019-10-02 DIAGNOSIS — J84.9 ILD (INTERSTITIAL LUNG DISEASE) (HCC): ICD-10-CM

## 2019-10-02 DIAGNOSIS — R91.8 ABNORMAL CT SCAN, LUNG: ICD-10-CM

## 2019-10-02 LAB
DLCO %PRED: 40 %
DLCO PRED: NORMAL ML/MIN/MMHG
DLCO/VA %PRED: NORMAL %
DLCO/VA PRED: NORMAL ML/MIN/MMHG
DLCO/VA: NORMAL ML/MIN/MMHG
DLCO: NORMAL ML/MIN/MMHG
EXPIRATORY TIME-POST: NORMAL SEC
EXPIRATORY TIME: NORMAL SEC
FEF 25-75% %CHNG: NORMAL
FEF 25-75% %PRED-POST: NORMAL %
FEF 25-75% %PRED-PRE: NORMAL L/SEC
FEF 25-75% PRED: NORMAL L/SEC
FEF 25-75%-POST: NORMAL L/SEC
FEF 25-75%-PRE: NORMAL L/SEC
FEV1 %PRED-POST: 68 %
FEV1 %PRED-PRE: 69 %
FEV1 PRED: NORMAL L
FEV1-POST: NORMAL L
FEV1-PRE: NORMAL L
FEV1/FVC %PRED-POST: NORMAL %
FEV1/FVC %PRED-PRE: NORMAL %
FEV1/FVC PRED: NORMAL %
FEV1/FVC-POST: 80 %
FEV1/FVC-PRE: 80 %
FVC %PRED-POST: NORMAL L
FVC %PRED-PRE: NORMAL %
FVC PRED: NORMAL L
FVC-POST: NORMAL L
FVC-PRE: NORMAL L
GAW %PRED: NORMAL %
GAW PRED: NORMAL L/S/CMH2O
GAW: NORMAL L/S/CMH2O
HCT VFR BLD CALC: 41.5 % (ref 36–48)
HEMOGLOBIN: 14 G/DL (ref 12–16)
IC %PRED: NORMAL %
IC PRED: NORMAL L
IC: NORMAL L
MCH RBC QN AUTO: 30.9 PG (ref 26–34)
MCHC RBC AUTO-ENTMCNC: 33.6 G/DL (ref 31–36)
MCV RBC AUTO: 91.7 FL (ref 80–100)
MEP: NORMAL
MIP: NORMAL
MVV %PRED-PRE: NORMAL %
MVV PRED: NORMAL L/MIN
MVV-PRE: NORMAL L/MIN
PDW BLD-RTO: 14.1 % (ref 12.4–15.4)
PEF %PRED-POST: NORMAL %
PEF %PRED-PRE: NORMAL L/SEC
PEF PRED: NORMAL L/SEC
PEF%CHNG: NORMAL
PEF-POST: NORMAL L/SEC
PEF-PRE: NORMAL L/SEC
PLATELET # BLD: 202 K/UL (ref 135–450)
PMV BLD AUTO: 9.7 FL (ref 5–10.5)
RAW %PRED: NORMAL %
RAW PRED: NORMAL CMH2O/L/S
RAW: NORMAL CMH2O/L/S
RBC # BLD: 4.52 M/UL (ref 4–5.2)
RHEUMATOID FACTOR: 218 IU/ML
RV %PRED: NORMAL %
RV PRED: NORMAL L
RV: NORMAL L
SEDIMENTATION RATE, ERYTHROCYTE: 43 MM/HR (ref 0–30)
SVC %PRED: NORMAL %
SVC PRED: NORMAL L
SVC: NORMAL L
TLC %PRED: 66 %
TLC PRED: NORMAL L
TLC: NORMAL L
TOTAL CK: 90 U/L (ref 26–192)
VA %PRED: NORMAL %
VA PRED: NORMAL L
VA: NORMAL L
VTG %PRED: NORMAL %
VTG PRED: NORMAL L
VTG: NORMAL L
WBC # BLD: 10.1 K/UL (ref 4–11)

## 2019-10-02 PROCEDURE — 94640 AIRWAY INHALATION TREATMENT: CPT

## 2019-10-02 PROCEDURE — 86431 RHEUMATOID FACTOR QUANT: CPT

## 2019-10-02 PROCEDURE — 82550 ASSAY OF CK (CPK): CPT

## 2019-10-02 PROCEDURE — 86200 CCP ANTIBODY: CPT

## 2019-10-02 PROCEDURE — 86038 ANTINUCLEAR ANTIBODIES: CPT

## 2019-10-02 PROCEDURE — 94729 DIFFUSING CAPACITY: CPT

## 2019-10-02 PROCEDURE — 86235 NUCLEAR ANTIGEN ANTIBODY: CPT

## 2019-10-02 PROCEDURE — 94760 N-INVAS EAR/PLS OXIMETRY 1: CPT

## 2019-10-02 PROCEDURE — 94726 PLETHYSMOGRAPHY LUNG VOLUMES: CPT

## 2019-10-02 PROCEDURE — 94060 EVALUATION OF WHEEZING: CPT

## 2019-10-02 PROCEDURE — 6360000002 HC RX W HCPCS: Performed by: INTERNAL MEDICINE

## 2019-10-02 PROCEDURE — 85027 COMPLETE CBC AUTOMATED: CPT

## 2019-10-02 PROCEDURE — 99214 OFFICE O/P EST MOD 30 MIN: CPT | Performed by: INTERNAL MEDICINE

## 2019-10-02 PROCEDURE — 71250 CT THORAX DX C-: CPT

## 2019-10-02 PROCEDURE — 86255 FLUORESCENT ANTIBODY SCREEN: CPT

## 2019-10-02 PROCEDURE — 36415 COLL VENOUS BLD VENIPUNCTURE: CPT

## 2019-10-02 PROCEDURE — 85652 RBC SED RATE AUTOMATED: CPT

## 2019-10-02 PROCEDURE — 82085 ASSAY OF ALDOLASE: CPT

## 2019-10-02 RX ORDER — ALBUTEROL SULFATE 2.5 MG/3ML
2.5 SOLUTION RESPIRATORY (INHALATION) ONCE
Status: COMPLETED | OUTPATIENT
Start: 2019-10-02 | End: 2019-10-02

## 2019-10-02 RX ADMIN — ALBUTEROL SULFATE 2.5 MG: 2.5 SOLUTION RESPIRATORY (INHALATION) at 09:11

## 2019-10-02 ASSESSMENT — PULMONARY FUNCTION TESTS
FEV1_PERCENT_PREDICTED_PRE: 69
FEV1_PERCENT_PREDICTED_POST: 68
FEV1/FVC_PRE: 80
FEV1/FVC_POST: 80

## 2019-10-03 LAB
ALDOLASE: 4.4 U/L (ref 1.5–8.1)
ANTI-NUCLEAR ANTIBODY (ANA): NEGATIVE
ANTI-SCL70 IGG: <0.2 AI (ref 0–0.9)
CYCLIC CITRULLINATED PEPTIDE ANTIBODY IGG: 107.8 U/ML (ref 0–2.9)

## 2019-10-05 LAB — ANCA IFA: NORMAL

## 2019-10-15 ENCOUNTER — TELEPHONE (OUTPATIENT)
Dept: PULMONOLOGY | Age: 59
End: 2019-10-15

## 2019-11-05 ENCOUNTER — TELEPHONE (OUTPATIENT)
Dept: PULMONOLOGY | Age: 59
End: 2019-11-05

## 2019-11-13 ENCOUNTER — HOSPITAL ENCOUNTER (OUTPATIENT)
Dept: CT IMAGING | Age: 59
Discharge: HOME | End: 2019-11-13
Payer: MEDICARE

## 2019-11-13 ENCOUNTER — HOSPITAL ENCOUNTER (OUTPATIENT)
Dept: PULMONOLOGY | Age: 59
Discharge: HOME | End: 2019-11-13
Payer: MEDICARE

## 2019-11-13 DIAGNOSIS — J44.9 CHRONIC OBSTRUCTIVE PULMONARY DISEASE, UNSPECIFIED COPD TYPE (HCC): ICD-10-CM

## 2019-11-13 DIAGNOSIS — R91.8 ABNORMAL CT SCAN, LUNG: ICD-10-CM

## 2019-11-13 DIAGNOSIS — J84.9 ILD (INTERSTITIAL LUNG DISEASE) (HCC): ICD-10-CM

## 2019-11-13 PROCEDURE — 94618 PULMONARY STRESS TESTING: CPT

## 2019-11-13 PROCEDURE — 71250 CT THORAX DX C-: CPT

## 2019-11-15 ENCOUNTER — TELEPHONE (OUTPATIENT)
Dept: PULMONOLOGY | Age: 59
End: 2019-11-15

## 2019-11-15 ENCOUNTER — OFFICE VISIT (OUTPATIENT)
Dept: PULMONOLOGY | Age: 59
End: 2019-11-15
Payer: MEDICARE

## 2019-11-15 VITALS
BODY MASS INDEX: 43.35 KG/M2 | SYSTOLIC BLOOD PRESSURE: 126 MMHG | RESPIRATION RATE: 20 BRPM | HEIGHT: 67 IN | DIASTOLIC BLOOD PRESSURE: 74 MMHG | WEIGHT: 276.2 LBS | HEART RATE: 60 BPM | OXYGEN SATURATION: 98 %

## 2019-11-15 DIAGNOSIS — R09.02 EXERCISE HYPOXEMIA: ICD-10-CM

## 2019-11-15 DIAGNOSIS — Z72.0 TOBACCO ABUSE: ICD-10-CM

## 2019-11-15 DIAGNOSIS — R93.89 ABNORMAL CHEST CT: Primary | ICD-10-CM

## 2019-11-15 PROBLEM — J96.21 ACUTE ON CHRONIC RESPIRATORY FAILURE WITH HYPOXIA (HCC): Status: RESOLVED | Noted: 2019-08-07 | Resolved: 2019-11-15

## 2019-11-15 PROCEDURE — 99214 OFFICE O/P EST MOD 30 MIN: CPT | Performed by: INTERNAL MEDICINE

## 2019-12-07 ENCOUNTER — APPOINTMENT (OUTPATIENT)
Dept: GENERAL RADIOLOGY | Age: 59
End: 2019-12-07
Payer: MEDICARE

## 2019-12-07 ENCOUNTER — HOSPITAL ENCOUNTER (EMERGENCY)
Age: 59
Discharge: HOME | End: 2019-12-07
Payer: MEDICARE

## 2019-12-07 VITALS
HEIGHT: 67 IN | BODY MASS INDEX: 40.81 KG/M2 | OXYGEN SATURATION: 98 % | RESPIRATION RATE: 17 BRPM | SYSTOLIC BLOOD PRESSURE: 175 MMHG | DIASTOLIC BLOOD PRESSURE: 95 MMHG | HEART RATE: 64 BPM | TEMPERATURE: 98.2 F | WEIGHT: 260 LBS

## 2019-12-07 DIAGNOSIS — M79.631 RIGHT FOREARM PAIN: ICD-10-CM

## 2019-12-07 DIAGNOSIS — M53.3 SACRAL PAIN: ICD-10-CM

## 2019-12-07 DIAGNOSIS — M25.552 BILATERAL HIP PAIN: ICD-10-CM

## 2019-12-07 DIAGNOSIS — M25.551 BILATERAL HIP PAIN: ICD-10-CM

## 2019-12-07 DIAGNOSIS — M54.50 LUMBAR PAIN: ICD-10-CM

## 2019-12-07 DIAGNOSIS — M25.521 RIGHT ELBOW PAIN: Primary | ICD-10-CM

## 2019-12-07 PROCEDURE — 72100 X-RAY EXAM L-S SPINE 2/3 VWS: CPT

## 2019-12-07 PROCEDURE — 6370000000 HC RX 637 (ALT 250 FOR IP): Performed by: PHYSICIAN ASSISTANT

## 2019-12-07 PROCEDURE — 73080 X-RAY EXAM OF ELBOW: CPT

## 2019-12-07 PROCEDURE — 99283 EMERGENCY DEPT VISIT LOW MDM: CPT

## 2019-12-07 PROCEDURE — 73090 X-RAY EXAM OF FOREARM: CPT

## 2019-12-07 PROCEDURE — 72170 X-RAY EXAM OF PELVIS: CPT

## 2019-12-07 PROCEDURE — 72220 X-RAY EXAM SACRUM TAILBONE: CPT

## 2019-12-07 RX ORDER — LIDOCAINE 50 MG/G
1 PATCH TOPICAL DAILY
Qty: 30 PATCH | Refills: 0 | Status: ON HOLD | OUTPATIENT
Start: 2019-12-07 | End: 2020-08-29 | Stop reason: ALTCHOICE

## 2019-12-07 RX ORDER — LIDOCAINE 4 G/G
1 PATCH TOPICAL ONCE
Status: DISCONTINUED | OUTPATIENT
Start: 2019-12-07 | End: 2019-12-08 | Stop reason: HOSPADM

## 2019-12-07 RX ORDER — ACETAMINOPHEN 500 MG
500 TABLET ORAL ONCE
Status: COMPLETED | OUTPATIENT
Start: 2019-12-07 | End: 2019-12-07

## 2019-12-07 RX ORDER — ACETAMINOPHEN 500 MG
500 TABLET ORAL 4 TIMES DAILY PRN
Qty: 120 TABLET | Refills: 0 | Status: SHIPPED | OUTPATIENT
Start: 2019-12-07

## 2019-12-07 RX ADMIN — ACETAMINOPHEN 500 MG: 500 TABLET ORAL at 21:42

## 2019-12-07 ASSESSMENT — PAIN SCALES - GENERAL
PAINLEVEL_OUTOF10: 8
PAINLEVEL_OUTOF10: 10

## 2019-12-07 ASSESSMENT — ENCOUNTER SYMPTOMS
RESPIRATORY NEGATIVE: 1
GASTROINTESTINAL NEGATIVE: 1
BACK PAIN: 1

## 2019-12-07 ASSESSMENT — PAIN DESCRIPTION - LOCATION: LOCATION: ARM;HIP

## 2020-02-18 ENCOUNTER — TELEPHONE (OUTPATIENT)
Dept: PULMONOLOGY | Age: 60
End: 2020-02-18

## 2020-02-18 NOTE — TELEPHONE ENCOUNTER
Patient did not show for 3 month f/u appointment  with Dr. Araceli Gentile on 2/18/20    Same Day Cancellation: Yes    Patient rescheduled:  No    New appointment: n/a    Patient was also no show on: 11/5/19    LOV 11/15/20  ASSESSMENT:  · Interstitial lung disease: abnormal CT chest with groundglass opacities which have improved, cystic changes, interstitial disease  · H/O Acute hypoxemic respiratory failure, presumed secondary to smoking synthetic cannabinoids and snorting pills -now tobacco and illicit substance free, seems to be improving  · Pulmonary Nodules 5 mm or smaller, stable July 2019 to Oct 2019  · Polysubstance abuse  · RLS - less severe with Wellbutrin  · Chronic CHF  · CAD  · Opiate abuse  · Hoarseness X 2 weeks, ENT recommended if persistent     PLAN:  · Supplemental oxygen to maintain SaO2 >92%; wean as tolerated    · Inhaled bronchodilators   · HRCT in 5-6 weeks with 6 MWT so that off prednisone  · ILD labs please   Tobacco cessation has been achieved, avoid all inhalational agents

## 2020-05-12 ENCOUNTER — TELEPHONE (OUTPATIENT)
Dept: PULMONOLOGY | Age: 60
End: 2020-05-12

## 2020-05-13 ENCOUNTER — TELEPHONE (OUTPATIENT)
Dept: PULMONOLOGY | Age: 60
End: 2020-05-13

## 2020-05-13 ENCOUNTER — VIRTUAL VISIT (OUTPATIENT)
Dept: PULMONOLOGY | Age: 60
End: 2020-05-13
Payer: MEDICARE

## 2020-05-13 VITALS — WEIGHT: 255 LBS | BODY MASS INDEX: 40.02 KG/M2 | HEIGHT: 67 IN

## 2020-05-13 PROCEDURE — 99214 OFFICE O/P EST MOD 30 MIN: CPT | Performed by: INTERNAL MEDICINE

## 2020-05-13 RX ORDER — ESCITALOPRAM OXALATE 10 MG/1
10 TABLET ORAL DAILY
COMMUNITY
Start: 2020-01-21

## 2020-05-13 NOTE — TELEPHONE ENCOUNTER
the provision of medical care and treatment via Telehealth and the Service and you may not be able to receive diagnosis and/or treatment through the Service for every condition for which you seek diagnosis and/or treatment. 11. There are potential risks to the use of Telehealth, including but not limited to the risks described in this Telehealth Consent. 12. Your Provider(s) have discussed the use of Telehealth and the Service with you, including the benefits and risks of such and you have provided oral consent to your Provider(s) for the use of Telehealth and the Service. 15. You understand that it is your duty to provide your Provider(s) truthful, accurate and complete information, including all relevant information regarding care that you may have received or may be receiving from other healthcare providers outside of the Service. 14. You understand that each of your Provider(s) may determine in his or sole discretion that your condition is not suitable for diagnosis and/or treatment using the Service, and that you may need to seek medical care and treatment a specialist or other healthcare provider, outside of the Service. 15. You understand that you are fully responsible for payment for all services provided by Provider(s) or through use of the Service and that you may not be able to use third-party insurance. 16. You represent that (a) you have read this Telehealth Consent carefully, (b) you understand the risks and benefits of the Service and the use of Telehealth in the medical care and treatment provided to you by Provider(s) using the Service, and (c) you have the legal capacity and authority to provide this consent for yourself and/or the minor for which you are consenting under applicable federal and state laws, including laws relating to the age of [de-identified] and/or parental/guardian consent.    17. You give your informed consent to the use of Telehealth by Provider(s) using the Service under the terms described in the Terms of Service and this Telehealth Consent. The patient was read the following statement and has consented to the visit as of 5/13/20. The patient has been scheduled for their first telehealth visit on 5/13/20 with Dr Mendy Barakat .

## 2020-05-13 NOTE — PROGRESS NOTES
Pulmonary Follow-up Note  Chief Complaint: cough, shortness of breath   Referring Provider: hospital f/u    Interval history:   Breathing has been overall pretty good except with weather changes. Is coming off oxygen. Her saturations run 93% without oxygen. Limited on exertion by hip pain. Uses albuterol but without benefit. Presenting history:     reports that she quit smoking about 9 months ago. Her smoking use included cigarettes. She has a 35.00 pack-year smoking history. She has never used smokeless tobacco.     Review of Systems:    Physical Exam:  Height 5' 7\" (1.702 m), weight 255 lb (115.7 kg). Constitutional:  No acute distress. Appears well developed and nourished. Eyes: EOM intact. Conjunctivae anicteric. No visible discharge. HENT: Head is normocephalic and atraumatic. Mucus membranes are moist and the tongue appears normal. Normal appearing nose. External Ears normal. Neck with no obvious mass and the trachea is midline. Respiratory: No accessory muscle usage. Respiratory effort normal. No visualized signs of difficulty breathing or respiratory distress  Cardiovascular: No obvious peripheral edema. Skin: No significant exanthematous lesions or discoloration noted on facial skin    Psychiatric: No anxiety or Agitation. Alert and Oriented to person, place and time. Normal judgement and insight. Data:   8/21/19 CXR  Impression   Interval improvement in appearance of multifocal airspace opacity within the   bilateral mid lung zones, likely a combination of pulmonary edema and   superimposed pneumonia.      10/2/19 CT CHEST  FINDINGS:   Mediastinum: Calcification of the thoracic aorta.  Coronary artery   calcification.  Mediastinal lymph nodes are seen which are predominantly   subcentimeter in short axis.  Coarse calcification within the left thyroid   lobe.  No axillary adenopathy.       Lungs/pleura: Several subcentimeter pulmonary nodules averaging 4-5 mm are   seen bilaterally which are not markedly changed in distribution as compared   to prior exam dated 07/17/2019. Zenovia Cely with more remote films is limited   given presence of airspace disease at that time.  Subpleural reticular   opacity is demonstrated bilaterally, within upper and lower lobes   bilaterally.  Small subpleural cystic spaces are also demonstrated in this   distribution.  Mild superimposed ground-glass opacity.  No pneumothorax. Trace left pleural effusion.       Upper Abdomen: No acute process       Soft Tissues/Bones: Degenerative change of the spine.           Impression   No significant interval change in small noncalcified pulmonary nodules   measuring up to approximately 5 mm as compared to exam dated 07/17/2019. Follow-up recommendations are as below.       Suspected interstitial lung disease again demonstrated about the periphery of   bilateral upper and lower lobes, for which high-resolution chest CT could be   considered for further assessment as warranted.        PFT 10/2/19 FEV1 2.01 L 69% ratio 0.8 TLC 3.82 L 66%  DLCO 10.2 40%    ASSESSMENT:  · Interstitial lung disease: abnormal CT chest with groundglass opacities which have improved, cystic changes, interstitial disease  · Chronic hypoxemic respiratory failure, presumed secondary to smoking synthetic cannabinoids and snorting pills -now tobacco and illicit substance free,   improving   · Pulmonary Nodules 5 mm or smaller, stable July 2019 to Oct 2019  · Polysubstance abuse  · RLS - less severe with Wellbutrin  · Chronic CHF  · CAD  · Opiate abuse     PLAN:  · Supplemental oxygen to maintain SaO2 >92%   · Inhaled bronchodilators   · HRCT in 4-6 weeks with full PFT without inhaled bronchodilators   · Can consider operative clearance at that time   · Tobacco cessation has been achieved, avoid all inhalational agents   · See me in person following testing (can be same day)    Erica Rojas is a 64 y.o. female being evaluated by a Virtual Visit (video visit) encounter to address concerns as mentioned above. A caregiver was present when appropriate. Due to this being a TeleHealth encounter (During AWNUB-70 public health emergency), evaluation of the following organ systems was limited: Vitals/Constitutional/EENT/Resp/CV/GI//MS/Neuro/Skin/Heme-Lymph-Imm. Pursuant to the emergency declaration under the [address] [place] [address] and the [employer] and [employer] Act, this Virtual Visit was conducted with patient's (and/or legal guardian's) consent, to reduce the patient's risk of exposure to COVID-19 and provide necessary medical care. The patient (and/or legal guardian) has also been advised to contact this office for worsening conditions or problems, and seek emergency medical treatment and/or call 911 if deemed necessary. Services were provided through a video synchronous discussion virtually to substitute for in-person clinic visit. Patient was located in her home, provider was located in his office. --Chuckie Zhu MD on 5/15/2020 at 2:10 PM    An electronic signature was used to authenticate this note.

## 2020-05-14 NOTE — TELEPHONE ENCOUNTER
Pt returned call and was informed of f/u appts. Advised pt that office will be calling PFT lab prior to appt to get pt worked in for PFT same day. Call PFT lab approx 1-2 weeks prior to pt appt.

## 2020-06-11 ENCOUNTER — OFFICE VISIT (OUTPATIENT)
Dept: PRIMARY CARE CLINIC | Age: 60
End: 2020-06-11

## 2020-06-12 LAB
SARS-COV-2: NOT DETECTED
SOURCE: NORMAL

## 2020-06-17 ENCOUNTER — HOSPITAL ENCOUNTER (OUTPATIENT)
Dept: PULMONOLOGY | Age: 60
Discharge: HOME | End: 2020-06-17
Payer: MEDICARE

## 2020-06-17 ENCOUNTER — OFFICE VISIT (OUTPATIENT)
Dept: PULMONOLOGY | Age: 60
End: 2020-06-17
Payer: MEDICARE

## 2020-06-17 ENCOUNTER — HOSPITAL ENCOUNTER (OUTPATIENT)
Dept: CT IMAGING | Age: 60
Discharge: HOME | End: 2020-06-17
Payer: MEDICARE

## 2020-06-17 VITALS
OXYGEN SATURATION: 97 % | BODY MASS INDEX: 39.55 KG/M2 | RESPIRATION RATE: 18 BRPM | WEIGHT: 252 LBS | TEMPERATURE: 99.1 F | SYSTOLIC BLOOD PRESSURE: 149 MMHG | HEART RATE: 73 BPM | DIASTOLIC BLOOD PRESSURE: 81 MMHG | HEIGHT: 67 IN

## 2020-06-17 VITALS — OXYGEN SATURATION: 98 %

## 2020-06-17 PROCEDURE — 94727 GAS DIL/WSHOT DETER LNG VOL: CPT

## 2020-06-17 PROCEDURE — 99214 OFFICE O/P EST MOD 30 MIN: CPT | Performed by: INTERNAL MEDICINE

## 2020-06-17 PROCEDURE — 94010 BREATHING CAPACITY TEST: CPT

## 2020-06-17 PROCEDURE — 94760 N-INVAS EAR/PLS OXIMETRY 1: CPT

## 2020-06-17 PROCEDURE — 71250 CT THORAX DX C-: CPT

## 2020-06-17 PROCEDURE — 94729 DIFFUSING CAPACITY: CPT

## 2020-06-17 NOTE — PROGRESS NOTES
Pulmonary Follow-up Note  Chief Complaint: cough, shortness of breath   Referring Provider: hospital f/u    Interval history:   Oxygen saturations are better. Got injection into knee. Wants to have hip replacement. Requesting nebulizer today. Presenting history:     reports that she has been smoking cigarettes. She has a 35.00 pack-year smoking history. She has never used smokeless tobacco.     Review of Systems:    Physical Exam:  Blood pressure (!) 149/81, pulse 73, temperature 99.1 °F (37.3 °C), temperature source Oral, resp. rate 18, height 5' 7\" (1.702 m), weight 252 lb (114.3 kg), SpO2 97 %. Constitutional:  No acute distress. HENT:  Oropharynx is clear and moist.   Neck: No tracheal deviation present. Cardiovascular: Normal heart sounds. No lower extremity edema. Pulmonary/Chest: No wheezes. No rhonchi. No rales. No decreased breath sounds. No accessory muscle usage or stridor. Musculoskeletal: No cyanosis. No clubbing. Skin: Skin is warm and dry. Psychiatric: Normal mood and affect.   Neurologic: speech fluent, alert and oriented, strength symmetric            Data:   HRCT 6/17/20  Mediastinum: Coronary artery calcification is seen.  Trace pericardial fluid   is seen.  Small hiatal hernia seen. Sharin Munda is nonspecific thickening at the   GE junction.  Small mediastinal and hilar nodes are noted       HRCT Findings/Lungs/pleura: Scattered ground-glass and subpleural reticular   opacities are seen throughout the lungs bilaterally.  A few areas of   subpleural honeycombing are seen. Sharin Munda is mild fusiform bronchiectasis.       There is increasing patchy ground-glass opacity in the left upper lobe,   anterior to the major fissure, when compared to prior.  A few new patchy   areas of ground-glass opacity are seen in the superior segment of the right   lower lobe.       There also a few new patchy areas of ground-glass opacity in the right upper   lobe and left lower lobe.       Small nodular density along the fissure on the right, measures 5 mm, similar   to prior.  A small perifissural nodule on the left, near the major fissure   also appears similar, measuring 5-6 mm.       Upper Abdomen: Right adrenal gland is normal.  Left adrenal gland is normal.   Mild stool load is seen in the colon       Soft Tissues/Bones: Spurring is seen in the spine           Impression   Scattered ground-glass opacities throughout the lungs bilaterally, new   compared to prior, favored to be postinflammatory-infectious.  Early   pulmonary edema could also have a similar appearance.       Stable underlying pulmonary fibrosis       Stable small perifissural nodules, likely intrapulmonary lymph nodes by   location       PFT 10/2/19 FEV1 2.01 L 69% ratio 0.8 TLC 3.82 L 66%  DLCO 10.2 40%  PFT 6/17/20 FEV1 2.05 L 71%     TLC 3.88 L 67% DLCO 12.55    ASSESSMENT:  · Interstitial lung disease: abnormal CT chest with groundglass opacities which have waxed and waned, there is stable pulmonary fibrosis   · Pulmonary Nodules 5 mm or smaller, stable July 2019 to June 2020, most likely perifissural lymph nodes  · Mild intermittent reactive airway disease  · H/O Polysubstance abuse  · RLS - less severe with Wellbutrin  · Chronic CHF  · CAD  · Ongoing tobacco abuse, working on quitting     PLAN:  · Supplemental oxygen to maintain SaO2 >92%   · Inhaled bronchodilators PRN, neb and albuterol. Needs nebulizer today   · Patient may proceed to the operating room from pulmonary perspective without further testing. Because of underlying disease, she is at increased risk of georgiana-operative complications, including atelectasis, pneumonia and, when general anesthesia is required, failure to liberate from mechanical ventilation. However, this should not preclude her from having the recommended operation. It is my recommendation that short acting bronchodilators be used on a prn basis.   All of this was discussed with the patient today in the

## 2020-06-17 NOTE — LETTER
[facility] Pulmonary, Critical Care, and Sleep  [address] [address]  Phone: [phone]  Fax: [phone]        June 17, 2020       Patient: Bernard Carreon   MR Number: <[id number]>   YOB: 1960   Date of Visit: 6/17/2020       Dear Dr. Miller Plate:    Annmarie Monson is followed by me for known pulmonary fibrosis. This is suspected to be secondary to remote inhalational/snorting injury to lungs. The fibrosis has remained stable and her lung function has slightly improved over the last 8 months. From a pulmonary perspective, she may proceed to the operating room without further testing. My assessment is below. ASSESSMENT:  · Interstitial lung disease: stable pulmonary fibrosis   · Mild intermittent reactive airway disease  · H/O Polysubstance abuse  · Ongoing tobacco abuse, working on quitting     PLAN:  · Inhaled bronchodilators PRN  · Patient may proceed to the operating room from pulmonary perspective without further testing. Because of underlying disease, she is at increased risk of georgiana-operative complications, including atelectasis, pneumonia and, when general anesthesia is required, failure to liberate from mechanical ventilation. However, this should not preclude her from having the recommended operation. It is my recommendation that short acting bronchodilators be used on a prn basis. All of this was discussed with the patient today in the office. · Tobacco cessation has been advised      If you have questions, please do not hesitate to call me. I look forward to following Annmarie Monson along with you.     Sincerely,    Klaus Gonzalez MD

## 2020-06-18 RX ORDER — ALBUTEROL SULFATE 2.5 MG/3ML
2.5 SOLUTION RESPIRATORY (INHALATION) EVERY 6 HOURS PRN
Qty: 360 ML | Refills: 5 | Status: SHIPPED | OUTPATIENT
Start: 2020-06-18

## 2020-06-18 RX ORDER — PREDNISONE 1 MG/1
TABLET ORAL
Qty: 60 TABLET | Refills: 0 | OUTPATIENT
Start: 2020-06-18

## 2020-08-29 ENCOUNTER — HOSPITAL ENCOUNTER (INPATIENT)
Age: 60
LOS: 3 days | Discharge: HOME | DRG: 987 | End: 2020-09-01
Attending: STUDENT IN AN ORGANIZED HEALTH CARE EDUCATION/TRAINING PROGRAM | Admitting: INTERNAL MEDICINE
Payer: MEDICARE

## 2020-08-29 ENCOUNTER — APPOINTMENT (OUTPATIENT)
Dept: CT IMAGING | Age: 60
DRG: 987 | End: 2020-08-29
Payer: MEDICARE

## 2020-08-29 PROBLEM — J18.9 PNEUMONIA: Status: ACTIVE | Noted: 2020-08-29

## 2020-08-29 LAB
A/G RATIO: 1.4 (ref 1.1–2.2)
ALBUMIN SERPL-MCNC: 3.6 G/DL (ref 3.4–5)
ALP BLD-CCNC: 106 U/L (ref 40–129)
ALT SERPL-CCNC: 13 U/L (ref 10–40)
ANION GAP SERPL CALCULATED.3IONS-SCNC: 11 MMOL/L (ref 3–16)
AST SERPL-CCNC: 14 U/L (ref 15–37)
BASE EXCESS VENOUS: 2.1 MMOL/L (ref -3–3)
BASOPHILS ABSOLUTE: 0 K/UL (ref 0–0.2)
BASOPHILS RELATIVE PERCENT: 0.3 %
BILIRUB SERPL-MCNC: 0.9 MG/DL (ref 0–1)
BUN BLDV-MCNC: 11 MG/DL (ref 7–20)
CALCIUM SERPL-MCNC: 9 MG/DL (ref 8.3–10.6)
CARBOXYHEMOGLOBIN: 6 % (ref 0–1.5)
CHLORIDE BLD-SCNC: 99 MMOL/L (ref 99–110)
CO2: 28 MMOL/L (ref 21–32)
CREAT SERPL-MCNC: 0.8 MG/DL (ref 0.6–1.1)
EKG ATRIAL RATE: 83 BPM
EKG DIAGNOSIS: NORMAL
EKG P AXIS: 46 DEGREES
EKG P-R INTERVAL: 142 MS
EKG Q-T INTERVAL: 366 MS
EKG QRS DURATION: 94 MS
EKG QTC CALCULATION (BAZETT): 430 MS
EKG R AXIS: 11 DEGREES
EKG T AXIS: 39 DEGREES
EKG VENTRICULAR RATE: 83 BPM
EOSINOPHILS ABSOLUTE: 0.2 K/UL (ref 0–0.6)
EOSINOPHILS RELATIVE PERCENT: 1.5 %
GFR AFRICAN AMERICAN: >60
GFR NON-AFRICAN AMERICAN: >60
GLOBULIN: 2.6 G/DL
GLUCOSE BLD-MCNC: 131 MG/DL (ref 70–99)
HCO3 VENOUS: 28.9 MMOL/L (ref 23–29)
HCT VFR BLD CALC: 31.4 % (ref 36–48)
HEMOGLOBIN: 10.3 G/DL (ref 12–16)
LYMPHOCYTES ABSOLUTE: 1.4 K/UL (ref 1–5.1)
LYMPHOCYTES RELATIVE PERCENT: 11.2 %
MCH RBC QN AUTO: 31.2 PG (ref 26–34)
MCHC RBC AUTO-ENTMCNC: 32.9 G/DL (ref 31–36)
MCV RBC AUTO: 94.9 FL (ref 80–100)
METHEMOGLOBIN VENOUS: 0.3 %
MONOCYTES ABSOLUTE: 0.8 K/UL (ref 0–1.3)
MONOCYTES RELATIVE PERCENT: 6.2 %
NEUTROPHILS ABSOLUTE: 10.2 K/UL (ref 1.7–7.7)
NEUTROPHILS RELATIVE PERCENT: 80.8 %
O2 CONTENT, VEN: 11 VOL %
O2 SAT, VEN: 69 %
O2 THERAPY: ABNORMAL
PCO2, VEN: 55.9 MMHG (ref 40–50)
PDW BLD-RTO: 15.6 % (ref 12.4–15.4)
PH VENOUS: 7.33 (ref 7.35–7.45)
PLATELET # BLD: 258 K/UL (ref 135–450)
PMV BLD AUTO: 7.9 FL (ref 5–10.5)
PO2, VEN: 38.9 MMHG (ref 25–40)
POTASSIUM SERPL-SCNC: 4.2 MMOL/L (ref 3.5–5.1)
PRO-BNP: 934 PG/ML (ref 0–124)
RBC # BLD: 3.31 M/UL (ref 4–5.2)
SODIUM BLD-SCNC: 138 MMOL/L (ref 136–145)
TCO2 CALC VENOUS: 31 MMOL/L
TOTAL PROTEIN: 6.2 G/DL (ref 6.4–8.2)
TROPONIN: <0.01 NG/ML
WBC # BLD: 12.6 K/UL (ref 4–11)

## 2020-08-29 PROCEDURE — 1200000000 HC SEMI PRIVATE

## 2020-08-29 PROCEDURE — 6370000000 HC RX 637 (ALT 250 FOR IP): Performed by: INTERNAL MEDICINE

## 2020-08-29 PROCEDURE — 6360000002 HC RX W HCPCS: Performed by: STUDENT IN AN ORGANIZED HEALTH CARE EDUCATION/TRAINING PROGRAM

## 2020-08-29 PROCEDURE — 2580000003 HC RX 258: Performed by: INTERNAL MEDICINE

## 2020-08-29 PROCEDURE — 99285 EMERGENCY DEPT VISIT HI MDM: CPT

## 2020-08-29 PROCEDURE — 56405 I&D VULVA/PERINEAL ABSCESS: CPT

## 2020-08-29 PROCEDURE — 82803 BLOOD GASES ANY COMBINATION: CPT

## 2020-08-29 PROCEDURE — 96365 THER/PROPH/DIAG IV INF INIT: CPT

## 2020-08-29 PROCEDURE — 93005 ELECTROCARDIOGRAM TRACING: CPT | Performed by: STUDENT IN AN ORGANIZED HEALTH CARE EDUCATION/TRAINING PROGRAM

## 2020-08-29 PROCEDURE — 93010 ELECTROCARDIOGRAM REPORT: CPT | Performed by: INTERNAL MEDICINE

## 2020-08-29 PROCEDURE — 85025 COMPLETE CBC W/AUTO DIFF WBC: CPT

## 2020-08-29 PROCEDURE — 83880 ASSAY OF NATRIURETIC PEPTIDE: CPT

## 2020-08-29 PROCEDURE — 0U9MXZZ DRAINAGE OF VULVA, EXTERNAL APPROACH: ICD-10-PCS | Performed by: STUDENT IN AN ORGANIZED HEALTH CARE EDUCATION/TRAINING PROGRAM

## 2020-08-29 PROCEDURE — 6360000004 HC RX CONTRAST MEDICATION: Performed by: STUDENT IN AN ORGANIZED HEALTH CARE EDUCATION/TRAINING PROGRAM

## 2020-08-29 PROCEDURE — 80053 COMPREHEN METABOLIC PANEL: CPT

## 2020-08-29 PROCEDURE — 6370000000 HC RX 637 (ALT 250 FOR IP): Performed by: STUDENT IN AN ORGANIZED HEALTH CARE EDUCATION/TRAINING PROGRAM

## 2020-08-29 PROCEDURE — 2700000000 HC OXYGEN THERAPY PER DAY

## 2020-08-29 PROCEDURE — 94760 N-INVAS EAR/PLS OXIMETRY 1: CPT

## 2020-08-29 PROCEDURE — 84484 ASSAY OF TROPONIN QUANT: CPT

## 2020-08-29 PROCEDURE — 2580000003 HC RX 258: Performed by: STUDENT IN AN ORGANIZED HEALTH CARE EDUCATION/TRAINING PROGRAM

## 2020-08-29 PROCEDURE — 71260 CT THORAX DX C+: CPT

## 2020-08-29 PROCEDURE — U0003 INFECTIOUS AGENT DETECTION BY NUCLEIC ACID (DNA OR RNA); SEVERE ACUTE RESPIRATORY SYNDROME CORONAVIRUS 2 (SARS-COV-2) (CORONAVIRUS DISEASE [COVID-19]), AMPLIFIED PROBE TECHNIQUE, MAKING USE OF HIGH THROUGHPUT TECHNOLOGIES AS DESCRIBED BY CMS-2020-01-R: HCPCS

## 2020-08-29 RX ORDER — METOPROLOL TARTRATE 50 MG/1
100 TABLET, FILM COATED ORAL 2 TIMES DAILY
Status: DISCONTINUED | OUTPATIENT
Start: 2020-08-29 | End: 2020-09-01 | Stop reason: HOSPADM

## 2020-08-29 RX ORDER — PANTOPRAZOLE SODIUM 40 MG/1
40 TABLET, DELAYED RELEASE ORAL
Status: DISCONTINUED | OUTPATIENT
Start: 2020-08-30 | End: 2020-09-01 | Stop reason: HOSPADM

## 2020-08-29 RX ORDER — TIZANIDINE 4 MG/1
4 TABLET ORAL 2 TIMES DAILY PRN
Status: DISCONTINUED | OUTPATIENT
Start: 2020-08-29 | End: 2020-09-01 | Stop reason: HOSPADM

## 2020-08-29 RX ORDER — ROPINIROLE 1 MG/1
2 TABLET, FILM COATED ORAL 4 TIMES DAILY
Status: DISCONTINUED | OUTPATIENT
Start: 2020-08-29 | End: 2020-08-30

## 2020-08-29 RX ORDER — ALBUTEROL SULFATE 90 UG/1
2 AEROSOL, METERED RESPIRATORY (INHALATION) 4 TIMES DAILY
Status: DISCONTINUED | OUTPATIENT
Start: 2020-08-29 | End: 2020-08-30

## 2020-08-29 RX ORDER — ACETAMINOPHEN 325 MG/1
650 TABLET ORAL EVERY 6 HOURS PRN
Status: DISCONTINUED | OUTPATIENT
Start: 2020-08-29 | End: 2020-09-01 | Stop reason: HOSPADM

## 2020-08-29 RX ORDER — IPRATROPIUM BROMIDE AND ALBUTEROL SULFATE 2.5; .5 MG/3ML; MG/3ML
3 SOLUTION RESPIRATORY (INHALATION) EVERY 4 HOURS PRN
Status: DISCONTINUED | OUTPATIENT
Start: 2020-08-29 | End: 2020-09-01 | Stop reason: HOSPADM

## 2020-08-29 RX ORDER — OXYBUTYNIN CHLORIDE 5 MG/1
5 TABLET ORAL 3 TIMES DAILY
Status: DISCONTINUED | OUTPATIENT
Start: 2020-08-29 | End: 2020-08-30

## 2020-08-29 RX ORDER — SODIUM CHLORIDE 0.9 % (FLUSH) 0.9 %
10 SYRINGE (ML) INJECTION EVERY 12 HOURS SCHEDULED
Status: DISCONTINUED | OUTPATIENT
Start: 2020-08-29 | End: 2020-09-01 | Stop reason: HOSPADM

## 2020-08-29 RX ORDER — ACETAMINOPHEN 650 MG/1
650 SUPPOSITORY RECTAL EVERY 6 HOURS PRN
Status: DISCONTINUED | OUTPATIENT
Start: 2020-08-29 | End: 2020-09-01 | Stop reason: HOSPADM

## 2020-08-29 RX ORDER — ACETAMINOPHEN 500 MG
500 TABLET ORAL 4 TIMES DAILY PRN
Status: DISCONTINUED | OUTPATIENT
Start: 2020-08-29 | End: 2020-09-01 | Stop reason: HOSPADM

## 2020-08-29 RX ORDER — OXYCODONE HYDROCHLORIDE AND ACETAMINOPHEN 5; 325 MG/1; MG/1
1 TABLET ORAL ONCE
Status: COMPLETED | OUTPATIENT
Start: 2020-08-29 | End: 2020-08-29

## 2020-08-29 RX ORDER — PROMETHAZINE HYDROCHLORIDE 25 MG/1
12.5 TABLET ORAL EVERY 6 HOURS PRN
Status: DISCONTINUED | OUTPATIENT
Start: 2020-08-29 | End: 2020-09-01 | Stop reason: HOSPADM

## 2020-08-29 RX ORDER — POLYETHYLENE GLYCOL 3350 17 G/17G
17 POWDER, FOR SOLUTION ORAL DAILY PRN
Status: DISCONTINUED | OUTPATIENT
Start: 2020-08-29 | End: 2020-09-01 | Stop reason: HOSPADM

## 2020-08-29 RX ORDER — SODIUM CHLORIDE 0.9 % (FLUSH) 0.9 %
10 SYRINGE (ML) INJECTION PRN
Status: DISCONTINUED | OUTPATIENT
Start: 2020-08-29 | End: 2020-09-01 | Stop reason: HOSPADM

## 2020-08-29 RX ORDER — ONDANSETRON 2 MG/ML
4 INJECTION INTRAMUSCULAR; INTRAVENOUS EVERY 6 HOURS PRN
Status: DISCONTINUED | OUTPATIENT
Start: 2020-08-29 | End: 2020-09-01 | Stop reason: HOSPADM

## 2020-08-29 RX ORDER — GABAPENTIN 300 MG/1
300 CAPSULE ORAL 4 TIMES DAILY
Status: DISCONTINUED | OUTPATIENT
Start: 2020-08-29 | End: 2020-08-30

## 2020-08-29 RX ORDER — SPIRONOLACTONE 25 MG/1
25 TABLET ORAL DAILY
Status: DISCONTINUED | OUTPATIENT
Start: 2020-08-30 | End: 2020-09-01 | Stop reason: HOSPADM

## 2020-08-29 RX ORDER — LIDOCAINE 4 G/G
1 PATCH TOPICAL DAILY
Status: DISCONTINUED | OUTPATIENT
Start: 2020-08-30 | End: 2020-09-01 | Stop reason: HOSPADM

## 2020-08-29 RX ORDER — FUROSEMIDE 40 MG/1
40 TABLET ORAL DAILY
Status: DISCONTINUED | OUTPATIENT
Start: 2020-08-30 | End: 2020-09-01 | Stop reason: HOSPADM

## 2020-08-29 RX ORDER — OXYCODONE HYDROCHLORIDE AND ACETAMINOPHEN 5; 325 MG/1; MG/1
1 TABLET ORAL EVERY 4 HOURS PRN
Status: ON HOLD | COMMUNITY
End: 2021-09-15 | Stop reason: SDUPTHER

## 2020-08-29 RX ORDER — PREDNISONE 10 MG/1
10 TABLET ORAL DAILY
COMMUNITY
End: 2020-12-23 | Stop reason: CLARIF

## 2020-08-29 RX ORDER — ESCITALOPRAM OXALATE 10 MG/1
10 TABLET ORAL DAILY
Status: DISCONTINUED | OUTPATIENT
Start: 2020-08-30 | End: 2020-09-01 | Stop reason: HOSPADM

## 2020-08-29 RX ORDER — ATORVASTATIN CALCIUM 40 MG/1
40 TABLET, FILM COATED ORAL DAILY
Status: DISCONTINUED | OUTPATIENT
Start: 2020-08-30 | End: 2020-09-01 | Stop reason: HOSPADM

## 2020-08-29 RX ADMIN — ROPINIROLE HYDROCHLORIDE 2 MG: 1 TABLET, FILM COATED ORAL at 22:01

## 2020-08-29 RX ADMIN — OXYCODONE HYDROCHLORIDE AND ACETAMINOPHEN 1 TABLET: 5; 325 TABLET ORAL at 17:27

## 2020-08-29 RX ADMIN — CEFTRIAXONE SODIUM 1 G: 1 INJECTION, POWDER, FOR SOLUTION INTRAMUSCULAR; INTRAVENOUS at 17:27

## 2020-08-29 RX ADMIN — OXYBUTYNIN CHLORIDE 5 MG: 5 TABLET ORAL at 22:01

## 2020-08-29 RX ADMIN — TIZANIDINE 4 MG: 4 TABLET ORAL at 22:01

## 2020-08-29 RX ADMIN — Medication 10 ML: at 22:01

## 2020-08-29 RX ADMIN — GABAPENTIN 300 MG: 300 CAPSULE ORAL at 22:01

## 2020-08-29 RX ADMIN — IOPAMIDOL 85 ML: 755 INJECTION, SOLUTION INTRAVENOUS at 16:06

## 2020-08-29 RX ADMIN — DEXTROSE MONOHYDRATE 500 MG: 50 INJECTION, SOLUTION INTRAVENOUS at 18:14

## 2020-08-29 RX ADMIN — METOPROLOL TARTRATE 100 MG: 50 TABLET, FILM COATED ORAL at 22:01

## 2020-08-29 ASSESSMENT — PAIN DESCRIPTION - ORIENTATION
ORIENTATION: RIGHT
ORIENTATION: RIGHT

## 2020-08-29 ASSESSMENT — PAIN DESCRIPTION - LOCATION
LOCATION: HIP

## 2020-08-29 ASSESSMENT — PAIN DESCRIPTION - PAIN TYPE: TYPE: ACUTE PAIN

## 2020-08-29 ASSESSMENT — PAIN SCALES - GENERAL
PAINLEVEL_OUTOF10: 9
PAINLEVEL_OUTOF10: 7
PAINLEVEL_OUTOF10: 7
PAINLEVEL_OUTOF10: 9

## 2020-08-29 NOTE — ED NOTES
Perfect serve message to Dr. Blanka Paez @ [facility]  08/29/20 1709    Dr Blanka Paez returned the call to Dr. Ubaldo Packer [facility] 08/29/20 [id number]

## 2020-08-29 NOTE — ED PROVIDER NOTES
[address] ED      CHIEF COMPLAINT  Shortness of Breath (past two days ago; 87% on room air per EMS; has PRN O2 at home. duo neb given in route; Hip replacement on 8/18/20)       HISTORY OF PRESENT ILLNESS  Jax Choudhury is a 64 y.o. female with a past medical history of COPD, heart failure, hypertension, hyperlipidemia, recent hip replacement, who presents to the ED complaining of shortness of breath. Patient reports shortness of breath over the past 2 days. Patient was found to be 87% on room air by EMS. Patient only uses as needed oxygen at home, but states she has not used it for months. Patient denies any chest pain. She does report a cough that is productive of green sputum. She denies hot hemoptysis. She denies any documented fever but does report some chills. She reports bilateral lower extremity swelling. Patient did have a hip replacement on 8/18/2020. Patient is also concerned about a boil that is located on her genitalia that has been present for 1 week. It is on her labia. She describes that it is painful. She has not noted any drainage from it. She denies any previous similar infection. Patient is scheduled for staple removal from her previous hip surgery this coming Friday. She denies any cardiac history. Patient does smoke. No other complaints, modifying factors or associated symptoms. I have reviewed the following from the nursing documentation.     Past Medical History:   Diagnosis Date    Class 2 obesity with serious comorbidity and body mass index (BMI) of 39.0 to 39.9 in adult 9/14/2018    Depression     History of left hip replacement 02/12/2018    Hyperlipidemia     Hypertension     Kidney stone     MRSA (methicillin resistant staph aureus) culture positive 07/27/2019    nasal colonization     Past Surgical History:   Procedure Laterality Date    BRONCHOSCOPY  08/08/2019    BRONCHOSCOPY N/A 8/8/2019    BRONCHOSCOPY ALVEOLAR LAVAGE performed by Lisa Inman MD at [facility]      FOOT SURGERY      HYSTERECTOMY      JOINT REPLACEMENT      KNEE SURGERY       No family history on file. Social History     Socioeconomic History    Marital status:       Spouse name: Not on file    Number of children: Not on file    Years of education: Not on file    Highest education level: Not on file   Occupational History    Not on file   Social Needs    Financial resource strain: Not on file    Food insecurity     Worry: Not on file     Inability: Not on file    Transportation needs     Medical: Not on file     Non-medical: Not on file   Tobacco Use    Smoking status: Former Smoker     Packs/day: 0.00     Years: 35.00     Pack years: 0.00     Types: Cigarettes     Last attempt to quit: 2019     Years since quittin.1    Smokeless tobacco: Never Used    Tobacco comment:  pk   Substance and Sexual Activity    Alcohol use: Yes     Comment: occasionally    Drug use: Not Currently     Types: Marijuana, Methamphetamines     Comment: occassionally meth and percocet in past, snort percocet, snorting and smoking meth    Sexual activity: Not on file   Lifestyle    Physical activity     Days per week: Not on file     Minutes per session: Not on file    Stress: Not on file   Relationships    Social connections     Talks on phone: Not on file     Gets together: Not on file     Attends [employer] service: Not on file     Active member of club or organization: Not on file     Attends meetings of clubs or organizations: Not on file     Relationship status: Not on file    Intimate partner violence     Fear of current or ex partner: Not on file     Emotionally abused: Not on file     Physically abused: Not on file     Forced sexual activity: Not on file   Other Topics Concern    Not on file   Social History Narrative    Not on file     No current facility-administered medications for this encounter. Current Outpatient Medications   Medication Sig Dispense Refill    albuterol (PROVENTIL) (2.5 MG/3ML) 0.083% nebulizer solution Take 3 mLs by nebulization every 6 hours as needed for Wheezing 360 mL 5    escitalopram (LEXAPRO) 10 MG tablet Take 10 mg by mouth daily      acetaminophen (TYLENOL) 500 MG tablet Take 1 tablet by mouth 4 times daily as needed for Pain 120 tablet 0    lidocaine (LIDODERM) 5 % Place 1 patch onto the skin daily 12 hours on, 12 hours off. 30 patch 0    ipratropium-albuterol (DUONEB) 0.5-2.5 (3) MG/3ML SOLN nebulizer solution Inhale 3 mLs into the lungs every 4 hours as needed for Shortness of Breath (Patient not taking: Reported on 6/17/2020) 360 mL 1    furosemide (LASIX) 40 MG tablet Take 1 tablet by mouth daily 30 tablet 3    spironolactone (ALDACTONE) 25 MG tablet Take 1 tablet by mouth daily 30 tablet 3    albuterol sulfate  (90 Base) MCG/ACT inhaler Inhale 2 puffs into the lungs 4 times daily 1 Inhaler 0    oxybutynin (DITROPAN) 5 MG tablet Take 5 mg by mouth 3 times daily      tiZANidine (ZANAFLEX) 4 MG tablet Take 4 mg by mouth 2 times daily as needed      metoprolol (LOPRESSOR) 100 MG tablet Take 100 mg by mouth 2 times daily      rOPINIRole (REQUIP) 2 MG tablet Take 2 mg by mouth 4 times daily       gabapentin (NEURONTIN) 300 MG capsule Take 300 mg by mouth 4 times daily.  atorvastatin (LIPITOR) 20 MG tablet Take 40 mg by mouth daily       omeprazole (PRILOSEC) 20 MG capsule Take 40 mg by mouth Daily        Allergies   Allergen Reactions    Lisinopril Swelling     Tongue swelling    Codeine      itchy    Cymbalta [Duloxetine Hcl] Other (See Comments)     SI thoughts     Duloxetine      Strong suicidal thoughts    Penicillins Itching     itchy    Sulfa Antibiotics      Unable to urinate       REVIEW OF SYSTEMS  10 systems reviewed, pertinent positives per HPI otherwise noted to be negative.     PHYSICAL EXAM  BP (!) 174/74 Pulse 85   Temp 99.4 °F (37.4 °C)   Resp 18   Ht 5' 5\" (1.651 m)   Wt 260 lb (117.9 kg)   SpO2 98%   BMI 43.27 kg/m²    GENERAL APPEARANCE: Awake and alert. Cooperative. In no acute distress. HENT: Normocephalic. Atraumatic. Mucous membranes are moist  NECK: Supple. Full range of motion of the neck without stiffness or pain. EYES: PERRL. EOM's grossly intact. HEART/CHEST: RRR. No murmurs. LUNGS: Respirations unlabored. Diminished breath sounds bilaterally. Patient is on oxygen and desat when this was turned off. Speaking comfortably in full sentences. ABDOMEN: No tenderness. Soft. Non-distended. No masses. No organomegaly. No guarding or rebound. : Chaperone present, evidence of abscess on the inferior right labia majora. No evidence of Bartholin abscess. MUSCULOSKELETAL: No extremity edema. Compartments soft. No deformity. No tenderness in the extremities. All extremities neurovascularly intact. SKIN: Warm and dry. No acute rashes. Abscess on right labia. Skin of previous surgical site has staples in place, no evidence of drainage or bleeding, site appears to be well-healing. NEUROLOGICAL: Alert and oriented. CN's 2-12 intact. No gross facial drooping. Strength 5/5, sensation intact. PSYCHIATRIC: Normal mood and affect. LABS  I have reviewed all labs for this visit. Results for orders placed or performed during the hospital encounter of 08/29/20   EKG 12 Lead   Result Value Ref Range    Ventricular Rate 83 BPM    Atrial Rate 83 BPM    P-R Interval 142 ms    QRS Duration 94 ms    Q-T Interval 366 ms    QTc Calculation (Bazett) 430 ms    P Axis 46 degrees    R Axis 11 degrees    T Axis 39 degrees    Diagnosis       Normal sinus rhythmNormal ECGNo previous ECGs available       ECG  The Ekg interpreted by me shows  normal sinus rhythm with a rate of 83  Axis is   Normal  QTc is  normal  Intervals and Durations are unremarkable.       ST Segments: normal      RADIOLOGY    CT CHEST PULMONARY EMBOLISM W CONTRAST   Final Result   Addendum 1 of 1   ADDENDUM:   Appearance of the thyroid unchanged since December 2017. No follow-up   necessary for what appear to be stable benign nodules.          Final      XR CHEST (2 VW)    (Results Pending)   VL Extremity Venous Bilateral    (Results Pending)          During the patient's ED course, the patient was given:  Medications   atorvastatin (LIPITOR) tablet 40 mg (has no administration in time range)   pantoprazole (PROTONIX) tablet 40 mg (has no administration in time range)   oxybutynin (DITROPAN) tablet 5 mg (5 mg Oral Given 8/29/20 2201)   tiZANidine (ZANAFLEX) tablet 4 mg (4 mg Oral Given 8/29/20 2201)   metoprolol tartrate (LOPRESSOR) tablet 100 mg (100 mg Oral Given 8/29/20 2201)   rOPINIRole (REQUIP) tablet 2 mg (2 mg Oral Given 8/29/20 2201)   gabapentin (NEURONTIN) capsule 300 mg (300 mg Oral Given 8/29/20 2201)   albuterol sulfate  (90 Base) MCG/ACT inhaler 2 puff (has no administration in time range)   furosemide (LASIX) tablet 40 mg (has no administration in time range)   spironolactone (ALDACTONE) tablet 25 mg (has no administration in time range)   ipratropium-albuterol (DUONEB) nebulizer solution 3 mL (has no administration in time range)   acetaminophen (TYLENOL) tablet 500 mg (has no administration in time range)   lidocaine 4 % external patch 1 patch (has no administration in time range)   escitalopram (LEXAPRO) tablet 10 mg (has no administration in time range)   sodium chloride flush 0.9 % injection 10 mL (10 mLs Intravenous Given 8/29/20 2201)   sodium chloride flush 0.9 % injection 10 mL (has no administration in time range)   acetaminophen (TYLENOL) tablet 650 mg (has no administration in time range)     Or   acetaminophen (TYLENOL) suppository 650 mg (has no administration in time range)   polyethylene glycol (GLYCOLAX) packet 17 g (has no administration in time range)   promethazine (PHENERGAN) tablet 12.5 mg (has no administration in time range)     Or   ondansetron (ZOFRAN) injection 4 mg (has no administration in time range)   enoxaparin (LOVENOX) injection 40 mg (has no administration in time range)   cefTRIAXone (ROCEPHIN) 1 g IVPB in 50 mL D5W minibag (has no administration in time range)     And   azithromycin (ZITHROMAX) 500 mg in D5W 250ml addavial (has no administration in time range)   iopamidol (ISOVUE-370) 76 % injection 85 mL (85 mLs Intravenous Given 8/29/20 1606)   oxyCODONE-acetaminophen (PERCOCET) 5-325 MG per tablet 1 tablet (1 tablet Oral Given 8/29/20 1727)   cefTRIAXone (ROCEPHIN) 1 g IVPB in 50 mL D5W minibag (0 g Intravenous Stopped 8/29/20 1818)     And   azithromycin (ZITHROMAX) 500 mg in D5W 250ml addavial (0 mg Intravenous Stopped 8/29/20 1945)        ED COURSE prolonged immobilization MDM  Patient seen and evaluated. Old records reviewed. Labs and imaging reviewed and results discussed with patient. Overall well appearing patient, in no acute distress, presenting for shortness of breath. History of present illness significant for recent hip surgery, hypoxic at home, new oxygen requirement, productive cough, no chest pain. Physical exam remarkable for decreased breath sounds bilaterally, labial abscess. Differential diagnosis includes but is not limited to: Pneumonia, pneumothorax, pleural effusion, ACS, CHF exacerbation, COPD exacerbation, asthma, pulmonary embolism, arrhythmia, anemia    I had high concern for pulmonary embolism, in the setting of recent hip surgery. CT PE obtained. Also will obtain EKG and laboratory studies to rule out other etiologies. CT PE study did not show evidence of pulmonary embolism. It did however show moderate amount of diffuse groundglass opacity within the lungs, especially the right upper lobe. Given patient's reported history of cough with green sputum, high suspicion for bacterial pneumonia. Patient has a new oxygen requirement.   Patient has leukocytosis. Given pneumonia with new oxygen requirement, believe that patient requires IV antibiotics and further management in the hospital for pneumonia. EKG showed no evidence of ischemia. Troponin was within normal limits. At this time, I have low suspicion for ACS. BNP was elevated, patient has no significant evidence of lower extremity edema. Patient can be continued on her home Lasix. Symptoms not consistent with COPD exacerbation. No wheezing appreciated on exam.  Blood gas showed mild acidemia and mild hypercarbia. Suspect that patient's shortness of breath is related to her pneumonia rather than a COPD exacerbation. Do not feel that she requires steroids at this time. Patient is receiving oxygen via nasal cannula. Low suspicion for aortic pathology given that patient does not have any chest pain. Patient is anemic, not unexpected after recent surgery. She denies any bleeding sources. Should trend for further decrease in hemoglobin. No electrolyte abnormalities or evidence of kidney dysfunction. Liver function testing unremarkable. Patient did have labial abscess on exam that has been present for approximately 1 week. This is not a Bartholin cyst based off its location. Area was assessed with ultrasound and fluid pocket appreciated. Bedside incision and drainage completed. Given the small size of the abscess and adequate incision and drainage, will not start empiric antibiotics. This should continue to be evaluated while the patient is in the hospital for improvement- if abscess appears to be reaccumulating or does not appear to be improving, would recommend antibiotic treatment, likely p.o. Bactrim. Patient discussed with hospital team.  Patient admitted in stable condition. CLINICAL IMPRESSION  1. Pneumonia due to organism    2. Labial abscess        Blood pressure (!) 174/80, pulse 85, temperature 98.7 °F (37.1 °C), temperature source Oral, resp.  rate 18, height 5' 5\" (1.651 m), weight 260 lb 1.6 oz (118 kg), SpO2 100 %, not currently breastfeeding. DISPOSITION  Cathie Delgado was admitted in stable condition. Patient was given scripts for the following medications. I counseled patient how to take these medications. New Prescriptions    No medications on file       Follow-up with:  No follow-up provider specified. DISCLAIMER: This chart was created using Dragon dictation software. Efforts were made by me to ensure accuracy, however some errors may be present due to limitations of this technology and occasionally words are not transcribed correctly.             Mona Guevara MD  08/30/20 3280

## 2020-08-29 NOTE — PLAN OF CARE
60/ F, recent hip surgery   here with cough, SOB    CT chest , no PE, + PNA   - needing supplemental O2 .   admit, o2, IV bax .     R/O COVID .     + leg edema, check doppler    Also S/P I and D of labial abscess in ED

## 2020-08-29 NOTE — PROCEDURES
PROCEDURE:  INCISION & DRAINAGE  Fabby Fearing or their surrogate had an opportunity to ask questions, and the risks, benefits, and alternatives were discussed. Patient verbally consented. US was used to confirm pocket appropriate for I&D. The abscess was prepped and draped to maintain a sterile field. A local anesthetic was used to completely anesthetize the abscess. An incision was made to keep the abscess open so it will continue to drain. It was copiously irrigated with its loculations broken down. There were no complications during the procedure.

## 2020-08-30 LAB
BASOPHILS ABSOLUTE: 0.1 K/UL (ref 0–0.2)
BASOPHILS RELATIVE PERCENT: 0.7 %
EOSINOPHILS ABSOLUTE: 0.3 K/UL (ref 0–0.6)
EOSINOPHILS RELATIVE PERCENT: 2.2 %
HCT VFR BLD CALC: 31.5 % (ref 36–48)
HEMOGLOBIN: 10.2 G/DL (ref 12–16)
LYMPHOCYTES ABSOLUTE: 1.5 K/UL (ref 1–5.1)
LYMPHOCYTES RELATIVE PERCENT: 12 %
MCH RBC QN AUTO: 30.3 PG (ref 26–34)
MCHC RBC AUTO-ENTMCNC: 32.3 G/DL (ref 31–36)
MCV RBC AUTO: 93.8 FL (ref 80–100)
MONOCYTES ABSOLUTE: 0.7 K/UL (ref 0–1.3)
MONOCYTES RELATIVE PERCENT: 5.6 %
NEUTROPHILS ABSOLUTE: 10 K/UL (ref 1.7–7.7)
NEUTROPHILS RELATIVE PERCENT: 79.5 %
PDW BLD-RTO: 15.4 % (ref 12.4–15.4)
PLATELET # BLD: 262 K/UL (ref 135–450)
PMV BLD AUTO: 7.9 FL (ref 5–10.5)
PROCALCITONIN: 0.17 NG/ML (ref 0–0.15)
RBC # BLD: 3.36 M/UL (ref 4–5.2)
WBC # BLD: 12.5 K/UL (ref 4–11)

## 2020-08-30 PROCEDURE — 99223 1ST HOSP IP/OBS HIGH 75: CPT | Performed by: INTERNAL MEDICINE

## 2020-08-30 PROCEDURE — 36415 COLL VENOUS BLD VENIPUNCTURE: CPT

## 2020-08-30 PROCEDURE — 1200000000 HC SEMI PRIVATE

## 2020-08-30 PROCEDURE — 94640 AIRWAY INHALATION TREATMENT: CPT

## 2020-08-30 PROCEDURE — 2580000003 HC RX 258

## 2020-08-30 PROCEDURE — 94761 N-INVAS EAR/PLS OXIMETRY MLT: CPT

## 2020-08-30 PROCEDURE — 2700000000 HC OXYGEN THERAPY PER DAY

## 2020-08-30 PROCEDURE — 87449 NOS EACH ORGANISM AG IA: CPT

## 2020-08-30 PROCEDURE — 6370000000 HC RX 637 (ALT 250 FOR IP): Performed by: INTERNAL MEDICINE

## 2020-08-30 PROCEDURE — 6360000002 HC RX W HCPCS: Performed by: INTERNAL MEDICINE

## 2020-08-30 PROCEDURE — 84145 PROCALCITONIN (PCT): CPT

## 2020-08-30 PROCEDURE — 2580000003 HC RX 258: Performed by: INTERNAL MEDICINE

## 2020-08-30 PROCEDURE — 85025 COMPLETE CBC W/AUTO DIFF WBC: CPT

## 2020-08-30 RX ORDER — SODIUM CHLORIDE 9 MG/ML
INJECTION, SOLUTION INTRAVENOUS
Status: COMPLETED
Start: 2020-08-30 | End: 2020-08-30

## 2020-08-30 RX ORDER — GABAPENTIN 300 MG/1
300 CAPSULE ORAL 4 TIMES DAILY
Status: DISCONTINUED | OUTPATIENT
Start: 2020-08-31 | End: 2020-09-01 | Stop reason: HOSPADM

## 2020-08-30 RX ORDER — ROPINIROLE 1 MG/1
2 TABLET, FILM COATED ORAL NIGHTLY
Status: DISCONTINUED | OUTPATIENT
Start: 2020-08-30 | End: 2020-09-01 | Stop reason: HOSPADM

## 2020-08-30 RX ORDER — ALBUTEROL SULFATE 90 UG/1
2 AEROSOL, METERED RESPIRATORY (INHALATION) 2 TIMES DAILY
Status: DISCONTINUED | OUTPATIENT
Start: 2020-08-30 | End: 2020-09-01 | Stop reason: HOSPADM

## 2020-08-30 RX ADMIN — SPIRONOLACTONE 25 MG: 25 TABLET ORAL at 09:43

## 2020-08-30 RX ADMIN — Medication 10 ML: at 17:30

## 2020-08-30 RX ADMIN — METOPROLOL TARTRATE 100 MG: 50 TABLET, FILM COATED ORAL at 20:33

## 2020-08-30 RX ADMIN — Medication 5 ML: at 12:10

## 2020-08-30 RX ADMIN — SODIUM CHLORIDE 250 ML: 9 INJECTION, SOLUTION INTRAVENOUS at 17:29

## 2020-08-30 RX ADMIN — ENOXAPARIN SODIUM 40 MG: 40 INJECTION SUBCUTANEOUS at 09:42

## 2020-08-30 RX ADMIN — CEFTRIAXONE SODIUM 1 G: 1 INJECTION, POWDER, FOR SOLUTION INTRAMUSCULAR; INTRAVENOUS at 17:29

## 2020-08-30 RX ADMIN — Medication 10 ML: at 09:43

## 2020-08-30 RX ADMIN — Medication 2 PUFF: at 12:17

## 2020-08-30 RX ADMIN — Medication 5 ML: at 18:09

## 2020-08-30 RX ADMIN — ROPINIROLE HYDROCHLORIDE 2 MG: 1 TABLET, FILM COATED ORAL at 20:33

## 2020-08-30 RX ADMIN — Medication 10 ML: at 20:33

## 2020-08-30 RX ADMIN — DEXTROSE MONOHYDRATE 500 MG: 50 INJECTION, SOLUTION INTRAVENOUS at 18:09

## 2020-08-30 RX ADMIN — ESCITALOPRAM OXALATE 10 MG: 10 TABLET ORAL at 09:43

## 2020-08-30 RX ADMIN — METOPROLOL TARTRATE 100 MG: 50 TABLET, FILM COATED ORAL at 09:43

## 2020-08-30 RX ADMIN — FUROSEMIDE 40 MG: 40 TABLET ORAL at 09:43

## 2020-08-30 RX ADMIN — Medication 2 PUFF: at 20:07

## 2020-08-30 RX ADMIN — ATORVASTATIN CALCIUM 40 MG: 40 TABLET, FILM COATED ORAL at 09:43

## 2020-08-30 RX ADMIN — Medication 2 PUFF: at 07:49

## 2020-08-30 RX ADMIN — PANTOPRAZOLE SODIUM 40 MG: 40 TABLET, DELAYED RELEASE ORAL at 05:38

## 2020-08-30 ASSESSMENT — PAIN DESCRIPTION - ORIENTATION: ORIENTATION: RIGHT

## 2020-08-30 ASSESSMENT — PAIN SCALES - GENERAL
PAINLEVEL_OUTOF10: 0
PAINLEVEL_OUTOF10: 8

## 2020-08-30 ASSESSMENT — PAIN DESCRIPTION - LOCATION: LOCATION: HIP

## 2020-08-30 ASSESSMENT — PAIN DESCRIPTION - PAIN TYPE: TYPE: ACUTE PAIN

## 2020-08-30 NOTE — PROGRESS NOTES
Pt has had no acute changes noted this shift. Pt has had hydrdan po x2 this shift for cough/cold. Will continue to monitor until the oncomin nurse assumes care.

## 2020-08-30 NOTE — PROGRESS NOTES
Pt resting in bed. Awake alert and oriented. Pt noted to have staples to the right hip with no s/s of infection noted. Will continue to monitor.

## 2020-08-30 NOTE — CARE COORDINATION
Case Management Assessment  Initial Evaluation      Patient Name: Farzana Ann  YOB: 1960  Diagnosis: Pneumonia [J18.9]  Date / Time: 8/29/2020  1:45 PM    Admission status/Date:8/29/2020 inpt  Chart Reviewed: Yes      Patient Interviewed: Yes   Family Interviewed:  No      Hospitalization in the last 30 days:  No    Health Care Decision Maker: son    If no Decision Maker listed above or available through scanned documents, then:  If no Authorized Decision Maker has previously been identified, then patient chooses [place]:  \"Who would you like to name as your primary health care decision-maker? \"               Name: Cathy Ambrosio        Relationship: son          Phone number: [phone]  [place] this person be reached easily? \" Yes  \"Who would you like to name as your back-up decision maker? \"   Name: Carol Daniels        Relationship: brother          Phone number: [phone]  [place] this person be reached easily? \" Yes    Met with: pt  Interview conducted  (bedside/phone):telephone    Current PCP:   [address] Medicare  Precert required for SNF : Y, N          3 night stay required - Y, N    ADLS  Support Systems/Care Needs: Family Members  Transportation: self    Meal Preparation: self    Housing  Living Arrangements: lives in 1st floor apt  Steps: [address] for return to present living arrangements: Yes  Identified Issues:     [address] with Home Health Care : No Agency:(Services)  Type of Home Care Services: None  Passport/Waiver : No  :                      Phone Number:    Passport/Waiver Services:           Durable Medical Equiptment   DME Provider: Anne Fischer  Equipment:   Walker_X__Cane___RTS___ BSC___Shower Chair_X__Hospital Bed___W/C____Other________  02 at ____Liter(s)---wears(frequency)_has O2 does notuse______ HHN _X__ CPAP___ BiPap___   N/A____      Home O2 Use :  Yes-does not use it  If No for home O2---if presently on O2 during hospitalization:  Yes  if yes CM to follow for potential DC O2 need      Community Service Affiliation  Dialysis:  No    · Agency:  · Location:  · Dialysis Schedule:  · Phone:   · Fax: Other Community Services:none (ex:PT/OT,Mental Health,Wound Clinic, Cardio/Pul [address])    DISCHARGE PLAN: Explained Case Management role/services. Chart reviewed, spoke with pt via telephone. Pt states she lives with her SO in 1st floor apt and plans to return. Pt states she has [employer] for home O2 however she does not use it. COVID from 8/29 pending.

## 2020-08-30 NOTE — PROGRESS NOTES
4 Eyes Skin Assessment     The patient is being assess for   Admission    I agree that 2 RN's have performed a thorough Head to Toe Skin Assessment on the patient. ALL assessment sites listed below have been assessed. Areas assessed by both nurses:   [x]   Head, Face, and Ears   [x]   Shoulders, Back, and Chest, Abdomen  [x]   Arms, Elbows, and Hands   [x]   Coccyx, Sacrum, and Ischium  [x]   Legs, Feet, and Heels        Large bruises on Right leg, ankle, scattered bruising on arms, and stomach. Right hip replacement done the 18th staples still in place. Scattered abrasions. Blister on vaginal area drained in the E.R.     **SHARE this note so that the co-signing nurse is able to place an eSignature**    Co-signer eSignature: Electronically signed by Holly Forde RN on 8/29/20 at 11:27 PM EDT    Does the Patient have Skin Breakdown? No          Alpesh Prevention initiated:  No   Wound Care Orders initiated:  No      [facility] nurse consulted for Pressure Injury (Stage 3,4, Unstageable, DTI, NWPT, Complex wounds)and New or Established Ostomies:  No    Patient is able to demonstrated the ability to move from a reclining position to an upright position within the recliner.     Primary Nurse eSignature: Electronically signed by Sera Mckenzie RN on 8/29/20 at 9:24 PM EDT

## 2020-08-30 NOTE — H&P
Admission Uli De Leon;  MRN: [id number] ; Admit Date: 8/29/2020  1:45 PM   Current Date and Time: 8/30/2020 8:07 AM    PCP  --  Ollie Dia         Chief Complaint   Patient presents with    Shortness of Breath     past two days ago; 87% on room air per EMS; has PRN O2 at home. duo neb given in route; Hip replacement on 8/18/20         HPI:  Patient is a 64 y.o.  female  With known h.o  Tobacco use, polysubstance use , depression, RLS  presents with increasing sob and cough x 2 days . Pt is s.p right hip replacement 12 days ago and reports she has been doing fairly well with recovery. Right leg is some edematous but not painful. Has been doing PT at home    Pt reports she was ASA for [facility] and was also placed on prednisone for last 10 days for copd       No fevers or chills.  No exposure to covid other than in hospital   Workup showed sangita pna and was admitted     Allergies   Allergen Reactions    Lisinopril Swelling     Tongue swelling    Codeine      itchy    Cymbalta [Duloxetine Hcl] Other (See Comments)     SI thoughts     Duloxetine      Strong suicidal thoughts    Penicillins Itching     itchy    Sulfa Antibiotics      Unable to urinate       Past Medical History:   Diagnosis Date    Class 2 obesity with serious comorbidity and body mass index (BMI) of 39.0 to 39.9 in adult 9/14/2018    Depression     History of left hip replacement 02/12/2018    Hyperlipidemia     Hypertension     Kidney stone     MRSA (methicillin resistant staph aureus) culture positive 07/27/2019    nasal colonization      Past Surgical History:   Procedure Laterality Date    BRONCHOSCOPY  08/08/2019    BRONCHOSCOPY N/A 8/8/2019    BRONCHOSCOPY ALVEOLAR LAVAGE performed by Caroline Ellsworth MD at [place] [address]      FOOT SURGERY      HYSTERECTOMY      JOINT REPLACEMENT      KNEE SURGERY        Medications Prior to Admission: oxyCODONE-acetaminophen (PERCOCET) 5-325 MG per tablet, Take 1 tablet by mouth every 4 hours as needed for Pain. predniSONE (DELTASONE) 10 MG tablet, Take 10 mg by mouth daily  albuterol (PROVENTIL) (2.5 MG/3ML) 0.083% nebulizer solution, Take 3 mLs by nebulization every 6 hours as needed for Wheezing  escitalopram (LEXAPRO) 10 MG tablet, Take 10 mg by mouth daily  acetaminophen (TYLENOL) 500 MG tablet, Take 1 tablet by mouth 4 times daily as needed for Pain  ipratropium-albuterol (DUONEB) 0.5-2.5 (3) MG/3ML SOLN nebulizer solution, Inhale 3 mLs into the lungs every 4 hours as needed for Shortness of Breath  furosemide (LASIX) 40 MG tablet, Take 1 tablet by mouth daily  spironolactone (ALDACTONE) 25 MG tablet, Take 1 tablet by mouth daily  albuterol sulfate  (90 Base) MCG/ACT inhaler, Inhale 2 puffs into the lungs 4 times daily  oxybutynin (DITROPAN) 5 MG tablet, Take 5 mg by mouth 3 times daily  tiZANidine (ZANAFLEX) 4 MG tablet, Take 4 mg by mouth 2 times daily as needed  metoprolol (LOPRESSOR) 100 MG tablet, Take 100 mg by mouth 2 times daily  rOPINIRole (REQUIP) 2 MG tablet, Take 2 mg by mouth 4 times daily   gabapentin (NEURONTIN) 300 MG capsule, Take 300 mg by mouth 4 times daily.    atorvastatin (LIPITOR) 20 MG tablet, Take 40 mg by mouth daily   omeprazole (PRILOSEC) 20 MG capsule, Take 40 mg by mouth Daily   Allergies   Allergen Reactions    Lisinopril Swelling     Tongue swelling    Codeine      itchy    Cymbalta [Duloxetine Hcl] Other (See Comments)     SI thoughts     Duloxetine      Strong suicidal thoughts    Penicillins Itching     itchy    Sulfa Antibiotics      Unable to urinate      Social History     Tobacco Use    Smoking status: Former Smoker     Packs/day: 0.00     Years: 35.00     Pack years: 0.00     Types: Cigarettes     Last attempt to quit: 2019     Years since quittin.1    Smokeless tobacco: Never Used    Tobacco comment:  pk   Substance Use Topics    Alcohol use: Yes     Comment: occasionally      History reviewed. No pertinent family history. Review of systems      Constitutional: Negative for fever or chills  HENT: Negative for sore throat   Eyes: Negative for redness   Respiratory: +ve   for dyspnea, cough   Cardiovascular: Negative for chest pain   Gastrointestinal:neg for abd pain, nausea or vomiting or diarrhea  No melena or BRPR  Genitourinary: Negative for hematuria   Musculoskeletal: + ve right hip pain    Skin: Negative for rash + edema   Neurological: Negative for syncope   Hematological: Negative for adenopathy   Psychiatric/Behavorial: Negative for anxiety      Objective:     VS: Temp: 99.4 °F (37.4 °C)  Pulse: 85  BP: (!) 174/74  SpO2: 98 %  O2 Flow Rate (L/min): 2 L/min        Physical Exam:  General: middle aged obese female,  Awake, alert and oriented. Appears to be not in any distress  Mucous Membranes:  Pink , anicteric  Neck: No JVD, no carotid bruit, no thyromegaly  Chest:  Diminished sangita with scattered wheeze and basilar crackles   Cardiovascular:  RRR S1S2 heard, no murmurs or gallops  Abdomen:  Soft, undistended, non tender, no organomegaly, BS present  Extremities: right hip surgical staples intact, large ecchymoses along right lower leg to foot noted. 1 + right LE edema  No edema or cyanosis on left side .  Distal pulses well felt  Neurological : no focal deficits        LABS:  CBC:  Lab Results   Component Value Date    WBC 12.5 08/30/2020    HGB 10.2 08/30/2020    HCT 31.5 08/30/2020    MCV 93.8 08/30/2020     08/30/2020    NEUTOPHILPCT 79.5 08/30/2020    LYMPHOPCT 12.0 08/30/2020    MONOPCT 5.6 08/30/2020    BASOPCT 0.7 08/30/2020    NEUTROABS 10.0 08/30/2020    LYMPHSABS 1.5 08/30/2020    MONOSABS 0.7 08/30/2020    EOSABS 0.3 08/30/2020    BASOSABS 0.1 08/30/2020     BMP:   Lab Results   Component Value Date     08/29/2020    K 4.2 08/29/2020    K 4.1 08/08/2019    CO2 28 08/29/2020    BUN 11 08/29/2020    CREATININE 0.8 08/29/2020    CALCIUM 9.0 08/29/2020     Coagulation:   Lab Results   Component Value Date    INR 1.04 08/06/2019     Cardiac markers:   Lab Results   Component Value Date    CKTOTAL 90 10/02/2019    TROPONINI <0.01 08/29/2020     ABGs: No results found for: POCPH, POCPCO2, POCPO2, POCHCO3, POCTCO2, POCFIO2    Lab Results   Component Value Date    ALT 13 08/29/2020    AST 14 (L) 08/29/2020    ALKPHOS 106 08/29/2020    BILITOT 0.9 08/29/2020       Lab Results   Component Value Date    INR 1.04 08/06/2019    PROTIME 11.8 08/06/2019         EKG: NSR     Radiology:    Ct chest   No evidence of pulmonary embolism.  Moderate amount of diffuse ground-glass    opacity within the lungs especially the right upper lobe.  Findings most    compatible with multilobar bacterial pneumonia.  Imaging features much less    likely seen with COVID-19 pneumonia, though they are nonspecific and can    occur with a variety of infectious and noninfectious processes. PneInd.  Some    of the lingular ground-glass opacity was present on 06/17/2020. ASSESMENT & PLAN:     1. Acute hypoxemic resp failure and Pneumonia  - pnuemonia likely aspirational - risk for gram neg as well as MRSA  - add doxy to rocephin and azithromycin  - hx of substance inhalation in the past and now denies any use  - check covid, imaging is less likely in appearance  - check sputum and blood cx  - decrease sedative meds - requip, zanaflex  - oxygen supplementation , IBD     2. Copd with acute exacerbation - as above, o2 , IBD, steroids  3. Hx of diastolic CHF - resume lasix and aldactone, well compensated   4. HTN - resume metoprolol   5. S.p recent right hip replacement - venous doppler to rule out Dvt.  CTA neg for PE though  Star tPT after covid ruled out       Diet:regular   GI/DVT PX: Bettey Dress  CODE STATUS: full     Jane Lopez MD 8/30/2020 8:07 AM

## 2020-08-30 NOTE — PROGRESS NOTES
RESPIRATORY THERAPY ASSESSMENT    Name:  Sidney Lubin  Medical Record Number:  [id number]  Age: 64 y.o. Gender: female  : 1960  Today's Date:  2020  Room:  0235/0235-02    Assessment     Is the patient being admitted for a COPD or Asthma exacerbation? No   (If yes the patient will be seen every 4 hours for the first 24 hours and then reassessed)    Patient Admission Diagnosis      Allergies  Allergies   Allergen Reactions    Lisinopril Swelling     Tongue swelling    Codeine      itchy    Cymbalta [Duloxetine Hcl] Other (See Comments)     SI thoughts     Duloxetine      Strong suicidal thoughts    Penicillins Itching     itchy    Sulfa Antibiotics      Unable to urinate       Minimum Predicted Vital Capacity:               Actual Vital Capacity:                    Pulmonary History:COPD and Asthma  Home Oxygen Therapy:  2 liters/min via nasal cannula  Home Respiratory Therapy:Albuterol and Budesonide/Formoterol    Current Respiratory Therapy:  Albuterol Q4wa and symbicort BID   Treatment Type: HHN  Medications: Albuterol    Respiratory Severity Index(RSI)   Patients with orders for inhalation medications, oxygen, or any therapeutic treatment modality will be placed on Respiratory Protocol. They will be assessed with the first treatment and at least every 72 hours thereafter. The following severity scale will be used to determine frequency of treatment intervention.     Smoking History: Pulmonary Disease or Smoking History, Greater than 15 pack year = 2    Social History  Social History     Tobacco Use    Smoking status: Former Smoker     Packs/day: 0.00     Years: 35.00     Pack years: 0.00     Types: Cigarettes     Last attempt to quit: 2019     Years since quittin.1    Smokeless tobacco: Never Used    Tobacco comment:  pk   Substance Use Topics    Alcohol use: Yes     Comment: occasionally    Drug use: Not Currently     Types: Marijuana, Methamphetamines     Comment: scheduled or as needed treatment for 72  Hrs. Patients Ordered on a Mucolytic Agent:    1. Must always be administered with a bronchodilator. 2. Discontinue if patient experiences worsened bronchospasm, or secretions have lessened to the point that the patient is able to clear them with a cough. Anti-inflammatory and Combination Medications:    1. If the patient lacks prior history of lung disease, is not using inhaled anti-inflammatory medication at home, and lacks wheezing by examination or by history for at least 24 hours, contact physician for possible discontinuation.

## 2020-08-30 NOTE — PROGRESS NOTES
RESPIRATORY THERAPY ASSESSMENT    Name:  Luan Hayes  Medical Record Number:  [id number]  Age: 64 y.o. Gender: female  : 1960  Today's Date:  2020  Room:  0235/0235-02    Assessment     Is the patient being admitted for a COPD or Asthma exacerbation? No   (If yes the patient will be seen every 4 hours for the first 24 hours and then reassessed)    Patient Admission Diagnosis      Allergies  Allergies   Allergen Reactions    Lisinopril Swelling     Tongue swelling    Codeine      itchy    Cymbalta [Duloxetine Hcl] Other (See Comments)     SI thoughts     Duloxetine      Strong suicidal thoughts    Penicillins Itching     itchy    Sulfa Antibiotics      Unable to urinate       Minimum Predicted Vital Capacity:               Actual Vital Capacity:                    Pulmonary History:former smoker  Home Oxygen Therapy:  2L  Home Respiratory Therapy:albuterol 4 times daily/duoneb prn   Current Respiratory Therapy:  Albuterol 4 times daily          Respiratory Severity Index(RSI)   Patients with orders for inhalation medications, oxygen, or any therapeutic treatment modality will be placed on Respiratory Protocol. They will be assessed with the first treatment and at least every 72 hours thereafter. The following severity scale will be used to determine frequency of treatment intervention.     Smoking History: Pulmonary Disease or Smoking History, Greater than 15 pack year = 2    Social History  Social History     Tobacco Use    Smoking status: Former Smoker     Packs/day: 0.00     Years: 35.00     Pack years: 0.00     Types: Cigarettes     Last attempt to quit: 2019     Years since quittin.1    Smokeless tobacco: Never Used    Tobacco comment:  pk   Substance Use Topics    Alcohol use: Yes     Comment: occasionally    Drug use: Not Currently     Types: Marijuana, Methamphetamines     Comment: occassionally meth and percocet in past, snort percocet, snorting and smoking meth contacted for respiratory rate (RR) greater than 35 breaths per minute. Therapy will be held for heart rate (HR) greater than 140 beats per minute, pending direction from physician. 3. Bronchodilators will be administered via Metered Dose Inhaler (MDI) with spacer when the following criteria are met:  a. Alert and cooperative     b. HR < 140 bpm  c. RR < 30 bpm                d. Can demonstrate a 2-3 second inspiratory hold  4. Bronchodilators will be administered via Hand Held Nebulizer ZAYRA [facility]) to patients when ANY of the following criteria are met  a. Incognizant or uncooperative          b. Patients treated with HHN at Home        c. Unable to demonstrate proper use of MDI with spacer     d. RR > 30 bpm   5. Bronchodilators will be delivered via Metered Dose Inhaler (MDI), HHN, Aerogen to intubated patients on mechanical ventilation. 6. Inhalation medication orders will be delivered and/or substituted as outlined below. Aerosolized Medications Ordering and Administration Guidelines:    1. All Medications will be ordered by a physician, and their frequency and/or modality will be adjusted as defined by the patients Respiratory Severity Index (RSI) score. 2. If the patient does not have documented COPD, consider discontinuing anticholinergics when RSI is less than 9.  3. If the bronchospasm worsens (increased RSI), then the bronchodilator frequency can be increased to a maximum of every 4 hours. If greater than every 4 hours is required, the physician will be contacted. 4. If the bronchospasm improves, the frequency of the bronchodilator can be decreased, based on the patient's RSI, but not less than home treatment regimen frequency. 5. Bronchodilator(s) will be discontinued if patient has a RSI less than 9 and has received no scheduled or as needed treatment for 72  Hrs. Patients Ordered on a Mucolytic Agent:    1. Must always be administered with a bronchodilator.     2. Discontinue if patient experiences worsened bronchospasm, or secretions have lessened to the point that the patient is able to clear them with a cough. Anti-inflammatory and Combination Medications:    1. If the patient lacks prior history of lung disease, is not using inhaled anti-inflammatory medication at home, and lacks wheezing by examination or by history for at least 24 hours, contact physician for possible discontinuation.

## 2020-08-31 LAB
L. PNEUMOPHILA SEROGP 1 UR AG: NORMAL
STREP PNEUMONIAE ANTIGEN, URINE: NORMAL

## 2020-08-31 PROCEDURE — 6370000000 HC RX 637 (ALT 250 FOR IP): Performed by: INTERNAL MEDICINE

## 2020-08-31 PROCEDURE — 94761 N-INVAS EAR/PLS OXIMETRY MLT: CPT

## 2020-08-31 PROCEDURE — 1200000000 HC SEMI PRIVATE

## 2020-08-31 PROCEDURE — 2700000000 HC OXYGEN THERAPY PER DAY

## 2020-08-31 PROCEDURE — 94640 AIRWAY INHALATION TREATMENT: CPT

## 2020-08-31 PROCEDURE — 6360000002 HC RX W HCPCS: Performed by: INTERNAL MEDICINE

## 2020-08-31 PROCEDURE — 92610 EVALUATE SWALLOWING FUNCTION: CPT

## 2020-08-31 PROCEDURE — 2580000003 HC RX 258: Performed by: INTERNAL MEDICINE

## 2020-08-31 RX ORDER — OXYCODONE AND ACETAMINOPHEN 10; 325 MG/1; MG/1
1 TABLET ORAL EVERY 4 HOURS PRN
Status: DISCONTINUED | OUTPATIENT
Start: 2020-08-31 | End: 2020-09-01 | Stop reason: HOSPADM

## 2020-08-31 RX ADMIN — Medication 2 PUFF: at 07:13

## 2020-08-31 RX ADMIN — METOPROLOL TARTRATE 100 MG: 50 TABLET, FILM COATED ORAL at 21:15

## 2020-08-31 RX ADMIN — ATORVASTATIN CALCIUM 40 MG: 40 TABLET, FILM COATED ORAL at 09:27

## 2020-08-31 RX ADMIN — ROPINIROLE HYDROCHLORIDE 2 MG: 1 TABLET, FILM COATED ORAL at 21:15

## 2020-08-31 RX ADMIN — DEXTROSE MONOHYDRATE 500 MG: 50 INJECTION, SOLUTION INTRAVENOUS at 21:15

## 2020-08-31 RX ADMIN — CEFTRIAXONE SODIUM 1 G: 1 INJECTION, POWDER, FOR SOLUTION INTRAMUSCULAR; INTRAVENOUS at 17:11

## 2020-08-31 RX ADMIN — GABAPENTIN 300 MG: 300 CAPSULE ORAL at 09:28

## 2020-08-31 RX ADMIN — SPIRONOLACTONE 25 MG: 25 TABLET ORAL at 09:27

## 2020-08-31 RX ADMIN — Medication 10 ML: at 09:27

## 2020-08-31 RX ADMIN — ESCITALOPRAM OXALATE 10 MG: 10 TABLET ORAL at 09:27

## 2020-08-31 RX ADMIN — METOPROLOL TARTRATE 100 MG: 50 TABLET, FILM COATED ORAL at 09:27

## 2020-08-31 RX ADMIN — Medication 2 PUFF: at 20:35

## 2020-08-31 RX ADMIN — PANTOPRAZOLE SODIUM 40 MG: 40 TABLET, DELAYED RELEASE ORAL at 09:27

## 2020-08-31 RX ADMIN — Medication 10 ML: at 21:17

## 2020-08-31 RX ADMIN — Medication 5 ML: at 03:41

## 2020-08-31 RX ADMIN — GABAPENTIN 300 MG: 300 CAPSULE ORAL at 21:15

## 2020-08-31 RX ADMIN — OXYCODONE HYDROCHLORIDE AND ACETAMINOPHEN 1 TABLET: 10; 325 TABLET ORAL at 12:53

## 2020-08-31 RX ADMIN — OXYCODONE HYDROCHLORIDE AND ACETAMINOPHEN 1 TABLET: 10; 325 TABLET ORAL at 21:15

## 2020-08-31 RX ADMIN — FUROSEMIDE 40 MG: 40 TABLET ORAL at 09:27

## 2020-08-31 RX ADMIN — GABAPENTIN 300 MG: 300 CAPSULE ORAL at 17:11

## 2020-08-31 RX ADMIN — ENOXAPARIN SODIUM 40 MG: 40 INJECTION SUBCUTANEOUS at 09:28

## 2020-08-31 RX ADMIN — GABAPENTIN 300 MG: 300 CAPSULE ORAL at 12:53

## 2020-08-31 ASSESSMENT — PAIN SCALES - GENERAL
PAINLEVEL_OUTOF10: 10
PAINLEVEL_OUTOF10: 0
PAINLEVEL_OUTOF10: 10

## 2020-08-31 NOTE — PROGRESS NOTES
Patient in bed with eyes open no concerns at this time. Medications given without issues. All safety maintained and Call light within reach.

## 2020-08-31 NOTE — CARE COORDINATION
INTERDISCIPLINARY PLAN OF CARE CONFERENCE    Date/Time: 8/31/2020 2:47 PM  Completed by: [place], Case Management      Patient Name:  Renata Rodriguez  YOB: 1960  Admitting Diagnosis: Pneumonia [J18.9]     Admit Date/Time:  8/29/2020  1:45 PM    Chart reviewed. Interdisciplinary team contacted or reviewed plan related to patient progress and discharge plans. Disciplines included Case Management, Nursing, and Dietitian. Current Status: Stable  PT/OT recommendation for discharge plan of care: N/A    Expected D/C Disposition:  Home  Confirmed plan with patient Yes     Discharge Plan Comments: Reviewed chart and spoke with pt via phone D/T C-19. Plan remains for home at [place]. States has home O2. Staets is ambulating in room without difficulty. Will cont to follow for poss [facility] needs. Home O2 in place on admit: Yes  Pt informed of need to bring portable home O2 tank on day of discharge for nursing to connect prior to leaving:  Yes  Verbalized agreement/Understanding:   Yes

## 2020-08-31 NOTE — PROGRESS NOTES
Speech Language Pathology  Facility/Department: [address] MEDICAL-SURGICAL   CLINICAL BEDSIDE SWALLOW EVALUATION      Recommended Diet and Intervention  Diet Solids Recommendation: Regular  Liquid Consistency Recommendation: Thin  Recommended Form of Meds: b  Continued follow-up recs: ENT;GI (ongoing reflux, hoarseness; seen by both in the past per pt)      NAME: Luan Hayes  : 1960  MRN: [id number]    ADMISSION DATE: 2020  ADMITTING DIAGNOSIS: has Hypertension; Hyperlipidemia; Depression; Class 2 obesity due to excess calories with body mass index (BMI) of 39.0 to 39.9 in adult; Acute pulmonary edema ([address] Acute congestive heart failure ([address] Iron deficiency anemia; Acute respiratory failure with hypoxia ([address] COPD exacerbation ([address] Respiratory alkalosis; Lactic acidosis; Nausea & vomiting; Hyperventilation; Hypokalemia; Hypoxia; Acute hypoxemic respiratory failure ([address] Abnormal chest CT; Chronic diastolic congestive heart failure ([address] Abnormal CT of the chest; COPD with acute exacerbation ([address] Tobacco abuse; Exercise hypoxemia; Pneumonia due to organism; Status post total replacement of right hip; and CHF (congestive heart failure), NYHA class I, chronic, systolic (HCC) on their problem list.  ONSET DATE: Pt admitted to [facility] on 20    Recent CT of Chest (20): Impression    No evidence of pulmonary embolism.  Moderate amount of diffuse ground-glass    opacity within the lungs especially the right upper lobe.  Findings most    compatible with multilobar bacterial pneumonia.  Imaging features much less    likely seen with COVID-19 pneumonia, though they are nonspecific and can    occur with a variety of infectious and noninfectious processes. PneInd.  Some    of the lingular ground-glass opacity was present on 2020.         Date of Eval: 2020  Evaluating Therapist: Marsha Rios    Current Diet level:  Current Diet : Regular  Current Liquid Diet : Yes  Duration/Frequency of Treatment: 1-2x/week for LOS  D/C Recommendations: Home independently  Referral To: ENT;GI    Recommended Diet and Intervention  Diet Solids Recommendation: Regular  Liquid Consistency Recommendation: Thin  Recommended Form of Meds: PO  Recommendations: Dysphagia treatment  Therapeutic Interventions: Diet tolerance monitoring;Patient/Family education    Compensatory Swallowing Strategies  Compensatory Swallowing Strategies: Alternate solids and liquids;Eat/Feed slowly;Upright as possible for all oral intake;Remain upright for 30-45 minutes after meals;Small bites/sips    Treatment/Goals  Short-term Goals  Timeframe for Short-term Goals: 2 days (9/2/20)  Long-term Goals  Timeframe for Long-term Goals: 3 days (9/3/20)  Goal 1: The pt will tolerate safest and least restrictive diet without s/s of aspiration. Dysphagia Goals: The patient will tolerate recommended diet without observed clinical signs of aspiration; The patient/caregiver will demonstrate understanding of compensatory strategies for improved swallowing safety. General  Chart Reviewed: Yes  Comments: Pt admitted d/t PNA (multilobar per CT of the Chest). Pt has hx of ILD, reflux, hoarseness, and esophageal dilations per chart. Behavior/Cognition: Alert; Cooperative;Pleasant mood  Respiratory Status: O2 via nasual cannula  O2 Device: Nasal cannula  Liters of Oxygen: 1 L  Communication Observation: Functional  Follows Directions: Simple  Dentition: Dentures top(pt reported plate present in lower jaw)  Patient Positioning: Upright in bed  Baseline Vocal Quality: Hoarse;Dysphonic  Volitional Cough: Strong  Prior Dysphagia History: Pt seen by  in August 2017 with recommendations for a Regular solid diet with thin liquids and GI and ENT f/u. Consistencies Administered: Reg solid; Dysphagia Pureed (Dysphagia I); Thin - cup; Thin - straw           Vision/Hearing  Vision  Vision: Impaired  Vision Exceptions: Wears glasses at all times  Hearing  Hearing: Within functional limits    Oral Motor Deficits  Oral/Motor  Oral Motor: Within functional limits    Oral Phase Dysfunction  Oral phase of swallow grossly appears WNL. No oral phase deficits noted during evaluation. Indicators of Pharyngeal Phase Dysfunction  Pharyngeal phase of swallow appears grossly WNL. No pharyngeal deficits noted during evaluation. Laryngeal elevation appears WFL based upon palpation of anterior neck during swallow. Vocal quality dry before and after po trials. Pharyngeal: Pt's RR consistent at 20-24/min, O2 sats 95-97% on 1 L O2 NC throughout BSE. No overt s/s of aspiration/penetration noted with PO intake. Pt denied globus sensation post-swallow with all PO intake (however, she reported having \"phelgm sticking in my throat a lot\"). Prognosis  Prognosis  Prognosis for safe diet advancement: good  Barriers to reach goals: other (comment)  Barriers/Prognosis Comment: Hoarseness, reflux  Individuals consulted  Consulted and agree with results and recommendations: Patient    Education  Patient Education: Pt educated on reason for referral, role of ST, assessment results and recommendations. Patient Education Response: Verbalizes understanding  Safety Devices in place: Yes  Type of devices: Call light within reach; Left in bed       Therapy Time  SLP Individual Minutes  Time In: 1006  Time Out: 5680  Minutes: 20; dysphagia bernadette Pickett M.S. Percell Notice  Speech-language pathologist  [phone]

## 2020-08-31 NOTE — PROGRESS NOTES
Hospitalist Progress Note      PCP: Pari Earl    Date of Admission: 8/29/2020    Chief Complaint: SOB, cough. Recent R hip replacement surgery. Chronic pain patient. Subjective:     Requesting her chronic pain meds to be resumed. Does not complain of dyspnea. Medications:  Reviewed    Infusion Medications   Scheduled Medications    gabapentin  300 mg Oral 4x Daily    rOPINIRole  2 mg Oral Nightly    albuterol sulfate HFA  2 puff Inhalation BID    atorvastatin  40 mg Oral Daily    pantoprazole  40 mg Oral QAM AC    metoprolol  100 mg Oral BID    furosemide  40 mg Oral Daily    spironolactone  25 mg Oral Daily    lidocaine  1 patch Transdermal Daily    escitalopram  10 mg Oral Daily    sodium chloride flush  10 mL Intravenous 2 times per day    enoxaparin  40 mg Subcutaneous Daily    cefTRIAXone (ROCEPHIN) IV  1 g Intravenous Q24H    And    azithromycin  500 mg Intravenous Q24H     PRN Meds: oxyCODONE-acetaminophen, HYDROcodone-homatropine, tiZANidine, ipratropium-albuterol, acetaminophen, sodium chloride flush, acetaminophen **OR** acetaminophen, polyethylene glycol, promethazine **OR** ondansetron      Intake/Output Summary (Last 24 hours) at 8/31/2020 1434  Last data filed at 8/31/2020 1243  Gross per 24 hour   Intake 360 ml   Output --   Net 360 ml       Physical Exam Performed:    BP (!) 165/88   Pulse 79   Temp 98.1 °F (36.7 °C) (Oral)   Resp 18   Ht 5' 5\" (1.651 m)   Wt 260 lb 1.6 oz (118 kg)   SpO2 95%   Breastfeeding No   BMI 43.28 kg/m²     General appearance: No apparent distress, appears stated age and cooperative. HEENT: Pupils equal, round, and reactive to light. Conjunctivae/corneas clear. Neck: Supple, with full range of motion. No jugular venous distention. Trachea midline. Respiratory:  Normal respiratory effort. Clear to auscultation, bilaterally without Rales/Wheezes/Rhonchi.   Cardiovascular: Regular rate and rhythm with normal S1/S2 without murmurs, rubs or gallops. Abdomen: Soft, non-tender, non-distended with normal bowel sounds. Musculoskeletal: No clubbing, cyanosis or edema bilaterally. Full range of motion without deformity. Skin: Skin color, texture, turgor normal.  No rashes or lesions. Neurologic:  Neurovascularly intact without any focal sensory/motor deficits. Cranial nerves: II-XII intact, grossly non-focal.  Psychiatric: Alert and oriented, thought content appropriate, normal insight  Capillary Refill: Brisk,< 3 seconds   Peripheral Pulses: +2 palpable, equal bilaterally       Labs:   Recent Labs     08/29/20  1438 08/30/20  0524   WBC 12.6* 12.5*   HGB 10.3* 10.2*   HCT 31.4* 31.5*    262     Recent Labs     08/29/20  1438      K 4.2   CL 99   CO2 28   BUN 11   CREATININE 0.8   CALCIUM 9.0     Recent Labs     08/29/20  1438   AST 14*   ALT 13   BILITOT 0.9   ALKPHOS 106     No results for input(s): INR in the last 72 hours. Recent Labs     08/29/20  1438   TROPONINI <0.01       Urinalysis:      Lab Results   Component Value Date    NITRU Negative 08/07/2019    WBCUA 10-20 05/18/2018    BACTERIA 2+ 05/18/2018    RBCUA 20-50 05/18/2018    BLOODU Negative 08/07/2019    SPECGRAV <=1.005 08/07/2019    GLUCOSEU Negative 08/07/2019       Radiology:  CT CHEST PULMONARY EMBOLISM W CONTRAST   Final Result   Addendum 1 of 1   ADDENDUM:   Appearance of the thyroid unchanged since December 2017. No follow-up   necessary for what appear to be stable benign nodules. 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 Pt on rocephin and zithromax   Denies any inhaled substance. COVID repeat pending.        COPD with AE -

## 2020-08-31 NOTE — PROGRESS NOTES
Shift assessment complete. See flow sheet. Scheduled meds given. See MAR. No further needs noted at this time. Will continue to monitor.

## 2020-08-31 NOTE — PLAN OF CARE
Problem: Pain:  Goal: Pain level will decrease  Description: Pain level will decrease  Outcome: Ongoing  Goal: Control of acute pain  Description: Control of acute pain  Outcome: Ongoing  Goal: Control of chronic pain  Description: Control of chronic pain  Outcome: Ongoing     Problem: OXYGENATION/RESPIRATORY FUNCTION  Goal: Patient will maintain patent airway  Outcome: Ongoing  Goal: Patient will achieve/maintain normal respiratory rate/effort  Description: Respiratory rate and effort will be within normal limits for the patient  Outcome: Ongoing     Problem: MECHANICAL VENTILATION  Goal: Patient will maintain patent airway  Outcome: Ongoing  Goal: Oral health is maintained or improved  Outcome: Ongoing  Goal: Tracheostomy will be managed safely  Outcome: Ongoing  Goal: ET tube will be managed safely  Outcome: Ongoing  Goal: Ability to express needs and understand communication  Outcome: Ongoing  Goal: Mobility/activity is maintained at optimum level for patient  Outcome: Ongoing     Problem: SKIN INTEGRITY  Goal: Skin integrity is maintained or improved  Outcome: Ongoing     Problem: NUTRITION  Goal: Nutritional status is improving  Outcome: Ongoing

## 2020-09-01 ENCOUNTER — APPOINTMENT (OUTPATIENT)
Dept: VASCULAR LAB | Age: 60
DRG: 987 | End: 2020-09-01
Payer: MEDICARE

## 2020-09-01 VITALS
SYSTOLIC BLOOD PRESSURE: 125 MMHG | WEIGHT: 260.1 LBS | OXYGEN SATURATION: 90 % | TEMPERATURE: 98.8 F | DIASTOLIC BLOOD PRESSURE: 74 MMHG | HEIGHT: 65 IN | BODY MASS INDEX: 43.34 KG/M2 | HEART RATE: 71 BPM | RESPIRATION RATE: 18 BRPM

## 2020-09-01 LAB — SARS-COV-2, PCR: NOT DETECTED

## 2020-09-01 PROCEDURE — 6360000002 HC RX W HCPCS: Performed by: INTERNAL MEDICINE

## 2020-09-01 PROCEDURE — 6370000000 HC RX 637 (ALT 250 FOR IP): Performed by: INTERNAL MEDICINE

## 2020-09-01 PROCEDURE — 93971 EXTREMITY STUDY: CPT

## 2020-09-01 PROCEDURE — 94640 AIRWAY INHALATION TREATMENT: CPT

## 2020-09-01 PROCEDURE — 93970 EXTREMITY STUDY: CPT

## 2020-09-01 PROCEDURE — 92526 ORAL FUNCTION THERAPY: CPT

## 2020-09-01 PROCEDURE — 2580000003 HC RX 258: Performed by: INTERNAL MEDICINE

## 2020-09-01 RX ORDER — LEVOFLOXACIN 500 MG/1
500 TABLET, FILM COATED ORAL DAILY
Qty: 10 TABLET | Refills: 0 | Status: SHIPPED | OUTPATIENT
Start: 2020-09-01 | End: 2020-09-11

## 2020-09-01 RX ADMIN — PANTOPRAZOLE SODIUM 40 MG: 40 TABLET, DELAYED RELEASE ORAL at 06:55

## 2020-09-01 RX ADMIN — Medication 10 ML: at 09:18

## 2020-09-01 RX ADMIN — GABAPENTIN 300 MG: 300 CAPSULE ORAL at 13:31

## 2020-09-01 RX ADMIN — ESCITALOPRAM OXALATE 10 MG: 10 TABLET ORAL at 09:18

## 2020-09-01 RX ADMIN — GABAPENTIN 300 MG: 300 CAPSULE ORAL at 09:18

## 2020-09-01 RX ADMIN — ATORVASTATIN CALCIUM 40 MG: 40 TABLET, FILM COATED ORAL at 09:18

## 2020-09-01 RX ADMIN — APIXABAN 10 MG: 5 TABLET, FILM COATED ORAL at 13:31

## 2020-09-01 RX ADMIN — OXYCODONE HYDROCHLORIDE AND ACETAMINOPHEN 1 TABLET: 10; 325 TABLET ORAL at 13:31

## 2020-09-01 RX ADMIN — SPIRONOLACTONE 25 MG: 25 TABLET ORAL at 09:18

## 2020-09-01 RX ADMIN — Medication 2 PUFF: at 08:12

## 2020-09-01 RX ADMIN — METOPROLOL TARTRATE 100 MG: 50 TABLET, FILM COATED ORAL at 09:18

## 2020-09-01 RX ADMIN — CEFTRIAXONE SODIUM 1 G: 1 INJECTION, POWDER, FOR SOLUTION INTRAMUSCULAR; INTRAVENOUS at 13:31

## 2020-09-01 RX ADMIN — OXYCODONE HYDROCHLORIDE AND ACETAMINOPHEN 1 TABLET: 10; 325 TABLET ORAL at 06:55

## 2020-09-01 RX ADMIN — FUROSEMIDE 40 MG: 40 TABLET ORAL at 09:18

## 2020-09-01 RX ADMIN — ENOXAPARIN SODIUM 40 MG: 40 INJECTION SUBCUTANEOUS at 09:18

## 2020-09-01 ASSESSMENT — PAIN SCALES - GENERAL
PAINLEVEL_OUTOF10: 3
PAINLEVEL_OUTOF10: 8
PAINLEVEL_OUTOF10: 10

## 2020-09-01 NOTE — PROGRESS NOTES
O2 Sat at rest on room air is 93 %. O2 Sat with activity on room air is 90 %. O2 Sat at rest with oxygen @ NA   lpm is %. O2 Sat with activity with oxygen NA @ lpm is %.

## 2020-09-01 NOTE — PLAN OF CARE
Problem: Pain:  Goal: Pain level will decrease  Description: Pain level will decrease  9/1/2020 0416 by Rebecca Rosario RN  Outcome: Ongoing  7/48/7702 8429 by Wendy Morrissey RN  Outcome: Ongoing  Goal: Control of acute pain  Description: Control of acute pain  9/1/2020 0416 by Rebecca Rosario RN  Outcome: Ongoing  7/17/7614 5756 by Wendy Morrissey RN  Outcome: Ongoing  Goal: Control of chronic pain  Description: Control of chronic pain  9/1/2020 0416 by Rebecca Rosario RN  Outcome: Ongoing  3/83/0043 2420 by Wendy Morrissey RN  Outcome: Ongoing     Problem: OXYGENATION/RESPIRATORY FUNCTION  Goal: Patient will maintain patent airway  9/1/2020 0416 by Rebecca Rosario RN  Outcome: Ongoing  7/40/3372 9452 by Wendy Morrissey RN  Outcome: Ongoing  Goal: Patient will achieve/maintain normal respiratory rate/effort  Description: Respiratory rate and effort will be within normal limits for the patient  9/1/2020 0416 by Rebecca Rosario RN  Outcome: Ongoing  3/80/3856 3046 by Wendy Morrissey RN  Outcome: Ongoing     Problem: MECHANICAL VENTILATION  Goal: Patient will maintain patent airway  9/1/2020 0416 by Rebecca Rosario RN  Outcome: Ongoing  5/63/5776 6927 by Wendy Morrissey RN  Outcome: Ongoing  Goal: Oral health is maintained or improved  9/1/2020 0416 by Rebecca Rosario RN  Outcome: Ongoing  5/62/3479 5036 by Wendy Morrissey RN  Outcome: Ongoing  Goal: Tracheostomy will be managed safely  9/1/2020 0416 by Rebecca Rosario RN  Outcome: Ongoing  7/47/2385 3807 by Wendy Morrissey RN  Outcome: Ongoing  Goal: ET tube will be managed safely  9/1/2020 0416 by Rebecca Rosario RN  Outcome: Ongoing  6/35/5618 6214 by Wendy Morrissey RN  Outcome: Ongoing  Goal: Ability to express needs and understand communication  9/1/2020 0416 by Rebecca Rosario RN  Outcome: Ongoing  5/65/5116 8141 by Wendy Morrissey RN  Outcome: Ongoing  Goal: Mobility/activity is maintained at optimum level for patient  9/1/2020 0416 by Rebecca Rosario RN  Outcome: Ongoing  7/21/3383 5978 by Renata Zambrano RN  Outcome: Ongoing     Problem: SKIN INTEGRITY  Goal: Skin integrity is maintained or improved  9/1/2020 0416 by Krysta Vigil RN  Outcome: Ongoing  5/42/1666 7328 by Renata Zambrano RN  Outcome: Ongoing     Problem: NUTRITION  Goal: Nutritional status is improving  9/1/2020 0416 by Krysta Vigil RN  Outcome: Ongoing  9/08/4837 0164 by Renata Zambrano RN  Outcome: Ongoing

## 2020-09-01 NOTE — CARE COORDINATION
DISCHARGE ORDER  Date/Time 2020 1:51 PM  Completed by: Alfred James, Case Management    Patient Name: Ken Mason      : 1960  Admitting Diagnosis: Pneumonia [J18.9]      Admit order Date and Status:   (verify MD's last order for status of admission)      Noted discharge order. If applicable PT/OT recommendation at Discharge:   DME recommendation by PT/OT:  Confirmed discharge plan with patient (A&O)  Discharge Plan: Chart reviewed. Met with pt at bedside and explained the role of the CM. Plans to DC to her S.O.'s home in [facility] while she recovers. + assist. Owns RW. +[facility] (PT/OT). Orders and AGUSTÍN/AVS on chart. Reviewed chart. Role of discharge planner explained and patient verbalized understanding. Discharge order is noted. Has Home O2 in place on admit:  No  Informed of need to bring portable home O2 tank on day of discharge for nursing to connect prior to leaving:   Not Indicated  Verbalized agreement/Understanding:   Not Indicated  Pt is being d/c'd to to her S.O's home today. Pt's O2 sats are 91% on RA. Discharge timeout done with [facility]. All discharge needs and concerns addressed.

## 2020-09-01 NOTE — CARE COORDINATION
[facility]  Received referral regarding [facility]. services from [address]. Sent request to [facility] intake to review pt's insurance. Pt resides in [place], however, going home with family in [address]. Pt has [address] in [place]. [facility] is able to service pt for PT/OT needs. Spoke with pt regarding HC services. Pt is agreeable to [facility] for PT/OT. Verified demographics. Pt is going home to family in [address].    [address]    Per pt she has a scheduled follow up with her ortho surgeon on Fri 9/4 Dr. Bernice Mae. [phone]. Pt is asking if Dr. Franca Duvall can follow for [facility]. orders. Telephone call to Dr. Robert Hubbard office. Spoke to office. Request sent back to Dr. rFanca Duvall to follow pt for [facility], [facility] orders. Faxed referral to [facility] for [facility].    5:00 Received call back from [email or web address] Dr. Franca Duvall will follow pt for PT/OT [facility] needs. Informed [facility] clinical team.     Telephone call to pt and informed pt Dr. Franca Duvall will for [facility] needs. Pt to follow up with Dr. Franca Duvall in office on 9/47.     Electronically signed by Brooklyn Adler RN on 9/1/2020 at 4:02 PM

## 2020-09-01 NOTE — DISCHARGE SUMMARY
Hospital Medicine Discharge Summary    Patient ID: Jax Choudhury      Patient's PCP: Michaela Moon    Admit Date: 8/29/2020     Discharge Date:   9/1/2020    Admitting Physician: Deysi Vaca MD     Discharge Physician: Patty Larkin MD     Discharge Diagnoses: Active Hospital Problems    Diagnosis    Status post total replacement of right hip [Z96.641]    CHF (congestive heart failure), NYHA class I, chronic, systolic (HCC) [J48.56]    Pneumonia due to organism [J18.9]       The patient was seen and examined on day of discharge and this discharge summary is in conjunction with any daily progress note from day of discharge. Hospital Course: 63yo woman with recent R hip replacement, presented with cough and dyspnea and hypoxia to 80s at home. Acute Hypox Resp Failure due to suspected PNA - resolved. Pt on rocephin and zithromax - to PO abx on discharge   Denies any inhaled substance. COVID repeat negative         COPD with AE - due to above - improved.         Hx of chronic diastolic CHF - stable - cont PTA regimen.         S/p recent R hip replacement. CTA no PE. Yohan doppler whos left tibial DVT - I would consider this provoked incident and will treat with eliquis for 3 months.            Physical Exam Performed:     BP (!) 180/92   Pulse 66   Temp 98.3 °F (36.8 °C) (Oral)   Resp 16   Ht 5' 5\" (1.651 m)   Wt 260 lb 1.6 oz (118 kg)   SpO2 91%   Breastfeeding No   BMI 43.28 kg/m²     General appearance: No apparent distress, appears stated age and cooperative. HEENT: Pupils equal, round, and reactive to light. Conjunctivae/corneas clear. Neck: Supple, with full range of motion. No jugular venous distention. Trachea midline. Respiratory:  Normal respiratory effort. Clear to auscultation, bilaterally without Rales/Wheezes/Rhonchi. Cardiovascular: Regular rate and rhythm with normal S1/S2 without murmurs, rubs or gallops.   Abdomen: Soft, non-tender, non-distended with normal bowel sounds. Musculoskeletal: No clubbing, cyanosis or edema bilaterally. Full range of motion without deformity. Skin: Skin color, texture, turgor normal.  No rashes or lesions. Neurologic:  Neurovascularly intact without any focal sensory/motor deficits. Cranial nerves: II-XII intact, grossly non-focal.  Psychiatric: Alert and oriented, thought content appropriate, normal insight  Capillary Refill: Brisk,< 3 seconds   Peripheral Pulses: +2 palpable, equal bilaterally          Labs: For convenience and continuity at follow-up the following most recent labs are provided:      CBC:    Lab Results   Component Value Date    WBC 12.5 08/30/2020    HGB 10.2 08/30/2020    HCT 31.5 08/30/2020     08/30/2020       Renal:    Lab Results   Component Value Date     08/29/2020    K 4.2 08/29/2020    K 4.1 08/08/2019    CL 99 08/29/2020    CO2 28 08/29/2020    BUN 11 08/29/2020    CREATININE 0.8 08/29/2020    CALCIUM 9.0 08/29/2020    PHOS 3.0 08/11/2019         Significant Diagnostic Studies    Radiology:   VL Extremity Venous Bilateral         CT CHEST PULMONARY EMBOLISM W CONTRAST   Final Result   Addendum 1 of 1   ADDENDUM:   Appearance of the thyroid unchanged since December 2017. No follow-up   necessary for what appear to be stable benign nodules. Final             Consults:     IP CONSULT TO HOME CARE NEEDS    Disposition:  home     Condition at Discharge: Stable    Discharge Instructions/Follow-up:  PCP 1 week. Code Status:  Full Code     Activity: activity as tolerated    Diet: regular diet      Discharge Medications:     Current Discharge Medication List           Details   levoFLOXacin (LEVAQUIN) 500 MG tablet Take 1 tablet by mouth daily for 10 days  Qty: 10 tablet, Refills: 0      apixaban (ELIQUIS DVT/PE STARTER PACK) 5 MG TABS tablet Take 2 tablets by mouth 2 times daily for 7 days, THEN 1 tablet 2 times daily for 23 days.   Qty: 74 tablet, Refills: 2 Details   oxyCODONE-acetaminophen (PERCOCET) 5-325 MG per tablet Take 1 tablet by mouth every 4 hours as needed for Pain. predniSONE (DELTASONE) 10 MG tablet Take 10 mg by mouth daily      albuterol (PROVENTIL) (2.5 MG/3ML) 0.083% nebulizer solution Take 3 mLs by nebulization every 6 hours as needed for Wheezing  Qty: 360 mL, Refills: 5    Associated Diagnoses: ILD (interstitial lung disease) ([facility])      escitalopram (LEXAPRO) 10 MG tablet Take 10 mg by mouth daily      acetaminophen (TYLENOL) 500 MG tablet Take 1 tablet by mouth 4 times daily as needed for Pain  Qty: 120 tablet, Refills: 0      ipratropium-albuterol (DUONEB) 0.5-2.5 (3) MG/3ML SOLN nebulizer solution Inhale 3 mLs into the lungs every 4 hours as needed for Shortness of Breath  Qty: 360 mL, Refills: 1      furosemide (LASIX) 40 MG tablet Take 1 tablet by mouth daily  Qty: 30 tablet, Refills: 3      spironolactone (ALDACTONE) 25 MG tablet Take 1 tablet by mouth daily  Qty: 30 tablet, Refills: 3      albuterol sulfate  (90 Base) MCG/ACT inhaler Inhale 2 puffs into the lungs 4 times daily  Qty: 1 Inhaler, Refills: 0      oxybutynin (DITROPAN) 5 MG tablet Take 5 mg by mouth 3 times daily      tiZANidine (ZANAFLEX) 4 MG tablet Take 4 mg by mouth 2 times daily as needed      metoprolol (LOPRESSOR) 100 MG tablet Take 100 mg by mouth 2 times daily      rOPINIRole (REQUIP) 2 MG tablet Take 2 mg by mouth 4 times daily       gabapentin (NEURONTIN) 300 MG capsule Take 300 mg by mouth 4 times daily. atorvastatin (LIPITOR) 20 MG tablet Take 40 mg by mouth daily       omeprazole (PRILOSEC) 20 MG capsule Take 40 mg by mouth Daily              Time Spent on discharge is more than 30 minutes in the examination, evaluation, counseling and review of medications and discharge plan. Signed:    Braeden Velazquez MD   9/1/2020      Thank you José Miguel Gillespie for the opportunity to be involved in this patient's care.  If you have any questions or concerns please feel free to contact me at [phone].

## 2020-09-01 NOTE — PROGRESS NOTES
Speech Language Pathology  Facility/Department: [address] [place] [address] [address] [facility]  Dysphagia Daily Treatment Note    NAME: Lyssa Stark  : 1960  MRN: [id number]    Patient Diagnosis(es):   Patient Active Problem List    Diagnosis Date Noted    Status post total replacement of right hip     CHF (congestive heart failure), NYHA class I, chronic, systolic [facility]     Pneumonia due to organism 2020    Tobacco abuse 11/15/2019    Exercise hypoxemia 11/15/2019    COPD with acute exacerbation ([facility]     Abnormal CT of the chest     Abnormal chest CT     Chronic diastolic congestive heart failure ([facility]     Acute hypoxemic respiratory failure (HCC)     Hypoxia 2019    Lactic acidosis 2019    Nausea & vomiting 2019    Hyperventilation     Hypokalemia     Respiratory alkalosis 2019    Acute respiratory failure with hypoxia (HCC)     COPD exacerbation (HCC)     Class 2 obesity due to excess calories with body mass index (BMI) of 39.0 to 39.9 in adult 2018    Hypertension     Hyperlipidemia     Depression     Acute pulmonary edema (HCC)     Acute congestive heart failure (HCC)     Iron deficiency anemia      Allergies: Allergies   Allergen Reactions    Lisinopril Swelling     Tongue swelling    Codeine      itchy    Cymbalta [Duloxetine Hcl] Other (See Comments)     SI thoughts     Duloxetine      Strong suicidal thoughts    Penicillins Itching     itchy    Sulfa Antibiotics      Unable to urinate     Onset Date: 2020  Subjective: Pt sitting up in bed. Pt oriented x4. Pt reports no difficulty with current regular solids and thin liquids. Significant other present. Pain: No c/o pain    Current Diet: DIET GENERAL;    Diet Tolerance:  Patient tolerating current diet level without signs/symptoms of penetration / aspiration. P.O. Trials:   Thin   x 1 pint chocolate milk by straw   Nectar / Mildly Thick       Honey / Moderately Thick Pudding / Extremely Thick       Puree       Solid   x 2 beatriz crackers     Dysphagia Treatment and Impressions:  Karen/TUCKER andersen SLP for entry for dysphagia tx. Pt on room air. Pt reports h/o necrotic jaw and metal plate in jaw. Pt assessed with thin liquids by straw and regular solids. Oral prep and transit with thin and solids appear WNL. No oral residue remains. No clinical signs of aspiration observed with thin or solids. Pt teaches back safe swallow strategies (upright position during and 30 minutes after meals, small bites/sips, slow rate of po intake). Pt educated in reflux precautions and instructed to discuss any medication concerns with physician. Education completed re: benefits of oral care in decreasing adverse outcomes associated with aspiration. Pt instructed to notify physician if signs of aspiration are noted. Pt voices good understanding of information discussed. Recommend continue regular solids and thin liquids, meds whole with thin or puree. Recommend upright position during and 30 minutes after meals, small bites/sips, slow rate of po intake, oral care before and after meals, and encourage self-feeding. No further ST recommended at this time as pt has met goals. Dysphagia Goals:  Timeframe for Long-term Goals: 3 days (9/3/20)  Goal 1: The pt will tolerate safest and least restrictive diet without s/s of aspiration. 09/01 GOAL MET. See above     Short-term Goals  Timeframe for Short-term Goals: 2 days (9/2/20)  Dysphagia Goals:   1The patient will tolerate recommended diet without observed clinical signs of aspiration, 09/01 GOAL MET. See above  2 The patient/caregiver will demonstrate understanding of compensatory strategies for improved swallowing safety. 09/01 GOAL MET.  See above      Recommendations:  Solid Consistency: regular  Liquid Consistency: thin  Medication: whole with thin liquids    Patient/Family/Caregiver Education: Education completed with pt and significant other re: safe swallow strategies, reflux precautions, signs and risks of aspiration, and ways to decrease adverse outcomes associated with aspiration. Pt and significant other voice good understanding via teach back. Compensatory Strategies: Recommend upright position during and 30 minutes after meals, small bites/sips, slow rate of po intake, oral care before and after meals, and encourage self-feeding, and reflux precautions. Plan:    No further ST recommended at this time    Discharge Recommendations: Home    If pt discharges from hospital prior to Speech/Swallowing discharge, this note serves as tx and discharge summary. Total Treatment Time / Charges     Time in Time out Total Time / units   Cognitive Tx         Speech Tx      Dysphagia Tx 1430 1500 30 min/1 unit     Signature:  Gabrielle Richards. Logan Ley M.A.  [address] [facility]  Speech-Language Pathologist

## 2020-09-01 NOTE — PLAN OF CARE
Problem: Pain:  Goal: Pain level will decrease  Description: Pain level will decrease  1/5/7187 8804 by Nakia Fair RN  Outcome: Ongoing  9/1/2020 0416 by Jessica Treviño RN  Outcome: Ongoing  Goal: Control of acute pain  Description: Control of acute pain  5/5/2933 9890 by Nakia Fair RN  Outcome: Ongoing  9/1/2020 0416 by Jessica Treviño RN  Outcome: Ongoing  Goal: Control of chronic pain  Description: Control of chronic pain  5/9/4489 6361 by Nakia Fair RN  Outcome: Ongoing  9/1/2020 0416 by Jessica Treviño RN  Outcome: Ongoing     Problem: OXYGENATION/RESPIRATORY FUNCTION  Goal: Patient will maintain patent airway  4/8/3219 1938 by Nakia Fair RN  Outcome: Ongoing  9/1/2020 0416 by Jessica Treviño RN  Outcome: Ongoing  Goal: Patient will achieve/maintain normal respiratory rate/effort  Description: Respiratory rate and effort will be within normal limits for the patient  9/1/4448 7202 by Nakia Fair RN  Outcome: Ongoing  9/1/2020 0416 by Jessica Treviño RN  Outcome: Ongoing     Problem: MECHANICAL VENTILATION  Goal: Patient will maintain patent airway  5/6/6636 6149 by Nakia Fair RN  Outcome: Ongoing  9/1/2020 0416 by Jessica Treviño RN  Outcome: Ongoing  Goal: Oral health is maintained or improved  6/8/6797 1019 by Nakia Fair RN  Outcome: Ongoing  9/1/2020 0416 by Jessica Treviño RN  Outcome: Ongoing  Goal: Tracheostomy will be managed safely  6/9/5468 1981 by Nakia Fair RN  Outcome: Ongoing  9/1/2020 0416 by Jessica Treviño RN  Outcome: Ongoing  Goal: ET tube will be managed safely  6/1/7966 7691 by Nakia Fair RN  Outcome: Ongoing  9/1/2020 0416 by Jessica Treviño RN  Outcome: Ongoing  Goal: Ability to express needs and understand communication  0/1/0871 3442 by Nakia Fair RN  Outcome: Ongoing  9/1/2020 0416 by Jessica Treviño RN  Outcome: Ongoing  Goal: Mobility/activity is maintained at optimum level for patient  6/7/5656 5333 by Leveda Fair, RN  Outcome: Ongoing  9/1/2020 0416 by Tawanda Bangura RN  Outcome: Ongoing     Problem: SKIN INTEGRITY  Goal: Skin integrity is maintained or improved  2/9/7406 5397 by Abhay Marmolejo RN  Outcome: Ongoing  9/1/2020 0416 by Tawanda Bangura RN  Outcome: Ongoing     Problem: NUTRITION  Goal: Nutritional status is improving  7/3/7419 8865 by Abhay Marmolejo RN  Outcome: Ongoing  9/1/2020 0416 by Tawanda Bangura RN  Outcome: Ongoing

## 2020-09-01 NOTE — PROGRESS NOTES
hypoxia    Query created by: Bay Gould on 9/1/2020 9:27 AM      Electronically signed by:  Xochilt Pizarro MD 9/1/2020 12:29 PM

## 2020-09-01 NOTE — DISCHARGE INSTR - COC
Continuity of Care Form    Patient Name: Chao Carrera   :  1960  MRN:  [id number]     ADDRESS: Ginette Alexandre, [address] [place] [address], [address] [place] [address] date:  2020  Discharge date:  2020  Code Status Order: Full Code   Advance Directives:   [facility] Documentation     Date/Time Healthcare Directive Type of Healthcare Directive Copy in [facility] [address] Agent's Name Healthcare Agent's Phone Number    [phone]  No, patient does not have an advance directive for healthcare treatment -- -- -- -- --          Admitting Physician:  Jessee Garcia MD  PCP: Saloni Turner    Discharging Nurse: [address] [place] [address] Unit/Room#: 4869/8145-77  Discharging Unit Phone Number: [phone]    Emergency Contact:   Extended Emergency Contact Information  Primary Emergency Contact: [facility]  Home Phone: [phone]  Relation: Child  Secondary Emergency Contact: Edgardomary Leahys  Address: [address] [place] [address], [address] [facility]  Home Phone: [phone]  Mobile Phone: [phone]  Relation: Domestic Partner    Past Surgical History:  Past Surgical History:   Procedure Laterality Date    BRONCHOSCOPY  2019    BRONCHOSCOPY N/A 2019    BRONCHOSCOPY ALVEOLAR LAVAGE performed by Sanam Dubose MD at [place] [address]      JOINT REPLACEMENT      KNEE SURGERY         Immunization History:   Immunization History   Administered Date(s) Administered    Influenza Nasal 2013    Influenza Vaccine, unspecified formulation 2012, 10/06/2014, 2016    Influenza Virus Vaccine 2012, 2013, 10/06/2014, 10/29/2015, 2016, 10/09/2017, 2018    Influenza Whole 2013    Pneumococcal Conjugate 7-valent (Prevnar7) 2013    Pneumococcal Polysaccharide (Uxyaqiwej90) 2018    Zoster Live (Zostavax) 03/15/2017 Assisted  Transfer  Assisted  Bathing  Assisted  Dressing  Assisted  Toileting  Independent  Feeding  [address]  Independent  Med Delivery   whole    Wound Care Documentation and Therapy:        Elimination:  Continence:   · Bowel: Yes  · Bladder: Yes  Urinary Catheter: None   Colostomy/Ileostomy/Ileal Conduit: No       Date of Last BM: 9/1/20    Intake/Output Summary (Last 24 hours) at 9/1/2020 1345  Last data filed at 8/31/2020 1823  Gross per 24 hour   Intake 480 ml   Output --   Net 480 ml     I/O last 3 completed shifts: In: 5 [P.O.:840]  Out: -     Safety Concerns: At Risk for Falls    Impairments/Disabilities:      None    Nutrition Therapy:  Current Nutrition Therapy:   - Oral Diet:  General    Routes of Feeding: Oral  Liquids: No Restrictions  Daily Fluid Restriction: no  Last Modified Barium Swallow with Video (Video Swallowing Test): not done    Treatments at the Time of Hospital Discharge:   Respiratory Treatments: Yes  Oxygen Therapy:  is on oxygen at AS NEEDED L/min per nasal cannula.   Ventilator:    - No ventilator support    Rehab Therapies: Physical Therapy  Weight Bearing Status/Restrictions: No weight bearing restirctions  Other Medical Equipment (for information only, NOT a DME order):  walker  Other Treatments: NA    Patient's personal belongings (please select all that are sent with patient):  None    RN SIGNATURE:  Electronically signed by Bj Mukherjee RN on 8/4/66 at 2:28 PM EDT    CASE MANAGEMENT/SOCIAL WORK SECTION    Inpatient Status Date: 08/29/2020    Readmission Risk Assessment Score:  Readmission Risk              Risk of Unplanned Readmission:        17           Discharging to Facility/ Agency   · Name: [facility]  · Address:  · Phone: [phone]  · Fax: [phone]: LEVEL 1 STANDARD    Home health agency to establish plan of care for patient over 60 day period          PT/OT   Evaluate with goal of regaining prior level of functioning   OT to evaluate if patient has [address] needs for personal care      PCP Visit scheduled within 7 days of hospital discharge       Dialysis Facility (if applicable)   · Name:  · Address:  · Dialysis Schedule:  · Phone:  · Fax:    / signature: Electronically signed by Adeola Hernandez RN on 9/1/20 at 1:47 PM EDT    PHYSICIAN SECTION    Prognosis: {Prognosis:8482414342}    Condition at Discharge: Enedelia Gonzales Patient Condition:[id number]}    Rehab Potential (if transferring to Rehab): {Prognosis:9910938632}    Recommended Labs or Other Treatments After Discharge: ***    Physician Certification: I certify the above information and transfer of Zulema Pham  is necessary for the continuing treatment of the diagnosis listed and that she requires Home Care for less 30 days.      Update Admission H&P: Changes in H&P as follows - see attached    PHYSICIAN SIGNATURE: Dr. Madeline Yoon MD/ Electronically signed by Adeola Hernandez RN on 9/1/20 at 1:47 PM EDT

## 2020-09-01 NOTE — PROGRESS NOTES
Shift assessment complete. See flow sheet. Scheduled meds given. See MAR. Call light and bedside table within reach. No further needs noted at this time. Will continue to monitor.

## 2020-09-02 ENCOUNTER — CARE COORDINATION (OUTPATIENT)
Dept: CASE MANAGEMENT | Age: 60
End: 2020-09-02

## 2020-09-02 NOTE — CARE COORDINATION
Patient contacted regarding Robert Salmon. COVID-19 Not Detected on 2020. Patient informed of results, if available? Yes    Patient has following risk factors of: heart failure, COPD, pneumonia and acute respiratory failure. Care Transition Nurse contacted the patient by telephone to perform post discharge assessment. Verified name and  with patient as identifiers. Provided introduction to self, and explanation of the CTN role, and reason for call due to risk factors for infection and/or exposure to COVID-19. Symptoms reviewed with patient who verbalized the following symptoms: no new symptoms. Due to no new or worsening symptoms encounter was not routed to provider for escalation. Discussed follow-up appointments. If no appointment was previously scheduled, appointment scheduling offered: scheduled    Future Appointments   Date Time Provider Desmond Garcia   2020  2:00 PM [facility] PULMONARY FUNCTION TESTING [place] PFT [facility]   2020  3:00 PM [facility]ose [facility]): Dr Elvin Marino at [place] [address] on Friday, 2020     Advance Care Planning:   Does patient have an Advance Directive:  decision maker updated. CTN reviewed discharge instructions, medical action plan and red flags such as increased shortness of breath, increasing fever and signs of decompensation with patient who verbalized understanding. Discussed exposure protocols and quarantine with CDC Guidelines What to do if you are sick with coronavirus disease .  Patient was given an opportunity for questions and concerns. The patient agrees to contact the [facility] exposure line [phone], local [facility] department [place] [facility]: ([phone]) and PCP office for questions related to their healthcare. Reviewed and educated patient on any new and changed medications related to discharge diagnosis.     Adali Rios is staying with her boyfriend who can provide transportation and assist with ADLs as she is s/p THR. Her nebulizer is with her and workse well. Reviewed DB&C, medications, when to call Dr Kit Galicia for recommendation/same day appt. She voices understanding. [facility] care called. She is waiting for therapist to schedule. Provided her with [facility] number and instructed her to contact them if she does not hear from them by this afternoon. She voices understanding. Denies needs otherwise. CTN provided contact number for future needs. She has appt with surgeon to have staples removed on Friday this week. Patient/family/caregiver given information for [employer] and agrees to enroll no    Plan for follow-up call in 3-5 days based on severity of symptoms and risk factors.     Phyllis Emmanuel RN  Care Transition Nurse  [phone] mobile

## 2020-09-09 ENCOUNTER — CARE COORDINATION (OUTPATIENT)
Dept: CASE MANAGEMENT | Age: 60
End: 2020-09-09

## 2020-09-16 ENCOUNTER — CARE COORDINATION (OUTPATIENT)
Dept: CASE MANAGEMENT | Age: 60
End: 2020-09-16

## 2020-11-17 ENCOUNTER — TELEPHONE (OUTPATIENT)
Dept: PULMONOLOGY | Age: 60
End: 2020-11-17

## 2020-11-17 NOTE — TELEPHONE ENCOUNTER
Patient cancelled appointment on 11/20/20 with Dr. Catracho Temple for pulm clearance. Reason: pt doesn't need clearance now    Patient did not reschedule appointment. Pt will keep appt scheduled for 12/23/20. OV 6/17/20:    ASSESSMENT:  · Interstitial lung disease: abnormal CT chest with groundglass opacities which have waxed and waned, there is stable pulmonary fibrosis   · Pulmonary Nodules 5 mm or smaller, stable July 2019 to June 2020, most likely perifissural lymph nodes  · Mild intermittent reactive airway disease  · H/O Polysubstance abuse  · RLS - less severe with Wellbutrin  · Chronic CHF  · CAD  · Ongoing tobacco abuse, working on quitting     PLAN:  · Supplemental oxygen to maintain SaO2 >92%   · Inhaled bronchodilators PRN, neb and albuterol. Needs nebulizer today   · Patient may proceed to the operating room from pulmonary perspective without further testing. Because of underlying disease, she is at increased risk of georgiana-operative complications, including atelectasis, pneumonia and, when general anesthesia is required, failure to liberate from mechanical ventilation. However, this should not preclude her from having the recommended operation. It is my recommendation that short acting bronchodilators be used on a prn basis. All of this was discussed with the patient today in the office.     · Tobacco cessation has been advised  · See me with PFT in 6 months, dx pulmonary fibrosis      Dr. Maci Barrios with [facility]

## 2020-12-17 ENCOUNTER — HOSPITAL ENCOUNTER (OUTPATIENT)
Age: 60
Discharge: HOME | End: 2020-12-17
Payer: MEDICARE

## 2020-12-17 PROCEDURE — U0003 INFECTIOUS AGENT DETECTION BY NUCLEIC ACID (DNA OR RNA); SEVERE ACUTE RESPIRATORY SYNDROME CORONAVIRUS 2 (SARS-COV-2) (CORONAVIRUS DISEASE [COVID-19]), AMPLIFIED PROBE TECHNIQUE, MAKING USE OF HIGH THROUGHPUT TECHNOLOGIES AS DESCRIBED BY CMS-2020-01-R: HCPCS

## 2020-12-17 PROCEDURE — U0002 COVID-19 LAB TEST NON-CDC: HCPCS

## 2020-12-18 LAB — SARS-COV-2, NAA: NOT DETECTED

## 2020-12-23 ENCOUNTER — OFFICE VISIT (OUTPATIENT)
Dept: PULMONOLOGY | Age: 60
End: 2020-12-23
Payer: MEDICARE

## 2020-12-23 ENCOUNTER — HOSPITAL ENCOUNTER (OUTPATIENT)
Dept: PULMONOLOGY | Age: 60
Discharge: HOME | End: 2020-12-23
Payer: MEDICARE

## 2020-12-23 VITALS — OXYGEN SATURATION: 95 %

## 2020-12-23 VITALS
OXYGEN SATURATION: 97 % | HEIGHT: 65 IN | HEART RATE: 56 BPM | SYSTOLIC BLOOD PRESSURE: 120 MMHG | BODY MASS INDEX: 42.49 KG/M2 | RESPIRATION RATE: 18 BRPM | WEIGHT: 255 LBS | DIASTOLIC BLOOD PRESSURE: 62 MMHG

## 2020-12-23 LAB
DLCO %PRED: 46 %
DLCO PRED: NORMAL
DLCO/VA %PRED: NORMAL
DLCO/VA PRED: NORMAL
DLCO/VA: NORMAL
DLCO: NORMAL
EXPIRATORY TIME-POST: NORMAL
EXPIRATORY TIME: NORMAL
FEF 25-75% %CHNG: NORMAL
FEF 25-75% %PRED-POST: NORMAL
FEF 25-75% %PRED-PRE: NORMAL
FEF 25-75% PRED: NORMAL
FEF 25-75%-POST: NORMAL
FEF 25-75%-PRE: NORMAL
FEV1 %PRED-POST: 81 %
FEV1 %PRED-PRE: 80 %
FEV1 PRED: NORMAL
FEV1-POST: NORMAL
FEV1-PRE: NORMAL
FEV1/FVC %PRED-POST: NORMAL
FEV1/FVC %PRED-PRE: NORMAL
FEV1/FVC PRED: NORMAL
FEV1/FVC-POST: 78 %
FEV1/FVC-PRE: 80 %
FVC %PRED-POST: NORMAL
FVC %PRED-PRE: NORMAL
FVC PRED: NORMAL
FVC-POST: NORMAL
FVC-PRE: NORMAL
GAW %PRED: NORMAL
GAW PRED: NORMAL
GAW: NORMAL
IC %PRED: NORMAL
IC PRED: NORMAL
IC: NORMAL
MEP: NORMAL
MIP: NORMAL
MVV %PRED-PRE: NORMAL
MVV PRED: NORMAL
MVV-PRE: NORMAL
PEF %PRED-POST: NORMAL
PEF %PRED-PRE: NORMAL
PEF PRED: NORMAL
PEF%CHNG: NORMAL
PEF-POST: NORMAL
PEF-PRE: NORMAL
RAW %PRED: NORMAL
RAW PRED: NORMAL
RAW: NORMAL
RV %PRED: NORMAL
RV PRED: NORMAL
RV: NORMAL
SVC %PRED: NORMAL
SVC PRED: NORMAL
SVC: NORMAL
TLC %PRED: 75 %
TLC PRED: NORMAL
TLC: NORMAL
VA %PRED: NORMAL
VA PRED: NORMAL
VA: NORMAL
VTG %PRED: NORMAL
VTG PRED: NORMAL
VTG: NORMAL

## 2020-12-23 PROCEDURE — 6370000000 HC RX 637 (ALT 250 FOR IP): Performed by: INTERNAL MEDICINE

## 2020-12-23 PROCEDURE — 99214 OFFICE O/P EST MOD 30 MIN: CPT | Performed by: INTERNAL MEDICINE

## 2020-12-23 RX ORDER — ALBUTEROL SULFATE 90 UG/1
2 AEROSOL, METERED RESPIRATORY (INHALATION) ONCE
Status: COMPLETED | OUTPATIENT
Start: 2020-12-23 | End: 2020-12-23

## 2020-12-23 RX ADMIN — Medication 2 PUFF: at 14:03

## 2020-12-23 ASSESSMENT — PULMONARY FUNCTION TESTS
FEV1_PERCENT_PREDICTED_POST: 81
FEV1/FVC_POST: 78
FEV1/FVC_PRE: 80
FEV1_PERCENT_PREDICTED_PRE: 80

## 2020-12-23 NOTE — PROGRESS NOTES
Pulmonary Follow-up Note  Chief Complaint: cough, shortness of breath   Referring Provider: hospital f/u    Interval history:   Feels pretty good, smoking about 1 ppd, on average 1 to 1.5 ppd over last 40 years. Had bilateral hip replacement. Presenting history:     reports that she has been smoking cigarettes. She has a 35.00 pack-year smoking history. She has never used smokeless tobacco.     Review of Systems:    Physical Exam:  Blood pressure 120/62, pulse 56, resp. rate 18, height 5' 5\" (1.651 m), weight 255 lb (115.7 kg), SpO2 97 %, not currently breastfeeding. Constitutional:  No acute distress. HENT:  Oropharynx is clear and moist.   Neck: No tracheal deviation present. Cardiovascular: Normal heart sounds. No lower extremity edema. Pulmonary/Chest: No wheezes. No rhonchi. No rales. No decreased breath sounds. No accessory muscle usage or stridor. Musculoskeletal: No cyanosis. No clubbing. Skin: Skin is warm and dry. Psychiatric: Normal mood and affect.   Neurologic: speech fluent, alert and oriented, strength symmetric            Data:   HRCT 6/17/20  Mediastinum: Coronary artery calcification is seen.  Trace pericardial fluid   is seen.  Small hiatal hernia seen. Stew Leonarda is nonspecific thickening at the   GE junction.  Small mediastinal and hilar nodes are noted       HRCT Findings/Lungs/pleura: Scattered ground-glass and subpleural reticular   opacities are seen throughout the lungs bilaterally.  A few areas of   subpleural honeycombing are seen. Stew Leonarda is mild fusiform bronchiectasis.       There is increasing patchy ground-glass opacity in the left upper lobe,   anterior to the major fissure, when compared to prior.  A few new patchy   areas of ground-glass opacity are seen in the superior segment of the right   lower lobe.       There also a few new patchy areas of ground-glass opacity in the right upper   lobe and left lower lobe.       Small nodular density along the fissure on the right, measures 5 mm, similar   to prior.  A small perifissural nodule on the left, near the major fissure   also appears similar, measuring 5-6 mm.       Upper Abdomen: Right adrenal gland is normal.  Left adrenal gland is normal.   Mild stool load is seen in the colon       Soft Tissues/Bones: Spurring is seen in the spine           Impression   Scattered ground-glass opacities throughout the lungs bilaterally, new   compared to prior, favored to be postinflammatory-infectious.  Early   pulmonary edema could also have a similar appearance.       Stable underlying pulmonary fibrosis       Stable small perifissural nodules, likely intrapulmonary lymph nodes by   location       PFT 10/2/19 FEV1 2.01 L 69% ratio 0.8 TLC 3.82 L 66%  DLCO 10.2 40%  PFT 6/17/20 FEV1 2.05 L 71%     TLC 3.88 L 67% DLCO 12.55  PFT 12/23/20 FEV1 2.16 L 80%  TLC 4.03 L 75% DLCO 11.1 46%    ASSESSMENT:  · Interstitial lung disease: abnormal CT chest with groundglass opacities which have waxed and waned, there is stable pulmonary fibrosis   · Pulmonary Nodules 5 mm or smaller, stable July 2019 to June 2020, most likely perifissural lymph nodes  · Mild intermittent reactive airway disease  · H/O Polysubstance abuse  · RLS - less severe with Wellbutrin  · Chronic CHF  · CAD  · Ongoing tobacco abuse, working on quitting, 35 pack year history      PLAN:  · Supplemental oxygen to maintain SaO2 >92%   · Inhaled bronchodilators PRN, neb and albuterol. · Tobacco cessation has been advised  · Plan repeat PFT for Dec 2021, schedule at next visit  · See me with LDCT for LCS in June 2021    Screening CT scan was considered in a lung cancer screening counseling and shared decision making visit today that included the following elements:    Eligibility: Age: 64. There are no signs or symptoms of lung cancer.   Tobacco History 45 pack-years, quit zero years ago   Verbal counseling has been performed by me to include benefits and harms of screening,

## 2020-12-24 NOTE — PROCEDURES
[address]                 [address] [place] [address]                               PULMONARY FUNCTION    PATIENT NAME: Jose Rutledge                     :        1960  MED REC NO:   [id number]                          ROOM:  ACCOUNT NO:   [de-identified]                           ADMIT DATE: 2020  PROVIDER:     Mamta Rajput MD    DATE OF PROCEDURE:  2020    INDICATION:  ILD. FINDINGS:  1. Spirometry revealed no evidence of obstructive defect. FEV1 is 2.16  liter which is 80% of predicted. No significant response to  bronchodilators. FEV1/FVC ratio of 88%. 2.  Lung volume revealed mild restrictive defect. Total lung capacity  4.03 liter which is 75% of predicted. 3.  Diffusion capacity severely decreased at 11.1, which is 46% of  predicted. 4.  Flow volume loops suggestive of restrictive defect. 5. In comparison with the test done on 2020, there is a  deceleration trend in FEV1, FVC, and diffusion capacity. IMPRESSION:  1. No evidence of obstructive defect or bronchodilator response. 2.  Mild restrictive defect with severely decreased diffusion capacity,  goes with the given diagnosis of ILD with deterioration trend in FEV1,  FVC, and diffusion capacity in comparison with the test done in 2020,  however, not significant.         Raul Pa MD    D: 2020 15:02:55       T: 2020 21:21:07     /[email or web address]  Job#: [id number]     Doc#: [id number]    CC:

## 2021-03-27 ENCOUNTER — APPOINTMENT (OUTPATIENT)
Dept: GENERAL RADIOLOGY | Age: 61
End: 2021-03-27
Payer: MEDICARE

## 2021-03-27 ENCOUNTER — APPOINTMENT (OUTPATIENT)
Dept: CT IMAGING | Age: 61
End: 2021-03-27
Payer: MEDICARE

## 2021-03-27 ENCOUNTER — HOSPITAL ENCOUNTER (EMERGENCY)
Age: 61
Discharge: HOME | End: 2021-03-27
Attending: STUDENT IN AN ORGANIZED HEALTH CARE EDUCATION/TRAINING PROGRAM
Payer: MEDICARE

## 2021-03-27 VITALS
BODY MASS INDEX: 42.49 KG/M2 | WEIGHT: 255 LBS | TEMPERATURE: 99.9 F | DIASTOLIC BLOOD PRESSURE: 78 MMHG | HEART RATE: 81 BPM | HEIGHT: 65 IN | OXYGEN SATURATION: 97 % | RESPIRATION RATE: 20 BRPM | SYSTOLIC BLOOD PRESSURE: 145 MMHG

## 2021-03-27 DIAGNOSIS — M27.2 OSTEOMYELITIS OF JAW: Primary | ICD-10-CM

## 2021-03-27 LAB
A/G RATIO: 0.8 (ref 1.1–2.2)
ALBUMIN SERPL-MCNC: 3.2 G/DL (ref 3.4–5)
ALP BLD-CCNC: 145 U/L (ref 40–129)
ALT SERPL-CCNC: 11 U/L (ref 10–40)
ANION GAP SERPL CALCULATED.3IONS-SCNC: 11 MMOL/L (ref 3–16)
AST SERPL-CCNC: 17 U/L (ref 15–37)
BASE EXCESS VENOUS: 7 MMOL/L (ref -3–3)
BASOPHILS ABSOLUTE: 0.1 K/UL (ref 0–0.2)
BASOPHILS RELATIVE PERCENT: 0.8 %
BILIRUB SERPL-MCNC: 0.7 MG/DL (ref 0–1)
BUN BLDV-MCNC: 13 MG/DL (ref 7–20)
CALCIUM SERPL-MCNC: 9.3 MG/DL (ref 8.3–10.6)
CARBOXYHEMOGLOBIN: 4.8 % (ref 0–1.5)
CHLORIDE BLD-SCNC: 96 MMOL/L (ref 99–110)
CO2: 29 MMOL/L (ref 21–32)
CREAT SERPL-MCNC: 1 MG/DL (ref 0.6–1.1)
EOSINOPHILS ABSOLUTE: 0.3 K/UL (ref 0–0.6)
EOSINOPHILS RELATIVE PERCENT: 2.1 %
GFR AFRICAN AMERICAN: >60
GFR NON-AFRICAN AMERICAN: 57
GLOBULIN: 4.1 G/DL
GLUCOSE BLD-MCNC: 143 MG/DL (ref 70–99)
HCO3 VENOUS: 32 MMOL/L (ref 23–29)
HCT VFR BLD CALC: 37.4 % (ref 36–48)
HEMOGLOBIN: 12.3 G/DL (ref 12–16)
LACTIC ACID, SEPSIS: 1.3 MMOL/L (ref 0.4–1.9)
LYMPHOCYTES ABSOLUTE: 1.3 K/UL (ref 1–5.1)
LYMPHOCYTES RELATIVE PERCENT: 8.7 %
MCH RBC QN AUTO: 27.8 PG (ref 26–34)
MCHC RBC AUTO-ENTMCNC: 32.9 G/DL (ref 31–36)
MCV RBC AUTO: 84.5 FL (ref 80–100)
METHEMOGLOBIN VENOUS: 0.3 %
MONOCYTES ABSOLUTE: 1.1 K/UL (ref 0–1.3)
MONOCYTES RELATIVE PERCENT: 7.4 %
NEUTROPHILS ABSOLUTE: 12.3 K/UL (ref 1.7–7.7)
NEUTROPHILS RELATIVE PERCENT: 81 %
O2 CONTENT, VEN: 13 VOL %
O2 SAT, VEN: 68 %
O2 THERAPY: ABNORMAL
PCO2, VEN: 46.9 MMHG (ref 40–50)
PDW BLD-RTO: 17.1 % (ref 12.4–15.4)
PH VENOUS: 7.45 (ref 7.35–7.45)
PLATELET # BLD: 228 K/UL (ref 135–450)
PMV BLD AUTO: 8.8 FL (ref 5–10.5)
PO2, VEN: 34.1 MMHG (ref 25–40)
POTASSIUM REFLEX MAGNESIUM: 3.7 MMOL/L (ref 3.5–5.1)
PRO-BNP: 332 PG/ML (ref 0–124)
RBC # BLD: 4.42 M/UL (ref 4–5.2)
SARS-COV-2, NAAT: NOT DETECTED
SODIUM BLD-SCNC: 136 MMOL/L (ref 136–145)
TCO2 CALC VENOUS: 34 MMOL/L
TOTAL PROTEIN: 7.3 G/DL (ref 6.4–8.2)
TROPONIN: <0.01 NG/ML
WBC # BLD: 15.2 K/UL (ref 4–11)

## 2021-03-27 PROCEDURE — 83605 ASSAY OF LACTIC ACID: CPT

## 2021-03-27 PROCEDURE — 93005 ELECTROCARDIOGRAM TRACING: CPT | Performed by: STUDENT IN AN ORGANIZED HEALTH CARE EDUCATION/TRAINING PROGRAM

## 2021-03-27 PROCEDURE — 83880 ASSAY OF NATRIURETIC PEPTIDE: CPT

## 2021-03-27 PROCEDURE — 70486 CT MAXILLOFACIAL W/O DYE: CPT

## 2021-03-27 PROCEDURE — 99285 EMERGENCY DEPT VISIT HI MDM: CPT

## 2021-03-27 PROCEDURE — 6360000002 HC RX W HCPCS: Performed by: STUDENT IN AN ORGANIZED HEALTH CARE EDUCATION/TRAINING PROGRAM

## 2021-03-27 PROCEDURE — 82803 BLOOD GASES ANY COMBINATION: CPT

## 2021-03-27 PROCEDURE — 96375 TX/PRO/DX INJ NEW DRUG ADDON: CPT

## 2021-03-27 PROCEDURE — 84484 ASSAY OF TROPONIN QUANT: CPT

## 2021-03-27 PROCEDURE — 6370000000 HC RX 637 (ALT 250 FOR IP): Performed by: STUDENT IN AN ORGANIZED HEALTH CARE EDUCATION/TRAINING PROGRAM

## 2021-03-27 PROCEDURE — 96365 THER/PROPH/DIAG IV INF INIT: CPT

## 2021-03-27 PROCEDURE — 85025 COMPLETE CBC W/AUTO DIFF WBC: CPT

## 2021-03-27 PROCEDURE — 2580000003 HC RX 258: Performed by: STUDENT IN AN ORGANIZED HEALTH CARE EDUCATION/TRAINING PROGRAM

## 2021-03-27 PROCEDURE — 87635 SARS-COV-2 COVID-19 AMP PRB: CPT

## 2021-03-27 PROCEDURE — 71045 X-RAY EXAM CHEST 1 VIEW: CPT

## 2021-03-27 PROCEDURE — 80053 COMPREHEN METABOLIC PANEL: CPT

## 2021-03-27 RX ORDER — CLINDAMYCIN HYDROCHLORIDE 150 MG/1
450 CAPSULE ORAL 3 TIMES DAILY
Qty: 63 CAPSULE | Refills: 0 | Status: SHIPPED | OUTPATIENT
Start: 2021-03-27 | End: 2021-04-03

## 2021-03-27 RX ORDER — CLINDAMYCIN HYDROCHLORIDE 150 MG/1
450 CAPSULE ORAL ONCE
Status: COMPLETED | OUTPATIENT
Start: 2021-03-27 | End: 2021-03-27

## 2021-03-27 RX ORDER — MORPHINE SULFATE 4 MG/ML
4 INJECTION, SOLUTION INTRAMUSCULAR; INTRAVENOUS ONCE
Status: COMPLETED | OUTPATIENT
Start: 2021-03-27 | End: 2021-03-27

## 2021-03-27 RX ADMIN — CEFTRIAXONE 2000 MG: 2 INJECTION, POWDER, FOR SOLUTION INTRAMUSCULAR; INTRAVENOUS at 21:28

## 2021-03-27 RX ADMIN — MORPHINE SULFATE 4 MG: 4 INJECTION INTRAVENOUS at 21:27

## 2021-03-27 RX ADMIN — CLINDAMYCIN HYDROCHLORIDE 450 MG: 150 CAPSULE ORAL at 22:27

## 2021-03-27 ASSESSMENT — ENCOUNTER SYMPTOMS
SORE THROAT: 0
SHORTNESS OF BREATH: 1
ABDOMINAL PAIN: 1
STRIDOR: 0
VOMITING: 0
BACK PAIN: 0
COUGH: 0
FACIAL SWELLING: 1
RHINORRHEA: 0
PHOTOPHOBIA: 0
NAUSEA: 0

## 2021-03-27 ASSESSMENT — PAIN SCALES - GENERAL
PAINLEVEL_OUTOF10: 4
PAINLEVEL_OUTOF10: 10

## 2021-03-27 ASSESSMENT — PAIN DESCRIPTION - LOCATION: LOCATION: FACE

## 2021-03-27 ASSESSMENT — PAIN DESCRIPTION - PAIN TYPE: TYPE: ACUTE PAIN

## 2021-03-27 NOTE — ED PROVIDER NOTES
 Class 2 obesity with serious comorbidity and body mass index (BMI) of 39.0 to 39.9 in adult 9/14/2018    Depression     History of left hip replacement 02/12/2018    Hyperlipidemia     Hypertension     Kidney stone     MRSA (methicillin resistant staph aureus) culture positive 07/27/2019    nasal colonization         SURGICAL HISTORY       Past Surgical History:   Procedure Laterality Date    BRONCHOSCOPY  08/08/2019    BRONCHOSCOPY N/A 8/8/2019    BRONCHOSCOPY ALVEOLAR LAVAGE performed by Audie Boxer, MD at [place] [address]      FOOT SURGERY      HYSTERECTOMY      JOINT REPLACEMENT      KNEE SURGERY      TOTAL HIP ARTHROPLASTY Right 08/2020         CURRENT MEDICATIONS       Previous Medications    ACETAMINOPHEN (TYLENOL) 500 MG TABLET    Take 1 tablet by mouth 4 times daily as needed for Pain    ALBUTEROL (PROVENTIL) (2.5 MG/3ML) 0.083% NEBULIZER SOLUTION    Take 3 mLs by nebulization every 6 hours as needed for Wheezing    ALBUTEROL SULFATE  (90 BASE) MCG/ACT INHALER    Inhale 2 puffs into the lungs 4 times daily    ATORVASTATIN (LIPITOR) 20 MG TABLET    Take 40 mg by mouth daily     ESCITALOPRAM (LEXAPRO) 10 MG TABLET    Take 10 mg by mouth daily    FUROSEMIDE (LASIX) 40 MG TABLET    Take 1 tablet by mouth daily    GABAPENTIN (NEURONTIN) 300 MG CAPSULE    Take 300 mg by mouth 4 times daily. IPRATROPIUM-ALBUTEROL (DUONEB) 0.5-2.5 (3) MG/3ML SOLN NEBULIZER SOLUTION    Inhale 3 mLs into the lungs every 4 hours as needed for Shortness of Breath    METOPROLOL (LOPRESSOR) 100 MG TABLET    Take 100 mg by mouth 2 times daily    OMEPRAZOLE (PRILOSEC) 20 MG CAPSULE    Take 40 mg by mouth Daily     OXYBUTYNIN (DITROPAN) 5 MG TABLET    Take 5 mg by mouth 3 times daily    OXYCODONE-ACETAMINOPHEN (PERCOCET) 5-325 MG PER TABLET    Take 1 tablet by mouth every 4 hours as needed for Pain.     ROPINIROLE (REQUIP) 2 MG TABLET    Take 2 mg by mouth 4 times daily     SPIRONOLACTONE (ALDACTONE) 25 MG TABLET    Take 1 tablet by mouth daily    TIZANIDINE (ZANAFLEX) 4 MG TABLET    Take 4 mg by mouth 2 times daily as needed       ALLERGIES     Lisinopril, Codeine, Cymbalta [duloxetine hcl], Duloxetine, Penicillins, and Sulfa antibiotics    FAMILY HISTORY     History reviewed. No pertinent family history. SOCIAL HISTORY       Social History     Socioeconomic History    Marital status:       Spouse name: None    Number of children: None    Years of education: None    Highest education level: None   Occupational History    None   Social Needs    Financial resource strain: None    Food insecurity     Worry: None     Inability: None    Transportation needs     Medical: None     Non-medical: None   Tobacco Use    Smoking status: Current Every Day Smoker     Packs/day: 1.00     Years: 35.00     Pack years: 35.00     Types: Cigarettes     Last attempt to quit: 2019     Years since quittin.7    Smokeless tobacco: Never Used   Substance and Sexual Activity    Alcohol use: Yes     Comment: occasionally    Drug use: Not Currently     Types: Marijuana, Methamphetamines     Comment: occassionally meth and percocet in past, snort percocet, snorting and smoking meth    Sexual activity: Yes     Partners: Male   Lifestyle    Physical activity     Days per week: None     Minutes per session: None    Stress: None   Relationships    Social connections     Talks on phone: None     Gets together: None     Attends Confucianism service: None     Active member of club or organization: None     Attends meetings of clubs or organizations: None     Relationship status: None    Intimate partner violence     Fear of current or ex partner: None     Emotionally abused: None     Physically abused: None     Forced sexual activity: None   Other Topics Concern    None   Social History Narrative    None       SCREENINGS        [place] Coma Scale  Eye Opening: Spontaneous  Best Verbal Response: Oriented  Best Motor Response: Obeys commands  Sandeep Coma Scale Score: 15                   REVIEW OF SYSTEMS    (2-9 systems for level 4, 10 or more for level 5)   Review of Systems   Constitutional: Negative for chills and fever. HENT: Positive for dental problem, facial swelling and mouth sores. Negative for congestion, rhinorrhea and sore throat. Eyes: Negative for photophobia and visual disturbance. Respiratory: Positive for shortness of breath. Negative for cough and stridor. Cardiovascular: Negative for chest pain, palpitations and leg swelling. Gastrointestinal: Positive for abdominal pain. Negative for nausea and vomiting. Genitourinary: Negative for decreased urine volume. Musculoskeletal: Negative for back pain, neck pain and neck stiffness. Skin: Negative for rash. Allergic/Immunologic: Negative for environmental allergies. Hematological: Negative for adenopathy. Psychiatric/Behavioral: Negative for confusion. PHYSICAL EXAM    (up to 7 for level 4, 8 or more for level 5)     ED Triage Vitals   BP Temp Temp src Pulse Resp SpO2 Height Weight   -- -- -- -- -- -- -- --       Physical Exam  Constitutional:       General: She is not in acute distress. Appearance: She is not diaphoretic. HENT:      Head: Normocephalic and atraumatic. Comments: Uvula is midline no obvious peritonsillar abscess or edema. Tolerating secretions in room, no stridor. There is no obvious facial swelling unable to palpate the mass that patient had reported. There is no significant dryness or swelling or erythema to the exterior face or below the mandible. Eyes:      Pupils: Pupils are equal, round, and reactive to light. Neck:      Musculoskeletal: Normal range of motion and neck supple. Trachea: No tracheal deviation. Cardiovascular:      Rate and Rhythm: Normal rate and regular rhythm.    Pulmonary:      Effort: Pulmonary effort is normal. No respiratory distress. Abdominal:      General: There is no distension. Palpations: Abdomen is soft. Musculoskeletal: Normal range of motion. Skin:     General: Skin is warm. Neurological:      Mental Status: She is oriented to person, place, and time. DIAGNOSTIC RESULTS     EKG: All EKG's are interpreted by the Emergency Department Physician who either signs or Co-signs this chart in the absence of a cardiologist.        The Ekg interpreted by me shows  normal sinus rhythm with a rate of 80  Axis is   Normal  QTc is  normal  Intervals and Durations are unremarkable. ST Segments: no acute change, scooping ST segment with mild depression lead II however this appears unchanged from prior EKG 8/29/2020 no other significant ST or T wave change noted          RADIOLOGY:   Non-plain film images such as CT, Ultrasound and MRI are read by the radiologist. Plain radiographic images are visualized and preliminarily interpreted by the emergency physician. Interpretation per the Radiologist below, if available at the time of this note:    XR CHEST PORTABLE   Final Result   Vascular congestion with interstitial pulmonary edema         CT FACIAL BONES WO CONTRAST   Final Result   9 mm focus of osseous erosion in the anterior mandible just to the left of   midline extending inferiorly from the gumline consistent with infection of   dental origin with associated osteomyelitis. This is at the site of previous   dental roots, although the teeth have been removed since the comparison. No   evidence of osteomyelitis or acute findings elsewhere.                LABS:  Labs Reviewed   CBC WITH AUTO DIFFERENTIAL - Abnormal; Notable for the following components:       Result Value    WBC 15.2 (*)     RDW 17.1 (*)     Neutrophils Absolute 12.3 (*)     All other components within normal limits    Narrative:     Performed at:  [facility]) - [facility] [address],  [place], [facility] Phone [phone]   COMPREHENSIVE METABOLIC PANEL W/ REFLEX TO MG FOR LOW K - Abnormal; Notable for the following components:    Chloride 96 (*)     Glucose 143 (*)     GFR Non- 57 (*)     Albumin 3.2 (*)     Albumin/Globulin Ratio 0.8 (*)     Alkaline Phosphatase 145 (*)     All other components within normal limits    Narrative:     Performed at:  [facility] [address],  [place], [facility]   Phone [phone]   BLOOD GAS, VENOUS - Abnormal; Notable for the following components:    pH, Yohan 7.452 (*)     HCO3, Venous 32.0 (*)     Base Excess, Yohan 7.0 (*)     Carboxyhemoglobin 4.8 (*)     All other components within normal limits    Narrative:     Performed at:  [facility]) - [facility] [address],  [place], [facility]   Phone [phone]   BRAIN NATRIURETIC PEPTIDE - Abnormal; Notable for the following components:    Pro- (*)     All other components within normal limits    Narrative:     Performed at:  [facility] [address],  [place], [facility]   Phone [phone]   TROPONIN    Narrative:     Performed at:  [facility]) - [facility] [address],  [place], [facility]   Phone [phone]   LACTATE, SEPSIS    Narrative:     Performed at:  [facility]) - [facility] [address],  [place], [facility]   Phone [phone]       All other labs were within normal range or not returned as of this dictation. EMERGENCY DEPARTMENT COURSE and DIFFERENTIAL DIAGNOSIS/MDM:   Silvia Zapata is a 63 y.o. female who presents to the emergency department with the complaint of facial swelling left side ongoing for last 2 days. Does have history of osteomyelitis of the jaw. She is afebrile on arrival, she was brought back to room she ambulated however was significantly tachypneic.   Stating that she also wants to be seen for shortness of breath as well as abdominal pain which are new complaints, her listed chief complaint on presentation to check and was facial swelling only. Patient was hooked up to SPO2 monitor initially was registering in the 80s however within several minutes she was in the mid 90s. Is prescribed oxygen however is noncompliant with this at home. On oropharyngeal exam mouth is moist uvula midline no obvious edema or peritonsillar swelling. Cannot palpate or visualize the swelling the patient had noted. No external cellulitis or erysipelas. Does have a history of osteotherefore will obtain CT facial bone. Due to the multiple complaints the patient is presenting with now covering multiple organ systems will obtain lab work including CBC CMP troponin lactic acid as well as a blood gas. Patient's laboratory work-up was reviewed my leukocytosis of 15.2, there is an area of new osteomyelitis seen on CT facial bone. Patient was started on broad-spectrum antibiotics, case were discussed with [facility], they reviewed her imaging, no indication based on osteomyelitis for transfer at this time recommending antibiotics, recommending clindamycin, plan is for outpatient follow-up with the [facility] at 10 AM this coming Monday, patient was provided with current information and address. Patient and significant other were made aware of this plan. Minimal elevated proBNP. The patient has maintained her sats during the emergency department, was ambulated off oxygen she maintained her sats down to 91, on oxygen 94%, she is prescribed oxygen for home just is noncompliant. No indication this time based on [facility] other lab work or imaging to admit patient her troponin was less than 0.01, nonischemic EKG.   Will discharge on clindamycin will plan to have patient follow-up with [facility] this coming Monday      CRITICAL CARE TIME   Total Critical Care time was 0 minutes, excluding separately

## 2021-03-28 LAB
EKG ATRIAL RATE: 80 BPM
EKG DIAGNOSIS: NORMAL
EKG P AXIS: 49 DEGREES
EKG P-R INTERVAL: 160 MS
EKG Q-T INTERVAL: 350 MS
EKG QRS DURATION: 86 MS
EKG QTC CALCULATION (BAZETT): 403 MS
EKG R AXIS: 8 DEGREES
EKG T AXIS: 22 DEGREES
EKG VENTRICULAR RATE: 80 BPM

## 2021-03-28 PROCEDURE — 93010 ELECTROCARDIOGRAM REPORT: CPT | Performed by: INTERNAL MEDICINE

## 2021-03-28 NOTE — ED NOTES
.Reviewed discharge instructions with patient. Patient verbalized understanding. No distress noted. Taken by wheelchair from department.      Celio Macario RN  03/27/21 [id number]

## 2021-03-28 NOTE — CONSULTS
Pharmacy to dose vancomycin to treat osteomyelitis providing Gram + coverage including MRSA. Based on patient HT, WT, renal function and diagnosis give:  Vancomycin 1750 MG x 1 dose in ER. If patient admitted, please have hospitalist enter new consult if vancomycin is to be continued. Thank you.   Danny Gil [facility]
EOAE (evoked otoacoustic emission)

## 2021-03-28 NOTE — ED NOTES
Dr. Ericka Johnson returned call and speaking with Dr. Lily Sheth.      Roxann Quezada  03/27/21 [id number]

## 2021-03-28 NOTE — ED NOTES
Call placed to transfer center for oral maxilla facial at  per verbal from Dr. Cardoso Boards.      Zainab Quezada  03/27/21 [id number]

## 2021-03-29 ENCOUNTER — CARE COORDINATION (OUTPATIENT)
Dept: CARE COORDINATION | Age: 61
End: 2021-03-29

## 2021-03-30 ENCOUNTER — CARE COORDINATION (OUTPATIENT)
Dept: CARE COORDINATION | Age: 61
End: 2021-03-30

## 2021-06-15 ENCOUNTER — CLINICAL DOCUMENTATION (OUTPATIENT)
Dept: OTHER | Age: 61
End: 2021-06-15

## 2021-06-30 ENCOUNTER — OFFICE VISIT (OUTPATIENT)
Dept: PULMONOLOGY | Age: 61
End: 2021-06-30
Payer: MEDICARE

## 2021-06-30 ENCOUNTER — HOSPITAL ENCOUNTER (OUTPATIENT)
Dept: CT IMAGING | Age: 61
Discharge: HOME | End: 2021-06-30
Payer: MEDICARE

## 2021-06-30 VITALS
OXYGEN SATURATION: 90 % | HEART RATE: 65 BPM | SYSTOLIC BLOOD PRESSURE: 117 MMHG | RESPIRATION RATE: 16 BRPM | TEMPERATURE: 98.1 F | WEIGHT: 244 LBS | BODY MASS INDEX: 40.6 KG/M2 | DIASTOLIC BLOOD PRESSURE: 73 MMHG

## 2021-06-30 DIAGNOSIS — R91.1 PULMONARY NODULE: ICD-10-CM

## 2021-06-30 DIAGNOSIS — Z87.891 PERSONAL HISTORY OF TOBACCO USE: ICD-10-CM

## 2021-06-30 DIAGNOSIS — J84.10 PULMONARY FIBROSIS (HCC): Primary | ICD-10-CM

## 2021-06-30 DIAGNOSIS — J84.9 ILD (INTERSTITIAL LUNG DISEASE) (HCC): ICD-10-CM

## 2021-06-30 PROCEDURE — 3017F COLORECTAL CA SCREEN DOC REV: CPT | Performed by: INTERNAL MEDICINE

## 2021-06-30 PROCEDURE — 99214 OFFICE O/P EST MOD 30 MIN: CPT | Performed by: INTERNAL MEDICINE

## 2021-06-30 PROCEDURE — 71250 CT THORAX DX C-: CPT

## 2021-06-30 PROCEDURE — G8427 DOCREV CUR MEDS BY ELIG CLIN: HCPCS | Performed by: INTERNAL MEDICINE

## 2021-06-30 PROCEDURE — 4004F PT TOBACCO SCREEN RCVD TLK: CPT | Performed by: INTERNAL MEDICINE

## 2021-06-30 PROCEDURE — G8417 CALC BMI ABV UP PARAM F/U: HCPCS | Performed by: INTERNAL MEDICINE

## 2021-06-30 ASSESSMENT — SLEEP AND FATIGUE QUESTIONNAIRES
HOW LIKELY ARE YOU TO NOD OFF OR FALL ASLEEP WHILE WATCHING TV: 2
HOW LIKELY ARE YOU TO NOD OFF OR FALL ASLEEP WHILE SITTING QUIETLY AFTER LUNCH WITHOUT ALCOHOL: 3
ESS TOTAL SCORE: 12
HOW LIKELY ARE YOU TO NOD OFF OR FALL ASLEEP IN A CAR, WHILE STOPPED FOR A FEW MINUTES IN TRAFFIC: 1
HOW LIKELY ARE YOU TO NOD OFF OR FALL ASLEEP WHEN YOU ARE A PASSENGER IN A CAR FOR AN HOUR WITHOUT A BREAK: 1
HOW LIKELY ARE YOU TO NOD OFF OR FALL ASLEEP WHILE LYING DOWN TO REST IN THE AFTERNOON WHEN CIRCUMSTANCES PERMIT: 2
HOW LIKELY ARE YOU TO NOD OFF OR FALL ASLEEP WHILE SITTING AND READING: 1
HOW LIKELY ARE YOU TO NOD OFF OR FALL ASLEEP WHILE SITTING INACTIVE IN A PUBLIC PLACE: 1
HOW LIKELY ARE YOU TO NOD OFF OR FALL ASLEEP WHILE SITTING AND TALKING TO SOMEONE: 1

## 2021-06-30 NOTE — PROGRESS NOTES
Pulmonary Follow-up Note  Chief Complaint: cough, shortness of breath   Referring Provider: hospital f/u    Interval history:   Feels pretty good, smoking about 1 ppd, on average 1 to 1.5 ppd over last 40 years. Planning knee replacement     Presenting history:     reports that she has been smoking cigarettes. She has a 35.00 pack-year smoking history. She has never used smokeless tobacco.     Review of Systems:    Physical Exam:  Blood pressure 117/73, pulse 65, temperature 98.1 °F (36.7 °C), temperature source Temporal, resp. rate 16, weight 244 lb (110.7 kg), SpO2 90 %, not currently breastfeeding. Constitutional:  No acute distress. HENT:  Oropharynx is clear and moist.   Neck: No tracheal deviation present. Cardiovascular: Normal heart sounds. No lower extremity edema. Pulmonary/Chest: No wheezes. No rhonchi. No rales. No decreased breath sounds. No accessory muscle usage or stridor. Musculoskeletal: No cyanosis. No clubbing. Skin: Skin is warm and dry. Psychiatric: Normal mood and affect. Neurologic: speech fluent, alert and oriented, strength symmetric            Data:   LDCT 6/23/21  Impression   Emphysema with stable noncalcified pulmonary nodules.  There is slight   improved aeration lungs with decreased ground-glass opacities       Underlying subpleural reticular opacities are seen with mild honeycombing   suggesting superimposed UIP.      HRCT 6/17/20  Scattered ground-glass opacities throughout the lungs bilaterally, new   compared to prior, favored to be postinflammatory-infectious.  Early   pulmonary edema could also have a similar appearance.       Stable underlying pulmonary fibrosis       Stable small perifissural nodules, likely intrapulmonary lymph nodes by   location       PFT 10/2/19 FEV1 2.01 L 69% ratio 0.8 TLC 3.82 L 66%  DLCO 10.2 40%  PFT 6/17/20 FEV1 2.05 L 71%     TLC 3.88 L 67% DLCO 12.55  PFT 12/23/20 FEV1 2.16 L 80%  TLC 4.03 L 75% DLCO 11.1 46%    ASSESSMENT:  Interstitial lung disease: abnormal CT chest with groundglass opacities which have waxed and waned, there is stable pulmonary fibrosis   Pulmonary Nodules 5 mm or smaller, stable July 2019 to June 2020, most likely perifissural lymph nodes  Mild intermittent reactive airway disease  H/O Polysubstance abuse  RLS - less severe with Wellbutrin  Chronic CHF  CAD  Ongoing tobacco abuse, working on quitting, 35 pack year history      PLAN:  Supplemental oxygen to maintain SaO2 >92%   Inhaled bronchodilators PRN, neb and albuterol. Tobacco cessation has been advised  Repeat PFT for Dec 2021, see me after  LDCT for LCS in June 2022  S/P COVID vaccine    Screening CT scan was considered in a lung cancer screening counseling and shared decision making visit today that included the following elements:   Eligibility: Age: 63. There are no signs or symptoms of lung cancer. Tobacco History 46 pack-years, quit zero years ago  Verbal counseling has been performed by me to include benefits and harms of screening, follow-up diagnostic testing, over-diagnosis, false positive rate, and total radiation exposure;   I have counseled on the importance of adherence to annual lung cancer LDCT screening, the impact of comorbidities and patient is willing to undergo diagnosis and treatment;   I have provided counseling on the importance of maintaining cigarette smoking abstinence if former smoker; or the importance of smoking cessation if current smoker and, if appropriate, furnishing of information about tobacco cessation interventions; and   I have furnished a written order for lung cancer screening with LDCT. Order for Screening chest CT scan should be placed with documentation as below:  Beneficiary date of birth;    Actual pack - year smoking history (number) from above;   Current smoking status, and for former smokers, the number of years since quitting smoking from above  Beneficiary is asymptomatic National Provider Identifier (NPI) for [facility] [id number]

## 2021-09-05 ENCOUNTER — APPOINTMENT (OUTPATIENT)
Dept: GENERAL RADIOLOGY | Age: 61
DRG: 853 | End: 2021-09-05
Payer: MEDICARE

## 2021-09-05 ENCOUNTER — HOSPITAL ENCOUNTER (INPATIENT)
Age: 61
LOS: 9 days | Discharge: ANOTHER HOSPITAL | DRG: 853 | End: 2021-09-15
Attending: EMERGENCY MEDICINE | Admitting: INTERNAL MEDICINE
Payer: MEDICARE

## 2021-09-05 ENCOUNTER — APPOINTMENT (OUTPATIENT)
Dept: CT IMAGING | Age: 61
DRG: 853 | End: 2021-09-05
Payer: MEDICARE

## 2021-09-05 DIAGNOSIS — M72.6 NECROTIZING FASCIITIS (HCC): Primary | ICD-10-CM

## 2021-09-05 DIAGNOSIS — M79.89 NECROTIZING SOFT TISSUE INFECTION: ICD-10-CM

## 2021-09-05 LAB
A/G RATIO: 0.6 (ref 1.1–2.2)
ALBUMIN SERPL-MCNC: 2.6 G/DL (ref 3.4–5)
ALP BLD-CCNC: 137 U/L (ref 40–129)
ALT SERPL-CCNC: 13 U/L (ref 10–40)
ANION GAP SERPL CALCULATED.3IONS-SCNC: 14 MMOL/L (ref 3–16)
AST SERPL-CCNC: 20 U/L (ref 15–37)
BASE EXCESS VENOUS: -0.1 MMOL/L (ref -3–3)
BASOPHILS ABSOLUTE: 0.1 K/UL (ref 0–0.2)
BASOPHILS RELATIVE PERCENT: 0.7 %
BILIRUB SERPL-MCNC: 0.4 MG/DL (ref 0–1)
BUN BLDV-MCNC: 24 MG/DL (ref 7–20)
CALCIUM SERPL-MCNC: 9.1 MG/DL (ref 8.3–10.6)
CARBOXYHEMOGLOBIN: 6.9 % (ref 0–1.5)
CHLORIDE BLD-SCNC: 93 MMOL/L (ref 99–110)
CO2: 22 MMOL/L (ref 21–32)
CREAT SERPL-MCNC: 1.9 MG/DL (ref 0.6–1.1)
D DIMER: 3090 NG/ML DDU (ref 0–229)
EOSINOPHILS ABSOLUTE: 0.1 K/UL (ref 0–0.6)
EOSINOPHILS RELATIVE PERCENT: 0.8 %
GFR AFRICAN AMERICAN: 33
GFR NON-AFRICAN AMERICAN: 27
GLOBULIN: 4.3 G/DL
GLUCOSE BLD-MCNC: 115 MG/DL (ref 70–99)
HCO3 VENOUS: 27.2 MMOL/L (ref 23–29)
HCT VFR BLD CALC: 39.9 % (ref 36–48)
HEMOGLOBIN: 13.1 G/DL (ref 12–16)
INFLUENZA A: NOT DETECTED
INFLUENZA B: NOT DETECTED
LACTIC ACID, SEPSIS: 2 MMOL/L (ref 0.4–1.9)
LYMPHOCYTES ABSOLUTE: 0.9 K/UL (ref 1–5.1)
LYMPHOCYTES RELATIVE PERCENT: 5.4 %
MCH RBC QN AUTO: 28.1 PG (ref 26–34)
MCHC RBC AUTO-ENTMCNC: 32.8 G/DL (ref 31–36)
MCV RBC AUTO: 85.8 FL (ref 80–100)
METHEMOGLOBIN VENOUS: 0.3 %
MONOCYTES ABSOLUTE: 0.5 K/UL (ref 0–1.3)
MONOCYTES RELATIVE PERCENT: 3.1 %
NEUTROPHILS ABSOLUTE: 15 K/UL (ref 1.7–7.7)
NEUTROPHILS RELATIVE PERCENT: 90 %
O2 SAT, VEN: 69 %
O2 THERAPY: ABNORMAL
PCO2, VEN: 56 MMHG (ref 40–50)
PDW BLD-RTO: 17.5 % (ref 12.4–15.4)
PH VENOUS: 7.3 (ref 7.35–7.45)
PLATELET # BLD: 216 K/UL (ref 135–450)
PMV BLD AUTO: 8.8 FL (ref 5–10.5)
PO2, VEN: 40.1 MMHG (ref 25–40)
POTASSIUM SERPL-SCNC: 4.8 MMOL/L (ref 3.5–5.1)
RBC # BLD: 4.65 M/UL (ref 4–5.2)
SARS-COV-2 RNA, RT PCR: NOT DETECTED
SODIUM BLD-SCNC: 129 MMOL/L (ref 136–145)
TCO2 CALC VENOUS: 29 MMOL/L
TOTAL PROTEIN: 6.9 G/DL (ref 6.4–8.2)
TROPONIN: <0.01 NG/ML
WBC # BLD: 16.6 K/UL (ref 4–11)

## 2021-09-05 PROCEDURE — 87636 SARSCOV2 & INF A&B AMP PRB: CPT

## 2021-09-05 PROCEDURE — 86850 RBC ANTIBODY SCREEN: CPT

## 2021-09-05 PROCEDURE — 6360000002 HC RX W HCPCS: Performed by: EMERGENCY MEDICINE

## 2021-09-05 PROCEDURE — 85025 COMPLETE CBC W/AUTO DIFF WBC: CPT

## 2021-09-05 PROCEDURE — 71045 X-RAY EXAM CHEST 1 VIEW: CPT

## 2021-09-05 PROCEDURE — 86900 BLOOD TYPING SEROLOGIC ABO: CPT

## 2021-09-05 PROCEDURE — 83605 ASSAY OF LACTIC ACID: CPT

## 2021-09-05 PROCEDURE — 96368 THER/DIAG CONCURRENT INF: CPT

## 2021-09-05 PROCEDURE — 6370000000 HC RX 637 (ALT 250 FOR IP): Performed by: EMERGENCY MEDICINE

## 2021-09-05 PROCEDURE — 87040 BLOOD CULTURE FOR BACTERIA: CPT

## 2021-09-05 PROCEDURE — 96365 THER/PROPH/DIAG IV INF INIT: CPT

## 2021-09-05 PROCEDURE — 86901 BLOOD TYPING SEROLOGIC RH(D): CPT

## 2021-09-05 PROCEDURE — 80053 COMPREHEN METABOLIC PANEL: CPT

## 2021-09-05 PROCEDURE — 2580000003 HC RX 258: Performed by: EMERGENCY MEDICINE

## 2021-09-05 PROCEDURE — 82803 BLOOD GASES ANY COMBINATION: CPT

## 2021-09-05 PROCEDURE — 74176 CT ABD & PELVIS W/O CONTRAST: CPT

## 2021-09-05 PROCEDURE — 84484 ASSAY OF TROPONIN QUANT: CPT

## 2021-09-05 PROCEDURE — 96367 TX/PROPH/DG ADDL SEQ IV INF: CPT

## 2021-09-05 PROCEDURE — 99285 EMERGENCY DEPT VISIT HI MDM: CPT

## 2021-09-05 PROCEDURE — 93005 ELECTROCARDIOGRAM TRACING: CPT | Performed by: EMERGENCY MEDICINE

## 2021-09-05 PROCEDURE — 2500000003 HC RX 250 WO HCPCS: Performed by: EMERGENCY MEDICINE

## 2021-09-05 PROCEDURE — 85379 FIBRIN DEGRADATION QUANT: CPT

## 2021-09-05 RX ORDER — PANTOPRAZOLE SODIUM 40 MG/1
40 TABLET, DELAYED RELEASE ORAL DAILY
COMMUNITY
Start: 2021-07-30

## 2021-09-05 RX ORDER — OXYCODONE HYDROCHLORIDE AND ACETAMINOPHEN 5; 325 MG/1; MG/1
1 TABLET ORAL ONCE
Status: COMPLETED | OUTPATIENT
Start: 2021-09-05 | End: 2021-09-05

## 2021-09-05 RX ORDER — CLINDAMYCIN PHOSPHATE 600 MG/50ML
600 INJECTION INTRAVENOUS ONCE
Status: DISCONTINUED | OUTPATIENT
Start: 2021-09-05 | End: 2021-09-05

## 2021-09-05 RX ORDER — 0.9 % SODIUM CHLORIDE 0.9 %
1000 INTRAVENOUS SOLUTION INTRAVENOUS ONCE
Status: COMPLETED | OUTPATIENT
Start: 2021-09-05 | End: 2021-09-05

## 2021-09-05 RX ORDER — ACETAMINOPHEN 325 MG/1
650 TABLET ORAL ONCE
Status: DISCONTINUED | OUTPATIENT
Start: 2021-09-05 | End: 2021-09-05

## 2021-09-05 RX ORDER — CLINDAMYCIN PHOSPHATE 900 MG/50ML
900 INJECTION INTRAVENOUS ONCE
Status: COMPLETED | OUTPATIENT
Start: 2021-09-05 | End: 2021-09-06

## 2021-09-05 RX ADMIN — CLINDAMYCIN IN 5 PERCENT DEXTROSE 900 MG: 18 INJECTION, SOLUTION INTRAVENOUS at 23:34

## 2021-09-05 RX ADMIN — CEFEPIME 2000 MG: 2 INJECTION, POWDER, FOR SOLUTION INTRAVENOUS at 19:37

## 2021-09-05 RX ADMIN — METRONIDAZOLE 500 MG: 500 INJECTION, SOLUTION INTRAVENOUS at 23:33

## 2021-09-05 RX ADMIN — SODIUM CHLORIDE 1000 ML: 9 INJECTION, SOLUTION INTRAVENOUS at 19:34

## 2021-09-05 RX ADMIN — VANCOMYCIN HYDROCHLORIDE 2000 MG: 10 INJECTION, POWDER, LYOPHILIZED, FOR SOLUTION INTRAVENOUS at 20:40

## 2021-09-05 RX ADMIN — OXYCODONE HYDROCHLORIDE AND ACETAMINOPHEN 1 TABLET: 5; 325 TABLET ORAL at 21:52

## 2021-09-05 ASSESSMENT — PAIN SCALES - GENERAL
PAINLEVEL_OUTOF10: 3
PAINLEVEL_OUTOF10: 10
PAINLEVEL_OUTOF10: 10

## 2021-09-05 NOTE — ED PROVIDER NOTES
injection 2 mg  2 mg IntraVENous Q5 Min PRN Blu Hoyt MD        oxyCODONE-acetaminophen (PERCOCET) 5-325 MG per tablet 1 tablet  1 tablet Oral PRN Blu Hoyt MD        Or    oxyCODONE-acetaminophen (PERCOCET) 5-325 MG per tablet 2 tablet  2 tablet Oral PRN Blu Hoyt MD        [facility] injection 4 mg  4 mg IntraVENous PRN Blu Hoyt MD        promethazine (PHENERGAN) injection 6.25 mg  6.25 mg IntraVENous Q15 Min PRN Blu Hoyt MD        diphenhydrAMINE (BENADRYL) injection 12.5 mg  12.5 mg IntraVENous Once PRN Blu Hoyt MD        labetalol (NORMODYNE;TRANDATE) injection 5 mg  5 mg IntraVENous Q10 Min PRN Blu Hoyt MD        hydrALAZINE (APRESOLINE) injection 5 mg  5 mg IntraVENous Q10 Min PRN Blu Hoyt MD        metronidazole (FLAGYL) 500 mg in NaCl 100 mL IVPB premix  500 mg IntraVENous Gracie Morales MD   Stopped at 09/06/21 0035     Allergies   Allergen Reactions    Lisinopril Swelling     Tongue swelling    Codeine      itchy    Cymbalta [Duloxetine Hcl] Other (See Comments)     SI thoughts     Duloxetine      Strong suicidal thoughts    Penicillins Itching     itchy    Sulfa Antibiotics      Unable to urinate       REVIEW OF SYSTEMS  10 systems reviewed, pertinent positives per HPI otherwise noted to be negative. PHYSICAL EXAM  /74   Pulse 66   Temp 98.5 °F (36.9 °C)   Resp 18   Ht 5' 7\" (1.702 m)   Wt 239 lb (108.4 kg)   SpO2 96%   BMI 37.43 kg/m²    GENERAL APPEARANCE: Drowsy. arousal to stimulation  HENT: Normocephalic. Atraumatic. PERRL. EOMI. No facial droop. HEART/CHEST: borderline hypotensive with sbp 90s. No tachycardia  LUNGS: Respirations unlabored. Speaking comfortably in full sentences. ABDOMEN: Soft, non-distended abdomen. Non tender to palpation. No guarding. No rebound. : 1cm ulcerated lesion with serosanguinous drainage and surrounding erythema and induration of the right buttock and perineum. EXTREMITIES: No gross deformities. Moving all extremities. SKIN: as above   NEUROLOGICAL: Drowsy. No gross facial drooping. Answering questions appropriately. Moving all extremities.     LABS  Results for orders placed or performed during the hospital encounter of 09/05/21   COVID-19 & Influenza Combo    Specimen: Nasopharyngeal Swab   Result Value Ref Range    SARS-CoV-2 RNA, RT PCR NOT DETECTED NOT DETECTED    INFLUENZA A NOT DETECTED NOT DETECTED    INFLUENZA B NOT DETECTED NOT DETECTED   Blood Gas, Venous   Result Value Ref Range    pH, Yohan 7.305 (L) 7.350 - 7.450    pCO2, Yohan 56.0 (H) 40.0 - 50.0 mmHg    pO2, Yohan 40.1 (H) 25 - 40 mmHg    HCO3, Venous 27.2 23.0 - 29.0 mmol/L    Base Excess, Yohan -0.1 -3.0 - 3.0 mmol/L    O2 Sat, Yohan 69 Not Established %    Carboxyhemoglobin 6.9 (H) 0.0 - 1.5 %    MetHgb, Yohan 0.3 <1.5 %    TC02 (Calc), Yohan 29 Not Established mmol/L    O2 Therapy Unknown    Comprehensive Metabolic Panel   Result Value Ref Range    Sodium 129 (L) 136 - 145 mmol/L    Potassium 4.8 3.5 - 5.1 mmol/L    Chloride 93 (L) 99 - 110 mmol/L    CO2 22 21 - 32 mmol/L    Anion Gap 14 3 - 16    Glucose 115 (H) 70 - 99 mg/dL    BUN 24 (H) 7 - 20 mg/dL    CREATININE 1.9 (H) 0.6 - 1.1 mg/dL    GFR Non-African American 27 (A) >60    GFR  33 (A) >60    Calcium 9.1 8.3 - 10.6 mg/dL    Total Protein 6.9 6.4 - 8.2 g/dL    Albumin 2.6 (L) 3.4 - 5.0 g/dL    Albumin/Globulin Ratio 0.6 (L) 1.1 - 2.2    Total Bilirubin 0.4 0.0 - 1.0 mg/dL    Alkaline Phosphatase 137 (H) 40 - 129 U/L    ALT 13 10 - 40 U/L    AST 20 15 - 37 U/L    Globulin 4.3 g/dL   CBC Auto Differential   Result Value Ref Range    WBC 16.6 (H) 4.0 - 11.0 K/uL    RBC 4.65 4.00 - 5.20 M/uL    Hemoglobin 13.1 12.0 - 16.0 g/dL    Hematocrit 39.9 36.0 - 48.0 %    MCV 85.8 80.0 - 100.0 fL    MCH 28.1 26.0 - 34.0 pg    MCHC 32.8 31.0 - 36.0 g/dL    RDW 17.5 (H) 12.4 - 15.4 %    Platelets 914 586 - 290 K/uL    MPV 8.8 5.0 - 10.5 fL    Neutrophils % 90.0 %    Lymphocytes % 5.4 %    Monocytes % 3.1 %    Eosinophils % 0.8 %    Basophils % 0.7 %    Neutrophils Absolute 15.0 (H) 1.7 - 7.7 K/uL    Lymphocytes Absolute 0.9 (L) 1.0 - 5.1 K/uL    Monocytes Absolute 0.5 0.0 - 1.3 K/uL    Eosinophils Absolute 0.1 0.0 - 0.6 K/uL    Basophils Absolute 0.1 0.0 - 0.2 K/uL   Lactate, Sepsis   Result Value Ref Range    Lactic Acid, Sepsis 2.0 (H) 0.4 - 1.9 mmol/L   Troponin   Result Value Ref Range    Troponin <0.01 <0.01 ng/mL   D-Dimer, Quantitative   Result Value Ref Range    D-Dimer, Quant 3090 (H) 0 - 229 ng/mL DDU   EKG 12 Lead   Result Value Ref Range    Ventricular Rate 70 BPM    Atrial Rate 70 BPM    P-R Interval 142 ms    QRS Duration 92 ms    Q-T Interval 372 ms    QTc Calculation (Bazett) 401 ms    P Axis 46 degrees    R Axis 0 degrees    T Axis 17 degrees    Diagnosis       Normal sinus rhythmNormal ECGWhen compared with ECG of 27-MAR-2021 19:57,No significant change was found   TYPE AND SCREEN   Result Value Ref Range    ABO/Rh A NEG     Antibody Screen NEG        I have reviewed all labs for this visit. ECG  The Ekg interpreted by me shows  Normal sinus rhythm with a rate of 70  Axis is normal  QTc is normal  Intervals and Durations are unremarkable. ST Segments: Nonspecific ST changes    RADIOLOGY  CT ABDOMEN PELVIS WO CONTRAST Additional Contrast? None    Result Date: 9/5/2021  EXAMINATION: CT OF THE ABDOMEN AND PELVIS WITHOUT CONTRAST 9/5/2021 9:20 pm TECHNIQUE: CT of the abdomen and pelvis was performed without the administration of intravenous contrast. Multiplanar reformatted images are provided for review. Dose modulation, iterative reconstruction, and/or weight based adjustment of the mA/kV was utilized to reduce the radiation dose to as low as reasonably achievable. COMPARISON: None.  HISTORY: ORDERING SYSTEM PROVIDED HISTORY: perirectal abscess TECHNOLOGIST PROVIDED HISTORY: Reason for exam:->perirectal abscess Additional Contrast?->None Decision Support Exception - unselect if not a suspected or confirmed emergency medical condition->Emergency Medical Condition (MA) Reason for Exam: perirectal abscess Acuity: Unknown Type of Exam: Unknown Additional signs and symptoms: abscess, GFR=27 FINDINGS: Lower Chest: There are hazy subpleural opacities along the lung bases posteriorly extending into the lingula inferiorly Organs: The liver and spleen are mildly enlarged. No focal lesion is seen. The gallbladder has been removed with clips in the gallbladder fossa. The bile ducts and pancreas are normal.  The adrenals are normal.  The kidneys are normal size with cortical scarring throughout. There is a 10 mm stone along the lower pole on the left which is unchanged. No masses are seen. The ureters are normal caliber. GI/Bowel: There is moderate feces scattered in the colon with no obstruction. The appendix is normal.  The mesentery is unremarkable. There are metallic prosthetic devices in both hips partially obscuring the lower pelvis. There are surgical clips along the right lower quadrant. Pelvis: The bladder is not well visualized. The uterus is not visualized with no adnexal mass or free fluid. The mesentery is unremarkable. Peritoneum/Retroperitoneum:   There is moderate calcified plaque throughout the aorta which is normal caliber with no retroperitoneal mass or adenopathy. Bones/Soft Tissues: There is subcutaneous air and stranding in the soft tissues in the right gluteal region posteriorly and extending anteriorly into the perineal soft tissues and anteriorly into the mons pubis and around the right inguinal region and lateral to the right hip this tracks into the subcutaneous soft tissues anterior and lateral to the underlying abdominal wall fascia. There is no focal fluid or air collection. The bones are intact.      Probable cellulitis with punctate collections of subcutaneous air in the soft tissues along the right gluteal x-ray obtained. Patient given vancomycin, cefepime for empiric antibiotics. She was given 1 L IV fluid bolus only chest x-ray indicative of possible heart failure. There was mild cardiomegaly and probable mild pulmonary interstitial edema. On reassessment, blood pressure improved. Patient's mentation improved. Creatinine is 1.9, which is an acute change compared to baseline at 1.0 from March 2021. As her GFR less than 30, I am unable to obtain CT scans with contrast. Instead of the CT PE scan, D-dimer obtained. CT abdomen pelvis was obtained without contrast. CT shows probable cellulitis with punctate collections of subcutaneous air in the soft tissues with the right gluteal regions inferiorly and extending anteriorly into the perineal region. Radiologist called due to concern for necrotizing fasciitis. Given this, clindamycin and metronidazole was added on. Type and screen obtained. General surgery consulted. Patient updated on the CT result. Patient initially did not want  to be notified. However, after some coaxing, she agreed. Attempted to get a hold of the  via the phone number listed on the demographics. Voicemail was left by patient's nurse and myself. I never received a call back from patient's . Patient evaluated by Dr. Amy Matthews at bedside. Patient sent the OR. Hospitalist was notified of the patient. To OR. I provided at least 70 minutes of critical care excluding separately billable procedures. Pt was seen during the [place] 19 pandemic. Appropriate PPE worn by ME during patient encounters. Pt seen during a time with constrained hospital bed capacity and other potential inpatient and outpatient resources were constrained due to the viral pandemic.      During the patient's ED course, the patient was given:  Medications   metronidazole (FLAGYL) 500 mg in NaCl 100 mL IVPB premix (0 mg IntraVENous Stopped 9/6/21 0035)   meperidine (DEMEROL) injection 12.5 mg (has no administration in time range)   HYDROmorphone (DILAUDID) injection 0.25 mg (has no administration in time range)   HYDROmorphone (DILAUDID) injection 0.5 mg (has no administration in time range)   morphine (PF) injection 1 mg (has no administration in time range)   morphine (PF) injection 2 mg (has no administration in time range)   oxyCODONE-acetaminophen (PERCOCET) 5-325 MG per tablet 1 tablet (has no administration in time range)     Or   oxyCODONE-acetaminophen (PERCOCET) 5-325 MG per tablet 2 tablet (has no administration in time range)   ondansetron (ZOFRAN) injection 4 mg (has no administration in time range)   promethazine (PHENERGAN) injection 6.25 mg (has no administration in time range)   diphenhydrAMINE (BENADRYL) injection 12.5 mg (has no administration in time range)   labetalol (NORMODYNE;TRANDATE) injection 5 mg (has no administration in time range)   hydrALAZINE (APRESOLINE) injection 5 mg (has no administration in time range)   0.9 % sodium chloride bolus (0 mLs IntraVENous Stopped 9/5/21 2041)   vancomycin (VANCOCIN) 2,000 mg in dextrose 5 % 500 mL IVPB (0 mg IntraVENous Stopped 9/5/21 2335)   cefepime (MAXIPIME) 2000 mg IVPB minibag (0 mg IntraVENous Stopped 9/5/21 2018)   oxyCODONE-acetaminophen (PERCOCET) 5-325 MG per tablet 1 tablet (1 tablet Oral Given 9/5/21 2152)   clindamycin (CLEOCIN) 900 mg in dextrose 5 % 50 mL IVPB (0 mg IntraVENous Stopped 9/6/21 0035)        CLINICAL IMPRESSION  1. Necrotizing fasciitis (HCC)        Blood pressure 117/74, pulse 66, temperature 98.5 °F (36.9 °C), resp. rate 18, height 5' 7\" (1.702 m), weight 239 lb (108.4 kg), SpO2 96 %, not currently breastfeeding. DISPOSITION  Jakob Kenny to OR     Patient was given scripts for the following medications. I counseled patient how to take these medications. Current Discharge Medication List          Follow-up with:  No follow-up provider specified.     DISCLAIMER: This chart was created using Dragon dictation software. Efforts were made by me to ensure accuracy, however some errors may be present due to limitations of this technology and occasionally words are not transcribed correctly.         Miko Butts MD  09/06/21 0437

## 2021-09-06 ENCOUNTER — ANESTHESIA EVENT (OUTPATIENT)
Dept: OPERATING ROOM | Age: 61
DRG: 853 | End: 2021-09-06
Payer: MEDICARE

## 2021-09-06 ENCOUNTER — ANESTHESIA (OUTPATIENT)
Dept: OPERATING ROOM | Age: 61
DRG: 853 | End: 2021-09-06
Payer: MEDICARE

## 2021-09-06 VITALS — OXYGEN SATURATION: 100 % | DIASTOLIC BLOOD PRESSURE: 70 MMHG | SYSTOLIC BLOOD PRESSURE: 112 MMHG

## 2021-09-06 PROBLEM — A41.9 SEVERE SEPSIS (HCC): Status: ACTIVE | Noted: 2021-09-06

## 2021-09-06 PROBLEM — R65.20 SEVERE SEPSIS (HCC): Status: ACTIVE | Noted: 2021-09-06

## 2021-09-06 PROBLEM — M79.89 NECROTIZING SOFT TISSUE INFECTION: Status: ACTIVE | Noted: 2021-09-06

## 2021-09-06 LAB
ABO/RH: NORMAL
ANION GAP SERPL CALCULATED.3IONS-SCNC: 9 MMOL/L (ref 3–16)
ANTIBODY SCREEN: NORMAL
ATYPICAL LYMPHOCYTE RELATIVE PERCENT: 1 % (ref 0–6)
BANDED NEUTROPHILS RELATIVE PERCENT: 16 % (ref 0–7)
BASOPHILS ABSOLUTE: 0 K/UL (ref 0–0.2)
BASOPHILS RELATIVE PERCENT: 0 %
BUN BLDV-MCNC: 25 MG/DL (ref 7–20)
CALCIUM SERPL-MCNC: 8.7 MG/DL (ref 8.3–10.6)
CHLORIDE BLD-SCNC: 98 MMOL/L (ref 99–110)
CO2: 23 MMOL/L (ref 21–32)
CREAT SERPL-MCNC: 1.6 MG/DL (ref 0.6–1.1)
EOSINOPHILS ABSOLUTE: 0 K/UL (ref 0–0.6)
EOSINOPHILS RELATIVE PERCENT: 0 %
GFR AFRICAN AMERICAN: 40
GFR NON-AFRICAN AMERICAN: 33
GLUCOSE BLD-MCNC: 131 MG/DL (ref 70–99)
HCT VFR BLD CALC: 35.4 % (ref 36–48)
HEMOGLOBIN: 11.3 G/DL (ref 12–16)
LYMPHOCYTES ABSOLUTE: 0.9 K/UL (ref 1–5.1)
LYMPHOCYTES RELATIVE PERCENT: 4 %
MAGNESIUM: 1.8 MG/DL (ref 1.8–2.4)
MCH RBC QN AUTO: 27.7 PG (ref 26–34)
MCHC RBC AUTO-ENTMCNC: 31.9 G/DL (ref 31–36)
MCV RBC AUTO: 86.8 FL (ref 80–100)
MONOCYTES ABSOLUTE: 1.1 K/UL (ref 0–1.3)
MONOCYTES RELATIVE PERCENT: 6 %
NEUTROPHILS ABSOLUTE: 16.7 K/UL (ref 1.7–7.7)
NEUTROPHILS RELATIVE PERCENT: 73 %
PDW BLD-RTO: 17.5 % (ref 12.4–15.4)
PHOSPHORUS: 5.1 MG/DL (ref 2.5–4.9)
PLATELET # BLD: 184 K/UL (ref 135–450)
PLATELET SLIDE REVIEW: ADEQUATE
PMV BLD AUTO: 8.7 FL (ref 5–10.5)
POTASSIUM SERPL-SCNC: 4.6 MMOL/L (ref 3.5–5.1)
RBC # BLD: 4.08 M/UL (ref 4–5.2)
SLIDE REVIEW: ABNORMAL
SODIUM BLD-SCNC: 130 MMOL/L (ref 136–145)
WBC # BLD: 18.8 K/UL (ref 4–11)

## 2021-09-06 PROCEDURE — 0KBN0ZZ EXCISION OF RIGHT HIP MUSCLE, OPEN APPROACH: ICD-10-PCS | Performed by: SURGERY

## 2021-09-06 PROCEDURE — 6360000002 HC RX W HCPCS: Performed by: INTERNAL MEDICINE

## 2021-09-06 PROCEDURE — 3700000000 HC ANESTHESIA ATTENDED CARE: Performed by: SURGERY

## 2021-09-06 PROCEDURE — 83735 ASSAY OF MAGNESIUM: CPT

## 2021-09-06 PROCEDURE — 2580000003 HC RX 258: Performed by: SURGERY

## 2021-09-06 PROCEDURE — 87205 SMEAR GRAM STAIN: CPT

## 2021-09-06 PROCEDURE — C9113 INJ PANTOPRAZOLE SODIUM, VIA: HCPCS | Performed by: INTERNAL MEDICINE

## 2021-09-06 PROCEDURE — 6370000000 HC RX 637 (ALT 250 FOR IP): Performed by: SURGERY

## 2021-09-06 PROCEDURE — 2500000003 HC RX 250 WO HCPCS: Performed by: ANESTHESIOLOGY

## 2021-09-06 PROCEDURE — 87076 CULTURE ANAEROBE IDENT EACH: CPT

## 2021-09-06 PROCEDURE — 6370000000 HC RX 637 (ALT 250 FOR IP): Performed by: INTERNAL MEDICINE

## 2021-09-06 PROCEDURE — 6360000002 HC RX W HCPCS: Performed by: SURGERY

## 2021-09-06 PROCEDURE — 3600000012 HC SURGERY LEVEL 2 ADDTL 15MIN: Performed by: SURGERY

## 2021-09-06 PROCEDURE — 1200000000 HC SEMI PRIVATE

## 2021-09-06 PROCEDURE — 87075 CULTR BACTERIA EXCEPT BLOOD: CPT

## 2021-09-06 PROCEDURE — 11006 DBRDMT SKIN XTRNL GENT PER: CPT | Performed by: SURGERY

## 2021-09-06 PROCEDURE — 7100000001 HC PACU RECOVERY - ADDTL 15 MIN: Performed by: SURGERY

## 2021-09-06 PROCEDURE — 85025 COMPLETE CBC W/AUTO DIFF WBC: CPT

## 2021-09-06 PROCEDURE — 87186 SC STD MICRODIL/AGAR DIL: CPT

## 2021-09-06 PROCEDURE — 3600000002 HC SURGERY LEVEL 2 BASE: Performed by: SURGERY

## 2021-09-06 PROCEDURE — 2580000003 HC RX 258: Performed by: INTERNAL MEDICINE

## 2021-09-06 PROCEDURE — 36415 COLL VENOUS BLD VENIPUNCTURE: CPT

## 2021-09-06 PROCEDURE — 2709999900 HC NON-CHARGEABLE SUPPLY: Performed by: SURGERY

## 2021-09-06 PROCEDURE — 99291 CRITICAL CARE FIRST HOUR: CPT | Performed by: SURGERY

## 2021-09-06 PROCEDURE — 7100000000 HC PACU RECOVERY - FIRST 15 MIN: Performed by: SURGERY

## 2021-09-06 PROCEDURE — 87077 CULTURE AEROBIC IDENTIFY: CPT

## 2021-09-06 PROCEDURE — 99223 1ST HOSP IP/OBS HIGH 75: CPT | Performed by: INTERNAL MEDICINE

## 2021-09-06 PROCEDURE — 80048 BASIC METABOLIC PNL TOTAL CA: CPT

## 2021-09-06 PROCEDURE — 94761 N-INVAS EAR/PLS OXIMETRY MLT: CPT

## 2021-09-06 PROCEDURE — 2500000003 HC RX 250 WO HCPCS: Performed by: SURGERY

## 2021-09-06 PROCEDURE — 6360000002 HC RX W HCPCS: Performed by: ANESTHESIOLOGY

## 2021-09-06 PROCEDURE — 2580000003 HC RX 258: Performed by: ANESTHESIOLOGY

## 2021-09-06 PROCEDURE — 0KBK0ZZ EXCISION OF RIGHT ABDOMEN MUSCLE, OPEN APPROACH: ICD-10-PCS | Performed by: SURGERY

## 2021-09-06 PROCEDURE — 3700000001 HC ADD 15 MINUTES (ANESTHESIA): Performed by: SURGERY

## 2021-09-06 PROCEDURE — 84100 ASSAY OF PHOSPHORUS: CPT

## 2021-09-06 PROCEDURE — 2700000000 HC OXYGEN THERAPY PER DAY

## 2021-09-06 PROCEDURE — 0KBM0ZZ EXCISION OF PERINEUM MUSCLE, OPEN APPROACH: ICD-10-PCS | Performed by: SURGERY

## 2021-09-06 PROCEDURE — 87070 CULTURE OTHR SPECIMN AEROBIC: CPT

## 2021-09-06 RX ORDER — IPRATROPIUM BROMIDE AND ALBUTEROL SULFATE 2.5; .5 MG/3ML; MG/3ML
1 SOLUTION RESPIRATORY (INHALATION) EVERY 4 HOURS PRN
Status: DISCONTINUED | OUTPATIENT
Start: 2021-09-06 | End: 2021-09-15 | Stop reason: HOSPADM

## 2021-09-06 RX ORDER — PROPOFOL 10 MG/ML
INJECTION, EMULSION INTRAVENOUS PRN
Status: DISCONTINUED | OUTPATIENT
Start: 2021-09-06 | End: 2021-09-06 | Stop reason: SDUPTHER

## 2021-09-06 RX ORDER — SODIUM CHLORIDE, SODIUM LACTATE, POTASSIUM CHLORIDE, CALCIUM CHLORIDE 600; 310; 30; 20 MG/100ML; MG/100ML; MG/100ML; MG/100ML
INJECTION, SOLUTION INTRAVENOUS CONTINUOUS PRN
Status: DISCONTINUED | OUTPATIENT
Start: 2021-09-06 | End: 2021-09-06 | Stop reason: SDUPTHER

## 2021-09-06 RX ORDER — 0.9 % SODIUM CHLORIDE 0.9 %
500 INTRAVENOUS SOLUTION INTRAVENOUS
Status: DISCONTINUED | OUTPATIENT
Start: 2021-09-06 | End: 2021-09-15 | Stop reason: HOSPADM

## 2021-09-06 RX ORDER — OXYCODONE HYDROCHLORIDE 5 MG/1
5 TABLET ORAL EVERY 4 HOURS PRN
Status: DISCONTINUED | OUTPATIENT
Start: 2021-09-06 | End: 2021-09-15 | Stop reason: HOSPADM

## 2021-09-06 RX ORDER — DEXAMETHASONE SODIUM PHOSPHATE 4 MG/ML
INJECTION, SOLUTION INTRA-ARTICULAR; INTRALESIONAL; INTRAMUSCULAR; INTRAVENOUS; SOFT TISSUE PRN
Status: DISCONTINUED | OUTPATIENT
Start: 2021-09-06 | End: 2021-09-06 | Stop reason: SDUPTHER

## 2021-09-06 RX ORDER — OXYCODONE HYDROCHLORIDE AND ACETAMINOPHEN 5; 325 MG/1; MG/1
2 TABLET ORAL PRN
Status: DISCONTINUED | OUTPATIENT
Start: 2021-09-06 | End: 2021-09-06 | Stop reason: HOSPADM

## 2021-09-06 RX ORDER — SODIUM CHLORIDE 9 MG/ML
INJECTION, SOLUTION INTRAVENOUS CONTINUOUS
Status: DISCONTINUED | OUTPATIENT
Start: 2021-09-06 | End: 2021-09-08

## 2021-09-06 RX ORDER — GABAPENTIN 300 MG/1
300 CAPSULE ORAL 3 TIMES DAILY
Status: DISCONTINUED | OUTPATIENT
Start: 2021-09-06 | End: 2021-09-15 | Stop reason: HOSPADM

## 2021-09-06 RX ORDER — MEPERIDINE HYDROCHLORIDE 25 MG/ML
12.5 INJECTION INTRAMUSCULAR; INTRAVENOUS; SUBCUTANEOUS EVERY 5 MIN PRN
Status: DISCONTINUED | OUTPATIENT
Start: 2021-09-06 | End: 2021-09-06 | Stop reason: HOSPADM

## 2021-09-06 RX ORDER — DIPHENHYDRAMINE HYDROCHLORIDE 50 MG/ML
12.5 INJECTION INTRAMUSCULAR; INTRAVENOUS
Status: DISCONTINUED | OUTPATIENT
Start: 2021-09-06 | End: 2021-09-06 | Stop reason: HOSPADM

## 2021-09-06 RX ORDER — OXYCODONE HYDROCHLORIDE 5 MG/1
10 TABLET ORAL EVERY 4 HOURS PRN
Status: DISCONTINUED | OUTPATIENT
Start: 2021-09-06 | End: 2021-09-15 | Stop reason: HOSPADM

## 2021-09-06 RX ORDER — ROCURONIUM BROMIDE 10 MG/ML
INJECTION, SOLUTION INTRAVENOUS PRN
Status: DISCONTINUED | OUTPATIENT
Start: 2021-09-06 | End: 2021-09-06 | Stop reason: SDUPTHER

## 2021-09-06 RX ORDER — METOPROLOL TARTRATE 50 MG/1
100 TABLET, FILM COATED ORAL 2 TIMES DAILY
Status: DISCONTINUED | OUTPATIENT
Start: 2021-09-06 | End: 2021-09-06

## 2021-09-06 RX ORDER — PROMETHAZINE HYDROCHLORIDE 25 MG/ML
6.25 INJECTION, SOLUTION INTRAMUSCULAR; INTRAVENOUS
Status: DISCONTINUED | OUTPATIENT
Start: 2021-09-06 | End: 2021-09-06 | Stop reason: HOSPADM

## 2021-09-06 RX ORDER — ALBUTEROL SULFATE 90 UG/1
2 AEROSOL, METERED RESPIRATORY (INHALATION) 4 TIMES DAILY
Status: DISCONTINUED | OUTPATIENT
Start: 2021-09-06 | End: 2021-09-06

## 2021-09-06 RX ORDER — LABETALOL HYDROCHLORIDE 5 MG/ML
5 INJECTION, SOLUTION INTRAVENOUS EVERY 10 MIN PRN
Status: DISCONTINUED | OUTPATIENT
Start: 2021-09-06 | End: 2021-09-06 | Stop reason: HOSPADM

## 2021-09-06 RX ORDER — MORPHINE SULFATE 2 MG/ML
1 INJECTION, SOLUTION INTRAMUSCULAR; INTRAVENOUS EVERY 5 MIN PRN
Status: DISCONTINUED | OUTPATIENT
Start: 2021-09-06 | End: 2021-09-06 | Stop reason: HOSPADM

## 2021-09-06 RX ORDER — ALBUTEROL SULFATE 90 UG/1
2 AEROSOL, METERED RESPIRATORY (INHALATION) EVERY 4 HOURS PRN
Status: DISCONTINUED | OUTPATIENT
Start: 2021-09-06 | End: 2021-09-06

## 2021-09-06 RX ORDER — OXYBUTYNIN CHLORIDE 5 MG/1
5 TABLET ORAL 3 TIMES DAILY
Status: DISCONTINUED | OUTPATIENT
Start: 2021-09-06 | End: 2021-09-15 | Stop reason: HOSPADM

## 2021-09-06 RX ORDER — MAGNESIUM HYDROXIDE 1200 MG/15ML
LIQUID ORAL CONTINUOUS PRN
Status: COMPLETED | OUTPATIENT
Start: 2021-09-06 | End: 2021-09-06

## 2021-09-06 RX ORDER — ONDANSETRON 2 MG/ML
INJECTION INTRAMUSCULAR; INTRAVENOUS PRN
Status: DISCONTINUED | OUTPATIENT
Start: 2021-09-06 | End: 2021-09-06 | Stop reason: SDUPTHER

## 2021-09-06 RX ORDER — OXYCODONE HYDROCHLORIDE AND ACETAMINOPHEN 5; 325 MG/1; MG/1
1 TABLET ORAL PRN
Status: DISCONTINUED | OUTPATIENT
Start: 2021-09-06 | End: 2021-09-06 | Stop reason: HOSPADM

## 2021-09-06 RX ORDER — NICOTINE 21 MG/24HR
1 PATCH, TRANSDERMAL 24 HOURS TRANSDERMAL DAILY
Status: DISCONTINUED | OUTPATIENT
Start: 2021-09-06 | End: 2021-09-15 | Stop reason: HOSPADM

## 2021-09-06 RX ORDER — ONDANSETRON 2 MG/ML
4 INJECTION INTRAMUSCULAR; INTRAVENOUS EVERY 6 HOURS PRN
Status: DISCONTINUED | OUTPATIENT
Start: 2021-09-06 | End: 2021-09-15 | Stop reason: HOSPADM

## 2021-09-06 RX ORDER — ALBUTEROL SULFATE 2.5 MG/3ML
2.5 SOLUTION RESPIRATORY (INHALATION) EVERY 6 HOURS PRN
Status: DISCONTINUED | OUTPATIENT
Start: 2021-09-06 | End: 2021-09-06

## 2021-09-06 RX ORDER — IPRATROPIUM BROMIDE AND ALBUTEROL SULFATE 2.5; .5 MG/3ML; MG/3ML
3 SOLUTION RESPIRATORY (INHALATION) EVERY 4 HOURS PRN
Status: DISCONTINUED | OUTPATIENT
Start: 2021-09-06 | End: 2021-09-15 | Stop reason: HOSPADM

## 2021-09-06 RX ORDER — PANTOPRAZOLE SODIUM 40 MG/10ML
40 INJECTION, POWDER, LYOPHILIZED, FOR SOLUTION INTRAVENOUS DAILY
Status: DISCONTINUED | OUTPATIENT
Start: 2021-09-06 | End: 2021-09-15 | Stop reason: HOSPADM

## 2021-09-06 RX ORDER — SPIRONOLACTONE 25 MG/1
25 TABLET ORAL DAILY
Status: DISCONTINUED | OUTPATIENT
Start: 2021-09-06 | End: 2021-09-06

## 2021-09-06 RX ORDER — CLINDAMYCIN PHOSPHATE 600 MG/50ML
600 INJECTION INTRAVENOUS EVERY 8 HOURS
Status: DISCONTINUED | OUTPATIENT
Start: 2021-09-06 | End: 2021-09-13

## 2021-09-06 RX ORDER — ROPINIROLE 1 MG/1
1 TABLET, FILM COATED ORAL 3 TIMES DAILY
Status: DISCONTINUED | OUTPATIENT
Start: 2021-09-06 | End: 2021-09-08

## 2021-09-06 RX ORDER — ONDANSETRON 2 MG/ML
4 INJECTION INTRAMUSCULAR; INTRAVENOUS PRN
Status: DISCONTINUED | OUTPATIENT
Start: 2021-09-06 | End: 2021-09-06 | Stop reason: HOSPADM

## 2021-09-06 RX ORDER — ESCITALOPRAM OXALATE 10 MG/1
10 TABLET ORAL DAILY
Status: DISCONTINUED | OUTPATIENT
Start: 2021-09-06 | End: 2021-09-15 | Stop reason: HOSPADM

## 2021-09-06 RX ORDER — MORPHINE SULFATE 2 MG/ML
2 INJECTION, SOLUTION INTRAMUSCULAR; INTRAVENOUS EVERY 5 MIN PRN
Status: DISCONTINUED | OUTPATIENT
Start: 2021-09-06 | End: 2021-09-06 | Stop reason: HOSPADM

## 2021-09-06 RX ORDER — HYDRALAZINE HYDROCHLORIDE 20 MG/ML
5 INJECTION INTRAMUSCULAR; INTRAVENOUS EVERY 10 MIN PRN
Status: DISCONTINUED | OUTPATIENT
Start: 2021-09-06 | End: 2021-09-06 | Stop reason: HOSPADM

## 2021-09-06 RX ADMIN — OXYCODONE HYDROCHLORIDE 10 MG: 5 TABLET ORAL at 22:33

## 2021-09-06 RX ADMIN — ROCURONIUM BROMIDE 50 MG: 10 INJECTION, SOLUTION INTRAVENOUS at 01:13

## 2021-09-06 RX ADMIN — CLINDAMYCIN PHOSPHATE 600 MG: 600 INJECTION, SOLUTION INTRAVENOUS at 06:54

## 2021-09-06 RX ADMIN — ONDANSETRON 4 MG: 2 INJECTION, SOLUTION INTRAMUSCULAR; INTRAVENOUS at 01:13

## 2021-09-06 RX ADMIN — OXYBUTYNIN CHLORIDE 5 MG: 5 TABLET ORAL at 08:16

## 2021-09-06 RX ADMIN — ENOXAPARIN SODIUM 40 MG: 40 INJECTION SUBCUTANEOUS at 01:16

## 2021-09-06 RX ADMIN — GABAPENTIN 300 MG: 300 CAPSULE ORAL at 20:19

## 2021-09-06 RX ADMIN — ESCITALOPRAM OXALATE 10 MG: 10 TABLET ORAL at 08:16

## 2021-09-06 RX ADMIN — PANTOPRAZOLE SODIUM 40 MG: 40 INJECTION, POWDER, FOR SOLUTION INTRAVENOUS at 08:15

## 2021-09-06 RX ADMIN — ROPINIROLE HYDROCHLORIDE 1 MG: 1 TABLET, FILM COATED ORAL at 20:19

## 2021-09-06 RX ADMIN — SODIUM CHLORIDE: 9 INJECTION, SOLUTION INTRAVENOUS at 03:28

## 2021-09-06 RX ADMIN — ROPINIROLE HYDROCHLORIDE 1 MG: 1 TABLET, FILM COATED ORAL at 08:16

## 2021-09-06 RX ADMIN — PROPOFOL 140 MG: 10 INJECTION, EMULSION INTRAVENOUS at 01:13

## 2021-09-06 RX ADMIN — OXYBUTYNIN CHLORIDE 5 MG: 5 TABLET ORAL at 20:19

## 2021-09-06 RX ADMIN — ROPINIROLE HYDROCHLORIDE 1 MG: 1 TABLET, FILM COATED ORAL at 15:44

## 2021-09-06 RX ADMIN — MEROPENEM 500 MG: 500 INJECTION, POWDER, FOR SOLUTION INTRAVENOUS at 03:31

## 2021-09-06 RX ADMIN — SODIUM CHLORIDE: 9 INJECTION, SOLUTION INTRAVENOUS at 22:34

## 2021-09-06 RX ADMIN — MEROPENEM 500 MG: 500 INJECTION, POWDER, FOR SOLUTION INTRAVENOUS at 20:18

## 2021-09-06 RX ADMIN — OXYCODONE HYDROCHLORIDE 5 MG: 5 TABLET ORAL at 12:39

## 2021-09-06 RX ADMIN — OXYCODONE HYDROCHLORIDE 10 MG: 5 TABLET ORAL at 18:28

## 2021-09-06 RX ADMIN — HYDROMORPHONE HYDROCHLORIDE 0.5 MG: 1 INJECTION, SOLUTION INTRAMUSCULAR; INTRAVENOUS; SUBCUTANEOUS at 23:32

## 2021-09-06 RX ADMIN — SUGAMMADEX 200 MG: 100 INJECTION, SOLUTION INTRAVENOUS at 02:02

## 2021-09-06 RX ADMIN — METOPROLOL TARTRATE 25 MG: 25 TABLET, FILM COATED ORAL at 20:19

## 2021-09-06 RX ADMIN — SODIUM CHLORIDE, POTASSIUM CHLORIDE, SODIUM LACTATE AND CALCIUM CHLORIDE: 600; 310; 30; 20 INJECTION, SOLUTION INTRAVENOUS at 01:13

## 2021-09-06 RX ADMIN — DEXAMETHASONE SODIUM PHOSPHATE 8 MG: 4 INJECTION, SOLUTION INTRAMUSCULAR; INTRAVENOUS at 01:13

## 2021-09-06 RX ADMIN — OXYBUTYNIN CHLORIDE 5 MG: 5 TABLET ORAL at 15:43

## 2021-09-06 RX ADMIN — HYDROMORPHONE HYDROCHLORIDE 0.5 MG: 1 INJECTION, SOLUTION INTRAMUSCULAR; INTRAVENOUS; SUBCUTANEOUS at 16:26

## 2021-09-06 RX ADMIN — GABAPENTIN 300 MG: 300 CAPSULE ORAL at 08:16

## 2021-09-06 RX ADMIN — HYDROMORPHONE HYDROCHLORIDE 0.5 MG: 1 INJECTION, SOLUTION INTRAMUSCULAR; INTRAVENOUS; SUBCUTANEOUS at 20:33

## 2021-09-06 RX ADMIN — PROPOFOL 30 MG: 10 INJECTION, EMULSION INTRAVENOUS at 01:59

## 2021-09-06 RX ADMIN — MEROPENEM 500 MG: 500 INJECTION, POWDER, FOR SOLUTION INTRAVENOUS at 14:02

## 2021-09-06 RX ADMIN — VANCOMYCIN HYDROCHLORIDE 1250 MG: 10 INJECTION, POWDER, LYOPHILIZED, FOR SOLUTION INTRAVENOUS at 23:35

## 2021-09-06 RX ADMIN — GABAPENTIN 300 MG: 300 CAPSULE ORAL at 15:43

## 2021-09-06 RX ADMIN — CLINDAMYCIN PHOSPHATE 600 MG: 600 INJECTION, SOLUTION INTRAVENOUS at 15:47

## 2021-09-06 ASSESSMENT — PULMONARY FUNCTION TESTS
PIF_VALUE: 0
PIF_VALUE: 1
PIF_VALUE: 3
PIF_VALUE: 21
PIF_VALUE: 2
PIF_VALUE: 23
PIF_VALUE: 21
PIF_VALUE: 20
PIF_VALUE: 24
PIF_VALUE: 24
PIF_VALUE: 23
PIF_VALUE: 22
PIF_VALUE: 22
PIF_VALUE: 0
PIF_VALUE: 23
PIF_VALUE: 23
PIF_VALUE: 0
PIF_VALUE: 19
PIF_VALUE: 0
PIF_VALUE: 24
PIF_VALUE: 25
PIF_VALUE: 5
PIF_VALUE: 22
PIF_VALUE: 22
PIF_VALUE: 18
PIF_VALUE: 22
PIF_VALUE: 21
PIF_VALUE: 26
PIF_VALUE: 0
PIF_VALUE: 23
PIF_VALUE: 4
PIF_VALUE: 23
PIF_VALUE: 23
PIF_VALUE: 0
PIF_VALUE: 21
PIF_VALUE: 24
PIF_VALUE: 22
PIF_VALUE: 22
PIF_VALUE: 23
PIF_VALUE: 24
PIF_VALUE: 24
PIF_VALUE: 2
PIF_VALUE: 22
PIF_VALUE: 24
PIF_VALUE: 24
PIF_VALUE: 23
PIF_VALUE: 25
PIF_VALUE: 23
PIF_VALUE: 23
PIF_VALUE: 24
PIF_VALUE: 23
PIF_VALUE: 24
PIF_VALUE: 0
PIF_VALUE: 22
PIF_VALUE: 6
PIF_VALUE: 23
PIF_VALUE: 26
PIF_VALUE: 12
PIF_VALUE: 23
PIF_VALUE: 23
PIF_VALUE: 0
PIF_VALUE: 23
PIF_VALUE: 18
PIF_VALUE: 19
PIF_VALUE: 25
PIF_VALUE: 23
PIF_VALUE: 27
PIF_VALUE: 24
PIF_VALUE: 1
PIF_VALUE: 24
PIF_VALUE: 0
PIF_VALUE: 24
PIF_VALUE: 19
PIF_VALUE: 22
PIF_VALUE: 24
PIF_VALUE: 19

## 2021-09-06 ASSESSMENT — PAIN SCALES - GENERAL
PAINLEVEL_OUTOF10: 0
PAINLEVEL_OUTOF10: 4
PAINLEVEL_OUTOF10: 8
PAINLEVEL_OUTOF10: 10
PAINLEVEL_OUTOF10: 0
PAINLEVEL_OUTOF10: 10
PAINLEVEL_OUTOF10: 8
PAINLEVEL_OUTOF10: 5
PAINLEVEL_OUTOF10: 10
PAINLEVEL_OUTOF10: 8
PAINLEVEL_OUTOF10: 0
PAINLEVEL_OUTOF10: 0

## 2021-09-06 ASSESSMENT — PAIN DESCRIPTION - ORIENTATION: ORIENTATION: LOWER

## 2021-09-06 ASSESSMENT — PAIN DESCRIPTION - LOCATION: LOCATION: BACK

## 2021-09-06 ASSESSMENT — PAIN DESCRIPTION - PAIN TYPE: TYPE: SURGICAL PAIN

## 2021-09-06 NOTE — PROGRESS NOTES
Blood pressure 132/74, pulse 71, temperature 97 °F (36.1 °C), resp. rate 14, height 5' 7\" (1.702 m), weight 239 lb (108.4 kg), SpO2 96 %, not currently breastfeeding. Patient was brought to unit by 2 OR nurses. Patient is alert but sleepy. No complaints of pain has been voiced at this time. Patient is on 2 L O2 via NC. LCTA. No coughing noted. Abdomen soft. BS + x 4 quads. Surgical dressing in place to lower abdomen, [place] area and buttocks. Dressing were note removed. No edema noted. All medication given per order. Patient now resting comfortably with call light in reach.      Electronically signed by Hayden Hdz RN on 9/6/2021 at 3:33 AM

## 2021-09-06 NOTE — H&P
Hospital Medicine History & Physical      PCP: Rashawn Sandhu    Date of Service: Pt seen/examined on 9/6/21 and admitted on 9/6/21 to Inpatient    Chief Complaint   Patient presents with    Abscess     Pt states that she \"has a big boil on my butt, well actually it is closer to my coochie. \"       History Of Present Illness: The patient is a 63 y.o. female with PMH below, presents with boil on her butt which started draining but still spreading to butt/groin, region painful. Pt reports that she started having pain of her R buttock about a week ago. She noted a boil on her R buttock at the time. She says she tried using Neosporin on it but it continued to worsen/spread and become more painful. She says it was originally on her R buttock but now feels darline it is more near her \"coochie. \"  She says the area has been draining. Entire buttock, hip groin is severely painful. She was emergently taken to the OR by Dr. Kristie Ford. Past Medical History:        Diagnosis Date    Class 2 obesity with serious comorbidity and body mass index (BMI) of 39.0 to 39.9 in adult 9/14/2018    Depression     History of left hip replacement 02/12/2018    Hyperlipidemia     Hypertension     Kidney stone     MRSA (methicillin resistant staph aureus) culture positive 07/27/2019    nasal colonization       Past Surgical History:        Procedure Laterality Date    BRONCHOSCOPY  08/08/2019    BRONCHOSCOPY N/A 8/8/2019    BRONCHOSCOPY ALVEOLAR LAVAGE performed by Errol Selby MD at [facility]      FOOT SURGERY      HYSTERECTOMY      JOINT REPLACEMENT      KNEE SURGERY      TOTAL HIP ARTHROPLASTY Right 08/2020       Medications Prior to Admission:    Prior to Admission medications    Medication Sig Start Date End Date Taking?  Authorizing Provider   pantoprazole (PROTONIX) 40 MG tablet Take 40 mg by mouth daily 7/30/21  Yes Historical Provider, MD oxyCODONE-acetaminophen (PERCOCET) 5-325 MG per tablet Take 1 tablet by mouth every 4 hours as needed for Pain. Yes Historical Provider, MD   albuterol (PROVENTIL) (2.5 MG/3ML) 0.083% nebulizer solution Take 3 mLs by nebulization every 6 hours as needed for Wheezing 6/18/20  Yes Jono Hair MD   escitalopram (LEXAPRO) 10 MG tablet Take 10 mg by mouth daily 1/21/20  Yes Historical Provider, MD   furosemide (LASIX) 40 MG tablet Take 1 tablet by mouth daily 9/18/18  Yes Latisha Stark MD   spironolactone (ALDACTONE) 25 MG tablet Take 1 tablet by mouth daily 9/18/18  Yes Latisha Stark MD   albuterol sulfate  (90 Base) MCG/ACT inhaler Inhale 2 puffs into the lungs 4 times daily 8/10/17  Yes Latisha Stark MD   oxybutynin (DITROPAN) 5 MG tablet Take 5 mg by mouth 3 times daily   Yes Historical Provider, MD   tiZANidine (ZANAFLEX) 4 MG tablet Take 4 mg by mouth 2 times daily as needed   Yes Historical Provider, MD   metoprolol (LOPRESSOR) 100 MG tablet Take 100 mg by mouth 2 times daily   Yes Historical Provider, MD   rOPINIRole (REQUIP) 2 MG tablet Take 2 mg by mouth 4 times daily    Yes Historical Provider, MD   gabapentin (NEURONTIN) 300 MG capsule Take 300 mg by mouth 4 times daily. Yes Historical Provider, MD   atorvastatin (LIPITOR) 20 MG tablet Take 40 mg by mouth daily    Yes Historical Provider, MD   acetaminophen (TYLENOL) 500 MG tablet Take 1 tablet by mouth 4 times daily as needed for Pain 12/7/19   Paty Mas PA-C   ipratropium-albuterol (DUONEB) 0.5-2.5 (3) MG/3ML SOLN nebulizer solution Inhale 3 mLs into the lungs every 4 hours as needed for Shortness of Breath 8/1/19   Latisha Stark MD   omeprazole (PRILOSEC) 20 MG capsule Take 40 mg by mouth Daily     Historical Provider, MD       Allergies:  Lisinopril, Codeine, Cymbalta [duloxetine hcl], Duloxetine, Penicillins, and Sulfa antibiotics    Social History:    TOBACCO:   reports that she has been smoking cigarettes.  She has a 35.00 pack-year smoking history. She has never used smokeless tobacco.  ETOH:   reports current alcohol use. Family History:  Reviewed in detail and negative for DM, Early CAD, Cancer (except as below). Positive as follows:    History reviewed. No pertinent family history. REVIEW OF SYSTEMS:   Pertinent positives/negatives as follows: boil on her butt which started draining but still spreading to butt/groin, region painful, and as discussed in HPI, otherwise a complete ROS performed and all other systems are negative. PHYSICAL EXAM PERFORMED:  /74   Pulse 66   Temp 98.5 °F (36.9 °C)   Resp 18   Ht 5' 7\" (1.702 m)   Wt 239 lb (108.4 kg)   SpO2 96%   BMI 37.43 kg/m²     GEN:  Somnolent but awakens to name. NAD. HEENT:  NC/AT,EOMI, semi dry MM, no erythema/exudates or visible masses. CVS:  Normal S1,S2. RRR. Without M/G/R.   LUNG:   CTA-B. no wheezes, rales or rhonchi  ABD:  Soft, ND/NT, BS+ x4. Without G/R.  EXT: 2+ pulses, no c/c/e. Brisk cap refill. PSY:  Thought process somnolent, affect somnolent. ANA MARÍA:  CN III-XII intact, moves all 4 spontaneously, sensory grossly intact. SKIN: Post op dressing over R buttock/perineal region. Chart review shows recent radiographs:  CT ABDOMEN PELVIS WO CONTRAST Additional Contrast? None    Result Date: 9/5/2021  EXAMINATION: CT OF THE ABDOMEN AND PELVIS WITHOUT CONTRAST 9/5/2021 9:20 pm TECHNIQUE: CT of the abdomen and pelvis was performed without the administration of intravenous contrast. Multiplanar reformatted images are provided for review. Dose modulation, iterative reconstruction, and/or weight based adjustment of the mA/kV was utilized to reduce the radiation dose to as low as reasonably achievable. COMPARISON: None.  HISTORY: ORDERING SYSTEM PROVIDED HISTORY: perirectal abscess TECHNOLOGIST PROVIDED HISTORY: Reason for exam:->perirectal abscess Additional Contrast?->None Decision Support Exception - unselect if not a suspected or confirmed emergency medical condition->Emergency Medical Condition (MA) Reason for Exam: perirectal abscess Acuity: Unknown Type of Exam: Unknown Additional signs and symptoms: abscess, GFR=27 FINDINGS: Lower Chest: There are hazy subpleural opacities along the lung bases posteriorly extending into the lingula inferiorly Organs: The liver and spleen are mildly enlarged. No focal lesion is seen. The gallbladder has been removed with clips in the gallbladder fossa. The bile ducts and pancreas are normal.  The adrenals are normal.  The kidneys are normal size with cortical scarring throughout. There is a 10 mm stone along the lower pole on the left which is unchanged. No masses are seen. The ureters are normal caliber. GI/Bowel: There is moderate feces scattered in the colon with no obstruction. The appendix is normal.  The mesentery is unremarkable. There are metallic prosthetic devices in both hips partially obscuring the lower pelvis. There are surgical clips along the right lower quadrant. Pelvis: The bladder is not well visualized. The uterus is not visualized with no adnexal mass or free fluid. The mesentery is unremarkable. Peritoneum/Retroperitoneum:   There is moderate calcified plaque throughout the aorta which is normal caliber with no retroperitoneal mass or adenopathy. Bones/Soft Tissues: There is subcutaneous air and stranding in the soft tissues in the right gluteal region posteriorly and extending anteriorly into the perineal soft tissues and anteriorly into the mons pubis and around the right inguinal region and lateral to the right hip this tracks into the subcutaneous soft tissues anterior and lateral to the underlying abdominal wall fascia. There is no focal fluid or air collection. The bones are intact. Probable cellulitis with punctate collections of subcutaneous air in the soft tissues along the right gluteal region inferiorly and extending anteriorly into the perineal region.   This tracks into the subcutaneous fat along the right pubic and right groin region and extends anterior and lateral to the right hip along the abdominal wall laterally which is suspicious for probable anaerobic infection and cellulitis with possible necrotizing fasciitis. Recommend surgical correlation. Slightly limited exam due to the lack of IV contrast with metallic prosthetic devices in both hips causing obscuration of the lower pelvis. 10 mm left renal stone which is unchanged with no hydronephrosis. Mild bibasilar atelectasis or infiltrates extending into the lingula which is more prominent. Previous cholecystectomy and mild hepatosplenomegaly which is unchanged. Mild constipation with no bowel obstruction. The findings were called to Dr. Aida Parks at 10:25 p.m. Magazino XR CHEST PORTABLE    Result Date: 9/5/2021  EXAMINATION: ONE XRAY VIEW OF THE CHEST 9/5/2021 7:24 pm COMPARISON: 03/27/2021 HISTORY: ORDERING SYSTEM PROVIDED HISTORY: other TECHNOLOGIST PROVIDED HISTORY: Reason for exam:->other Reason for Exam: cough Acuity: Unknown Type of Exam: Unknown Additional signs and symptoms: pt c/o a recurring cough, denies chest pain and SOB, covid r/o FINDINGS: The heart is mildly enlarged but unchanged. The pulmonary vessels are less prominent. There are some hazy perihilar and bibasilar interstitial opacities which is less prominent. No consolidation or effusion is seen. Mild cardiomegaly and probable mild pulmonary interstitial edema which is less prominent from the prior study. Recommend short-term follow-up.        EKG:    EKG 12 Lead [[id number]]    Collected: 09/05/21 1928    Updated: 09/05/21 1939     Ventricular Rate 70 BPM    Atrial Rate 70 BPM    P-R Interval 142 ms    QRS Duration 92 ms    Q-T Interval 372 ms    QTc Calculation (Bazett) 401 ms    P Axis 46 degrees    R Axis 0 degrees    T Axis 17 degrees    Diagnosis Normal sinus rhythmNormal ECGWhen compared with ECG of 27-MAR-2021 19:57,No significant change was found     CBC:  Recent Labs     09/05/21 1852   WBC 16.6*   HGB 13.1   HCT 39.9           RENAL  Recent Labs     09/05/21 1852   *   K 4.8   CL 93*   CO2 22   BUN 24*   CREATININE 1.9*   GLUCOSE 115*       Hemoglobin a1c:  Lab Results   Component Value Date    LABA1C 5.9 07/18/2019    LABA1C 6.0 09/14/2018       LFT'S:  Recent Labs     09/05/21 1852   AST 20   ALT 13   BILITOT 0.4   ALKPHOS 137*     CARDIAC ENZYMES:   Recent Labs     09/05/21 1852   TROPONINI <0.01     Lab Results   Component Value Date    PROBNP 332 (H) 03/27/2021    PROBNP 934 (H) 08/29/2020    PROBNP 169 (H) 08/06/2019     LACTIC ACID:  Recent Labs     09/05/21 1852   LACTSEPSIS 2.0*     VBG:  Recent Labs     09/05/21 1930   PHVEN 7.305*   OXW3GKF 56.0*   PUH8MLB 27.2   PO2VEN 40.1*   [id number] 69        PHYSICIAN CERTIFICATION  I certify that Kriss Starks is expected to be hospitalized for 2 midnights based on the following assessment and plan:    ASSESSMENT/PLAN:  1. Severe sepsis, likely related to #2. IV vanco, Merrem. No need for pressors at this time. IVF. F/u cx.   2. Necrotizing fascitis of R buttock, groin and lower abd. Dr. Reyes Grief taking to OR tonight. Admit to ICU. PRN oxycodone/IV dilaudid for now. 3. Hypotension, improving w/ IVF. Monitor. 4. LONNY, Cr 1.9, baseline ~0.8-1.0. IVF and monitor. 5. Elevated WBC, 16.6. Monitor. 6. Chronic resp failure. Pt on 2-3 L at home. Sats stable on that. 7. Tobacco Abuse, counseled cessation, 21 mg nicotine patch. 8. Hx polysubstance abuse. DVT Prophylaxis: Lx  Diet: regular  Code Status: Full Code   PT/OT Eval Status: Will order if needed and as patient condition allows  Dispo - Admit to inpatient ICU.     Total critical care time caring for this patient with life threatening, unstable organ failure, including direct patient contact, management of life support systems, review of data including imaging and labs, discussions with other team members and physicians is 35 minutes so far today, excluding procedures. Zuri Kline MD    Thank you Kiara Franco for the opportunity to be involved in this patient's care. If you have any questions or concerns please feel free to contact me via the [employer]swering Service at [phone]. This chart was generated using the [address] [place] [address] [address] dictation system. I created this record but it may contain dictation errors given the limitations of this technology.

## 2021-09-06 NOTE — PROGRESS NOTES
4 Eyes Skin Assessment     The patient is being assess for   Admission    I agree that 2 RN's have performed a thorough Head to Toe Skin Assessment on the patient. ALL assessment sites listed below have been assessed. Areas assessed for pressure by both nurses:   [x]   Head, Face, and Ears   [x]   Shoulders, Back, and Chest, Abdomen  [x]   Arms, Elbows, and Hands   [x]   Coccyx, Sacrum, and Ischium  [x]   Legs, Feet, and Heels  No skin issues noted besides surgical areas under the surgical dressings. Skin Assessed Under all Medical Devices by both nurses:  O2 device tubing              All Mepilex Borders were peeled back and area peeked at by both nurses:  No: No mepilex in place. Please list where Mepilex Borders are located:             Patient is not able to demonstrate the ability to move from a reclining position to an upright position within the recliner due to weakness. **SHARE this note so that the co-signing nurse is able to place an eSignature**    Co-signer eSignature: Electronically signed by La Wall RN on 9/6/21 at 6:13 AM EDT    Does the Patient have Skin Breakdown related to pressure?   No     (Insert Photo here)         Alpesh Prevention initiated:  Yes   Wound Care Orders initiated:  No      WOC nurse consulted for Pressure Injury (Stage 3,4, Unstageable, DTI, NWPT, Complex wounds)and New or Established Ostomies:  No      Primary Nurse eSignature: Electronically signed by Missael De La O RN on 9/6/21 at 3:35 AM EDT

## 2021-09-06 NOTE — ANESTHESIA POSTPROCEDURE EVALUATION
Department of Anesthesiology  Postprocedure Note    Patient: Chino Simpson  MRN: [id number]  YOB: 1960  Date of evaluation: 9/6/2021  Time:  11:57 AM     Procedure Summary     Date: 09/06/21 Room / Location: [address] [place] [address] / [facility]    Anesthesia Start: 0102 Anesthesia Stop: 3828    Procedure: DEBRIDEMENT OF NECROTIZING SOFT TISSUE INFECTION, RIGHT BUTTOCK, GROIN, RIGHT LOWER QUADRANT, PERINEUM. (Right Groin) Diagnosis: (NECROTIZING FASCIITIS)    Surgeons: Luc Evans MD Responsible Provider: Aaron Harp MD    Anesthesia Type: general ASA Status: 3 - Emergent          Anesthesia Type: general    Leonora Phase I: Leonora Score: 9    Leonora Phase II:      Last vitals: Reviewed and per EMR flowsheets.        Anesthesia Post Evaluation    Comments: Postoperative Anesthesia Note    Name:    Chino Simpson  MRN:      [id number]    Patient Vitals in the past 12 hrs:  09/06/21 1100, BP:(!) 109/54, Pulse:70, Resp:15, SpO2:(!) 89 %  09/06/21 1000, BP:123/62, Pulse:68, Resp:16, SpO2:99 %  09/06/21 0955, Resp:19, SpO2:98 %  09/06/21 0900, BP:111/67, Pulse:68, Resp:13, SpO2:95 %  09/06/21 0800, BP:(!) 95/59, Pulse:70, Resp:11, SpO2:95 %  09/06/21 0700, BP:105/60, Pulse:73, Resp:12, SpO2:96 %  09/06/21 0600, BP:(!) 97/55, Pulse:72, Resp:11, SpO2:97 %  09/06/21 0500, BP:(!) 88/52, Pulse:66, Resp:12, SpO2:95 %  09/06/21 0400, BP:(!) 106/54, Pulse:71, Resp:12, SpO2:97 %  09/06/21 0330, BP:98/61, Temp:97.8 °F (36.6 °C), Temp src:Oral, Pulse:74, Resp:17, SpO2:100 %, Height:5' 7\" (1.702 m), Weight:232 lb (105.2 kg)  09/06/21 0245, BP:132/74, Temp:97 °F (36.1 °C), Pulse:71, Resp:14, SpO2:96 %  09/06/21 0237, BP:(!) 126/52, Temp:97.2 °F (36.2 °C), Pulse:71, Resp:14, SpO2:94 %  09/06/21 0232, BP:116/61, Pulse:71, Resp:17, SpO2:99 %  09/06/21 0230, Temp:97 °F (36.1 °C)  09/06/21 0227, BP:(!) 121/52, Pulse:71, Resp:23, SpO2:99 %  09/06/21 0223, BP:(!) 121/46, Temp:97 °F (36.1 °C), Pulse:71, Resp:28, SpO2:98 %     LABS:    CBC  Lab Results       Component                Value               Date/Time                  WBC                      18.8 (H)            09/06/2021 04:16 AM        HGB                      11.3 (L)            09/06/2021 04:16 AM        HCT                      35.4 (L)            09/06/2021 04:16 AM        PLT                      184                 09/06/2021 04:16 AM   RENAL  Lab Results       Component                Value               Date/Time                  NA                       130 (L)             09/06/2021 04:16 AM        K                        4.6                 09/06/2021 04:16 AM        K                        3.7                 03/27/2021 07:46 PM        CL                       98 (L)              09/06/2021 04:16 AM        CO2                      23                  09/06/2021 04:16 AM        BUN                      25 (H)              09/06/2021 04:16 AM        CREATININE               1.6 (H)             09/06/2021 04:16 AM        GLUCOSE                  131 (H)             09/06/2021 04:16 AM   COAGS  Lab Results       Component                Value               Date/Time                  PROTIME                  11.8                08/06/2019 11:06 PM        INR                      1.04                08/06/2019 11:06 PM     Intake & Output:  @83TTNL@    Nausea & Vomiting:  No    Level of Consciousness:  Awake    Pain Assessment:  Adequate analgesia    Anesthesia Complications:  No apparent anesthetic complications    SUMMARY      Vital signs stable  OK to discharge from Stage I post anesthesia care.   Care transferred from Anesthesiology department on discharge from perioperative area

## 2021-09-06 NOTE — PROGRESS NOTES
RESPIRATORY THERAPY ASSESSMENT    Name:  Moon Helm  Medical Record Number:  [id number]  Age: 63 y.o. Gender: female  : 1960  Today's Date:  2021  Room:  [facility]-01    Assessment     Is the patient being admitted for a COPD or Asthma exacerbation? No   (If yes the patient will be seen every 4 hours for the first 24 hours and then reassessed)    Patient Admission Diagnosis      Allergies  Allergies   Allergen Reactions    Lisinopril Swelling     Tongue swelling    Codeine      itchy    Cymbalta [Duloxetine Hcl] Other (See Comments)     SI thoughts     Duloxetine      Strong suicidal thoughts    Penicillins Itching     itchy    Sulfa Antibiotics      Unable to urinate       Minimum Predicted Vital Capacity:               Actual Vital Capacity:                    Pulmonary History:current smoker  Home Oxygen Therapy:  room air  Home Respiratory Therapy:albuterol/duoneb   Current Respiratory Therapy:  Albuterol qid          Respiratory Severity Index(RSI)   Patients with orders for inhalation medications, oxygen, or any therapeutic treatment modality will be placed on Respiratory Protocol. They will be assessed with the first treatment and at least every 72 hours thereafter. The following severity scale will be used to determine frequency of treatment intervention.     Smoking History: Pulmonary Disease or Smoking History, Greater than 15 pack year = 2    Social History  Social History     Tobacco Use    Smoking status: Current Every Day Smoker     Packs/day: 1.00     Years: 35.00     Pack years: 35.00     Types: Cigarettes     Last attempt to quit: 2019     Years since quittin.1    Smokeless tobacco: Never Used   Vaping Use    Vaping Use: Never used   Substance Use Topics    Alcohol use: Yes     Comment: once a week    Drug use: Not Currently     Types: Marijuana, Methamphetamines     Comment: occassionally meth and percocet in past, snort percocet, snorting and smoking meth Protocol Guidelines     1. Assessment and treatment by Respiratory Therapy will be initiated for medication and therapeutic interventions upon initiation of aerosolized medication. 2. Physician will be contacted for respiratory rate (RR) greater than 35 breaths per minute. Therapy will be held for heart rate (HR) greater than 140 beats per minute, pending direction from physician. 3. Bronchodilators will be administered via Metered Dose Inhaler (MDI) with spacer when the following criteria are met:  a. Alert and cooperative     b. HR < 140 bpm  c. RR < 30 bpm                d. Can demonstrate a 2-3 second inspiratory hold  4. Bronchodilators will be administered via Hand Held Nebulizer ZAYRA [facility] to patients when ANY of the following criteria are met  a. Incognizant or uncooperative          b. Patients treated with HHN at Home        c. Unable to demonstrate proper use of MDI with spacer     d. RR > 30 bpm   5. Bronchodilators will be delivered via Metered Dose Inhaler (MDI), HHN, Aerogen to intubated patients on mechanical ventilation. 6. Inhalation medication orders will be delivered and/or substituted as outlined below. Aerosolized Medications Ordering and Administration Guidelines:    1. All Medications will be ordered by a physician, and their frequency and/or modality will be adjusted as defined by the patients Respiratory Severity Index (RSI) score. 2. If the patient does not have documented COPD, consider discontinuing anticholinergics when RSI is less than 9.  3. If the bronchospasm worsens (increased RSI), then the bronchodilator frequency can be increased to a maximum of every 4 hours. If greater than every 4 hours is required, the physician will be contacted. 4. If the bronchospasm improves, the frequency of the bronchodilator can be decreased, based on the patient's RSI, but not less than home treatment regimen frequency.   5. Bronchodilator(s) will be discontinued if patient has a RSI less than 9 and has received no scheduled or as needed treatment for 72  Hrs. Patients Ordered on a Mucolytic Agent:    1. Must always be administered with a bronchodilator. 2. Discontinue if patient experiences worsened bronchospasm, or secretions have lessened to the point that the patient is able to clear them with a cough. Anti-inflammatory and Combination Medications:    1. If the patient lacks prior history of lung disease, is not using inhaled anti-inflammatory medication at home, and lacks wheezing by examination or by history for at least 24 hours, contact physician for possible discontinuation.

## 2021-09-06 NOTE — CONSULTS
mcg/mL    Assessment/Plan:  Will initiate Vancomycin with a one time loading dose of 2000 mg x1, followed by 1250 mg IV every 24 hours. A vancomycin trough has been ordered for 9/7 @ 2200 due to patient's unstable renal function. Thank you for the consult. Will continue to follow.

## 2021-09-06 NOTE — CONSULTS
[facility] SURGERY      Department of General Surgery Consult    PATIENT NAME: Perla Bennett   YOB: 1960    ADMISSION DATE: 9/5/2021  6:42 PM      TODAY'S DATE: 9/6/2021    Reason for Consult:  Necrotizing soft tissue infection    Requesting Physician:  Hospitalist    HISTORY OF PRESENT ILLNESS:              The patient is a 63 y.o. female who presents with progressively worsening boil on her R buttock. It has worsened over past week. It now started draining. She states the area has been painful. No alleviating factors. Past Medical History:        Diagnosis Date    Class 2 obesity with serious comorbidity and body mass index (BMI) of 39.0 to 39.9 in adult 9/14/2018    Depression     History of left hip replacement 02/12/2018    Hyperlipidemia     Hypertension     Kidney stone     MRSA (methicillin resistant staph aureus) culture positive 07/27/2019    nasal colonization       Past Surgical History:        Procedure Laterality Date    BRONCHOSCOPY  08/08/2019    BRONCHOSCOPY N/A 8/8/2019    BRONCHOSCOPY ALVEOLAR LAVAGE performed by Crista Saunders MD at [place] [address]      FOOT SURGERY      HYSTERECTOMY      JOINT REPLACEMENT      KNEE SURGERY      TOTAL HIP ARTHROPLASTY Right 08/2020       Current Medications:   Current Facility-Administered Medications: metronidazole (FLAGYL) 500 mg in NaCl 100 mL IVPB premix, 500 mg, IntraVENous, Q8H  clindamycin (CLEOCIN) 900 mg in dextrose 5 % 50 mL IVPB, 900 mg, IntraVENous, Once  Prior to Admission medications    Medication Sig Start Date End Date Taking? Authorizing Provider   pantoprazole (PROTONIX) 40 MG tablet Take 40 mg by mouth daily 7/30/21  Yes Historical Provider, MD   oxyCODONE-acetaminophen (PERCOCET) 5-325 MG per tablet Take 1 tablet by mouth every 4 hours as needed for Pain.    Yes Historical Provider, MD   albuterol (PROVENTIL) (2.5 MG/3ML) 0.083% nebulizer solution Take 3 mLs by nebulization every 6 hours as needed for Wheezing 6/18/20  Yes Emily Kay MD   escitalopram (LEXAPRO) 10 MG tablet Take 10 mg by mouth daily 1/21/20  Yes Historical Provider, MD   furosemide (LASIX) 40 MG tablet Take 1 tablet by mouth daily 9/18/18  Yes Kadi Abernathy MD   spironolactone (ALDACTONE) 25 MG tablet Take 1 tablet by mouth daily 9/18/18  Yes Kadi Abernathy MD   albuterol sulfate  (90 Base) MCG/ACT inhaler Inhale 2 puffs into the lungs 4 times daily 8/10/17  Yes Kadi Abernathy MD   oxybutynin (DITROPAN) 5 MG tablet Take 5 mg by mouth 3 times daily   Yes Historical Provider, MD   tiZANidine (ZANAFLEX) 4 MG tablet Take 4 mg by mouth 2 times daily as needed   Yes Historical Provider, MD   metoprolol (LOPRESSOR) 100 MG tablet Take 100 mg by mouth 2 times daily   Yes Historical Provider, MD   rOPINIRole (REQUIP) 2 MG tablet Take 2 mg by mouth 4 times daily    Yes Historical Provider, MD   gabapentin (NEURONTIN) 300 MG capsule Take 300 mg by mouth 4 times daily. Yes Historical Provider, MD   atorvastatin (LIPITOR) 20 MG tablet Take 40 mg by mouth daily    Yes Historical Provider, MD   acetaminophen (TYLENOL) 500 MG tablet Take 1 tablet by mouth 4 times daily as needed for Pain 12/7/19   Sara Gonzalez PA-C   ipratropium-albuterol (DUONEB) 0.5-2.5 (3) MG/3ML SOLN nebulizer solution Inhale 3 mLs into the lungs every 4 hours as needed for Shortness of Breath 8/1/19   Kadi Abernathy MD   omeprazole (PRILOSEC) 20 MG capsule Take 40 mg by mouth Daily     Historical Provider, MD        Allergies:  Lisinopril, Codeine, Cymbalta [duloxetine hcl], Duloxetine, Penicillins, and Sulfa antibiotics    Social History:   TOBACCO:   reports that she has been smoking cigarettes. She has a 35.00 pack-year smoking history. She has never used smokeless tobacco.  ETOH:   reports current alcohol use. DRUGS:   reports previous drug use. Drugs: Marijuana and Methamphetamines.       Family History: History reviewed. No pertinent family history. REVIEW OF SYSTEMS:  She reports no complaints related to the eyes, ears , nose throat or mouth. She denies weight loss. No chest pain. No SOB. No urinary complaints. Musculoskeletal complaints include pain and swelling R groin area. No skin rashes. No neurologic deficits. No bleeding tendencies. GI complaints include none. PHYSICAL EXAM:  VITALS:  /74   Pulse 66   Temp 98.5 °F (36.9 °C)   Resp 18   Ht 5' 7\" (1.702 m)   Wt 239 lb (108.4 kg)   SpO2 96%   BMI 37.43 kg/m²     CONSTITUTIONAL:  Somnolent, mild distress and moderately obese  EYES:  sclera clear  ENT:  normocepalic, without obvious abnormality  NECK:  supple, symmetrical, trachea midline  LUNGS:  clear to auscultation  CARDIOVASCULAR:  regular rate and rhythm   ABDOMEN:   non-distended, tenderness noted suprapubic area, R labia and R groin with associated erythema. There is erythema on the R buttock with open draining area that is draining grey, foul fluid. MUSCULOSKELETAL:  Some pitting edema lower extremities  NEUROLOGIC:  Mental Status Exam:  Level of Alertness:   Awakens to voice  SKIN:  Erythema as described above    DATA:    CBC:   Recent Labs     09/05/21  1852   WBC 16.6*   HGB 13.1   HCT 39.9        BMP:    Recent Labs     09/05/21 1852   *   K 4.8   CL 93*   CO2 22   BUN 24*   CREATININE 1.9*   GLUCOSE 115*     Hepatic:   Recent Labs     09/05/21  1852   AST 20   ALT 13   BILITOT 0.4   ALKPHOS 137*       Radiology Review:  CT with large amount subcutaneous air in R groin, perineum and RLQ    ASSESSMENT:  Necrotizing soft tissue infection R groin, perineum, buttock and RLQ. Morbid obesity with BMI 37  LONNY    PLAN:  Recommend emergent operative debridement with antibiotics and IVF for this life threatening diagnosis.   41 minutes spent at the bedside, reviewing the chart, reviewing labs and imaging and discussing with the treatment team to formulate a plan for this critically ill patient with life threatening illness.         Libby Jeronimo MD     [facility]

## 2021-09-06 NOTE — BRIEF OP NOTE
Brief Postoperative Note      Patient: Andriy Thompson  YOB: 1960  MRN: [id number]    Date of Procedure: 9/6/2021    Pre-Op Diagnosis: NECROTIZING SOFT TISSUE INFECTION    Post-Op Diagnosis: Same       Procedure(s):  DEBRIDEMENT RIGHT GROIN, PERINEUM, R buttock, and RLQ abdominal wall    Surgeon(s):  Anne Chan MD    Assistant:  Surgical Assistant: Inna Wick    Anesthesia: General    Estimated Blood Loss (mL): 838     Complications: None    Specimens:   ID Type Source Tests Collected by Time Destination   1 : RIGHT GROIN (AEROBIC AND ANAEROBIC) Body Fluid Fluid CULTURE, ANAEROBIC, CULTURE, BODY FLUID Anne Chan MD 9/6/2021 0128        Implants:  * No implants in log *      Drains:   Urethral Catheter Non-latex 16 fr (Active)       Findings: As above    Electronically signed by Lakeisha Dumont MD on 9/6/2021 at 2:00 AM

## 2021-09-06 NOTE — CONSULTS
Patient is being seen at the request of Tawana Chatterjee MD  for a consultation for sepsis    HISTORY OF PRESENT ILLNESS:   63years old presented with boil on her buttock, draining and spreading to the groin region. Moderate to severe. Not sure what makes better or worse. Associated with pain and tenderness. Started approximately a week ago. Not better with Neosporin. Imaging revealed suggestive of necrotizing soft fasciitis. Patient was taken to the OR underwent. Smoker for 35 years 1 ppd. Uses Albuterol PRN. Uses O2 PRN at home. No pressors requirements   2L O2   Making urine     PAST MEDICAL HISTORY:  Past Medical History:   Diagnosis Date    Class 2 obesity with serious comorbidity and body mass index (BMI) of 39.0 to 39.9 in adult 9/14/2018    Depression     History of left hip replacement 02/12/2018    Hyperlipidemia     Hypertension     Kidney stone     MRSA (methicillin resistant staph aureus) culture positive 07/27/2019    nasal colonization     PAST SURGICAL HISTORY:  Past Surgical History:   Procedure Laterality Date    BRONCHOSCOPY  08/08/2019    BRONCHOSCOPY N/A 8/8/2019    BRONCHOSCOPY ALVEOLAR LAVAGE performed by Ivette Vogt MD at [place] [id number]      FOOT SURGERY      HYSTERECTOMY      JOINT REPLACEMENT      KNEE SURGERY      OTHER SURGICAL HISTORY      DEBRIDEMENT OF NECROTIZING SOFT TISSUE INFECTION, RIGHT BUTTOCK, GROIN, RIGHT LOWER QUADRANT, PERINEUM.     TOTAL HIP ARTHROPLASTY Right 08/2020       FAMILY HISTORY:  No lung cancer       SOCIAL HISTORY:   reports that she has been smoking cigarettes. She has a 35.00 pack-year smoking history.  She has never used smokeless tobacco.    Scheduled Meds:   escitalopram  10 mg Oral Daily    [START ON 9/7/2021] enoxaparin  40 mg SubCUTAneous Q24H    clindamycin (CLEOCIN) IV  600 mg IntraVENous Q8H    nicotine  1 patch TransDERmal Daily    meropenem  500 mg IntraVENous Q8H    metoprolol  25 mg Oral BID    pantoprazole  40 mg IntraVENous Daily    gabapentin  300 mg Oral TID    oxybutynin  5 mg Oral TID    rOPINIRole  1 mg Oral TID    vancomycin  1,250 mg IntraVENous Q24H     Continuous Infusions:   sodium chloride 150 mL/hr at 09/06/21 0815     PRN Meds:  ipratropium-albuterol, HYDROmorphone, oxyCODONE **OR** oxyCODONE, ondansetron, ipratropium-albuterol    ALLERGIES:  Patient is allergic to lisinopril, codeine, cymbalta [duloxetine hcl], duloxetine, penicillins, and sulfa antibiotics. REVIEW OF SYSTEMS:  Constitutional: Negative for fever  HENT: Negative for sore throat  Eyes: Negative for redness   Respiratory: Negative for dyspnea, cough  Cardiovascular: Negative for chest pain  Gastrointestinal: Negative for vomiting, diarrhea   Genitourinary: Negative for hematuria   Musculoskeletal: +  arthralgias   Skin: Negative for rash  Neurological: Negative for syncope  Hematological: Negative for adenopathy  Psychiatric/Behavorial: Negative for anxiety    PHYSICAL EXAM:  Blood pressure 111/67, pulse 68, temperature 97.8 °F (36.6 °C), temperature source Oral, resp. rate 19, height 5' 7\" (1.702 m), weight 232 lb (105.2 kg), SpO2 98 %, not currently breastfeeding.' on RA  Gen: No distress. Eyes: PERRL. No sclera icterus. No conjunctival injection. ENT: No discharge. Pharynx clear. Neck: Trachea midline. No obvious mass. Resp: No accessory muscle use. Few crackles. No wheezes. No rhonchi. No dullness on percussion. CV: Regular rate. Regular rhythm. No murmur or rub. No edema. GI: Non-tender. Non-distended. No hernia. Skin: Warm and dry. No nodule on exposed extremities. Groin area dressed. Lymph: No cervical LAD. No supraclavicular LAD. M/S: No cyanosis. No joint deformity. No clubbing. Neuro: Awake. Alert. Moves all four extremities. Psych: Oriented x 3. No anxiety.      LABS:  CBC:   Recent Labs     09/05/21  1852 09/06/21  0416   WBC 16.6* 18.8*   HGB 13.1 11.3*   HCT 39.9 35.4*   MCV 85.8 86.8    184     BMP:   Recent Labs     09/05/21  1852 09/06/21  0416   * 130*   K 4.8 4.6   CL 93* 98*   CO2 22 23   PHOS  --  5.1*   BUN 24* 25*   CREATININE 1.9* 1.6*     LIVER PROFILE:   Recent Labs     09/05/21  1852   AST 20   ALT 13   BILITOT 0.4   ALKPHOS 137*     PT/INR: No results for input(s): PROTIME, INR in the last 72 hours. APTT: No results for input(s): APTT in the last 72 hours. UA:No results for input(s): NITRITE, COLORU, PHUR, LABCAST, WBCUA, RBCUA, MUCUS, TRICHOMONAS, YEAST, BACTERIA, CLARITYU, SPECGRAV, LEUKOCYTESUR, UROBILINOGEN, BILIRUBINUR, BLOODU, GLUCOSEU, AMORPHOUS in the last 72 hours. Invalid input(s): KETONESU  No results for input(s): PHART, MIA8STO, PO2ART in the last 72 hours. Chest x-ray 9/5 imaging was reviewed by me and showed   Mild cardiomegaly with prominent interstitial markings      CT abdomen 9/5  Probable cellulitis with punctate collections of subcutaneous air in the soft   tissues along the right gluteal region inferiorly and extending anteriorly   into the perineal region.  This tracks into the subcutaneous fat along the   right pubic and right groin region and extends anterior and lateral to the   right hip along the abdominal wall laterally which is suspicious for probable   anaerobic infection and cellulitis with possible necrotizing fasciitis. Slightly limited exam due to the lack of IV contrast with metallic prosthetic   devices in both hips causing obscuration of the lower pelvis. 10 mm left renal stone which is unchanged with no hydronephrosis. Mild bibasilar atelectasis or infiltrates extending into the lingula which is   more prominent. Previous cholecystectomy and mild hepatosplenomegaly which is unchanged. Mild constipation with no bowel obstruction    Echo 9/82/2970   LV systolic function is normal with ejection fraction estimated at 55 %. No regional wall motion abnormalities are noted. There is mild concentric left ventricular hypertrophy. Normal diastolic function with normal filling pressure. The left atrium is mildly dilated in size. Aortic valve appears mildly sclerotic but opens adequately. Mild mitral and tricuspid regurgitation. Systolic pulmonary artery pressure (SPAP) is estimated at 39mmHg (right   atrial pressure 8mmHg).     ASSESSMENT:  · Severe sepsis  · Right groin, perineum, buttock necrotizing fasciitis post debridement 9/6/2021  · Acute kidney injury  · Leukocytosis  · Chronic hypoxemic respiratory failure-uses 2 to 3 L at home  · ILD followed by Dr. Mata Simons   · Tobacco abuse  · History of polysubstance abuse    PLAN:  Supplemental oxygen to maintain SaO2 >92%; wean as tolerated  · IVF and IVF  cc bolus PRN target SBP>90 up to 4 liters  Inhaled bronchodilators PRN   · Meropenem and Clindamycin per surgery/IM   · Okay to move out of the ICU from our perspective  · Lovenox for DVT prophylaxis

## 2021-09-06 NOTE — ANESTHESIA PRE PROCEDURE
Department of Anesthesiology  Preprocedure Note       Name:  Moon Helm   Age:  63 y.o.  :  1960                                          MRN:  [id number]         Date:  2021      Surgeon: Aide Herrera):  Olya Whittington MD    Procedure: Procedure(s):  GROIN DEBRIDEMENT    Medications prior to admission:   Prior to Admission medications    Medication Sig Start Date End Date Taking? Authorizing Provider   pantoprazole (PROTONIX) 40 MG tablet Take 40 mg by mouth daily 21  Yes Historical Provider, MD   oxyCODONE-acetaminophen (PERCOCET) 5-325 MG per tablet Take 1 tablet by mouth every 4 hours as needed for Pain. Yes Historical Provider, MD   albuterol (PROVENTIL) (2.5 MG/3ML) 0.083% nebulizer solution Take 3 mLs by nebulization every 6 hours as needed for Wheezing 20  Yes Trixie Mott MD   escitalopram (LEXAPRO) 10 MG tablet Take 10 mg by mouth daily 20  Yes Historical Provider, MD   furosemide (LASIX) 40 MG tablet Take 1 tablet by mouth daily 18  Yes Trudy Francis MD   spironolactone (ALDACTONE) 25 MG tablet Take 1 tablet by mouth daily 18  Yes Trudy Francis MD   albuterol sulfate  (90 Base) MCG/ACT inhaler Inhale 2 puffs into the lungs 4 times daily 8/10/17  Yes Trudy Francis MD   oxybutynin (DITROPAN) 5 MG tablet Take 5 mg by mouth 3 times daily   Yes Historical Provider, MD   tiZANidine (ZANAFLEX) 4 MG tablet Take 4 mg by mouth 2 times daily as needed   Yes Historical Provider, MD   metoprolol (LOPRESSOR) 100 MG tablet Take 100 mg by mouth 2 times daily   Yes Historical Provider, MD   rOPINIRole (REQUIP) 2 MG tablet Take 2 mg by mouth 4 times daily    Yes Historical Provider, MD   gabapentin (NEURONTIN) 300 MG capsule Take 300 mg by mouth 4 times daily.     Yes Historical Provider, MD   atorvastatin (LIPITOR) 20 MG tablet Take 40 mg by mouth daily    Yes Historical Provider, MD   acetaminophen (TYLENOL) 500 MG tablet Take 1 tablet by mouth 4 times daily as needed for Pain 12/7/19   Yates Lesch, PA-C   ipratropium-albuterol (DUONEB) 0.5-2.5 (3) MG/3ML SOLN nebulizer solution Inhale 3 mLs into the lungs every 4 hours as needed for Shortness of Breath 8/1/19   Trudy Francis MD   omeprazole (PRILOSEC) 20 MG capsule Take 40 mg by mouth Daily     Historical Provider, MD       Current medications:    Current Facility-Administered Medications   Medication Dose Route Frequency Provider Last Rate Last Admin    metronidazole (FLAGYL) 500 mg in NaCl 100 mL IVPB premix  500 mg IntraVENous Arun Evans MD   Stopped at 09/06/21 0035     Current Outpatient Medications   Medication Sig Dispense Refill    pantoprazole (PROTONIX) 40 MG tablet Take 40 mg by mouth daily      oxyCODONE-acetaminophen (PERCOCET) 5-325 MG per tablet Take 1 tablet by mouth every 4 hours as needed for Pain.  albuterol (PROVENTIL) (2.5 MG/3ML) 0.083% nebulizer solution Take 3 mLs by nebulization every 6 hours as needed for Wheezing 360 mL 5    escitalopram (LEXAPRO) 10 MG tablet Take 10 mg by mouth daily      furosemide (LASIX) 40 MG tablet Take 1 tablet by mouth daily 30 tablet 3    spironolactone (ALDACTONE) 25 MG tablet Take 1 tablet by mouth daily 30 tablet 3    albuterol sulfate  (90 Base) MCG/ACT inhaler Inhale 2 puffs into the lungs 4 times daily 1 Inhaler 0    oxybutynin (DITROPAN) 5 MG tablet Take 5 mg by mouth 3 times daily      tiZANidine (ZANAFLEX) 4 MG tablet Take 4 mg by mouth 2 times daily as needed      metoprolol (LOPRESSOR) 100 MG tablet Take 100 mg by mouth 2 times daily      rOPINIRole (REQUIP) 2 MG tablet Take 2 mg by mouth 4 times daily       gabapentin (NEURONTIN) 300 MG capsule Take 300 mg by mouth 4 times daily.        atorvastatin (LIPITOR) 20 MG tablet Take 40 mg by mouth daily       acetaminophen (TYLENOL) 500 MG tablet Take 1 tablet by mouth 4 times daily as needed for Pain 120 tablet 0    ipratropium-albuterol (DUONEB) 0.5-2.5 (3) MG/3ML SOLN nebulizer solution Inhale 3 mLs into the lungs every 4 hours as needed for Shortness of Breath 360 mL 1    omeprazole (PRILOSEC) 20 MG capsule Take 40 mg by mouth Daily          Allergies:     Allergies   Allergen Reactions    Lisinopril Swelling     Tongue swelling    Codeine      itchy    Cymbalta [Duloxetine Hcl] Other (See Comments)     SI thoughts     Duloxetine      Strong suicidal thoughts    Penicillins Itching     itchy    Sulfa Antibiotics      Unable to urinate       Problem List:    Patient Active Problem List   Diagnosis Code    Hypertension I10    Hyperlipidemia E78.5    Depression F32.9    Class 2 obesity due to excess calories with body mass index (BMI) of 39.0 to 39.9 in adult E66.09, Z68.39    Acute pulmonary edema (HCC) J81.0    Acute congestive heart failure (HCC) I50.9    Iron deficiency anemia D50.9    Acute respiratory failure with hypoxia (HCC) J96.01    COPD exacerbation (HCC) J44.1    Respiratory alkalosis E87.3    Lactic acidosis E87.2    Nausea & vomiting R11.2    Hyperventilation R06.4    Hypokalemia E87.6    Hypoxia R09.02    Acute hypoxemic respiratory failure (HCC) J96.01    Abnormal chest CT R93.89    Chronic diastolic congestive heart failure (HCC) I50.32    Abnormal CT of the chest R93.89    COPD with acute exacerbation (HCC) J44.1    Tobacco abuse Z72.0    Exercise hypoxemia R09.02    Pneumonia due to organism J18.9    Status post total replacement of right hip Z96.641    CHF (congestive heart failure), NYHA class I, chronic, systolic (HCC) D87.33    Necrotizing soft tissue infection M79.89       Past Medical History:        Diagnosis Date    Class 2 obesity with serious comorbidity and body mass index (BMI) of 39.0 to 39.9 in adult 9/14/2018    Depression     History of left hip replacement 02/12/2018    Hyperlipidemia     Hypertension     Kidney stone     MRSA (methicillin resistant staph aureus) culture positive 07/27/2019    nasal colonization       Past Surgical History:        Procedure Laterality Date    BRONCHOSCOPY  2019    BRONCHOSCOPY N/A 2019    BRONCHOSCOPY ALVEOLAR LAVAGE performed by [facility]y [facility]hi MD at [facility] 94      FOOT SURGERY      HYSTERECTOMY      JOINT REPLACEMENT      KNEE SURGERY      TOTAL HIP ARTHROPLASTY Right 2020       Social History:    Social History     Tobacco Use    Smoking status: Current Every Day Smoker     Packs/day: 1.00     Years: 35.00     Pack years: 35.00     Types: Cigarettes     Last attempt to quit: 2019     Years since quittin.1    Smokeless tobacco: Never Used   Substance Use Topics    Alcohol use: Yes     Comment: once a week                                Ready to quit: Not Answered  Counseling given: Not Answered      Vital Signs (Current):   Vitals:    21 1932 21 1939 21 233   BP: 85/67 (!) 93/58 (!) 109/55 117/74   Pulse:  69 67 66   Resp:     Temp:  98.5 °F (36.9 °C)     TempSrc:       SpO2:  97% 98% 96%   Weight:       Height:                                                  BP Readings from Last 3 Encounters:   21 117/74   21 117/73   21 (!) 145/78       NPO Status:                                                                                 BMI:   Wt Readings from Last 3 Encounters:   21 239 lb (108.4 kg)   21 244 lb (110.7 kg)   21 255 lb (115.7 kg)     Body mass index is 37.43 kg/m².     CBC:   Lab Results   Component Value Date    WBC 16.6 2021    RBC 4.65 2021    HGB 13.1 2021    HCT 39.9 2021    MCV 85.8 2021    RDW 17.5 2021     2021       CMP:   Lab Results   Component Value Date     2021    K 4.8 2021    K 3.7 2021    CL 93 2021    CO2 22 2021    BUN 24 2021    CREATININE 1.9 2021    GFRAA 33 2021    GFRAA >60 09/15/2012    AGRATIO 0.6 09/05/2021    LABGLOM 27 09/05/2021    GLUCOSE 115 09/05/2021    PROT 6.9 09/05/2021    PROT 7.5 09/15/2012    CALCIUM 9.1 09/05/2021    BILITOT 0.4 09/05/2021    ALKPHOS 137 09/05/2021    AST 20 09/05/2021    ALT 13 09/05/2021       POC Tests: No results for input(s): POCGLU, POCNA, POCK, POCCL, POCBUN, POCHEMO, POCHCT in the last 72 hours. Coags:   Lab Results   Component Value Date    PROTIME 11.8 08/06/2019    INR 1.04 08/06/2019       HCG (If Applicable): No results found for: PREGTESTUR, PREGSERUM, HCG, HCGQUANT     ABGs:   Lab Results   Component Value Date    PHART 7.585 08/06/2019    PO2ART 66.6 08/06/2019    BKB1BLM 27.3 08/06/2019    QPO4ZVK 25.3 08/06/2019    BEART 4.6 08/06/2019    B5AARHKO 95.9 08/06/2019        Type & Screen (If Applicable):  No results found for: LABABO, LABRH    Drug/Infectious Status (If Applicable):  No results found for: HIV, HEPCAB    COVID-19 Screening (If Applicable):   Lab Results   Component Value Date    COVID19 NOT DETECTED 09/05/2021    COVID19 NOT DETECTED 12/17/2020    COVID19 Not Detected 08/29/2020           Anesthesia Evaluation  Patient summary reviewed and Nursing notes reviewed no history of anesthetic complications:   Airway: Mallampati: III     Neck ROM: full   Dental:    (+) upper dentures      Pulmonary:Negative Pulmonary ROS and normal exam    (+) pneumonia:  COPD:                             Cardiovascular:Negative CV ROS    (+) hypertension:, CHF:,                   Neuro/Psych:   Negative Neuro/Psych ROS  (+) psychiatric history:depression/anxiety             GI/Hepatic/Renal: Neg GI/Hepatic/Renal ROS       (-) hiatal hernia and GERD       Endo/Other: Negative Endo/Other ROS                    Abdominal:             Vascular: Other Findings:             Anesthesia Plan      general     ASA 3 - emergent     (I discussed with the patient the risks and benefits of PIV, general anesthesia, IV Narcotics, PACU.   All questions were answered the patient agrees with the plan and wishes to proceed.  )  Induction: intravenous. Summary   LV systolic function is normal with ejection fraction estimated at 55 %. No regional wall motion abnormalities are noted. There is mild concentric left ventricular hypertrophy. Normal diastolic function with normal filling pressure. The left atrium is mildly dilated in size. Aortic valve appears mildly sclerotic but opens adequately. Mild mitral and tricuspid regurgitation. Systolic pulmonary artery pressure (SPAP) is estimated at 39mmHg (right   atrial pressure 8mmHg). Signature      ------------------------------------------------------------------   Electronically signed by Len Ann MD, [facility]   (Interpreting physician) on 2019 at 05:25 PM    Pre-Operative Diagnosis: No admission diagnoses are documented for this encounter. 63 y.o.   BMI:  Body mass index is 37.43 kg/m².      Vitals:    21 19321 19321 2335   BP: 85/67 (!) 93/58 (!) 109/55 117/74   Pulse:  69 67 66   Resp:  20 21 18   Temp:  98.5 °F (36.9 °C)     TempSrc:       SpO2:  97% 98% 96%   Weight:       Height:           Allergies   Allergen Reactions    Lisinopril Swelling     Tongue swelling    Codeine      itchy    Cymbalta [Duloxetine Hcl] Other (See Comments)     SI thoughts     Duloxetine      Strong suicidal thoughts    Penicillins Itching     itchy    Sulfa Antibiotics      Unable to urinate       Social History     Tobacco Use    Smoking status: Current Every Day Smoker     Packs/day: 1.00     Years: 35.00     Pack years: 35.00     Types: Cigarettes     Last attempt to quit: 2019     Years since quittin.1    Smokeless tobacco: Never Used   Substance Use Topics    Alcohol use: Yes     Comment: once a week       LABS:    CBC  Lab Results   Component Value Date/Time    WBC 16.6 (H) 2021 06:52 PM    HGB 13.1 2021 06:52 PM    HCT 39.9 09/05/2021 06:52 PM     09/05/2021 06:52 PM     RENAL  Lab Results   Component Value Date/Time     (L) 09/05/2021 06:52 PM    K 4.8 09/05/2021 06:52 PM    K 3.7 03/27/2021 07:46 PM    CL 93 (L) 09/05/2021 06:52 PM    CO2 22 09/05/2021 06:52 PM    BUN 24 (H) 09/05/2021 06:52 PM    CREATININE 1.9 (H) 09/05/2021 06:52 PM    GLUCOSE 115 (H) 09/05/2021 06:52 PM     COAGS  Lab Results   Component Value Date/Time    PROTIME 11.8 08/06/2019 11:06 PM    INR 1.04 08/06/2019 11:06 PM       Gómez Hernandez MD   9/6/2021

## 2021-09-06 NOTE — PROGRESS NOTES
4 Eyes Skin Assessment     The patient is being assess for   Admission    I agree that 2 RN's have performed a thorough Head to Toe Skin Assessment on the patient. ALL assessment sites listed below have been assessed. Areas assessed for pressure by both nurses:   [x]   Head, Face, and Ears   [x]   Shoulders, Back, and Chest, Abdomen  [x]   Arms, Elbows, and Hands   [x]   Coccyx, Sacrum, and Ischium  [x]   Legs, Feet, and Heels  Scattered bruising. Pt also has surgical incisions of the buttocks to the perineum to the abdomen. Dressing intact with minor drainage from the site. Skin Assessed Under all Medical Devices by both nurses:  O2 device tubing              All Mepilex Borders were peeled back and area peeked at by both nurses:  No: na  Please list where Mepilex Borders are located:  na             **SHARE this note so that the co-signing nurse is able to place an eSignature**    Co-signer eSignature: Electronically signed by Spencer Wing RN on 9/6/21 at 6:52 PM EDT    Does the Patient have Skin Breakdown related to pressure?   No     (Insert Photo herena)         Alpesh Prevention initiated:  NA   Wound Care Orders initiated:  NA      WOC nurse consulted for Pressure Injury (Stage 3,4, Unstageable, DTI, NWPT, Complex wounds)and New or Established Ostomies:  NA      Primary Nurse eSignature: Electronically signed by Edvin Martins RN on 9/6/21 at 6:45 PM EDT

## 2021-09-06 NOTE — PROGRESS NOTES
RESPIRATORY THERAPY ASSESSMENT    Name:  Raenelle Dakin  Medical Record Number:  [id number]  Age: 63 y.o. Gender: female  : 1960  Today's Date:  2021  Room:  [facility]0222-01    Assessment     Is the patient being admitted for a COPD or Asthma exacerbation? No   (If yes the patient will be seen every 4 hours for the first 24 hours and then reassessed)    Patient Admission Diagnosis      Allergies  Allergies   Allergen Reactions    Lisinopril Swelling     Tongue swelling    Codeine      itchy    Cymbalta [Duloxetine Hcl] Other (See Comments)     SI thoughts     Duloxetine      Strong suicidal thoughts    Penicillins Itching     itchy    Sulfa Antibiotics      Unable to urinate       Minimum Predicted Vital Capacity:               Actual Vital Capacity:                    Pulmonary History:COPD and ILD  Home Oxygen Therapy:  room air  Home Respiratory Therapy:Albuterol prn, duoneb prn (reports she has not been using)  Current Respiratory Therapy:  duoneb Q4prn          Respiratory Severity Index(RSI)   Patients with orders for inhalation medications, oxygen, or any therapeutic treatment modality will be placed on Respiratory Protocol. They will be assessed with the first treatment and at least every 72 hours thereafter. The following severity scale will be used to determine frequency of treatment intervention.     Smoking History: Pulmonary Disease or Smoking History, Greater than 15 pack year = 2    Social History  Social History     Tobacco Use    Smoking status: Current Every Day Smoker     Packs/day: 1.00     Years: 35.00     Pack years: 35.00     Types: Cigarettes     Last attempt to quit: 2019     Years since quittin.1    Smokeless tobacco: Never Used   Vaping Use    Vaping Use: Never used   Substance Use Topics    Alcohol use: Yes     Comment: once a week    Drug use: Not Currently     Types: Marijuana, Methamphetamines     Comment: occassionally meth and percocet in past, snort percocet, snorting and smoking meth       Recent Surgical History: None = 0  Past Surgical History  Past Surgical History:   Procedure Laterality Date    BRONCHOSCOPY  08/08/2019    BRONCHOSCOPY N/A 8/8/2019    BRONCHOSCOPY ALVEOLAR LAVAGE performed by Emily Kay MD at [facility] 94      FOOT SURGERY      HYSTERECTOMY      JOINT REPLACEMENT      KNEE SURGERY      OTHER SURGICAL HISTORY      DEBRIDEMENT OF NECROTIZING SOFT TISSUE INFECTION, RIGHT BUTTOCK, GROIN, RIGHT LOWER QUADRANT, PERINEUM.     TOTAL HIP ARTHROPLASTY Right 08/2020       Level of Consciousness: Alert, Oriented, and Cooperative = 0    Level of Activity: Walking unassisted = 0    Respiratory Pattern: Regular Pattern; RR 8-20 = 0    Breath Sounds: Clear = 0    Sputum   ,  ,    Cough: Strong, spontaneous, non-productive = 0    Vital Signs   BP (!) 158/84   Pulse 64   Temp 97 °F (36.1 °C) (Oral)   Resp 18   Ht 5' 7\" (1.702 m)   Wt 232 lb (105.2 kg)   SpO2 95%   BMI 36.34 kg/m²   SPO2 (COPD values may differ): 90-91% on room air or greater than 92% on FiO2 24- 28% = 1    Peak Flow (asthma only): not applicable = 0    RSI: 0-4 = See once and convert to home regimen or discontinue        Plan       Goals: medication delivery prn    Patient/caregiver was educated on the proper method of use for Respiratory Care Devices:  Yes      Level of patient/caregiver understanding able to:   ? Verbalize understanding   ? Demonstrate understanding       ? Teach back        ? Needs reinforcement       ? No available caregiver               ? Other:     Response to education:  Good     Is patient being placed on Home Treatment Regimen? Yes     Does the patient have everything they need prior to discharge?   Yes     Comments: chart reviewed, pt assessed    Plan of Care: Sherley Guerin    Electronically signed by Cheyenne Madsen RCP on 9/6/2021 at 5:29 PM    Respiratory Protocol Guidelines 1. Assessment and treatment by Respiratory Therapy will be initiated for medication and therapeutic interventions upon initiation of aerosolized medication. 2. Physician will be contacted for respiratory rate (RR) greater than 35 breaths per minute. Therapy will be held for heart rate (HR) greater than 140 beats per minute, pending direction from physician. 3. Bronchodilators will be administered via Metered Dose Inhaler (MDI) with spacer when the following criteria are met:  a. Alert and cooperative     b. HR < 140 bpm  c. RR < 30 bpm                d. Can demonstrate a 2-3 second inspiratory hold  4. Bronchodilators will be administered via Hand Held Nebulizer ZAYRA [facility]) to patients when ANY of the following criteria are met  a. Incognizant or uncooperative          b. Patients treated with HHN at Home        c. Unable to demonstrate proper use of MDI with spacer     d. RR > 30 bpm   5. Bronchodilators will be delivered via Metered Dose Inhaler (MDI), HHN, Aerogen to intubated patients on mechanical ventilation. 6. Inhalation medication orders will be delivered and/or substituted as outlined below. Aerosolized Medications Ordering and Administration Guidelines:    1. All Medications will be ordered by a physician, and their frequency and/or modality will be adjusted as defined by the patients Respiratory Severity Index (RSI) score. 2. If the patient does not have documented COPD, consider discontinuing anticholinergics when RSI is less than 9.  3. If the bronchospasm worsens (increased RSI), then the bronchodilator frequency can be increased to a maximum of every 4 hours. If greater than every 4 hours is required, the physician will be contacted. 4. If the bronchospasm improves, the frequency of the bronchodilator can be decreased, based on the patient's RSI, but not less than home treatment regimen frequency.   5. Bronchodilator(s) will be discontinued if patient has a RSI less than 9 and has received no scheduled or as needed treatment for 72  Hrs. Patients Ordered on a Mucolytic Agent:    1. Must always be administered with a bronchodilator. 2. Discontinue if patient experiences worsened bronchospasm, or secretions have lessened to the point that the patient is able to clear them with a cough. Anti-inflammatory and Combination Medications:    1. If the patient lacks prior history of lung disease, is not using inhaled anti-inflammatory medication at home, and lacks wheezing by examination or by history for at least 24 hours, contact physician for possible discontinuation.

## 2021-09-06 NOTE — PROGRESS NOTES
O2 decreased to 1lpm         09/06/21 0955   Oxygen Therapy/Pulse Ox   O2 Therapy Oxygen humidified   O2 Device High flow nasal cannula   O2 Flow Rate (L/min) 1 L/min   Resp 19   SpO2 98 %

## 2021-09-06 NOTE — ED NOTES
Left VMM with pt son with pt permission to attempt to inform family that pt is going into surgery      Mayra Magallon RN  09/06/21 0340

## 2021-09-07 LAB
ALBUMIN SERPL-MCNC: 2.2 G/DL (ref 3.4–5)
ANION GAP SERPL CALCULATED.3IONS-SCNC: 7 MMOL/L (ref 3–16)
ANISOCYTOSIS: ABNORMAL
BANDED NEUTROPHILS RELATIVE PERCENT: 6 % (ref 0–7)
BASOPHILS ABSOLUTE: 0 K/UL (ref 0–0.2)
BASOPHILS RELATIVE PERCENT: 0 %
BUN BLDV-MCNC: 22 MG/DL (ref 7–20)
CALCIUM SERPL-MCNC: 8.5 MG/DL (ref 8.3–10.6)
CHLORIDE BLD-SCNC: 103 MMOL/L (ref 99–110)
CO2: 25 MMOL/L (ref 21–32)
CREAT SERPL-MCNC: 0.8 MG/DL (ref 0.6–1.1)
EKG ATRIAL RATE: 70 BPM
EKG DIAGNOSIS: NORMAL
EKG P AXIS: 46 DEGREES
EKG P-R INTERVAL: 142 MS
EKG Q-T INTERVAL: 372 MS
EKG QRS DURATION: 92 MS
EKG QTC CALCULATION (BAZETT): 401 MS
EKG R AXIS: 0 DEGREES
EKG T AXIS: 17 DEGREES
EKG VENTRICULAR RATE: 70 BPM
EOSINOPHILS ABSOLUTE: 0 K/UL (ref 0–0.6)
EOSINOPHILS RELATIVE PERCENT: 0 %
GFR AFRICAN AMERICAN: >60
GFR NON-AFRICAN AMERICAN: >60
GLUCOSE BLD-MCNC: 129 MG/DL (ref 70–99)
HCT VFR BLD CALC: 30.5 % (ref 36–48)
HEMOGLOBIN: 9.6 G/DL (ref 12–16)
LYMPHOCYTES ABSOLUTE: 1.4 K/UL (ref 1–5.1)
LYMPHOCYTES RELATIVE PERCENT: 8 %
MAGNESIUM: 2.1 MG/DL (ref 1.8–2.4)
MCH RBC QN AUTO: 27 PG (ref 26–34)
MCHC RBC AUTO-ENTMCNC: 31.4 G/DL (ref 31–36)
MCV RBC AUTO: 85.9 FL (ref 80–100)
MONOCYTES ABSOLUTE: 1.4 K/UL (ref 0–1.3)
MONOCYTES RELATIVE PERCENT: 8 %
NEUTROPHILS ABSOLUTE: 15 K/UL (ref 1.7–7.7)
NEUTROPHILS RELATIVE PERCENT: 78 %
PDW BLD-RTO: 17.6 % (ref 12.4–15.4)
PHOSPHORUS: 3 MG/DL (ref 2.5–4.9)
PLATELET # BLD: 201 K/UL (ref 135–450)
PLATELET SLIDE REVIEW: ADEQUATE
PMV BLD AUTO: 8.6 FL (ref 5–10.5)
POTASSIUM SERPL-SCNC: 5.4 MMOL/L (ref 3.5–5.1)
RBC # BLD: 3.56 M/UL (ref 4–5.2)
SLIDE REVIEW: ABNORMAL
SODIUM BLD-SCNC: 135 MMOL/L (ref 136–145)
VANCOMYCIN TROUGH: 7.4 UG/ML (ref 10–20)
WBC # BLD: 17.8 K/UL (ref 4–11)

## 2021-09-07 PROCEDURE — 80069 RENAL FUNCTION PANEL: CPT

## 2021-09-07 PROCEDURE — 2500000003 HC RX 250 WO HCPCS: Performed by: INTERNAL MEDICINE

## 2021-09-07 PROCEDURE — 83735 ASSAY OF MAGNESIUM: CPT

## 2021-09-07 PROCEDURE — 93010 ELECTROCARDIOGRAM REPORT: CPT | Performed by: INTERNAL MEDICINE

## 2021-09-07 PROCEDURE — C9113 INJ PANTOPRAZOLE SODIUM, VIA: HCPCS | Performed by: INTERNAL MEDICINE

## 2021-09-07 PROCEDURE — 99233 SBSQ HOSP IP/OBS HIGH 50: CPT | Performed by: INTERNAL MEDICINE

## 2021-09-07 PROCEDURE — 1200000000 HC SEMI PRIVATE

## 2021-09-07 PROCEDURE — 6370000000 HC RX 637 (ALT 250 FOR IP): Performed by: INTERNAL MEDICINE

## 2021-09-07 PROCEDURE — 2580000003 HC RX 258: Performed by: INTERNAL MEDICINE

## 2021-09-07 PROCEDURE — 6370000000 HC RX 637 (ALT 250 FOR IP): Performed by: NURSE PRACTITIONER

## 2021-09-07 PROCEDURE — 6360000002 HC RX W HCPCS: Performed by: INTERNAL MEDICINE

## 2021-09-07 PROCEDURE — 99232 SBSQ HOSP IP/OBS MODERATE 35: CPT | Performed by: SURGERY

## 2021-09-07 PROCEDURE — 99231 SBSQ HOSP IP/OBS SF/LOW 25: CPT | Performed by: NURSE PRACTITIONER

## 2021-09-07 PROCEDURE — 36415 COLL VENOUS BLD VENIPUNCTURE: CPT

## 2021-09-07 PROCEDURE — 80202 ASSAY OF VANCOMYCIN: CPT

## 2021-09-07 PROCEDURE — 85025 COMPLETE CBC W/AUTO DIFF WBC: CPT

## 2021-09-07 RX ORDER — SODIUM POLYSTYRENE SULFONATE 15 G/60ML
15 SUSPENSION ORAL; RECTAL ONCE
Status: COMPLETED | OUTPATIENT
Start: 2021-09-07 | End: 2021-09-07

## 2021-09-07 RX ADMIN — ESCITALOPRAM OXALATE 10 MG: 10 TABLET ORAL at 07:47

## 2021-09-07 RX ADMIN — CLINDAMYCIN PHOSPHATE 600 MG: 600 INJECTION, SOLUTION INTRAVENOUS at 23:50

## 2021-09-07 RX ADMIN — METOPROLOL TARTRATE 25 MG: 25 TABLET, FILM COATED ORAL at 07:47

## 2021-09-07 RX ADMIN — OXYCODONE HYDROCHLORIDE 10 MG: 5 TABLET ORAL at 15:43

## 2021-09-07 RX ADMIN — MEROPENEM 500 MG: 500 INJECTION, POWDER, FOR SOLUTION INTRAVENOUS at 11:52

## 2021-09-07 RX ADMIN — ROPINIROLE HYDROCHLORIDE 1 MG: 1 TABLET, FILM COATED ORAL at 20:13

## 2021-09-07 RX ADMIN — OXYBUTYNIN CHLORIDE 5 MG: 5 TABLET ORAL at 07:47

## 2021-09-07 RX ADMIN — ENOXAPARIN SODIUM 40 MG: 40 INJECTION SUBCUTANEOUS at 23:50

## 2021-09-07 RX ADMIN — HYDROMORPHONE HYDROCHLORIDE 0.5 MG: 1 INJECTION, SOLUTION INTRAMUSCULAR; INTRAVENOUS; SUBCUTANEOUS at 12:19

## 2021-09-07 RX ADMIN — ROPINIROLE HYDROCHLORIDE 1 MG: 1 TABLET, FILM COATED ORAL at 13:29

## 2021-09-07 RX ADMIN — OXYBUTYNIN CHLORIDE 5 MG: 5 TABLET ORAL at 13:29

## 2021-09-07 RX ADMIN — SODIUM POLYSTYRENE SULFONATE 15 G: 15 SUSPENSION ORAL; RECTAL at 09:36

## 2021-09-07 RX ADMIN — CLINDAMYCIN PHOSPHATE 600 MG: 600 INJECTION, SOLUTION INTRAVENOUS at 01:13

## 2021-09-07 RX ADMIN — MEROPENEM 500 MG: 500 INJECTION, POWDER, FOR SOLUTION INTRAVENOUS at 20:16

## 2021-09-07 RX ADMIN — ROPINIROLE HYDROCHLORIDE 1 MG: 1 TABLET, FILM COATED ORAL at 07:47

## 2021-09-07 RX ADMIN — GABAPENTIN 300 MG: 300 CAPSULE ORAL at 20:13

## 2021-09-07 RX ADMIN — SODIUM CHLORIDE: 9 INJECTION, SOLUTION INTRAVENOUS at 23:45

## 2021-09-07 RX ADMIN — GABAPENTIN 300 MG: 300 CAPSULE ORAL at 13:29

## 2021-09-07 RX ADMIN — HYDROMORPHONE HYDROCHLORIDE 0.5 MG: 1 INJECTION, SOLUTION INTRAMUSCULAR; INTRAVENOUS; SUBCUTANEOUS at 02:36

## 2021-09-07 RX ADMIN — MEROPENEM 500 MG: 500 INJECTION, POWDER, FOR SOLUTION INTRAVENOUS at 03:51

## 2021-09-07 RX ADMIN — PANTOPRAZOLE SODIUM 40 MG: 40 INJECTION, POWDER, FOR SOLUTION INTRAVENOUS at 07:47

## 2021-09-07 RX ADMIN — SODIUM CHLORIDE: 9 INJECTION, SOLUTION INTRAVENOUS at 11:51

## 2021-09-07 RX ADMIN — OXYCODONE HYDROCHLORIDE 10 MG: 5 TABLET ORAL at 20:13

## 2021-09-07 RX ADMIN — CLINDAMYCIN PHOSPHATE 600 MG: 600 INJECTION, SOLUTION INTRAVENOUS at 15:45

## 2021-09-07 RX ADMIN — METOPROLOL TARTRATE 25 MG: 25 TABLET, FILM COATED ORAL at 20:13

## 2021-09-07 RX ADMIN — HYDROMORPHONE HYDROCHLORIDE 0.5 MG: 1 INJECTION, SOLUTION INTRAMUSCULAR; INTRAVENOUS; SUBCUTANEOUS at 23:45

## 2021-09-07 RX ADMIN — OXYCODONE HYDROCHLORIDE 10 MG: 5 TABLET ORAL at 07:24

## 2021-09-07 RX ADMIN — CLINDAMYCIN PHOSPHATE 600 MG: 600 INJECTION, SOLUTION INTRAVENOUS at 08:47

## 2021-09-07 RX ADMIN — HYDROMORPHONE HYDROCHLORIDE 0.5 MG: 1 INJECTION, SOLUTION INTRAMUSCULAR; INTRAVENOUS; SUBCUTANEOUS at 17:56

## 2021-09-07 RX ADMIN — OXYCODONE HYDROCHLORIDE 10 MG: 5 TABLET ORAL at 03:48

## 2021-09-07 RX ADMIN — OXYCODONE HYDROCHLORIDE 10 MG: 5 TABLET ORAL at 11:47

## 2021-09-07 RX ADMIN — OXYBUTYNIN CHLORIDE 5 MG: 5 TABLET ORAL at 20:13

## 2021-09-07 RX ADMIN — GABAPENTIN 300 MG: 300 CAPSULE ORAL at 07:47

## 2021-09-07 ASSESSMENT — PAIN DESCRIPTION - PAIN TYPE
TYPE: SURGICAL PAIN

## 2021-09-07 ASSESSMENT — PAIN SCALES - GENERAL
PAINLEVEL_OUTOF10: 8
PAINLEVEL_OUTOF10: 3
PAINLEVEL_OUTOF10: 9
PAINLEVEL_OUTOF10: 10
PAINLEVEL_OUTOF10: 7
PAINLEVEL_OUTOF10: 8
PAINLEVEL_OUTOF10: 10
PAINLEVEL_OUTOF10: 6
PAINLEVEL_OUTOF10: 3
PAINLEVEL_OUTOF10: 3
PAINLEVEL_OUTOF10: 8

## 2021-09-07 ASSESSMENT — PAIN DESCRIPTION - LOCATION
LOCATION: BUTTOCKS;GROIN
LOCATION: BACK
LOCATION: BUTTOCKS;GROIN

## 2021-09-07 ASSESSMENT — PAIN DESCRIPTION - ONSET
ONSET: ON-GOING

## 2021-09-07 ASSESSMENT — PAIN DESCRIPTION - FREQUENCY
FREQUENCY: CONTINUOUS

## 2021-09-07 ASSESSMENT — PAIN DESCRIPTION - ORIENTATION: ORIENTATION: LOWER

## 2021-09-07 NOTE — PROGRESS NOTES
Progress Note    Admit Date:  9/5/2021    63year old female with hypertension, hyperlipidemia, Depression presented to [facility] with c/o abscess to her buttock/groin. She was emergently taken to the OR by Dr. Rosalina Martin. Admitted to med-surg. Subjective:  Ms. Vini Nunn c/o pain to her groin/buttock. It is draining. Objective:   Patient Vitals for the past 4 hrs:   BP Temp Temp src Pulse Resp SpO2   09/07/21 0700 120/70 96.9 °F (36.1 °C) Oral 68 18 97 %            Intake/Output Summary (Last 24 hours) at 9/7/2021 1000  Last data filed at 9/7/2021 0935  Gross per 24 hour   Intake 2030 ml   Output 3000 ml   Net -970 ml       Physical Exam:  Gen: No distress. Alert. Eyes: PERRL. No sclera icterus. No conjunctival injection. ENT: No discharge. Pharynx clear. Neck: Trachea midline. Resp: No accessory muscle use. No crackles. No wheezes. No rhonchi. CV: Regular rate. Regular rhythm. No murmur. No rub. No edema. Capillary Refill: Brisk,< 3 seconds   Peripheral Pulses: +2 palpable, equal bilaterally   GI: Non-tender. Non-distended. Normal bowel sounds. No hernia. Skin: Warm and dry. Dressing lower abdomen, groin, buttock. Draining serosanguinous fluid. M/S: No cyanosis. No joint deformity. No clubbing. Neuro: Awake. Grossly nonfocal    Psych: Oriented x 3. No anxiety or agitation      Data:  CBC:   Recent Labs     09/05/21 1852 09/06/21  0416 09/07/21  0549   WBC 16.6* 18.8* 17.8*   HGB 13.1 11.3* 9.6*   HCT 39.9 35.4* 30.5*   MCV 85.8 86.8 85.9    184 201     BMP:   Recent Labs     09/05/21  1852 09/06/21  0416 09/07/21  0549   * 130* 135*   K 4.8 4.6 5.4*   CL 93* 98* 103   CO2 22 23 25   PHOS  --  5.1* 3.0   BUN 24* 25* 22*   CREATININE 1.9* 1.6* 0.8     LIVER PROFILE:   Recent Labs     09/05/21  1852   AST 20   ALT 13   BILITOT 0.4   ALKPHOS 137*     PT/INR: No results for input(s): PROTIME, INR in the last 72 hours.     CULTURES  COVID/Influenza: not detected  Blood: NGTD  Fluid right groin: E. Coli, staph epidermidis     RADIOLOGY  CT ABDOMEN PELVIS WO CONTRAST Additional Contrast? None   Final Result   Probable cellulitis with punctate collections of subcutaneous air in the soft   tissues along the right gluteal region inferiorly and extending anteriorly   into the perineal region. This tracks into the subcutaneous fat along the   right pubic and right groin region and extends anterior and lateral to the   right hip along the abdominal wall laterally which is suspicious for probable   anaerobic infection and cellulitis with possible necrotizing fasciitis. Recommend surgical correlation. Slightly limited exam due to the lack of IV contrast with metallic prosthetic   devices in both hips causing obscuration of the lower pelvis. 10 mm left renal stone which is unchanged with no hydronephrosis. Mild bibasilar atelectasis or infiltrates extending into the lingula which is   more prominent. Previous cholecystectomy and mild hepatosplenomegaly which is unchanged. Mild constipation with no bowel obstruction. The findings were called to Dr. Helayne Riedel at 10:25 p.m. [facility] XR CHEST PORTABLE   Final Result   Mild cardiomegaly and probable mild pulmonary interstitial edema which is   less prominent from the prior study. Recommend short-term follow-up. Assessment/Plan:  Sepsis  - Hypotension, lactic acidosis, leukocytosis  - POA  - BLood cx NGTD  - Wound cx as above. - IV Vanc, Clindamycin, Merrem D#2    Abscess  Necrotizing fascitis of the right buttock/groin, lower abdomen  - taken to OR emergently. S/p DEBRIDEMENT RIGHT GROIN, PERINEUM, R buttock, and RLQ abdominal wall  - seen by general surgery  - continue IV Vanc, Merrem, Clindamycin. Wound cx with E. Coli, staph epidermidis  - PRN Oxycodone, Dilaudid     Hypotension  - due to above. Resolving with IVFs. LONNY  - Creatinine 1.9 on admission  - Held Aldactone, Lasix. - given IVF's. Resolved.

## 2021-09-07 NOTE — PROGRESS NOTES
General Surgery - Jaleesa Rodríguez, HAIDER - CNP, CNP  Daily Progress Note    Pt Name: Mariposa Grewal  Medical Record Number: [id number]  Date of Birth 1960   Today's Date: 9/7/2021    ASSESSMENT  1. POD # 1 s/p debridement of right groin, perineum, right buttocks and RLQ abdominal wall. 2. Right groin dressing change: picture in media section, wound measures 34 cm x 11 cm x 7.5 cm. Wound base was pink and moist, there was one small aree of the right groin/perineal area which was slightly gray in color, no odor, no alireza purulence, pt tolerated well. Packed with two rolls of kerlex and cover. 3. Leuks 17.8  4. ARF resolved: Cr 1.9->1.6->0.8   5. Culture: staph epidermidis and e coli, sensitivity is pending. 6. VSS    PLAN  1. PT: OOB to chair  2. Pain control   3. Vanc and Clinda  4. Await culture results. 5. Plan to do dressing change in the am with wound care for possible Veriflo VAC placement. Opal Yang has improved from yesterday. Pain is well controlled. She has no nausea and no vomiting. She has passed flatus and has not had a bowel movement. She is tolerating regular diet. Current activity is up with assistance    OBJECTIVE  VITALS:  height is 5' 7\" (1.702 m) and weight is 232 lb (105.2 kg). Her oral temperature is 97 °F (36.1 °C). Her blood pressure is 138/74 and her pulse is 66. Her respiration is 18 and oxygen saturation is 97%. VITALS:  /74   Pulse 66   Temp 97 °F (36.1 °C) (Oral)   Resp 18   Ht 5' 7\" (1.702 m)   Wt 232 lb (105.2 kg)   SpO2 97%   BMI 36.34 kg/m²   INTAKE/OUTPUT:    Intake/Output Summary (Last 24 hours) at 9/7/2021 1451  Last data filed at 9/7/2021 1256  Gross per 24 hour   Intake 5141.26 ml   Output 3000 ml   Net 2141.26 ml     GENERAL: alert, cooperative, no distress    I/O last 3 completed shifts:   In: 4679 [P.O.:480; I.V.:1310]  Out: 3000 [Urine:3000]  I/O this shift:  In: 3351.3 [P.O.:480; I.V.:2871.3]  Out: -     LABS  Recent Labs

## 2021-09-07 NOTE — PROGRESS NOTES
Shift report report received from Matthew Dash, [facility].

## 2021-09-07 NOTE — PROGRESS NOTES
--  5.1* 3.0   BUN 24* 25* 22*   CREATININE 1.9* 1.6* 0.8     LIVER PROFILE:   Recent Labs     09/05/21  1852   AST 20   ALT 13   BILITOT 0.4   ALKPHOS 137*     PT/INR: No results for input(s): PROTIME, INR in the last 72 hours. APTT: No results for input(s): APTT in the last 72 hours. UA:No results for input(s): NITRITE, COLORU, PHUR, LABCAST, WBCUA, RBCUA, MUCUS, TRICHOMONAS, YEAST, BACTERIA, CLARITYU, SPECGRAV, LEUKOCYTESUR, UROBILINOGEN, BILIRUBINUR, BLOODU, GLUCOSEU, AMORPHOUS in the last 72 hours. Invalid input(s): KETONESU  No results for input(s): PHART, XHP0FDD, PO2ART in the last 72 hours. Cultures:   9/6 Body fluid- + e coli  9/5 blood- ngtd      Films:     Chest x-ray 9/5 imaging showed:  Mild cardiomegaly with prominent interstitial markings        CT abdomen 9/5  Probable cellulitis with punctate collections of subcutaneous air in the soft   tissues along the right gluteal region inferiorly and extending anteriorly   into the perineal region.  This tracks into the subcutaneous fat along the   right pubic and right groin region and extends anterior and lateral to the   right hip along the abdominal wall laterally which is suspicious for probable   anaerobic infection and cellulitis with possible necrotizing fasciitis. Slightly limited exam due to the lack of IV contrast with metallic prosthetic   devices in both hips causing obscuration of the lower pelvis. 10 mm left renal stone which is unchanged with no hydronephrosis. Mild bibasilar atelectasis or infiltrates extending into the lingula which is   more prominent. Previous cholecystectomy and mild hepatosplenomegaly which is unchanged.    Mild constipation with no bowel obstruction     Echo 7/29/1185   LV systolic function is normal with ejection fraction estimated at 55 %.   No regional wall motion abnormalities are noted.   There is mild concentric left ventricular hypertrophy.   Normal diastolic function with normal filling pressure.   The left atrium is mildly dilated in size.   Aortic valve appears mildly sclerotic but opens adequately.   Mild mitral and tricuspid regurgitation.   Systolic pulmonary artery pressure (SPAP) is estimated at 39mmHg (right   atrial pressure 8mmHg).     ASSESSMENT:  · Severe sepsis  · Right groin, perineum, buttock necrotizing fasciitis post debridement 9/6/2021  · Acute kidney injury  · Leukocytosis  · Chronic hypoxemic respiratory failure-uses 2 to 3 L at home  · ILD followed by Dr. Cathe Aschoff   · Tobacco abuse  · History of polysubstance abuse     PLAN:  · Supplemental oxygen to maintain SaO2 >92%; wean as tolerated  · Inhaled bronchodilators as needed  · Incentive spirometry  · Meropenem and Clindamycin per surgery/IM   · Lovenox for DVT prophylaxis

## 2021-09-07 NOTE — PROGRESS NOTES
See PM shift assessment. States her heels are becoming sore. Reinforced need to move legs while in bed; voiced understanding. Placed pillow under legs to keep heels off of mattress. Changed chux pad as it had brown drainage on it from patients sx incision. States she would like pain medication as soon as it is due. Updated white board re pain med schedule. Call light in reach.

## 2021-09-07 NOTE — FLOWSHEET NOTE
09/07/21 1330 09/07/21 1348   Vital Signs   Temp 97 °F (36.1 °C)  --    Temp Source Oral  --    Pulse 66  --    Heart Rate Source Monitor  --    Resp 18  --    /74  --    BP Location Left upper arm  --    Patient Position Semi fowlers  --    Level of Consciousness Alert (0)  --    MEWS Score 1  --    Patient Currently in Pain Yes  --    Pain Assessment   Pain Assessment 0-10  --    RASS Score  --  0   Pain Level 8  --    POSS Score (Patient Ctrl Analgesia)  --  Sleep, easy to arouse   Pain Type Surgical pain  --    Pain Location Buttocks;Groin  --    Response to Pain Intervention  --  Asleep with RR greater than 10   Oxygen Therapy   SpO2 97 %  --    O2 Device Nasal cannula  --      Alert and oriented x4. Skin w/d. resp e/e unlabored. Dressing changed per surgery and wound care involved. No s/s distress noted. Call light in easy reach.

## 2021-09-07 NOTE — CONSULTS
[facility] Wound Ostomy Continence Nurse  Consult Note       NAME:  Andriy Thompson  MEDICAL RECORD NUMBER:  [id number]  AGE: 63 y.o. GENDER: female  : 1960  TODAY'S DATE:  2021    Subjective Pt cooperative and pleasant, agreeable to wound assessment with surgery NP. Reason for WOCN Evaluation and Assessment: right groin/perineum nec fasc surgical wound      Andriy Thompson is a 63 y.o. female referred by:   [x] Physician  [] Nursing  [] Other:     Wound Identification:  Wound Type: non-healing surgical and nec fasc  Contributing Factors: obesity and decreased sensation secondary to prescritpion narcotic use    The patient is a 63 y.o. female with/PMH below, presents with boil on her butt which started draining but still spreading to butt/groin, region painful. Pt reports that she started having pain of her R buttock about a week ago. She noted a boil on her R buttock at the time. She says she tried using Neosporin on it but it continued to worsen/spread and become more painful. She came to the ER and was emergently taken to the OR per Dr Mary Ann Mcmanus on .      Patient Goal of Care:  [x] Wound Healing  [] Odor Control  [] Palliative Care  [x] Pain Control   [] Other:         PAST MEDICAL HISTORY        Diagnosis Date    Class 2 obesity with serious comorbidity and body mass index (BMI) of 39.0 to 39.9 in adult 2018    Depression     History of left hip replacement 2018    Hyperlipidemia     Hypertension     Kidney stone     MRSA (methicillin resistant staph aureus) culture positive 2019    nasal colonization       PAST SURGICAL HISTORY    Past Surgical History:   Procedure Laterality Date    BRONCHOSCOPY  2019    BRONCHOSCOPY N/A 2019    BRONCHOSCOPY ALVEOLAR LAVAGE performed by Flavia Christianson MD at [facility]      FOOT SURGERY      GROIN SURGERY Right 2021    DEBRIDEMENT OF NECROTIZING SOFT TISSUE INFECTION, RIGHT BUTTOCK, GROIN, RIGHT LOWER QUADRANT, PERINEUM. performed by Javier Knox MD at [address] [facility]      OTHER SURGICAL HISTORY      DEBRIDEMENT OF NECROTIZING SOFT TISSUE INFECTION, RIGHT BUTTOCK, GROIN, RIGHT LOWER QUADRANT, PERINEUM.     TOTAL HIP ARTHROPLASTY Right 2020       FAMILY HISTORY    History reviewed. No pertinent family history. SOCIAL HISTORY    Social History     Tobacco Use    Smoking status: Current Every Day Smoker     Packs/day: 1.00     Years: 35.00     Pack years: 35.00     Types: Cigarettes     Last attempt to quit: 2019     Years since quittin.1    Smokeless tobacco: Never Used   Vaping Use    Vaping Use: Never used   Substance Use Topics    Alcohol use: Yes     Comment: once a week    Drug use: Not Currently     Types: Marijuana, Methamphetamines     Comment: occassionally meth and percocet in past, snort percocet, snorting and smoking meth       ALLERGIES    Allergies   Allergen Reactions    Lisinopril Swelling     Tongue swelling    Codeine      itchy    Cymbalta [Duloxetine Hcl] Other (See Comments)     SI thoughts     Duloxetine      Strong suicidal thoughts    Penicillins Itching     itchy    Sulfa Antibiotics      Unable to urinate       MEDICATIONS    No current facility-administered medications on file prior to encounter. Current Outpatient Medications on File Prior to Encounter   Medication Sig Dispense Refill    pantoprazole (PROTONIX) 40 MG tablet Take 40 mg by mouth daily      oxyCODONE-acetaminophen (PERCOCET) 5-325 MG per tablet Take 1 tablet by mouth every 4 hours as needed for Pain.       albuterol (PROVENTIL) (2.5 MG/3ML) 0.083% nebulizer solution Take 3 mLs by nebulization every 6 hours as needed for Wheezing 360 mL 5    escitalopram (LEXAPRO) 10 MG tablet Take 10 mg by mouth daily      furosemide (LASIX) 40 MG tablet Take 1 tablet by mouth daily 30 tablet 3    spironolactone (ALDACTONE) 25 MG tablet Take 1 tablet by mouth daily 30 tablet 3    albuterol sulfate  (90 Base) MCG/ACT inhaler Inhale 2 puffs into the lungs 4 times daily 1 Inhaler 0    oxybutynin (DITROPAN) 5 MG tablet Take 5 mg by mouth 3 times daily      tiZANidine (ZANAFLEX) 4 MG tablet Take 4 mg by mouth 2 times daily as needed      metoprolol (LOPRESSOR) 100 MG tablet Take 100 mg by mouth 2 times daily      rOPINIRole (REQUIP) 2 MG tablet Take 2 mg by mouth 4 times daily       gabapentin (NEURONTIN) 300 MG capsule Take 300 mg by mouth 4 times daily.        atorvastatin (LIPITOR) 20 MG tablet Take 40 mg by mouth daily       acetaminophen (TYLENOL) 500 MG tablet Take 1 tablet by mouth 4 times daily as needed for Pain 120 tablet 0    ipratropium-albuterol (DUONEB) 0.5-2.5 (3) MG/3ML SOLN nebulizer solution Inhale 3 mLs into the lungs every 4 hours as needed for Shortness of Breath 360 mL 1    omeprazole (PRILOSEC) 20 MG capsule Take 40 mg by mouth Daily          Objective    /74   Pulse 66   Temp 97 °F (36.1 °C) (Oral)   Resp 18   Ht 5' 7\" (1.702 m)   Wt 232 lb (105.2 kg)   SpO2 97%   BMI 36.34 kg/m²     LABS:  WBC:    Lab Results   Component Value Date    WBC 17.8 09/07/2021     H/H:    Lab Results   Component Value Date    HGB 9.6 09/07/2021    HCT 30.5 09/07/2021     PTT:  No results found for: APTT, PTT[APTT}  PT/INR:    Lab Results   Component Value Date    PROTIME 11.8 08/06/2019    INR 1.04 08/06/2019     HgBA1c:    Lab Results   Component Value Date    LABA1C 5.9 07/18/2019       Assessment   Alpesh Risk Score: Alpesh Scale Score: 19    Patient Active Problem List   Diagnosis Code    Hypertension I10    Hyperlipidemia E78.5    Depression F32.9    Class 2 obesity due to excess calories with body mass index (BMI) of 39.0 to 39.9 in adult E66.09, Z68.39    Acute pulmonary edema (HCC) J81.0    Acute congestive heart failure (HCC) I50.9    Iron deficiency anemia D50.9    Acute respiratory failure with hypoxia ([facility]) J96.01    COPD exacerbation ([facility]) J44.1    Respiratory alkalosis E87.3    Lactic acidosis E87.2    Nausea & vomiting R11.2    Hyperventilation R06.4    Hypokalemia E87.6    Hypoxia R09.02    Acute hypoxemic respiratory failure ([facility]) J96.01    Abnormal chest CT R93.89    Chronic diastolic congestive heart failure ([facility]) I50.32    Abnormal CT of the chest R93.89    COPD with acute exacerbation ([facility]) J44.1    Tobacco abuse Z72.0    Exercise hypoxemia R09.02    Pneumonia due to organism J18.9    Status post total replacement of right hip Z96.641    CHF (congestive heart failure), NYHA class I, chronic, systolic ([facility]) K28.35    Necrotizing soft tissue infection M79.89    Severe sepsis ([facility]) A41.9, R65.20    Necrotizing fasciitis ([facility]) M72.6    Acute kidney injury ([facility]) N17.9    ILD (interstitial lung disease) ([facility]) J84.9    Chronic hypoxemic respiratory failure ([facility]) J96.11       Measurements:     Incision 09/06/21 Groin (Active)   Dressing Status Clean;Dry; Intact 09/07/21 0800   Dressing/Treatment Dry dressing 09/07/21 0800   Drainage Amount Moderate 09/07/21 0800   Drainage Description Serosanguinous 09/07/21 0800   Odor None 09/07/21 0800   Number of days: 1     right groin, right perinaum extending to right buttock:  28b48k7.5cm, 90% pink and red tissue with moist healthy subq fat. 10% gray dusky tissue noted in base, no purulence or odor. Surrounding skin intact without redness or induration. Response to treatment:  With complaints of pain. Pain Assessment:  Severity:  7 / 10  Quality of pain: burning  Wound Pain Timing/Severity: during dressing chnage  Premedicated: Yes    Plan  Wound assessed with Surgery NP Jaleesa. Plan to change dressing again tomorrow and likely place Veraflo wound vac if tissue remains healthy. Wound repacked using damp NSS roll gauze , ABD pads and tape.      Plan of Care:      Specialty Bed Required : N/A

## 2021-09-07 NOTE — FLOWSHEET NOTE
09/07/21 0700   Vital Signs   Temp 96.9 °F (36.1 °C)   Temp Source Oral   Pulse 68   Heart Rate Source Monitor   Resp 18   /70   BP Location Left upper arm   Patient Position Semi fowlers   Level of Consciousness Alert (0)   MEWS Score 1   Pain Assessment   Pain Assessment 0-10   Pain Level 6   Pain Location Back   Oxygen Therapy   SpO2 97 %   O2 Device Nasal cannula     Alert and oriented x4. Skin w/d. resp e/e unlabored. Dressings to georgiana area and right buttocks in place. Drainage serosanguinous on bed bed. Bed pad changed. IVF as ordered and IV atb therapy. No voiced concerns. Call light in easy reach. Safety devices in place. Nursing asmt completed.

## 2021-09-08 LAB
ALBUMIN SERPL-MCNC: 2.1 G/DL (ref 3.4–5)
ANION GAP SERPL CALCULATED.3IONS-SCNC: 8 MMOL/L (ref 3–16)
ANISOCYTOSIS: ABNORMAL
BASOPHILS ABSOLUTE: 0 K/UL (ref 0–0.2)
BASOPHILS RELATIVE PERCENT: 0 %
BODY FLUID CULTURE, STERILE: ABNORMAL
BODY FLUID CULTURE, STERILE: ABNORMAL
BUN BLDV-MCNC: 21 MG/DL (ref 7–20)
CALCIUM SERPL-MCNC: 8.9 MG/DL (ref 8.3–10.6)
CHLORIDE BLD-SCNC: 103 MMOL/L (ref 99–110)
CO2: 28 MMOL/L (ref 21–32)
CREAT SERPL-MCNC: 0.7 MG/DL (ref 0.6–1.1)
EOSINOPHILS ABSOLUTE: 0 K/UL (ref 0–0.6)
EOSINOPHILS RELATIVE PERCENT: 0 %
GFR AFRICAN AMERICAN: >60
GFR NON-AFRICAN AMERICAN: >60
GLUCOSE BLD-MCNC: 102 MG/DL (ref 70–99)
GRAM STAIN RESULT: ABNORMAL
HCT VFR BLD CALC: 32.4 % (ref 36–48)
HEMOGLOBIN: 10.1 G/DL (ref 12–16)
HYPOCHROMIA: ABNORMAL
LYMPHOCYTES ABSOLUTE: 2.5 K/UL (ref 1–5.1)
LYMPHOCYTES RELATIVE PERCENT: 16 %
MAGNESIUM: 2 MG/DL (ref 1.8–2.4)
MCH RBC QN AUTO: 27.5 PG (ref 26–34)
MCHC RBC AUTO-ENTMCNC: 31 G/DL (ref 31–36)
MCV RBC AUTO: 88.5 FL (ref 80–100)
MONOCYTES ABSOLUTE: 0.3 K/UL (ref 0–1.3)
MONOCYTES RELATIVE PERCENT: 2 %
NEUTROPHILS ABSOLUTE: 12.9 K/UL (ref 1.7–7.7)
NEUTROPHILS RELATIVE PERCENT: 82 %
ORGANISM: ABNORMAL
ORGANISM: ABNORMAL
PDW BLD-RTO: 18.3 % (ref 12.4–15.4)
PHOSPHORUS: 2.5 MG/DL (ref 2.5–4.9)
PLATELET # BLD: 213 K/UL (ref 135–450)
PLATELET SLIDE REVIEW: ADEQUATE
PMV BLD AUTO: 8.2 FL (ref 5–10.5)
POTASSIUM SERPL-SCNC: 4.7 MMOL/L (ref 3.5–5.1)
RBC # BLD: 3.66 M/UL (ref 4–5.2)
SLIDE REVIEW: ABNORMAL
SODIUM BLD-SCNC: 139 MMOL/L (ref 136–145)
WBC # BLD: 15.7 K/UL (ref 4–11)

## 2021-09-08 PROCEDURE — 6360000002 HC RX W HCPCS: Performed by: INTERNAL MEDICINE

## 2021-09-08 PROCEDURE — 6370000000 HC RX 637 (ALT 250 FOR IP): Performed by: INTERNAL MEDICINE

## 2021-09-08 PROCEDURE — C9113 INJ PANTOPRAZOLE SODIUM, VIA: HCPCS | Performed by: INTERNAL MEDICINE

## 2021-09-08 PROCEDURE — 36415 COLL VENOUS BLD VENIPUNCTURE: CPT

## 2021-09-08 PROCEDURE — 2500000003 HC RX 250 WO HCPCS: Performed by: INTERNAL MEDICINE

## 2021-09-08 PROCEDURE — 97606 NEG PRS WND THER DME>50 SQCM: CPT

## 2021-09-08 PROCEDURE — 85025 COMPLETE CBC W/AUTO DIFF WBC: CPT

## 2021-09-08 PROCEDURE — 99232 SBSQ HOSP IP/OBS MODERATE 35: CPT | Performed by: INTERNAL MEDICINE

## 2021-09-08 PROCEDURE — 99232 SBSQ HOSP IP/OBS MODERATE 35: CPT | Performed by: SURGERY

## 2021-09-08 PROCEDURE — 83735 ASSAY OF MAGNESIUM: CPT

## 2021-09-08 PROCEDURE — 80069 RENAL FUNCTION PANEL: CPT

## 2021-09-08 PROCEDURE — 2700000000 HC OXYGEN THERAPY PER DAY

## 2021-09-08 PROCEDURE — 2580000003 HC RX 258: Performed by: INTERNAL MEDICINE

## 2021-09-08 PROCEDURE — 1200000000 HC SEMI PRIVATE

## 2021-09-08 PROCEDURE — 94761 N-INVAS EAR/PLS OXIMETRY MLT: CPT

## 2021-09-08 RX ORDER — ROPINIROLE 1 MG/1
1 TABLET, FILM COATED ORAL NIGHTLY
Status: DISCONTINUED | OUTPATIENT
Start: 2021-09-08 | End: 2021-09-09

## 2021-09-08 RX ORDER — SODIUM HYPOCHLORITE 1.25 MG/ML
473 SOLUTION TOPICAL CONTINUOUS PRN
Status: DISCONTINUED | OUTPATIENT
Start: 2021-09-08 | End: 2021-09-15

## 2021-09-08 RX ORDER — FUROSEMIDE 20 MG/1
20 TABLET ORAL DAILY
Status: DISCONTINUED | OUTPATIENT
Start: 2021-09-08 | End: 2021-09-15 | Stop reason: HOSPADM

## 2021-09-08 RX ADMIN — ESCITALOPRAM OXALATE 10 MG: 10 TABLET ORAL at 08:09

## 2021-09-08 RX ADMIN — ROPINIROLE HYDROCHLORIDE 1 MG: 1 TABLET, FILM COATED ORAL at 08:03

## 2021-09-08 RX ADMIN — CLINDAMYCIN PHOSPHATE 600 MG: 600 INJECTION, SOLUTION INTRAVENOUS at 08:36

## 2021-09-08 RX ADMIN — CLINDAMYCIN PHOSPHATE 600 MG: 600 INJECTION, SOLUTION INTRAVENOUS at 17:58

## 2021-09-08 RX ADMIN — OXYCODONE HYDROCHLORIDE 10 MG: 5 TABLET ORAL at 08:20

## 2021-09-08 RX ADMIN — OXYCODONE HYDROCHLORIDE 10 MG: 5 TABLET ORAL at 23:35

## 2021-09-08 RX ADMIN — METOPROLOL TARTRATE 25 MG: 25 TABLET, FILM COATED ORAL at 22:11

## 2021-09-08 RX ADMIN — GABAPENTIN 300 MG: 300 CAPSULE ORAL at 08:09

## 2021-09-08 RX ADMIN — ROPINIROLE HYDROCHLORIDE 1 MG: 1 TABLET, FILM COATED ORAL at 22:11

## 2021-09-08 RX ADMIN — CLINDAMYCIN PHOSPHATE 600 MG: 600 INJECTION, SOLUTION INTRAVENOUS at 23:33

## 2021-09-08 RX ADMIN — HYDROMORPHONE HYDROCHLORIDE 0.5 MG: 1 INJECTION, SOLUTION INTRAMUSCULAR; INTRAVENOUS; SUBCUTANEOUS at 12:03

## 2021-09-08 RX ADMIN — METOPROLOL TARTRATE 25 MG: 25 TABLET, FILM COATED ORAL at 08:09

## 2021-09-08 RX ADMIN — MEROPENEM 500 MG: 500 INJECTION, POWDER, FOR SOLUTION INTRAVENOUS at 04:13

## 2021-09-08 RX ADMIN — MEROPENEM 500 MG: 500 INJECTION, POWDER, FOR SOLUTION INTRAVENOUS at 22:08

## 2021-09-08 RX ADMIN — VANCOMYCIN HYDROCHLORIDE 1500 MG: 10 INJECTION, POWDER, LYOPHILIZED, FOR SOLUTION INTRAVENOUS at 00:35

## 2021-09-08 RX ADMIN — GABAPENTIN 300 MG: 300 CAPSULE ORAL at 14:21

## 2021-09-08 RX ADMIN — FUROSEMIDE 20 MG: 20 TABLET ORAL at 16:30

## 2021-09-08 RX ADMIN — HYDROMORPHONE HYDROCHLORIDE 0.5 MG: 1 INJECTION, SOLUTION INTRAMUSCULAR; INTRAVENOUS; SUBCUTANEOUS at 22:09

## 2021-09-08 RX ADMIN — GABAPENTIN 300 MG: 300 CAPSULE ORAL at 22:11

## 2021-09-08 RX ADMIN — OXYBUTYNIN CHLORIDE 5 MG: 5 TABLET ORAL at 08:10

## 2021-09-08 RX ADMIN — PANTOPRAZOLE SODIUM 40 MG: 40 INJECTION, POWDER, FOR SOLUTION INTRAVENOUS at 08:10

## 2021-09-08 RX ADMIN — OXYCODONE HYDROCHLORIDE 5 MG: 5 TABLET ORAL at 08:33

## 2021-09-08 RX ADMIN — ROPINIROLE HYDROCHLORIDE 1 MG: 1 TABLET, FILM COATED ORAL at 14:21

## 2021-09-08 RX ADMIN — MEROPENEM 500 MG: 500 INJECTION, POWDER, FOR SOLUTION INTRAVENOUS at 11:01

## 2021-09-08 RX ADMIN — OXYCODONE HYDROCHLORIDE 10 MG: 5 TABLET ORAL at 19:21

## 2021-09-08 RX ADMIN — HYDROMORPHONE HYDROCHLORIDE 0.5 MG: 1 INJECTION, SOLUTION INTRAMUSCULAR; INTRAVENOUS; SUBCUTANEOUS at 17:54

## 2021-09-08 RX ADMIN — HYDROMORPHONE HYDROCHLORIDE 0.5 MG: 1 INJECTION, SOLUTION INTRAMUSCULAR; INTRAVENOUS; SUBCUTANEOUS at 06:37

## 2021-09-08 RX ADMIN — ENOXAPARIN SODIUM 40 MG: 40 INJECTION SUBCUTANEOUS at 22:11

## 2021-09-08 RX ADMIN — OXYCODONE HYDROCHLORIDE 10 MG: 5 TABLET ORAL at 14:22

## 2021-09-08 RX ADMIN — OXYCODONE HYDROCHLORIDE 10 MG: 5 TABLET ORAL at 04:13

## 2021-09-08 RX ADMIN — SODIUM CHLORIDE: 9 INJECTION, SOLUTION INTRAVENOUS at 14:25

## 2021-09-08 RX ADMIN — OXYBUTYNIN CHLORIDE 5 MG: 5 TABLET ORAL at 22:11

## 2021-09-08 RX ADMIN — HYDROMORPHONE HYDROCHLORIDE 0.5 MG: 1 INJECTION, SOLUTION INTRAMUSCULAR; INTRAVENOUS; SUBCUTANEOUS at 10:57

## 2021-09-08 RX ADMIN — OXYBUTYNIN CHLORIDE 5 MG: 5 TABLET ORAL at 14:22

## 2021-09-08 ASSESSMENT — PAIN SCALES - GENERAL
PAINLEVEL_OUTOF10: 10
PAINLEVEL_OUTOF10: 6
PAINLEVEL_OUTOF10: 10
PAINLEVEL_OUTOF10: 10
PAINLEVEL_OUTOF10: 6
PAINLEVEL_OUTOF10: 6
PAINLEVEL_OUTOF10: 7
PAINLEVEL_OUTOF10: 10
PAINLEVEL_OUTOF10: 10
PAINLEVEL_OUTOF10: 8
PAINLEVEL_OUTOF10: 10
PAINLEVEL_OUTOF10: 8
PAINLEVEL_OUTOF10: 8
PAINLEVEL_OUTOF10: 10
PAINLEVEL_OUTOF10: 6
PAINLEVEL_OUTOF10: 4
PAINLEVEL_OUTOF10: 10

## 2021-09-08 ASSESSMENT — PAIN DESCRIPTION - PAIN TYPE
TYPE: SURGICAL PAIN

## 2021-09-08 ASSESSMENT — PAIN DESCRIPTION - ORIENTATION
ORIENTATION: RIGHT;LOWER
ORIENTATION: RIGHT;LOWER

## 2021-09-08 ASSESSMENT — PAIN DESCRIPTION - LOCATION
LOCATION: ABDOMEN
LOCATION: BUTTOCKS
LOCATION: ABDOMEN

## 2021-09-08 NOTE — FLOWSHEET NOTE
Pt seen with surgery NP for VAC dressing placement. Pt premedicated with IV Dilaudid prior to procedure. Adhesive remover spray used to remove old tape. Wound base assessed, unchanged from yesterday. 10% gray/yellow slough noted in base, otherwise tissue in red, moist with viable subq fat exposed. Pulse palpated for in base, no exposed or palpable vessel noted. Wound cleansed with NS, patted dry. periwound skin prepped using barrier wipes, barrier strips and vac drape. 3 black foam used, good seal obtained. Veraflo settings above 55 mls, every 3 hours, soak time 10 minutes. Good seal obtained. NS infusing, awaiting Dakins solution from Pharmacy. Staff rn instructed on use. Pt required additional IV pain med after procedure complete, rates pain a 10. Next dressing change on Friday, plan to assess patient pain using PO pain meds only.

## 2021-09-08 NOTE — PROGRESS NOTES
Pharmacy Vancomycin Consult     Vancomycin Day: 2   Current Dosin mg IVPB q24h  Current indication: necrotizing soft tissue infection      Temp max:  97.7    Recent Labs     21  0416 21  0549   BUN 25* 22*   CREATININE 1.6* 0.8   WBC 18.8* 17.8*       Intake/Output Summary (Last 24 hours) at 2021 2346  Last data filed at 2021 1749  Gross per 24 hour   Intake 4541. 45 ml   Output 3250 ml   Net 1291.45 ml     Culture Date      Source                       Results      Ht Readings from Last 1 Encounters:   21 5' 7\" (1.702 m)        Wt Readings from Last 1 Encounters:   21 232 lb (105.2 kg)       Body mass index is 36.34 kg/m². Estimated Creatinine Clearance: 94 mL/min (based on SCr of 0.8 mg/dL). Trough: 7.4    Assessment/Plan:  SeCr/CrCl improved since admission. Will increase dose to 1500 mg IVPB q18h. Give dose now. A new vancomycin trough has been ordered for  @ 1100.   Expected value = 11.6

## 2021-09-08 NOTE — PROGRESS NOTES
Vancomycin Day: 4    Patient's labs, cultures, vitals, and vancomycin regimen reviewed. No changes today.   Level due on 9/9  Ese FRANCOIS 9/8/20211:02 PM  .

## 2021-09-08 NOTE — PROGRESS NOTES
Wound vac placed per wound care nurse and NP Jaleesa. Pt rates right groin pain as 10/10. Per NP Jaleesa ok to give another 0.5 mg DIlaudid. Dilaudid given. See MAR. Call light in reach.

## 2021-09-08 NOTE — PROGRESS NOTES
Progress Note    Admit Date:  9/5/2021    63year old female with hypertension, hyperlipidemia, Depression presented to [facility] with c/o abscess to her buttock/groin. She was emergently taken to the OR by Dr. Royal Rico. Admitted to med-surg. Subjective:  Ms. Deanna Molina  Seen up  In bed, denies any new issues    Wound exam done along with surgery team   Wound vac placed today . No fevers. Pain controlled      Objective:   Patient Vitals for the past 4 hrs:   BP Temp Temp src Pulse Resp SpO2   09/08/21 1346 (!) 148/78 97 °F (36.1 °C) Oral 60 16 98 %            Intake/Output Summary (Last 24 hours) at 9/8/2021 1351  Last data filed at 9/8/2021 0719  Gross per 24 hour   Intake 1790.07 ml   Output 3650 ml   Net -1859.93 ml       Physical Exam:      General:  Obese middle aged female up in bed  Awake, alert and oriented. Appears to be not in any distress  Mucous Membranes:  Pink , anicteric  Neck: No JVD, no carotid bruit, no thyromegaly  Chest:  Clear to auscultation bilaterally, no added sounds  Cardiovascular:  RRR S1S2 heard, no murmurs or gallops  Abdomen:  Soft, undistended, non tender, no organomegaly, BS present  Large open wound to right Lower abd within the abdominal pannus extending from flank to right groin and vulvar region   Healthy granulation tissue   No drainage   Extremities: No edema or cyanosis.  Distal pulses well felt  Neurological : grossly normal        Data:  CBC:   Recent Labs     09/06/21  0416 09/07/21  0549 09/08/21  0451   WBC 18.8* 17.8* 15.7*   HGB 11.3* 9.6* 10.1*   HCT 35.4* 30.5* 32.4*   MCV 86.8 85.9 88.5    201 213     BMP:   Recent Labs     09/06/21  0416 09/07/21  0549 09/08/21  0451   * 135* 139   K 4.6 5.4* 4.7   CL 98* 103 103   CO2 23 25 28   PHOS 5.1* 3.0 2.5   BUN 25* 22* 21*   CREATININE 1.6* 0.8 0.7     LIVER PROFILE:   Recent Labs     09/05/21  1852   AST 20   ALT 13   BILITOT 0.4   ALKPHOS 137*       CULTURES    COVID/Influenza: not detected  Blood: NGTD  Fluid right groin: E. Coli, staph epidermidis     RADIOLOGY  CT ABDOMEN PELVIS WO CONTRAST Additional Contrast? None   Final Result   Probable cellulitis with punctate collections of subcutaneous air in the soft   tissues along the right gluteal region inferiorly and extending anteriorly   into the perineal region. This tracks into the subcutaneous fat along the   right pubic and right groin region and extends anterior and lateral to the   right hip along the abdominal wall laterally which is suspicious for probable   anaerobic infection and cellulitis with possible necrotizing fasciitis. Recommend surgical correlation. Slightly limited exam due to the lack of IV contrast with metallic prosthetic   devices in both hips causing obscuration of the lower pelvis. 10 mm left renal stone which is unchanged with no hydronephrosis. Mild bibasilar atelectasis or infiltrates extending into the lingula which is   more prominent. Previous cholecystectomy and mild hepatosplenomegaly which is unchanged. Mild constipation with no bowel obstruction. The findings were called to Dr. Goldstein Given at 10:25 p.m. Stacey Rajesh XR CHEST PORTABLE   Final Result   Mild cardiomegaly and probable mild pulmonary interstitial edema which is   less prominent from the prior study. Recommend short-term follow-up. Assessment/Plan:    Sepsis  - sec to necrotizing fascitis of right lower abd  - Hypotension, lactic acidosis, leukocytosis  - POA  - BLood cx NGTD  - Wound cx as above. - IV Vanc, Clindamycin, Merrem D#3    Abscess  Necrotizing fascitis of the right buttock/groin, lower abdomen  - taken to OR emergently by surgery team . S/p DEBRIDEMENT RIGHT GROIN, PERINEUM, R buttock, and RLQ abdominal wall    - continue IV Vanc, Merrem, Clindamycin. Wound cx with E. Coli, staph epidermidis  - PRN Oxycodone, Dilaudid   - wound care by surgery team - wound vac placed  - wbc improving.  No fevers  - start PT   - remove morales

## 2021-09-08 NOTE — PROGRESS NOTES
General Surgery - Jaleesa Bacon, APRN - CNP, CNP  Daily Progress Note    Pt Name: Tarik Harris  Medical Record Number: [id number]  Date of Birth 1960   Today's Date: 9/8/2021    ASSESSMENT  1. POD # 2 s/p debridement of right groin, perineum, right buttocks and RLQ abdominal wall. 2. Right groin dressing change: picture in media section, wound measures 34 cm x 11 cm x 7.5 cm. Wound base was pink and moist, there was one small aree of the right groin/perineal area which was slightly gray in color, no odor, no alireza purulence, Veriflo VAC laced with wound care: Dakins instill q 3 hours with 10 minute dwell time. 3. Leuks 17.8->15.7  4. ARF resolved: Cr 1.9->1.6->0.8->0.7   5. Culture: staph epidermidis and e coli: e coli is pan sensitive    6. VSS    PLAN  1. PT: OOB to chair  2. Veriflo: next change on Friday  3. Pain control   4. D/C vanc and continue Clinda  5. IVF d/c, getting lasix per medicine  6. D/C morales, continue strict I&os  7. Next VAC dressing change on Friday. Cam Garzon is virtually unchanged yesterday. Pain is well controlled. She has no nausea and no vomiting. She has passed flatus and has not had a bowel movement. She is tolerating some regular diet. Current activity is up with assistance    OBJECTIVE  VITALS:  height is 5' 7\" (1.702 m) and weight is 232 lb (105.2 kg). Her oral temperature is 97 °F (36.1 °C). Her blood pressure is 148/78 (abnormal) and her pulse is 60. Her respiration is 16 and oxygen saturation is 98%. VITALS:  BP (!) 148/78   Pulse 60   Temp 97 °F (36.1 °C) (Oral)   Resp 16   Ht 5' 7\" (1.702 m)   Wt 232 lb (105.2 kg)   SpO2 98%   BMI 36.34 kg/m²   INTAKE/OUTPUT:      Intake/Output Summary (Last 24 hours) at 9/8/2021 1433  Last data filed at 9/8/2021 5933  Gross per 24 hour   Intake 1790.07 ml   Output 3650 ml   Net -1859.93 ml     GENERAL: alert, cooperative, no distress    I/O last 3 completed shifts: In: 5141.3 [P.O.:720;  I.V.:2871.3; IV Piggyback:1550.1]  Out: 3650 [Urine:3650]  No intake/output data recorded. LABS  Recent Labs     09/05/21  1852 09/06/21  0416 09/08/21  0451   WBC 16.6*   < > 15.7*   HGB 13.1   < > 10.1*   HCT 39.9   < > 32.4*      < > 213   *   < > 139   K 4.8   < > 4.7   CL 93*   < > 103   CO2 22   < > 28   BUN 24*   < > 21*   CREATININE 1.9*   < > 0.7   MG  --    < > 2.00   PHOS  --    < > 2.5   CALCIUM 9.1   < > 8.9   AST 20  --   --    ALT 13  --   --    BILITOT 0.4  --   --     < > = values in this interval not displayed. CBC with Differential:    Lab Results   Component Value Date    WBC 15.7 09/08/2021    RBC 3.66 09/08/2021    HGB 10.1 09/08/2021    HCT 32.4 09/08/2021     09/08/2021    MCV 88.5 09/08/2021    MCH 27.5 09/08/2021    MCHC 31.0 09/08/2021    RDW 18.3 09/08/2021    SEGSPCT 69.5 09/15/2012    BANDSPCT 6 09/07/2021    LYMPHOPCT 16.0 09/08/2021    MONOPCT 2.0 09/08/2021    BASOPCT 0.0 09/08/2021    MONOSABS 0.3 09/08/2021    LYMPHSABS 2.5 09/08/2021    EOSABS 0.0 09/08/2021    BASOSABS 0.0 09/08/2021    DIFFTYPE Auto 09/15/2012     CMP:    Lab Results   Component Value Date     09/08/2021    K 4.7 09/08/2021    K 3.7 03/27/2021     09/08/2021    CO2 28 09/08/2021    BUN 21 09/08/2021    CREATININE 0.7 09/08/2021    GFRAA >60 09/08/2021    GFRAA >60 09/15/2012    AGRATIO 0.6 09/05/2021    LABGLOM >60 09/08/2021    GLUCOSE 102 09/08/2021    PROT 6.9 09/05/2021    PROT 7.5 09/15/2012    LABALBU 2.1 09/08/2021    CALCIUM 8.9 09/08/2021    BILITOT 0.4 09/05/2021    ALKPHOS 137 09/05/2021    AST 20 09/05/2021    ALT 13 09/05/2021         HAIDER Lott - CNP  Electronically signed 9/8/2021 at 2:33 PM       Patient seen and examined. I agree with the assessment and plan from CARL Lacy. Patient wound clean. VAC placed. Continue current treatments.     Smith Tse MD

## 2021-09-08 NOTE — ADT AUTH CERT
Utilization Reviews       pictures from wound care RN note by Ana Baeza RN       Review Status Review Entered   In Primary 9/8/2021 13:14      Criteria Review           Cellulitis - Care Day 2 (9/7/2021) by Ana Baeza RN       Review Status Review Entered   Completed 9/8/2021 13:06      Criteria Review      Care Day: 2 Care Date: 9/7/2021 Level of Care: Inpatient Floor    Guideline Day 2    Clinical Status    ( ) * Dehydration absent    9/8/2021 1:06 PM EDT by Mira Antunez      cont ivf @ 100 ml/hr    (X) * Mental status at baseline    9/8/2021 1:06 PM EDT by Mira Antunez      wdl    (X) * Hemodynamic stability    9/8/2021 1:06 PM EDT by Mira Antunez      96.9 18 68 120/70 97%2 L nc    (X) * Afebrile or fever improved    Activity    (X) Activity as tolerated    9/8/2021 1:06 PM EDT by Mira Antunez      yes    Routes    (X) * Oral hydration    9/8/2021 1:06 PM EDT by Mira Antunez      yes    (X) * Oral medications    9/8/2021 1:06 PM EDT by Mira Antunez      cont current meds plus   kayexalate 15 mg x1 po    (X) Diet as tolerated    9/8/2021 1:06 PM EDT by Mira Antunez      cont same    Interventions    (X) WBC    9/8/2021 1:06 PM EDT by Mira Antunez      17.8    Medications    (X) IV antibiotics    9/8/2021 1:06 PM EDT by Mira Antunez      cont iv cleocin and iv merrem  iv vanco 1500 mg q18    * Milestone   Additional Notes   9/7/2021      POD#1      Vs see above      Sodium: 135 (L)   Potassium: 5.4 (H)   BUN: 22 (H)   Glucose: 129 (H)   Albumin: 2.2 (L)      WBC: 17.8 (H)   RBC: 3.56 (L)   Hemoglobin Quant: 9.6 (L)   Hematocrit: 30.5 (L)      Vancomycin Tr: 7.4 (L)      Additional meds   Lovenox 40 mg q24 sc   Iv dilaudid 0.5 mg q3 prn x3   Roxicodone 10 mg q4 prn x5 po   =======================================================      Per IM (NP)      Assessment/Plan:   Sepsis   - Hypotension, lactic acidosis, leukocytosis   - POA   - BLood cx NGTD   - Wound cx as above.     - IV Vanc, Clindamycin, Merrem D#2       Abscess   Necrotizing fascitis of the right buttock/groin, lower abdomen   - taken to OR emergently. S/p DEBRIDEMENT RIGHT GROIN, PERINEUM, R buttock, and RLQ abdominal wall   - seen by general surgery   - continue IV Vanc, Merrem, Clindamycin.  Wound cx with E. Coli, staph epidermidis   - PRN Oxycodone, Dilaudid        Hypotension   - due to above. Resolving with IVFs.     LONNY   - Creatinine 1.9 on admission   - Held Aldactone, Lasix. - given IVF's.  Resolved.  Creatinine 0.8       Leukocytosis   - due to above. - Vanc, Clindamycin, Merrem D#1   - improving.  Monitor.        Chronic hypoxic respiratory failure   ILD, pulmonary fibrosis    - has oxygen at home. States she has not worn it in over a year   - currently on 2 L. Wean as tolerated. - Duonebs PRN.    - F/w Dr. Mj Howell       Tobacco Dependence   -Recommended cessation   - nicotine patch ordered        H/o polysubstance abuse      GERD   - on PPI       RLS   - on Requip       Neuropathy   - continue Gabapentin       Hyperlipidemia   - on Atorvastatin. CAD   - on statin, BB.        Chronic diastolic CHF   - stable. No s/s decompensation   - on Lasix, Aldactone at home. Holding currently.        Obesity   - Body mass index is 36.34 kg/m². - Recommended weight loss       DVT Prophylaxis: Lovenox   Diet: ADULT DIET; Regular   Adult Oral Nutrition Supplement; Standard High Calorie/High Protein Oral Supplement   Code Status: Full Code       ___________________________________________________________      Per GS   ASSESSMENT   1. POD # 1 s/p debridement of right groin, perineum, right buttocks and RLQ abdominal wall. 2. Right groin dressing change: picture in media section, wound measures 34 cm x 11 cm x 7.5 cm. Wound base was pink and moist, there was one small aree of the right groin/perineal area which was slightly gray in color, no odor, no alireza purulence, pt tolerated well.  Packed with two rolls of kerlex and cover. 3. Leuks 17.8   4. ARF resolved: Cr 1.9->1.6->0. 8            5. Culture: staph epidermidis and e coli, sensitivity is pending. 6. VSS       PLAN   1. PT: OOB to chair   2. Pain control    3. Vanc and Clinda   4. Await culture results.     5. Plan to do dressing change in the am with wound care for possible Veriflo VAC placement.    ____________________________________________________________      Per Ketty Phoenix       ASSESSMENT:   · Severe sepsis   · Right groin, perineum, buttock necrotizing fasciitis post debridement 9/6/2021   · Acute kidney injury   · Leukocytosis   · Chronic hypoxemic respiratory failure-uses 2 to 3 L at home   · ILD followed by Dr. Holden Haley   · Tobacco abuse   · History of polysubstance abuse       PLAN:   · Supplemental oxygen to maintain SaO2 >92%; wean as tolerated   · Inhaled bronchodilators as needed   · Incentive spirometry   · Meropenem and Clindamycin per surgery/IM    · Lovenox for DVT prophylaxis

## 2021-09-08 NOTE — CONSULTS
Call received from nursing. Significant other stepped on tbing of VAC and machine now malfunctioning. Machine reset, Dakins solution now on unit, hung. Current bottle not compatible with machine. New sterile water bottle emptied and Poured Dakins into irrigation bottle and placed spikable cap, sterility maintained. Spoke with Isael in pharmacy regarding this issue. Working on resolution. Good seal on vac dressing, machine functioning properly.

## 2021-09-08 NOTE — PROGRESS NOTES
103   CO2 23 25 28   PHOS 5.1* 3.0 2.5   BUN 25* 22* 21*   CREATININE 1.6* 0.8 0.7     LIVER PROFILE:   Recent Labs     09/05/21  1852   AST 20   ALT 13   BILITOT 0.4   ALKPHOS 137*     PT/INR: No results for input(s): PROTIME, INR in the last 72 hours. APTT: No results for input(s): APTT in the last 72 hours. UA:No results for input(s): NITRITE, COLORU, PHUR, LABCAST, WBCUA, RBCUA, MUCUS, TRICHOMONAS, YEAST, BACTERIA, CLARITYU, SPECGRAV, LEUKOCYTESUR, UROBILINOGEN, BILIRUBINUR, BLOODU, GLUCOSEU, AMORPHOUS in the last 72 hours. Invalid input(s): KETONESU  No results for input(s): PHART, LJU1DAQ, PO2ART in the last 72 hours. Cultures:   9/6 Body fluid- + e coli  9/5 blood- ngtd      Films:     Chest x-ray 9/5 imaging showed:  Mild cardiomegaly with prominent interstitial markings        CT abdomen 9/5  Probable cellulitis with punctate collections of subcutaneous air in the soft   tissues along the right gluteal region inferiorly and extending anteriorly   into the perineal region.  This tracks into the subcutaneous fat along the   right pubic and right groin region and extends anterior and lateral to the   right hip along the abdominal wall laterally which is suspicious for probable   anaerobic infection and cellulitis with possible necrotizing fasciitis. Slightly limited exam due to the lack of IV contrast with metallic prosthetic   devices in both hips causing obscuration of the lower pelvis. 10 mm left renal stone which is unchanged with no hydronephrosis. Mild bibasilar atelectasis or infiltrates extending into the lingula which is   more prominent. Previous cholecystectomy and mild hepatosplenomegaly which is unchanged.    Mild constipation with no bowel obstruction     Echo 5/94/5385   LV systolic function is normal with ejection fraction estimated at 55 %.   No regional wall motion abnormalities are noted.   There is mild concentric left ventricular hypertrophy.   Normal diastolic function with normal filling pressure.   The left atrium is mildly dilated in size.   Aortic valve appears mildly sclerotic but opens adequately.   Mild mitral and tricuspid regurgitation.   Systolic pulmonary artery pressure (SPAP) is estimated at 39mmHg (right   atrial pressure 8mmHg).     ASSESSMENT:  · Severe sepsis  · Right groin, perineum, buttock necrotizing fasciitis post debridement 9/6/2021  · Acute kidney injury  · Leukocytosis  · Chronic hypoxemic respiratory failure-uses 2 to 3 L at home  · ILD followed by Dr. Cathe Aschoff   · Tobacco abuse  · History of polysubstance abuse     PLAN:  · Supplemental oxygen to maintain SaO2 >92%; wean as tolerated  · Inhaled bronchodilators as needed  · Incentive spirometry  · Meropenem and Clindamycin per surgery/IM   · Lovenox for DVT prophylaxis

## 2021-09-08 NOTE — PROGRESS NOTES
States that her right arm is sore. Site infiltrated; red, swollen; IV removed and warm compress applied. States she would like a snack; IV restarted in L ac; see flow sheet. Call light in reach.

## 2021-09-09 LAB
ALBUMIN SERPL-MCNC: 2.4 G/DL (ref 3.4–5)
ANION GAP SERPL CALCULATED.3IONS-SCNC: 11 MMOL/L (ref 3–16)
ANISOCYTOSIS: ABNORMAL
BANDED NEUTROPHILS RELATIVE PERCENT: 1 % (ref 0–7)
BASOPHILS ABSOLUTE: 0 K/UL (ref 0–0.2)
BASOPHILS RELATIVE PERCENT: 0 %
BUN BLDV-MCNC: 16 MG/DL (ref 7–20)
CALCIUM SERPL-MCNC: 8.7 MG/DL (ref 8.3–10.6)
CHLORIDE BLD-SCNC: 96 MMOL/L (ref 99–110)
CO2: 30 MMOL/L (ref 21–32)
CREAT SERPL-MCNC: 0.6 MG/DL (ref 0.6–1.1)
EOSINOPHILS ABSOLUTE: 0.1 K/UL (ref 0–0.6)
EOSINOPHILS RELATIVE PERCENT: 1 %
GFR AFRICAN AMERICAN: >60
GFR NON-AFRICAN AMERICAN: >60
GLUCOSE BLD-MCNC: 109 MG/DL (ref 70–99)
HCT VFR BLD CALC: 34.3 % (ref 36–48)
HEMOGLOBIN: 10.8 G/DL (ref 12–16)
LYMPHOCYTES ABSOLUTE: 2.4 K/UL (ref 1–5.1)
LYMPHOCYTES RELATIVE PERCENT: 17 %
MAGNESIUM: 1.5 MG/DL (ref 1.8–2.4)
MCH RBC QN AUTO: 27 PG (ref 26–34)
MCHC RBC AUTO-ENTMCNC: 31.5 G/DL (ref 31–36)
MCV RBC AUTO: 85.7 FL (ref 80–100)
MONOCYTES ABSOLUTE: 0.4 K/UL (ref 0–1.3)
MONOCYTES RELATIVE PERCENT: 3 %
MYELOCYTE PERCENT: 2 %
NEUTROPHILS ABSOLUTE: 11 K/UL (ref 1.7–7.7)
NEUTROPHILS RELATIVE PERCENT: 76 %
NUCLEATED RED BLOOD CELLS: 1 /100 WBC
PDW BLD-RTO: 18.1 % (ref 12.4–15.4)
PHOSPHORUS: 3.9 MG/DL (ref 2.5–4.9)
PLATELET # BLD: 268 K/UL (ref 135–450)
PLATELET SLIDE REVIEW: ADEQUATE
PMV BLD AUTO: 8.1 FL (ref 5–10.5)
POLYCHROMASIA: ABNORMAL
POTASSIUM SERPL-SCNC: 4.6 MMOL/L (ref 3.5–5.1)
RBC # BLD: 4 M/UL (ref 4–5.2)
SLIDE REVIEW: ABNORMAL
SODIUM BLD-SCNC: 137 MMOL/L (ref 136–145)
VACUOLATED NEUTROPHILS: PRESENT
WBC # BLD: 13.9 K/UL (ref 4–11)

## 2021-09-09 PROCEDURE — 99232 SBSQ HOSP IP/OBS MODERATE 35: CPT | Performed by: SURGERY

## 2021-09-09 PROCEDURE — 6360000002 HC RX W HCPCS: Performed by: INTERNAL MEDICINE

## 2021-09-09 PROCEDURE — 2580000003 HC RX 258: Performed by: INTERNAL MEDICINE

## 2021-09-09 PROCEDURE — 1200000000 HC SEMI PRIVATE

## 2021-09-09 PROCEDURE — 85025 COMPLETE CBC W/AUTO DIFF WBC: CPT

## 2021-09-09 PROCEDURE — 6370000000 HC RX 637 (ALT 250 FOR IP): Performed by: INTERNAL MEDICINE

## 2021-09-09 PROCEDURE — 2700000000 HC OXYGEN THERAPY PER DAY

## 2021-09-09 PROCEDURE — 36415 COLL VENOUS BLD VENIPUNCTURE: CPT

## 2021-09-09 PROCEDURE — C9113 INJ PANTOPRAZOLE SODIUM, VIA: HCPCS | Performed by: INTERNAL MEDICINE

## 2021-09-09 PROCEDURE — 83735 ASSAY OF MAGNESIUM: CPT

## 2021-09-09 PROCEDURE — 6360000002 HC RX W HCPCS: Performed by: NURSE PRACTITIONER

## 2021-09-09 PROCEDURE — 80069 RENAL FUNCTION PANEL: CPT

## 2021-09-09 PROCEDURE — 99232 SBSQ HOSP IP/OBS MODERATE 35: CPT | Performed by: INTERNAL MEDICINE

## 2021-09-09 PROCEDURE — 94761 N-INVAS EAR/PLS OXIMETRY MLT: CPT

## 2021-09-09 PROCEDURE — 2500000003 HC RX 250 WO HCPCS: Performed by: INTERNAL MEDICINE

## 2021-09-09 PROCEDURE — 99233 SBSQ HOSP IP/OBS HIGH 50: CPT | Performed by: INTERNAL MEDICINE

## 2021-09-09 RX ORDER — ROPINIROLE 1 MG/1
1 TABLET, FILM COATED ORAL 3 TIMES DAILY
Status: DISCONTINUED | OUTPATIENT
Start: 2021-09-09 | End: 2021-09-15 | Stop reason: HOSPADM

## 2021-09-09 RX ORDER — SPIRONOLACTONE 25 MG/1
25 TABLET ORAL DAILY
Status: DISCONTINUED | OUTPATIENT
Start: 2021-09-09 | End: 2021-09-15 | Stop reason: HOSPADM

## 2021-09-09 RX ORDER — MAGNESIUM SULFATE IN WATER 40 MG/ML
2000 INJECTION, SOLUTION INTRAVENOUS ONCE
Status: COMPLETED | OUTPATIENT
Start: 2021-09-09 | End: 2021-09-09

## 2021-09-09 RX ADMIN — ROPINIROLE HYDROCHLORIDE 1 MG: 1 TABLET, FILM COATED ORAL at 21:21

## 2021-09-09 RX ADMIN — FUROSEMIDE 20 MG: 20 TABLET ORAL at 08:37

## 2021-09-09 RX ADMIN — MEROPENEM 500 MG: 500 INJECTION, POWDER, FOR SOLUTION INTRAVENOUS at 02:51

## 2021-09-09 RX ADMIN — METOPROLOL TARTRATE 25 MG: 25 TABLET, FILM COATED ORAL at 08:37

## 2021-09-09 RX ADMIN — HYDROMORPHONE HYDROCHLORIDE 0.5 MG: 1 INJECTION, SOLUTION INTRAMUSCULAR; INTRAVENOUS; SUBCUTANEOUS at 02:48

## 2021-09-09 RX ADMIN — ENOXAPARIN SODIUM 40 MG: 40 INJECTION SUBCUTANEOUS at 21:21

## 2021-09-09 RX ADMIN — ROPINIROLE HYDROCHLORIDE 1 MG: 1 TABLET, FILM COATED ORAL at 13:21

## 2021-09-09 RX ADMIN — CLINDAMYCIN PHOSPHATE 600 MG: 600 INJECTION, SOLUTION INTRAVENOUS at 14:42

## 2021-09-09 RX ADMIN — OXYBUTYNIN CHLORIDE 5 MG: 5 TABLET ORAL at 08:37

## 2021-09-09 RX ADMIN — SPIRONOLACTONE 25 MG: 25 TABLET ORAL at 14:46

## 2021-09-09 RX ADMIN — MEROPENEM 500 MG: 500 INJECTION, POWDER, FOR SOLUTION INTRAVENOUS at 21:25

## 2021-09-09 RX ADMIN — CLINDAMYCIN PHOSPHATE 600 MG: 600 INJECTION, SOLUTION INTRAVENOUS at 07:22

## 2021-09-09 RX ADMIN — MAGNESIUM SULFATE HEPTAHYDRATE 2000 MG: 40 INJECTION, SOLUTION INTRAVENOUS at 09:14

## 2021-09-09 RX ADMIN — HYDROMORPHONE HYDROCHLORIDE 0.5 MG: 1 INJECTION, SOLUTION INTRAMUSCULAR; INTRAVENOUS; SUBCUTANEOUS at 08:44

## 2021-09-09 RX ADMIN — OXYCODONE HYDROCHLORIDE 10 MG: 5 TABLET ORAL at 23:31

## 2021-09-09 RX ADMIN — GABAPENTIN 300 MG: 300 CAPSULE ORAL at 21:21

## 2021-09-09 RX ADMIN — MEROPENEM 500 MG: 500 INJECTION, POWDER, FOR SOLUTION INTRAVENOUS at 11:34

## 2021-09-09 RX ADMIN — OXYBUTYNIN CHLORIDE 5 MG: 5 TABLET ORAL at 21:21

## 2021-09-09 RX ADMIN — GABAPENTIN 300 MG: 300 CAPSULE ORAL at 08:37

## 2021-09-09 RX ADMIN — OXYCODONE HYDROCHLORIDE 10 MG: 5 TABLET ORAL at 05:59

## 2021-09-09 RX ADMIN — OXYCODONE HYDROCHLORIDE 10 MG: 5 TABLET ORAL at 12:39

## 2021-09-09 RX ADMIN — GABAPENTIN 300 MG: 300 CAPSULE ORAL at 13:15

## 2021-09-09 RX ADMIN — METOPROLOL TARTRATE 25 MG: 25 TABLET, FILM COATED ORAL at 21:21

## 2021-09-09 RX ADMIN — PANTOPRAZOLE SODIUM 40 MG: 40 INJECTION, POWDER, FOR SOLUTION INTRAVENOUS at 08:37

## 2021-09-09 RX ADMIN — HYDROMORPHONE HYDROCHLORIDE 0.5 MG: 1 INJECTION, SOLUTION INTRAMUSCULAR; INTRAVENOUS; SUBCUTANEOUS at 21:20

## 2021-09-09 RX ADMIN — ESCITALOPRAM OXALATE 10 MG: 10 TABLET ORAL at 08:37

## 2021-09-09 RX ADMIN — OXYBUTYNIN CHLORIDE 5 MG: 5 TABLET ORAL at 13:16

## 2021-09-09 RX ADMIN — OXYCODONE HYDROCHLORIDE 10 MG: 5 TABLET ORAL at 17:44

## 2021-09-09 RX ADMIN — CLINDAMYCIN PHOSPHATE 600 MG: 600 INJECTION, SOLUTION INTRAVENOUS at 23:35

## 2021-09-09 ASSESSMENT — PAIN DESCRIPTION - PROGRESSION
CLINICAL_PROGRESSION: NOT CHANGED

## 2021-09-09 ASSESSMENT — PAIN DESCRIPTION - ONSET
ONSET: ON-GOING

## 2021-09-09 ASSESSMENT — PAIN DESCRIPTION - DESCRIPTORS
DESCRIPTORS: DISCOMFORT;SHARP
DESCRIPTORS: SHARP
DESCRIPTORS: ACHING;SHARP
DESCRIPTORS: ACHING;SHARP
DESCRIPTORS: BURNING;SHARP;SHOOTING

## 2021-09-09 ASSESSMENT — PAIN DESCRIPTION - PAIN TYPE
TYPE: SURGICAL PAIN

## 2021-09-09 ASSESSMENT — PAIN SCALES - GENERAL
PAINLEVEL_OUTOF10: 3
PAINLEVEL_OUTOF10: 8
PAINLEVEL_OUTOF10: 4
PAINLEVEL_OUTOF10: 8
PAINLEVEL_OUTOF10: 8
PAINLEVEL_OUTOF10: 10
PAINLEVEL_OUTOF10: 9
PAINLEVEL_OUTOF10: 10
PAINLEVEL_OUTOF10: 8
PAINLEVEL_OUTOF10: 9

## 2021-09-09 ASSESSMENT — PAIN DESCRIPTION - LOCATION
LOCATION: ABDOMEN
LOCATION: ABDOMEN;BUTTOCKS;GROIN
LOCATION: ABDOMEN
LOCATION: BUTTOCKS;GROIN
LOCATION: ABDOMEN;BUTTOCKS;GROIN
LOCATION: ABDOMEN
LOCATION: ABDOMEN;BUTTOCKS;GROIN
LOCATION: GROIN

## 2021-09-09 ASSESSMENT — PAIN - FUNCTIONAL ASSESSMENT
PAIN_FUNCTIONAL_ASSESSMENT: PREVENTS OR INTERFERES WITH MANY ACTIVE NOT PASSIVE ACTIVITIES
PAIN_FUNCTIONAL_ASSESSMENT: PREVENTS OR INTERFERES WITH MANY ACTIVE NOT PASSIVE ACTIVITIES

## 2021-09-09 ASSESSMENT — PAIN DESCRIPTION - FREQUENCY
FREQUENCY: CONTINUOUS

## 2021-09-09 ASSESSMENT — PAIN DESCRIPTION - DIRECTION: RADIATING_TOWARDS: LOWER BACK

## 2021-09-09 ASSESSMENT — PAIN DESCRIPTION - ORIENTATION
ORIENTATION: RIGHT;LOWER
ORIENTATION: RIGHT
ORIENTATION: RIGHT;LOWER
ORIENTATION: RIGHT;LOWER
ORIENTATION: RIGHT
ORIENTATION: RIGHT
ORIENTATION: RIGHT;LOWER

## 2021-09-09 NOTE — PROGRESS NOTES
Bedside report given and pt care transferred to [facility]. Pt denies needs at this time. Call light within reach.

## 2021-09-09 NOTE — OP NOTE
[address]                 [address] [address]                                OPERATIVE REPORT    PATIENT NAME: Ling Garcia                     :        1960  MED REC NO:   [id number]                          ROOM:       0222  ACCOUNT NO:   [de-identified]                           ADMIT DATE: 2021  PROVIDER:     Nael Guerin MD    DATE OF PROCEDURE:  2021    PREOPERATIVE DIAGNOSIS:  Necrotizing soft tissue infection of the right  buttock, right perineum, right groin, and right lower quadrant abdominal  wall. POSTOPERATIVE DIAGNOSIS:  Necrotizing soft tissue infection of the right  buttock, right perineum, right groin, and right lower quadrant abdominal  wall. OPERATION PERFORMED:  Wide excisional debridement of necrotizing soft  tissue infection of the right buttock, right perineum, right groin, and  right lower quadrant abdominal wall. SURGEON:  Nael Guerin MD    ANESTHESIA:  General.    COMPLICATIONS:  None. ESTIMATED BLOOD LOSS:  300 mL. INDICATIONS FOR THE OPERATION:  This 31-year-old female presented with a  boil on the right buttock that had progressed. Imaging showed extensive  subcutaneous gas. The patient was in acute renal failure and was  somnolent. Blood pressures were in the low normal range. I recommended  emergent operative debridement. The patient understood the resultant  soft tissue defect with this operation. She wanted to proceed. DESCRIPTION OF OPERATION:  The patient was brought to the operating  room. General anesthesia was induced. She was placed in lithotomy  position. She was prepped and draped in the usual surgical sterile  fashion. I started at the right buttock. There was foul purulent fluid  in the tissues. This extended up along the right perineum up through  the right groin and into the right lower quadrant and abdominal wall.    This was an extensive wide excisional debridement of necrotizing soft  tissues. I debrided back to normal healthy-appearing tissue throughout. Cultures were obtained. Bleeding was controlled with combination of  cautery and pressure. At the end of the case, there was no evident  necrotic or infected tissue. The extensive wounds were packed with  multiple Kerlix pads. DISPOSITION:  The patient tolerated the procedure without any acute  complication. She went to the intensive care unit in critical but  stable condition.         Fallon Magana MD    D: 09/09/2021 10:56:19       T: 09/09/2021 11:01:08     [email or web address]  Job#: [id number]     Doc#: [id number]    CC:

## 2021-09-09 NOTE — PROGRESS NOTES
Patient c/o 9/10 surgical pain to RLQ PRN Dilaudid given. Call light within reach. Will continue to monitor.

## 2021-09-09 NOTE — PROGRESS NOTES
PRN pain med given for pain level 10/10 located R groin/butttock surgical. See mar/pain flowsheet. Call Light within reach.

## 2021-09-09 NOTE — PROGRESS NOTES
Patient c/o surgical pain to the RLQ PRN Oxy 10mg given. Call light within reach. Will continue to monitor.

## 2021-09-09 NOTE — FLOWSHEET NOTE
09/09/21 0559   Pain Assessment   Pain Level 8   Patient's Stated Pain Goal 3   Pain Type Surgical pain   Pain Location Abdomen;Buttocks;Groin   Pain Orientation Right   Pain Descriptors Sharp   Pain Frequency Continuous     Medicated with oxycodone for surgical pain. See ofelia Blancas RN

## 2021-09-09 NOTE — PROGRESS NOTES
Progress Note    Admit Date:  9/5/2021    63year old female with hypertension, hyperlipidemia, Depression presented to [facility] with c/o abscess to her buttock/groin. She was emergently taken to the OR by Dr. Lydia Evans. Admitted to med-surg. Subjective:  Ms. Marcus Banks  Seen up  In bed, reports pain all over lower abd and Restless legs   Wishes to increase her requip to home dose    No fevers  Wound vac placed yesterday        Objective:   Patient Vitals for the past 4 hrs:   BP Temp Temp src Pulse Resp SpO2   09/09/21 0700 (!) 140/86 98.8 °F (37.1 °C) Oral 70 18 91 %            Intake/Output Summary (Last 24 hours) at 9/9/2021 0732  Last data filed at 9/9/2021 6137  Gross per 24 hour   Intake 568.69 ml   Output 2950 ml   Net -2381.31 ml       Physical Exam:      General:  Obese middle aged female up in bed  Awake, alert and oriented. Appears to be not in any distress  Mucous Membranes:  Pink , anicteric  Neck: No JVD, no carotid bruit, no thyromegaly  Chest:  Clear to auscultation bilaterally, no added sounds  Cardiovascular:  RRR S1S2 heard, no murmurs or gallops  Abdomen:  Soft, undistended, non tender, no organomegaly, BS present  Large open wound to right Lower abd within the abdominal pannus extending from flank to right groin and vulvar region - wound vac in place   Hip movements sangita normal with no pain   No drainage   Extremities: No edema or cyanosis. Distal pulses well felt  Neurological : grossly normal        Data:  CBC:   Recent Labs     09/07/21  0549 09/08/21  0451 09/09/21  0607   WBC 17.8* 15.7* 13.9*   HGB 9.6* 10.1* 10.8*   HCT 30.5* 32.4* 34.3*   MCV 85.9 88.5 85.7    213 268     BMP:   Recent Labs     09/07/21  0549 09/08/21  0451 09/09/21  0607   * 139 137   K 5.4* 4.7 4.6    103 96*   CO2 25 28 30   PHOS 3.0 2.5 3.9   BUN 22* 21* 16   CREATININE 0.8 0.7 0.6     LIVER PROFILE:   No results for input(s): AST, ALT, LIPASE, BILIDIR, BILITOT, ALKPHOS in the last 72 hours.     Invalid input(s): AMYLASE,  ALB    CULTURES    COVID/Influenza: not detected  Blood: NGTD  Fluid right groin: E. Coli, staph epidermidis     RADIOLOGY  CT ABDOMEN PELVIS WO CONTRAST Additional Contrast? None   Final Result   Probable cellulitis with punctate collections of subcutaneous air in the soft   tissues along the right gluteal region inferiorly and extending anteriorly   into the perineal region. This tracks into the subcutaneous fat along the   right pubic and right groin region and extends anterior and lateral to the   right hip along the abdominal wall laterally which is suspicious for probable   anaerobic infection and cellulitis with possible necrotizing fasciitis. Recommend surgical correlation. Slightly limited exam due to the lack of IV contrast with metallic prosthetic   devices in both hips causing obscuration of the lower pelvis. 10 mm left renal stone which is unchanged with no hydronephrosis. Mild bibasilar atelectasis or infiltrates extending into the lingula which is   more prominent. Previous cholecystectomy and mild hepatosplenomegaly which is unchanged. Mild constipation with no bowel obstruction. The findings were called to Dr. Marilee Robles at 10:25 p.m. Jose Guadalupe Turner XR CHEST PORTABLE   Final Result   Mild cardiomegaly and probable mild pulmonary interstitial edema which is   less prominent from the prior study. Recommend short-term follow-up. Assessment/Plan:    Sepsis  - sec to necrotizing fascitis of right lower abd  - Hypotension, lactic acidosis, leukocytosis  - POA  - BLood cx NGTD  - Wound cx as above. - IV Vanc, Clindamycin, Merrem D#4    Abscess  Necrotizing fascitis of the right buttock/groin, lower abdomen  - taken to OR emergently by surgery team . S/p DEBRIDEMENT RIGHT GROIN, PERINEUM, R buttock, and RLQ abdominal wall    - continue IV Vanc, Merrem, Clindamycin.   Wound cx with E. Coli, staph epidermidis  - PRN Oxycodone, Dilaudid   - wound care by surgery team - wound vac placed  - wbc improving. No fevers  - start PT        Hypotension  - due to sepsis ,resolved   - holding home lasix and aldactone - resume as tolerated    LONNY  - Creatinine 1.9 on admission  - Held Aldactone, Lasix. - given IVF's. Resolved. Creatinine 0.8  - resume lasix       Chronic hypoxic respiratory failure  ILD, pulmonary fibrosis   - has oxygen at home. States she has not worn it in over a year  - currently on 2 L. Wean as tolerated. - Hussein PRN.   - F/w Dr. Loren Arango  -Recommended cessation  - nicotine patch ordered     H/o polysubstance abuse    GERD  - on PPI    RLS  - on Requip      Hyperlipidemia  - on Atorvastatin. CAD  - on statin, BB. Chronic diastolic CHF  - stable. No s/s decompensation  - on Lasix, Aldactone at home. Holding currently. Obesity  - Body mass index is 36.34 kg/m². - Recommended weight loss    DVT Prophylaxis: Lovenox  Diet: ADULT DIET;  Regular  Adult Oral Nutrition Supplement; Standard High Calorie/High Protein Oral Supplement  Code Status: Full Code      Luann Love MD, 9/9/2021 7:32 AM

## 2021-09-09 NOTE — PROGRESS NOTES
Pulmonary Progress Note    CC: hypoxemia    Subjective:   Patient feels about the same, no flatus. Intake/Output Summary (Last 24 hours) at 9/9/2021 0835  Last data filed at 9/9/2021 0724  Gross per 24 hour   Intake 568.69 ml   Output 2950 ml   Net -2381.31 ml       Exam:   BP (!) 140/86 Comment: Reported to the nurse   Pulse 70   Temp 98.8 °F (37.1 °C) (Oral)   Resp 18   Ht 5' 7\" (1.702 m)   Wt 232 lb (105.2 kg)   SpO2 91%   BMI 36.34 kg/m²  on RA  Gen: No distress. Obese. Eyes: PERRL. No sclera icterus. ENT: No discharge. Pharynx clear. Neck: Trachea midline. Resp: No accessory muscle use. No crackles. No wheezes. No rhonchi. No dullness on percussion. CV: Regular rate. Regular rhythm. No murmur or rub. No edema. GI: Non-tender. + distended. No masses. Skin: Warm and dry. No nodule on exposed extremities. No rash on exposed extremities. M/S: No cyanosis. No joint deformity. No clubbing. Neuro: Awake. Moves all extremities. CN grossly intact.       Scheduled Meds:   rOPINIRole  1 mg Oral Nightly    furosemide  20 mg Oral Daily    escitalopram  10 mg Oral Daily    enoxaparin  40 mg SubCUTAneous Q24H    clindamycin (CLEOCIN) IV  600 mg IntraVENous Q8H    nicotine  1 patch TransDERmal Daily    meropenem  500 mg IntraVENous Q8H    metoprolol  25 mg Oral BID    pantoprazole  40 mg IntraVENous Daily    gabapentin  300 mg Oral TID    oxybutynin  5 mg Oral TID     Continuous Infusions:   sodium hypochlorite       PRN Meds:  sodium hypochlorite, ipratropium-albuterol, HYDROmorphone, oxyCODONE **OR** oxyCODONE, ondansetron, ipratropium-albuterol, sodium chloride    Labs:  CBC:   Recent Labs     09/07/21  0549 09/08/21  0451 09/09/21  0607   WBC 17.8* 15.7* 13.9*   HGB 9.6* 10.1* 10.8*   HCT 30.5* 32.4* 34.3*   MCV 85.9 88.5 85.7    213 268     BMP:   Recent Labs     09/07/21  0549 09/08/21  0451 09/09/21  0607   * 139 137   K 5.4* 4.7 4.6    103 96*   CO2 25 28 30 PHOS 3.0 2.5 3.9   BUN 22* 21* 16   CREATININE 0.8 0.7 0.6     LIVER PROFILE:   No results for input(s): AST, ALT, LIPASE, BILIDIR, BILITOT, ALKPHOS in the last 72 hours. Invalid input(s): AMYLASE,  ALB  PT/INR: No results for input(s): PROTIME, INR in the last 72 hours. APTT: No results for input(s): APTT in the last 72 hours. UA:No results for input(s): NITRITE, COLORU, PHUR, LABCAST, WBCUA, RBCUA, MUCUS, TRICHOMONAS, YEAST, BACTERIA, CLARITYU, SPECGRAV, LEUKOCYTESUR, UROBILINOGEN, BILIRUBINUR, BLOODU, GLUCOSEU, AMORPHOUS in the last 72 hours. Invalid input(s): KETONESU  No results for input(s): PHART, DPZ8QAN, PO2ART in the last 72 hours. Cultures:   9/6 Body fluid- + e coli  9/5 blood- ngtd      Films:     Chest x-ray 9/5 imaging showed:  Mild cardiomegaly with prominent interstitial markings        CT abdomen 9/5  Probable cellulitis with punctate collections of subcutaneous air in the soft   tissues along the right gluteal region inferiorly and extending anteriorly   into the perineal region.  This tracks into the subcutaneous fat along the   right pubic and right groin region and extends anterior and lateral to the   right hip along the abdominal wall laterally which is suspicious for probable   anaerobic infection and cellulitis with possible necrotizing fasciitis. Slightly limited exam due to the lack of IV contrast with metallic prosthetic   devices in both hips causing obscuration of the lower pelvis. 10 mm left renal stone which is unchanged with no hydronephrosis. Mild bibasilar atelectasis or infiltrates extending into the lingula which is   more prominent. Previous cholecystectomy and mild hepatosplenomegaly which is unchanged.    Mild constipation with no bowel obstruction     Echo 8/46/1068   LV systolic function is normal with ejection fraction estimated at 55 %.   No regional wall motion abnormalities are noted.   There is mild concentric left ventricular hypertrophy.  Reynoso Fuelling diastolic function with normal filling pressure.   The left atrium is mildly dilated in size.   Aortic valve appears mildly sclerotic but opens adequately.   Mild mitral and tricuspid regurgitation.   Systolic pulmonary artery pressure (SPAP) is estimated at 39mmHg (right   atrial pressure 8mmHg).     ASSESSMENT:  · Severe sepsis  · Right groin, perineum, buttock necrotizing fasciitis post debridement 9/6/2021  · Acute kidney injury  · Leukocytosis  · Chronic hypoxemic respiratory failure-uses 2 to 3 L at home  · ILD followed by Dr. Vickey Lopez   · Tobacco abuse  · History of polysubstance abuse     PLAN:  · Supplemental oxygen to maintain SaO2 >92%; wean as tolerated  · Inhaled bronchodilators as needed  · Incentive spirometry  · Meropenem and Clindamycin per surgery/IM   · Lovenox for DVT prophylaxis

## 2021-09-09 NOTE — PROGRESS NOTES
Attempted to assist pt up to bedside with PCA. Pt stated 'I can't do this. \" Pt refuses to get up to [facility]. Call light within reach.

## 2021-09-09 NOTE — PROGRESS NOTES
AM assessment complete. Gave am meds due see mar. A/O x 4. Pain level 3/10 no pain meds available. Wound vac in place R buttock. Denies any needs. Purewick in place. 2L NC in place. Bed locked/lowest position Call light within reach.

## 2021-09-09 NOTE — CONSULTS
Pt seen for VAC check. ,  Dressing with good seal.  Pt continues to complain of pain 10/10. VAC pressure decreased to 125 to hopefully alleviate some pain and pressure. VAC change tomorrow.   Pt states she needs her Requip for restless legs

## 2021-09-09 NOTE — PLAN OF CARE
Problem: Pain:  Goal: Pain level will decrease  Description: Pain level will decrease  9/9/2021 0930 by Krystle Nelson RN  Outcome: Ongoing  9/8/2021 2149 by Marysue Cushing, RN  Outcome: Ongoing  Goal: Control of acute pain  Description: Control of acute pain  9/9/2021 0930 by Krystle Nelson RN  Outcome: Ongoing  9/8/2021 2149 by Marysue Cushing, RN  Outcome: Ongoing  Goal: Control of chronic pain  Description: Control of chronic pain  9/9/2021 0930 by Krystle Nelson RN  Outcome: Ongoing  9/8/2021 2149 by Marysue Cushing, RN  Outcome: Ongoing  Goal: Patient's pain/discomfort is manageable  Description: Patient's pain/discomfort is manageable  9/9/2021 0930 by Krystle Nelson RN  Outcome: Ongoing  9/8/2021 2149 by Marysue Cushing, RN  Outcome: Ongoing     Problem: Infection:  Goal: Will remain free from infection  Description: Will remain free from infection  9/9/2021 0930 by Krystle Nelson RN  Outcome: Ongoing  9/8/2021 2149 by Marysue Cushing, RN  Outcome: Ongoing     Problem: Safety:  Goal: Free from accidental physical injury  Description: Free from accidental physical injury  9/9/2021 0930 by Krystle Nelson RN  Outcome: Ongoing  9/8/2021 2149 by Marysue Cushing, RN  Outcome: Ongoing  Goal: Free from intentional harm  Description: Free from intentional harm  9/9/2021 0930 by Krystle Nelson RN  Outcome: Ongoing  9/8/2021 2149 by Marysue Cushing, RN  Outcome: Ongoing     Problem: Daily Care:  Goal: Daily care needs are met  Description: Daily care needs are met  9/9/2021 0930 by Krystle Nelson RN  Outcome: Ongoing  9/8/2021 2149 by Marysue Cushing, RN  Outcome: Ongoing     Problem: Skin Integrity:  Goal: Skin integrity will stabilize  Description: Skin integrity will stabilize  9/9/2021 0930 by Krystle Nelson RN  Outcome: Ongoing  9/8/2021 2149 by Marysue Cushing, RN  Outcome: Ongoing     Problem: Discharge Planning:  Goal: Patients continuum of care needs are met  Description: Patients continuum of care needs are met  9/9/2021 0973 by Erin Bakc RN  Outcome: Ongoing  9/8/2021 2149 by Madison Ross RN  Outcome: Ongoing     Problem: Skin Integrity:  Goal: Will show no infection signs and symptoms  Description: Will show no infection signs and symptoms  Outcome: Ongoing  Goal: Absence of new skin breakdown  Description: Absence of new skin breakdown  Outcome: Ongoing

## 2021-09-09 NOTE — PROGRESS NOTES
General Surgery - Jaleesa Molina, APRN - CNP, CNP  Daily Progress Note    Pt Name: Claudio Copeland  Medical Record Number: [id number]  Date of Birth 1960   Today's Date: 9/9/2021    ASSESSMENT  1. POD # 3 s/p debridement of right groin, perineum, right buttocks and RLQ abdominal wall. 2. Right groin dressing change:Veriflo VAC in place: Dakins instill q 3 hours with 10 minute dwell time. No Leak, minimal drainage in canister. 3. Leuks 17.8->15.7->13.9  4. ARF resolved   5. Culture: staph epidermidis and e coli: e coli is pan sensitive    6. VSS  7. UO good: pt with external catheter  8. Pt reports \"I have some right hip pain and feel yucky. \"   9. Right hip assessed: no skin changes, no pain, no signes of fluctuance. Assess gluteus: skin looks good. PLAN  1. PT: OOB to chair/offloading  2. Veriflo: will change tomorrow  3. Pain control: PO pain meds  4. Clinda  5. Getting lasix per medicine  6. Explained to pt the importance of getting OOB and up to bedside commode. Needs to d/c external morales cath: discussed with RN. 7. Next VAC dressing change on Friday. Jolie Gonzáles is virtually unchanged yesterday. Pain is well controlled. She has no nausea and no vomiting. She has passed flatus and has not had a bowel movement. She is tolerating some regular diet. Current activity is up with assistance    OBJECTIVE  VITALS:  height is 5' 7\" (1.702 m) and weight is 232 lb (105.2 kg). Her oral temperature is 98.8 °F (37.1 °C). Her blood pressure is 140/86 (abnormal) and her pulse is 70. Her respiration is 18 and oxygen saturation is 91%.    VITALS:  BP (!) 140/86 Comment: Reported to the nurse   Pulse 70   Temp 98.8 °F (37.1 °C) (Oral)   Resp 18   Ht 5' 7\" (1.702 m)   Wt 232 lb (105.2 kg)   SpO2 91%   BMI 36.34 kg/m²   INTAKE/OUTPUT:      Intake/Output Summary (Last 24 hours) at 9/9/2021 1126  Last data filed at 9/9/2021 0859  Gross per 24 hour   Intake 568.69 ml   Output 3950 ml   Net -3381.31 ml     GENERAL: alert, cooperative, no distress    I/O last 3 completed shifts:   In: 364.1 [P.O.:180; IV Piggyback:184.1]  Out: 2950 [Urine:2950]  I/O this shift:  In: 204.6 [IV Piggyback:204.6]  Out: 1000 [Drains:1000]    LABS  Recent Labs     09/09/21  0607   WBC 13.9*   HGB 10.8*   HCT 34.3*         K 4.6   CL 96*   CO2 30   BUN 16   CREATININE 0.6   MG 1.50*   PHOS 3.9   CALCIUM 8.7     CBC with Differential:    Lab Results   Component Value Date    WBC 13.9 09/09/2021    RBC 4.00 09/09/2021    HGB 10.8 09/09/2021    HCT 34.3 09/09/2021     09/09/2021    MCV 85.7 09/09/2021    MCH 27.0 09/09/2021    MCHC 31.5 09/09/2021    RDW 18.1 09/09/2021    NRBC 1 09/09/2021    SEGSPCT 69.5 09/15/2012    BANDSPCT 1 09/09/2021    LYMPHOPCT 17.0 09/09/2021    MONOPCT 3.0 09/09/2021    MYELOPCT 2 09/09/2021    BASOPCT 0.0 09/09/2021    MONOSABS 0.4 09/09/2021    LYMPHSABS 2.4 09/09/2021    EOSABS 0.1 09/09/2021    BASOSABS 0.0 09/09/2021    DIFFTYPE Auto 09/15/2012     CMP:    Lab Results   Component Value Date     09/09/2021    K 4.6 09/09/2021    K 3.7 03/27/2021    CL 96 09/09/2021    CO2 30 09/09/2021    BUN 16 09/09/2021    CREATININE 0.6 09/09/2021    GFRAA >60 09/09/2021    GFRAA >60 09/15/2012    AGRATIO 0.6 09/05/2021    LABGLOM >60 09/09/2021    GLUCOSE 109 09/09/2021    PROT 6.9 09/05/2021    PROT 7.5 09/15/2012    LABALBU 2.4 09/09/2021    CALCIUM 8.7 09/09/2021    BILITOT 0.4 09/05/2021    ALKPHOS 137 09/05/2021    AST 20 09/05/2021    ALT 13 09/05/2021         Jaleesa Simons, HAIDER - CNP  Electronically signed 9/9/2021 at 11:26 AM

## 2021-09-09 NOTE — PROGRESS NOTES
Bedside report and pt care transferred from Sachi Guo. Pt reposition in bed. Pt denies needs at this time. Wound vac in place. Bed alarm on for safety. Call light within reach. Erasto Wilson RN\

## 2021-09-09 NOTE — PROGRESS NOTES
Shift assessment complete. See doc flow. Nightly medications given see MAR. Patient resting in bed, c/o surgical pain 10/10 to RLQ. PRN Dilaudid given. Wound vac in place. Merlos removed by day shift nurse at shift change, pur-wick placed for urine elimination. Over 1000ml urine output at this time. Patient repositioned for comfort. Call light and bedside table within easy reach. Will continue to monitor.

## 2021-09-10 LAB
ALBUMIN SERPL-MCNC: 2.5 G/DL (ref 3.4–5)
ANAEROBIC CULTURE: ABNORMAL
ANAEROBIC CULTURE: ABNORMAL
ANION GAP SERPL CALCULATED.3IONS-SCNC: 6 MMOL/L (ref 3–16)
ANISOCYTOSIS: ABNORMAL
BASOPHILS ABSOLUTE: 0 K/UL (ref 0–0.2)
BASOPHILS RELATIVE PERCENT: 0 %
BLOOD CULTURE, ROUTINE: NORMAL
BUN BLDV-MCNC: 13 MG/DL (ref 7–20)
CALCIUM SERPL-MCNC: 8.6 MG/DL (ref 8.3–10.6)
CHLORIDE BLD-SCNC: 94 MMOL/L (ref 99–110)
CO2: 33 MMOL/L (ref 21–32)
CREAT SERPL-MCNC: 0.6 MG/DL (ref 0.6–1.1)
CULTURE, BLOOD 2: NORMAL
EKG ATRIAL RATE: 174 BPM
EKG ATRIAL RATE: 69 BPM
EKG DIAGNOSIS: NORMAL
EKG DIAGNOSIS: NORMAL
EKG P AXIS: 20 DEGREES
EKG P-R INTERVAL: 160 MS
EKG Q-T INTERVAL: 290 MS
EKG Q-T INTERVAL: 364 MS
EKG QRS DURATION: 104 MS
EKG QRS DURATION: 86 MS
EKG QTC CALCULATION (BAZETT): 390 MS
EKG QTC CALCULATION (BAZETT): 456 MS
EKG R AXIS: -4 DEGREES
EKG R AXIS: 1 DEGREES
EKG T AXIS: 190 DEGREES
EKG T AXIS: 7 DEGREES
EKG VENTRICULAR RATE: 149 BPM
EKG VENTRICULAR RATE: 69 BPM
EOSINOPHILS ABSOLUTE: 0.2 K/UL (ref 0–0.6)
EOSINOPHILS RELATIVE PERCENT: 1 %
GFR AFRICAN AMERICAN: >60
GFR NON-AFRICAN AMERICAN: >60
GLUCOSE BLD-MCNC: 132 MG/DL (ref 70–99)
GLUCOSE BLD-MCNC: 148 MG/DL (ref 70–99)
GLUCOSE BLD-MCNC: 164 MG/DL (ref 70–99)
GLUCOSE BLD-MCNC: 194 MG/DL (ref 70–99)
GLUCOSE BLD-MCNC: 208 MG/DL (ref 70–99)
HCT VFR BLD CALC: 31.4 % (ref 36–48)
HEMOGLOBIN: 10.2 G/DL (ref 12–16)
HYPOCHROMIA: ABNORMAL
LYMPHOCYTES ABSOLUTE: 3.6 K/UL (ref 1–5.1)
LYMPHOCYTES RELATIVE PERCENT: 23 %
MAGNESIUM: 2 MG/DL (ref 1.8–2.4)
MCH RBC QN AUTO: 27.3 PG (ref 26–34)
MCHC RBC AUTO-ENTMCNC: 32.5 G/DL (ref 31–36)
MCV RBC AUTO: 84 FL (ref 80–100)
MONOCYTES ABSOLUTE: 0.6 K/UL (ref 0–1.3)
MONOCYTES RELATIVE PERCENT: 4 %
NEUTROPHILS ABSOLUTE: 11.3 K/UL (ref 1.7–7.7)
NEUTROPHILS RELATIVE PERCENT: 72 %
ORGANISM: ABNORMAL
ORGANISM: ABNORMAL
PDW BLD-RTO: 17.6 % (ref 12.4–15.4)
PERFORMED ON: ABNORMAL
PHOSPHORUS: 3.6 MG/DL (ref 2.5–4.9)
PLATELET # BLD: 282 K/UL (ref 135–450)
PLATELET SLIDE REVIEW: ADEQUATE
PMV BLD AUTO: 7.6 FL (ref 5–10.5)
POLYCHROMASIA: ABNORMAL
POTASSIUM SERPL-SCNC: 4.5 MMOL/L (ref 3.5–5.1)
RBC # BLD: 3.73 M/UL (ref 4–5.2)
SLIDE REVIEW: ABNORMAL
SODIUM BLD-SCNC: 133 MMOL/L (ref 136–145)
TROPONIN: <0.01 NG/ML
TROPONIN: <0.01 NG/ML
TSH REFLEX: 3.43 UIU/ML (ref 0.27–4.2)
WBC # BLD: 15.7 K/UL (ref 4–11)

## 2021-09-10 PROCEDURE — 6370000000 HC RX 637 (ALT 250 FOR IP): Performed by: INTERNAL MEDICINE

## 2021-09-10 PROCEDURE — 99222 1ST HOSP IP/OBS MODERATE 55: CPT | Performed by: INTERNAL MEDICINE

## 2021-09-10 PROCEDURE — 93010 ELECTROCARDIOGRAM REPORT: CPT | Performed by: INTERNAL MEDICINE

## 2021-09-10 PROCEDURE — 99232 SBSQ HOSP IP/OBS MODERATE 35: CPT | Performed by: INTERNAL MEDICINE

## 2021-09-10 PROCEDURE — 2580000003 HC RX 258: Performed by: INTERNAL MEDICINE

## 2021-09-10 PROCEDURE — 97606 NEG PRS WND THER DME>50 SQCM: CPT

## 2021-09-10 PROCEDURE — 94761 N-INVAS EAR/PLS OXIMETRY MLT: CPT

## 2021-09-10 PROCEDURE — 6370000000 HC RX 637 (ALT 250 FOR IP): Performed by: SURGERY

## 2021-09-10 PROCEDURE — 99232 SBSQ HOSP IP/OBS MODERATE 35: CPT | Performed by: SURGERY

## 2021-09-10 PROCEDURE — 93005 ELECTROCARDIOGRAM TRACING: CPT | Performed by: INTERNAL MEDICINE

## 2021-09-10 PROCEDURE — 6360000002 HC RX W HCPCS: Performed by: INTERNAL MEDICINE

## 2021-09-10 PROCEDURE — 84443 ASSAY THYROID STIM HORMONE: CPT

## 2021-09-10 PROCEDURE — 99233 SBSQ HOSP IP/OBS HIGH 50: CPT | Performed by: INTERNAL MEDICINE

## 2021-09-10 PROCEDURE — 97166 OT EVAL MOD COMPLEX 45 MIN: CPT

## 2021-09-10 PROCEDURE — 97530 THERAPEUTIC ACTIVITIES: CPT

## 2021-09-10 PROCEDURE — 2500000003 HC RX 250 WO HCPCS: Performed by: INTERNAL MEDICINE

## 2021-09-10 PROCEDURE — 36415 COLL VENOUS BLD VENIPUNCTURE: CPT

## 2021-09-10 PROCEDURE — 2700000000 HC OXYGEN THERAPY PER DAY

## 2021-09-10 PROCEDURE — 83735 ASSAY OF MAGNESIUM: CPT

## 2021-09-10 PROCEDURE — 80069 RENAL FUNCTION PANEL: CPT

## 2021-09-10 PROCEDURE — 2500000003 HC RX 250 WO HCPCS: Performed by: NURSE PRACTITIONER

## 2021-09-10 PROCEDURE — 85025 COMPLETE CBC W/AUTO DIFF WBC: CPT

## 2021-09-10 PROCEDURE — 84484 ASSAY OF TROPONIN QUANT: CPT

## 2021-09-10 PROCEDURE — C9113 INJ PANTOPRAZOLE SODIUM, VIA: HCPCS | Performed by: INTERNAL MEDICINE

## 2021-09-10 PROCEDURE — 97162 PT EVAL MOD COMPLEX 30 MIN: CPT

## 2021-09-10 PROCEDURE — 2060000000 HC ICU INTERMEDIATE R&B

## 2021-09-10 PROCEDURE — 97535 SELF CARE MNGMENT TRAINING: CPT

## 2021-09-10 RX ORDER — DILTIAZEM HYDROCHLORIDE 5 MG/ML
10 INJECTION INTRAVENOUS ONCE
Status: DISCONTINUED | OUTPATIENT
Start: 2021-09-10 | End: 2021-09-15 | Stop reason: HOSPADM

## 2021-09-10 RX ORDER — DIGOXIN 0.25 MG/ML
250 INJECTION INTRAMUSCULAR; INTRAVENOUS EVERY 4 HOURS
Status: DISPENSED | OUTPATIENT
Start: 2021-09-10 | End: 2021-09-11

## 2021-09-10 RX ORDER — DIGOXIN 0.25 MG/ML
250 INJECTION INTRAMUSCULAR; INTRAVENOUS EVERY 4 HOURS
Status: DISCONTINUED | OUTPATIENT
Start: 2021-09-10 | End: 2021-09-10

## 2021-09-10 RX ORDER — DILTIAZEM HYDROCHLORIDE 5 MG/ML
10 INJECTION INTRAVENOUS ONCE
Status: COMPLETED | OUTPATIENT
Start: 2021-09-10 | End: 2021-09-10

## 2021-09-10 RX ADMIN — OXYBUTYNIN CHLORIDE 5 MG: 5 TABLET ORAL at 21:36

## 2021-09-10 RX ADMIN — DILTIAZEM HYDROCHLORIDE 10 MG: 5 INJECTION INTRAVENOUS at 09:45

## 2021-09-10 RX ADMIN — ONDANSETRON HYDROCHLORIDE 4 MG: 2 INJECTION, SOLUTION INTRAMUSCULAR; INTRAVENOUS at 14:56

## 2021-09-10 RX ADMIN — DILTIAZEM HYDROCHLORIDE 10 MG/HR: 5 INJECTION INTRAVENOUS at 09:44

## 2021-09-10 RX ADMIN — OXYCODONE HYDROCHLORIDE 10 MG: 5 TABLET ORAL at 04:46

## 2021-09-10 RX ADMIN — HYDROMORPHONE HYDROCHLORIDE 0.5 MG: 1 INJECTION, SOLUTION INTRAMUSCULAR; INTRAVENOUS; SUBCUTANEOUS at 14:01

## 2021-09-10 RX ADMIN — HYDROMORPHONE HYDROCHLORIDE 0.5 MG: 1 INJECTION, SOLUTION INTRAMUSCULAR; INTRAVENOUS; SUBCUTANEOUS at 08:19

## 2021-09-10 RX ADMIN — OXYBUTYNIN CHLORIDE 5 MG: 5 TABLET ORAL at 09:04

## 2021-09-10 RX ADMIN — LIDOCAINE HYDROCHLORIDE 5 ML: 20 INJECTION, SOLUTION EPIDURAL; INFILTRATION; INTRACAUDAL; PERINEURAL at 14:03

## 2021-09-10 RX ADMIN — HYDROMORPHONE HYDROCHLORIDE 0.5 MG: 1 INJECTION, SOLUTION INTRAMUSCULAR; INTRAVENOUS; SUBCUTANEOUS at 21:40

## 2021-09-10 RX ADMIN — GABAPENTIN 300 MG: 300 CAPSULE ORAL at 14:04

## 2021-09-10 RX ADMIN — ESCITALOPRAM OXALATE 10 MG: 10 TABLET ORAL at 09:04

## 2021-09-10 RX ADMIN — CLINDAMYCIN PHOSPHATE 600 MG: 600 INJECTION, SOLUTION INTRAVENOUS at 15:00

## 2021-09-10 RX ADMIN — HYDROMORPHONE HYDROCHLORIDE 0.5 MG: 1 INJECTION, SOLUTION INTRAMUSCULAR; INTRAVENOUS; SUBCUTANEOUS at 17:27

## 2021-09-10 RX ADMIN — ROPINIROLE HYDROCHLORIDE 1 MG: 1 TABLET, FILM COATED ORAL at 09:04

## 2021-09-10 RX ADMIN — MEROPENEM 500 MG: 500 INJECTION, POWDER, FOR SOLUTION INTRAVENOUS at 11:24

## 2021-09-10 RX ADMIN — ROPINIROLE HYDROCHLORIDE 1 MG: 1 TABLET, FILM COATED ORAL at 21:35

## 2021-09-10 RX ADMIN — FUROSEMIDE 20 MG: 20 TABLET ORAL at 09:05

## 2021-09-10 RX ADMIN — SODIUM HYPOCHLORITE 473 ML: 1.25 SOLUTION TOPICAL at 08:20

## 2021-09-10 RX ADMIN — METOPROLOL TARTRATE 25 MG: 25 TABLET, FILM COATED ORAL at 21:35

## 2021-09-10 RX ADMIN — ENOXAPARIN SODIUM 105 MG: 150 INJECTION SUBCUTANEOUS at 21:39

## 2021-09-10 RX ADMIN — DIGOXIN 250 MCG: 250 INJECTION, SOLUTION INTRAMUSCULAR; INTRAVENOUS; PARENTERAL at 14:04

## 2021-09-10 RX ADMIN — PANTOPRAZOLE SODIUM 40 MG: 40 INJECTION, POWDER, FOR SOLUTION INTRAVENOUS at 09:05

## 2021-09-10 RX ADMIN — METOPROLOL TARTRATE 25 MG: 25 TABLET, FILM COATED ORAL at 09:04

## 2021-09-10 RX ADMIN — MEROPENEM 500 MG: 500 INJECTION, POWDER, FOR SOLUTION INTRAVENOUS at 21:35

## 2021-09-10 RX ADMIN — SPIRONOLACTONE 25 MG: 25 TABLET ORAL at 09:04

## 2021-09-10 RX ADMIN — ENOXAPARIN SODIUM 105 MG: 150 INJECTION SUBCUTANEOUS at 10:36

## 2021-09-10 RX ADMIN — MEROPENEM 500 MG: 500 INJECTION, POWDER, FOR SOLUTION INTRAVENOUS at 01:55

## 2021-09-10 RX ADMIN — DIGOXIN 250 MCG: 0.25 INJECTION INTRAMUSCULAR; INTRAVENOUS at 10:00

## 2021-09-10 RX ADMIN — GABAPENTIN 300 MG: 300 CAPSULE ORAL at 21:35

## 2021-09-10 RX ADMIN — OXYBUTYNIN CHLORIDE 5 MG: 5 TABLET ORAL at 14:04

## 2021-09-10 RX ADMIN — HYDROMORPHONE HYDROCHLORIDE 0.5 MG: 1 INJECTION, SOLUTION INTRAMUSCULAR; INTRAVENOUS; SUBCUTANEOUS at 01:51

## 2021-09-10 RX ADMIN — GABAPENTIN 300 MG: 300 CAPSULE ORAL at 09:05

## 2021-09-10 RX ADMIN — CLINDAMYCIN PHOSPHATE 600 MG: 600 INJECTION, SOLUTION INTRAVENOUS at 23:28

## 2021-09-10 RX ADMIN — DIGOXIN 250 MCG: 250 INJECTION, SOLUTION INTRAMUSCULAR; INTRAVENOUS; PARENTERAL at 17:28

## 2021-09-10 RX ADMIN — CLINDAMYCIN PHOSPHATE 600 MG: 600 INJECTION, SOLUTION INTRAVENOUS at 09:03

## 2021-09-10 RX ADMIN — OXYCODONE HYDROCHLORIDE 10 MG: 5 TABLET ORAL at 11:27

## 2021-09-10 RX ADMIN — ROPINIROLE HYDROCHLORIDE 1 MG: 1 TABLET, FILM COATED ORAL at 14:04

## 2021-09-10 ASSESSMENT — PAIN DESCRIPTION - PROGRESSION
CLINICAL_PROGRESSION: RAPIDLY WORSENING
CLINICAL_PROGRESSION: NOT CHANGED

## 2021-09-10 ASSESSMENT — PAIN DESCRIPTION - FREQUENCY: FREQUENCY: INTERMITTENT

## 2021-09-10 ASSESSMENT — PAIN SCALES - GENERAL
PAINLEVEL_OUTOF10: 5
PAINLEVEL_OUTOF10: 10
PAINLEVEL_OUTOF10: 9
PAINLEVEL_OUTOF10: 9
PAINLEVEL_OUTOF10: 0
PAINLEVEL_OUTOF10: 10
PAINLEVEL_OUTOF10: 7
PAINLEVEL_OUTOF10: 8
PAINLEVEL_OUTOF10: 10
PAINLEVEL_OUTOF10: 9
PAINLEVEL_OUTOF10: 10

## 2021-09-10 ASSESSMENT — PAIN DESCRIPTION - LOCATION
LOCATION: ABDOMEN
LOCATION: CHEST
LOCATION: ABDOMEN

## 2021-09-10 ASSESSMENT — PAIN DESCRIPTION - PAIN TYPE
TYPE: SURGICAL PAIN
TYPE: SURGICAL PAIN
TYPE: ACUTE PAIN

## 2021-09-10 ASSESSMENT — PAIN DESCRIPTION - ONSET: ONSET: GRADUAL

## 2021-09-10 ASSESSMENT — PAIN DESCRIPTION - ORIENTATION
ORIENTATION: MID;UPPER
ORIENTATION: RIGHT;LOWER
ORIENTATION: RIGHT;LOWER

## 2021-09-10 ASSESSMENT — PAIN DESCRIPTION - DESCRIPTORS: DESCRIPTORS: DULL;DISCOMFORT

## 2021-09-10 NOTE — PROGRESS NOTES
Inpatient Physical Therapy Evaluation and Treatment    Unit: [address]  Date:  9/10/2021  Patient Name:    Homero Delgado  Admitting diagnosis:  Severe sepsis ([facility] [address] [A41.9, R65.20]  Admit Date:  9/5/2021  Precautions/Restrictions/WB Status/ Lines/ Wounds/ Oxygen: Fall risk, Bed/chair alarm, Lines -Supplemental O2 (2L) and Purewick catheter, Telemetry and wound vac to R groin and lateral hip area    Treatment Time:  08:14-08:55  Treatment Number:  1   Timed Code Treatment Minutes: 31 minutes  Total Treatment Minutes:  41  minutes    Patient Goals for Therapy: \" to get up to the [facility] \"          Discharge Recommendations: SNF  DME needs for discharge: defer to facility       Therapy recommendation for EMS Transport: requires transport by cot due to inability to tolerate transfers    Therapy recommendations for staff:   Assist of 1 to roll to L side and reposition in bed. Encourage AROM to UEs/LEs in bed. History of Present Illness:   Per H&P Dr. Selena Chatterjee 9/06: \"The patient is a 63 y.o. female with PMH below, presents with boil on her butt which started draining but still spreading to butt/groin, region painful. Pt reports that she started having pain of her R buttock about a week ago. She noted a boil on her R buttock at the time. She says she tried using Neosporin on it but it continued to worsen/spread and become more painful. She says it was originally on her R buttock but now feels darline it is more near her \"coochie. \"  She says the area has been draining. Entire buttock, hip groin is severely painful. She was emergently taken to the OR by Dr. Pedro Pablo Salazar. \"  9/06 - debridement of right groin, perineum, right buttocks and RLQ abdominal wall. Home Health S4 Level Recommendation:  NA  AM-PAC Mobility Score    AM-PAC Inpatient Mobility Raw Score : 8  AM-PAC Inpatient Mobility without Stair Climbing Raw Score : 7    Preadmission Environment    Pt.  Lives with boyfriend at this time, but plans to go back to her home in indicated  Ankle Pumps x 15 reps  Quad sets x 15 reps  Heel slides x 7 reps    Other  None. Patient/Family Education   Pt educated on role of inpatient PT, POC, importance of continued activity, transfer techniques. Assessment  Pt seen for Physical Therapy evaluation in acute care setting. PTA pt independent with all ADLs and mobility. Currently functioning well below baseline. She is in 10/10 pain at baseline while supine and increased pain with all RLE ROM. This therapy evaluation limited due to onset of irregular heart rhythm while pt attempting to transfer sup>sit through L side. Pt affirmed a desire to mobilize with therapy and showed good effort prior to this event. Pt will benefit from skilled PT in acute setting to promote improved functional mobility as tolerated / appropriate. Recommending SNF upon discharge as patient functioning well below baseline, demonstrates good rehab potential and unable to return home due to limited or no family support, inability to negotiate stairs to enter home/bedroom/bathroom and home environment not conducive to patient recovery. Pt reports she does not have available assistance from friends/family/other if needed. Goals : To be met in 3 visits:  1). Independent with LE Ex x 10 reps    To be met in 6 visits:  1). Supine to/from sit: Independent  2). Sit to/from stand: Modified Independent  3). Bed to chair: Modified Independent  4). Gait: Ambulate 50 ft.  with  Supervision and use of LRAD (least restrictive assistive device)  5). Tolerate B LE exercises 3 sets of 10-15 reps    Rehabilitation Potential: Good  Strengths for achieving goals include:   Pt motivated, PLOF and Pt cooperative   Barriers to achieving goals include:    Pain    Plan    To be seen 3-5 x / week  while in acute care setting for therapeutic exercises, bed mobility, transfers, progressive gait training, balance training, and family/patient education.     Signature: Navneet Flores, PT,

## 2021-09-10 NOTE — PROGRESS NOTES
Patient's HR currently 71, converted to NSR per MT at 1430. Stat EKG ordered at this time. Will continue to monitor.

## 2021-09-10 NOTE — PROGRESS NOTES
Per Dr. Jordon River, Digoxin changed to a total of 4 doses, with one already given. Cardizem bolus 10mg x 1 ordered now and drip increased to 15/hr. HR currently 146. Will continue to monitor.

## 2021-09-10 NOTE — PROGRESS NOTES
Progress Note    Admit Date:  9/5/2021    63year old female with hypertension, hyperlipidemia, Depression presented to [facility] with c/o abscess to her buttock/groin. She was emergently taken to the OR by Dr. Carmen Quiñones. Admitted to med-surg. Subjective:    Ms. Leavitt Goes  Seen up  In bed , reports feeling bad today   Went into rapid AFibb when working with PT  Had some chest discomfort with it    HR upto 170 on moving but down to 140's now           Objective:   Patient Vitals for the past 4 hrs:   BP Temp Temp src Pulse Resp SpO2   09/10/21 0725 (!) 148/74 98.9 °F (37.2 °C) Oral 87 16 94 %            Intake/Output Summary (Last 24 hours) at 9/10/2021 0737  Last data filed at 9/10/2021 0447  Gross per 24 hour   Intake 590 ml   Output 4500 ml   Net -3910 ml       Physical Exam:      General:  Obese middle aged female up in bed  Awake, alert and oriented. Appears to be not in any distress  Mucous Membranes:  Pink , anicteric  Neck: No JVD, no carotid bruit, no thyromegaly  Chest:  Clear to auscultation bilaterally, no added sounds  Cardiovascular: irregular  S1S2 heard, no murmurs or gallops  Abdomen:  Soft, undistended, non tender, no organomegaly, BS present  Large open wound to right Lower abd within the abdominal pannus extending from flank to right groin and vulvar region - wound vac in place   No drainage   Extremities: No edema or cyanosis. Distal pulses well felt  Neurological : grossly normal        Data:  CBC:   Recent Labs     09/08/21 0451 09/09/21  0607 09/10/21  0524   WBC 15.7* 13.9* 15.7*   HGB 10.1* 10.8* 10.2*   HCT 32.4* 34.3* 31.4*   MCV 88.5 85.7 84.0    268 282     BMP:   Recent Labs     09/08/21 0451 09/09/21  0607 09/10/21  0524    137 133*   K 4.7 4.6 4.5    96* 94*   CO2 28 30 33*   PHOS 2.5 3.9 3.6   BUN 21* 16 13   CREATININE 0.7 0.6 0.6     LIVER PROFILE:   No results for input(s): AST, ALT, LIPASE, BILIDIR, BILITOT, ALKPHOS in the last 72 hours.     Invalid input(s): by surgery team - wound vac placed  - wbc slightly up . No fevers         Hypotension  - due to sepsis ,resolved   - - resumed now as tolerated    LONNY  - Creatinine 1.9 on admission  - Held Aldactone, Lasix. - given IVF's. Resolved. Creatinine 0.8  - resumed lasix       Chronic hypoxic respiratory failure  ILD, pulmonary fibrosis   - has oxygen at home. States she has not worn it in over a year  - currently on 2 L. Wean as tolerated. - Hussein PRN.   - F/w Dr. Jacinto Apt onset Afibb with RVR  - likely with acute illness  - start on cardizem bolus and gtt  - consider digoxin iV   - continue home BB  - check TSh , cycle troponins  - cardiology consult and echo       Tobacco Dependence  -Recommended cessation  - nicotine patch ordered     H/o polysubstance abuse    GERD  - on PPI    RLS  - on Requip      Hyperlipidemia  - on Atorvastatin. CAD  - on statin, BB. Chronic diastolic CHF  - stable. No s/s decompensation  - on Lasix, Aldactone at home. Holding currently. Obesity  - Body mass index is 36.34 kg/m². - Recommended weight loss    DVT Prophylaxis: Lovenox  Diet: ADULT DIET;  Regular  Adult Oral Nutrition Supplement; Standard High Calorie/High Protein Oral Supplement  Code Status: Full Code      Mabel Felty, MD, 9/10/2021 7:37 AM

## 2021-09-10 NOTE — PROGRESS NOTES
CMU called with patients HR increased to 160's and irregular. Pt was working with PT/OT in the bed. Charge RN at the bedside and irregular rhythm auscultated. C/o of dull chest pain 7/10. STAT ekg order placed. Updated Dr. Leo Luther.       09/10/21 0850   Vitals   Temp 98 °F (36.7 °C)   Temp Source Oral   Pulse 146   Heart Rate Source Apical   Resp 22   BP (!) 147/99   BP Location Right upper arm   BP Upper/Lower Upper   BP Method Automatic   Patient Position Semi fowlers   Level of Consciousness Alert (0)   MEWS Score 5   Oxygen Therapy   SpO2 91 %   O2 Device Nasal cannula   O2 Flow Rate (L/min) 2 L/min

## 2021-09-10 NOTE — PROGRESS NOTES
Shift assessment complete. See doc flow. Nightly medications given see MAR. Patient resting in bed, c/o RLQ pain to surgical area. PRN Dilaudid given. O2 sats 94% on 2L via nasal cannula. Wound vac in place to Rt buttocks, rt perineum, rt groin and RLQ. Call light and bedside table within easy reach. Will continue to monitor.

## 2021-09-10 NOTE — PROGRESS NOTES
Patient transferred from  at this time, 's. Cardizem bolus 10mg x 1 given. Cardizem drip started at 10/hr. IV Digoxin given also 250mcg x 1. Patient denies any needs, resting in bed with eyes closed. Patient denies any chest pain, only a fluttering. Will continue to monitor.

## 2021-09-10 NOTE — PROGRESS NOTES
General Surgery - Jaleesa Crenshaw, APRN - CNP, CNP  Daily Progress Note    Pt Name: Jakob Kenny  Medical Record Number: [id number]  Date of Birth 1960   Today's Date: 9/10/2021    ASSESSMENT  1. POD # 4 s/p debridement of right groin, perineum, right buttocks and RLQ abdominal wall. 2. Right groin dressing changed: + granulation tissue, pink, moist, There was a loculated collection in the center of the wound where the skin had adhered together, there was a moderate amount of alireza purulence as the locule was broken with my finger. I placed a wet to dry. The wound was reexamined at 1130, pictures placed in the media section. There was no additional purulence. Tissues looked good. Will replace Veriflo today with Dakins. 3. Leuks 17.8->15.7->13.9->15.7  4. ARF resolved   5. Culture: staph epidermidis and e coli: e coli is pan sensitive, Bacteroids, Prevotella     6. VS: afebrile, HR a fib with RVR  7. UO good: pt with external catheter  8. Pt reports \"I not doing to good today\"   9. Right hip assessed: no skin changes, no pain, no signes of fluctuance. Assess gluteus: skin looks good. PLAN  1. PT: OOB to chair/offloading  2. Veriflo: will change tomorrow  3. Pain control: PO pain meds  4. Clinda/merrem  5. Getting lasix per medicine  6. Transferred to PCU by meds: on Cardizem gtt  7. Labs in the am  8. Pt woudn looks OK POD 4: will replace Veriflo this afternoon. Fallon  is virtually unchanged yesterday. Pain is somewhatwell controlled. She has no nausea and no vomiting. She has passed flatus and has not had a bowel movement. She is tolerating some regular diet. Current activity is up with assistance    OBJECTIVE  VITALS:  height is 5' 7\" (1.702 m) and weight is 232 lb (105.2 kg). Her oral temperature is 98.9 °F (37.2 °C). Her blood pressure is 99/66 and her pulse is 111. Her respiration is 18 and oxygen saturation is 92%.    VITALS:  BP 99/66   Pulse 111   Temp 98.9 °F (37.2 °C) (Oral)   Resp 18   Ht 5' 7\" (1.702 m)   Wt 232 lb (105.2 kg)   SpO2 92%   BMI 36.34 kg/m²   INTAKE/OUTPUT:      Intake/Output Summary (Last 24 hours) at 9/10/2021 1235  Last data filed at 9/10/2021 0908  Gross per 24 hour   Intake 590 ml   Output 4500 ml   Net -3910 ml     GENERAL: alert, cooperative, no acute distress    I/O last 3 completed shifts:   In: 794.6 [P.O.:540; IV Piggyback:254.6]  Out: 4500 [Urine:3500; Drains:1000]  I/O this shift:  In: -   Out: 1000 [Urine:1000]    LABS  Recent Labs     09/10/21  0524   WBC 15.7*   HGB 10.2*   HCT 31.4*      *   K 4.5   CL 94*   CO2 33*   BUN 13   CREATININE 0.6   MG 2.00   PHOS 3.6   CALCIUM 8.6     CBC with Differential:    Lab Results   Component Value Date    WBC 15.7 09/10/2021    RBC 3.73 09/10/2021    HGB 10.2 09/10/2021    HCT 31.4 09/10/2021     09/10/2021    MCV 84.0 09/10/2021    MCH 27.3 09/10/2021    MCHC 32.5 09/10/2021    RDW 17.6 09/10/2021    NRBC 1 09/09/2021    SEGSPCT 69.5 09/15/2012    BANDSPCT 1 09/09/2021    LYMPHOPCT 23.0 09/10/2021    MONOPCT 4.0 09/10/2021    MYELOPCT 2 09/09/2021    BASOPCT 0.0 09/10/2021    MONOSABS 0.6 09/10/2021    LYMPHSABS 3.6 09/10/2021    EOSABS 0.2 09/10/2021    BASOSABS 0.0 09/10/2021    DIFFTYPE Auto 09/15/2012     CMP:    Lab Results   Component Value Date     09/10/2021    K 4.5 09/10/2021    K 3.7 03/27/2021    CL 94 09/10/2021    CO2 33 09/10/2021    BUN 13 09/10/2021    CREATININE 0.6 09/10/2021    GFRAA >60 09/10/2021    GFRAA >60 09/15/2012    AGRATIO 0.6 09/05/2021    LABGLOM >60 09/10/2021    GLUCOSE 148 09/10/2021    PROT 6.9 09/05/2021    PROT 7.5 09/15/2012    LABALBU 2.5 09/10/2021    CALCIUM 8.6 09/10/2021    BILITOT 0.4 09/05/2021    ALKPHOS 137 09/05/2021    AST 20 09/05/2021    ALT 13 09/05/2021         HAIDER Carrasquillo - CNP  Electronically signed 9/10/2021 at 12:35 PM

## 2021-09-10 NOTE — PROGRESS NOTES
Patient incontinent large amount of urine. New purewick placed for patient and hooked to continuous suction. R buttock wound to labia, and up under abdominal pannus. Dressing with packing and ABD pad over top. Per 2W RN, Dr. Mariaa Grimm to come up and see patient and re-do dressing. Will continue to monitor.

## 2021-09-10 NOTE — PROGRESS NOTES
Pulmonary Progress Note    CC: hypoxemia    Subjective:   Patient went into afib with RVR and was transferred to PCU. Intake/Output Summary (Last 24 hours) at 9/10/2021 1603  Last data filed at 9/10/2021 1330  Gross per 24 hour   Intake 1070 ml   Output 3100 ml   Net -2030 ml       Exam:   /65   Pulse 71   Temp 99 °F (37.2 °C) (Oral)   Resp 18   Ht 5' 7\" (1.702 m)   Wt 232 lb (105.2 kg)   SpO2 90%   BMI 36.34 kg/m²  on 2l NC  Gen: No distress. Obese. Eyes: PERRL. No sclera icterus. ENT: No discharge. Pharynx clear. Neck: Trachea midline. Resp: No accessory muscle use. No crackles. No wheezes. No rhonchi. No dullness on percussion. CV: Regular rate. Regular rhythm. No murmur or rub. No edema. GI: Non-tender. + distended. No masses. Skin: Warm and dry. No nodule on exposed extremities. No rash on exposed extremities. M/S: No cyanosis. No joint deformity. No clubbing. Neuro: Awake. Moves all extremities. CN grossly intact.       Scheduled Meds:   enoxaparin  1 mg/kg SubCUTAneous BID    digoxin  250 mcg IntraVENous Q4H    dilTIAZem  10 mg IntraVENous Once    rOPINIRole  1 mg Oral TID    spironolactone  25 mg Oral Daily    furosemide  20 mg Oral Daily    escitalopram  10 mg Oral Daily    clindamycin (CLEOCIN) IV  600 mg IntraVENous Q8H    nicotine  1 patch TransDERmal Daily    meropenem  500 mg IntraVENous Q8H    metoprolol  25 mg Oral BID    pantoprazole  40 mg IntraVENous Daily    gabapentin  300 mg Oral TID    oxybutynin  5 mg Oral TID     Continuous Infusions:   dilTIAZem 10 mg/hr (09/10/21 0944)    sodium hypochlorite       PRN Meds:  perflutren lipid microspheres, sodium hypochlorite, ipratropium-albuterol, HYDROmorphone, oxyCODONE **OR** oxyCODONE, ondansetron, ipratropium-albuterol, sodium chloride    Labs:  CBC:   Recent Labs     09/08/21  0451 09/09/21  0607 09/10/21  0524   WBC 15.7* 13.9* 15.7*   HGB 10.1* 10.8* 10.2*   HCT 32.4* 34.3* 31.4*   MCV 88.5 85.7 84.0    268 282     BMP:   Recent Labs     09/08/21  0451 09/09/21  0607 09/10/21  0524    137 133*   K 4.7 4.6 4.5    96* 94*   CO2 28 30 33*   PHOS 2.5 3.9 3.6   BUN 21* 16 13   CREATININE 0.7 0.6 0.6     LIVER PROFILE:   No results for input(s): AST, ALT, LIPASE, BILIDIR, BILITOT, ALKPHOS in the last 72 hours. Invalid input(s): AMYLASE,  ALB  PT/INR: No results for input(s): PROTIME, INR in the last 72 hours. APTT: No results for input(s): APTT in the last 72 hours. UA:No results for input(s): NITRITE, COLORU, PHUR, LABCAST, WBCUA, RBCUA, MUCUS, TRICHOMONAS, YEAST, BACTERIA, CLARITYU, SPECGRAV, LEUKOCYTESUR, UROBILINOGEN, BILIRUBINUR, BLOODU, GLUCOSEU, AMORPHOUS in the last 72 hours. Invalid input(s): KETONESU  No results for input(s): PHART, IQE3OXD, PO2ART in the last 72 hours. Cultures:   9/6 Body fluid- + e coli  9/5 blood- ngtd      Films:     Chest x-ray 9/5 imaging showed:  Mild cardiomegaly with prominent interstitial markings        CT abdomen 9/5  Probable cellulitis with punctate collections of subcutaneous air in the soft   tissues along the right gluteal region inferiorly and extending anteriorly   into the perineal region.  This tracks into the subcutaneous fat along the   right pubic and right groin region and extends anterior and lateral to the   right hip along the abdominal wall laterally which is suspicious for probable   anaerobic infection and cellulitis with possible necrotizing fasciitis. Slightly limited exam due to the lack of IV contrast with metallic prosthetic   devices in both hips causing obscuration of the lower pelvis. 10 mm left renal stone which is unchanged with no hydronephrosis. Mild bibasilar atelectasis or infiltrates extending into the lingula which is   more prominent. Previous cholecystectomy and mild hepatosplenomegaly which is unchanged.    Mild constipation with no bowel obstruction     Echo 9/05/7560   LV systolic function is normal with ejection fraction estimated at 55 %.   No regional wall motion abnormalities are noted.   There is mild concentric left ventricular hypertrophy.   Normal diastolic function with normal filling pressure.   The left atrium is mildly dilated in size.   Aortic valve appears mildly sclerotic but opens adequately.   Mild mitral and tricuspid regurgitation.   Systolic pulmonary artery pressure (SPAP) is estimated at 39mmHg (right   atrial pressure 8mmHg).     ASSESSMENT:  · Severe sepsis  · Right groin, perineum, buttock necrotizing fasciitis post debridement 9/6/2021  · Acute kidney injury  · Leukocytosis  · Chronic hypoxemic respiratory failure-uses 2 to 3 L at home  · ILD followed by Dr. Arlen Gómez   · Tobacco abuse  · History of polysubstance abuse  · afib with RVR     PLAN:  · Supplemental oxygen to maintain SaO2 >92%; wean as tolerated  · Inhaled bronchodilators as needed  · Incentive spirometry  · Meropenem and Clindamycin per surgery/IM   · Lovenox for DVT prophylaxis    · I will sign off, please call with questions

## 2021-09-10 NOTE — PROGRESS NOTES
Consult called in to [facility], Dr Addie Titus is covering. Spoke with Gabriel Garcia 9-10-21 @812.  Melissa Smith

## 2021-09-10 NOTE — PROGRESS NOTES
Patient's rates 68-70 at this time. Wound vac on and in place. Report given to [facility] for transfer of care.

## 2021-09-10 NOTE — CONSULTS
[address] [place] [address]  [phone]        Reason for Consultation/Chief Complaint: \"I have been having heart racing. \"  Consulted for afib with RVR with sepsis    History of Present Illness:  Ratna Hassan is a 63 y.o. patient who presented to [facility] 9/5/21 painful and draining abscess to her buttock. groin and emergently taken to the OR 9/6/21 by Dr. Carmen Quiñones for debridement of necrotizing fasciitis. She has a PMH of HTN, HLD, GERD, kidney stone, and depression. She was admitted to med-surg and then transferred to PCU due to increased HR to the 160's and irregular. Note EKG afib with RVR 149bpm; IVCD; nonspecific IVCD (NSR on 9/5 EKG)  CXR showed mild cardiomegaly and probable mild pulmonary interstitial edema; Eugene <0.01 x2; TSH=3.43.. Most recent Echo 7/27/2019 showed EF 55%; mild cLVH; left atrium mildly dilated; aortic valve appears mildly sclerotic but opens adequately; mild MR and TR. Most recent [facility] 9/13/2018 was negative for ischemia with EF 69%; breast artifact. She is POD#4 s/p extensive debridement right groin, perineum, buttock, and abdominal wall. Cultures + E. Coli and S. Epidermidis. She reports feeling \"heart racing\" earlier this AM when getting cleaned up and working with PT. Tele showed rapid afib up to 200's bpm. Started on diltiazem gtt after IVP 10mg and given dose of IV digoxin 0.25mg. [facility] She feels better now but c/o generalized weakness. Patient with no complaints of chest pain, SOB, dizziness, edema, or orthopnea/PND. I have been asked to provide consultation regarding further management and testing. Past Medical History:   has a past medical history of Class 2 obesity with serious comorbidity and body mass index (BMI) of 39.0 to 39.9 in adult, Depression, History of left hip replacement, Hyperlipidemia, Hypertension, Kidney stone, and MRSA (methicillin resistant staph aureus) culture positive.     Surgical History:   has a past surgical history that includes Hysterectomy; knee surgery; Foot surgery; Cholecystectomy; joint replacement; Facial cosmetic surgery; bronchoscopy (08/08/2019); bronchoscopy (N/A, 8/8/2019); Total hip arthroplasty (Right, 08/2020); other surgical history; and Groin Surgery (Right, 9/6/2021). Social History:   reports that she has been smoking cigarettes. She has a 35.00 pack-year smoking history. She has never used smokeless tobacco. She reports current alcohol use. She reports previous drug use. Drugs: Marijuana and Methamphetamines. Family History:  family history is negative for significant heart disease in parents. Home Medications:  Were reviewed and are listed in nursing record. and/or listed below  Prior to Admission medications    Medication Sig Start Date End Date Taking? Authorizing Provider   pantoprazole (PROTONIX) 40 MG tablet Take 40 mg by mouth daily 7/30/21  Yes Historical Provider, MD   oxyCODONE-acetaminophen (PERCOCET) 5-325 MG per tablet Take 1 tablet by mouth every 4 hours as needed for Pain.    Yes Historical Provider, MD   albuterol (PROVENTIL) (2.5 MG/3ML) 0.083% nebulizer solution Take 3 mLs by nebulization every 6 hours as needed for Wheezing 6/18/20  Yes Flavia Christianson MD   escitalopram (LEXAPRO) 10 MG tablet Take 10 mg by mouth daily 1/21/20  Yes Historical Provider, MD   furosemide (LASIX) 40 MG tablet Take 1 tablet by mouth daily 9/18/18  Yes Titi Johnson MD   spironolactone (ALDACTONE) 25 MG tablet Take 1 tablet by mouth daily 9/18/18  Yes Titi Johnson MD   albuterol sulfate  (90 Base) MCG/ACT inhaler Inhale 2 puffs into the lungs 4 times daily 8/10/17  Yes Titi Johnson MD   oxybutynin (DITROPAN) 5 MG tablet Take 5 mg by mouth 3 times daily   Yes Historical Provider, MD   tiZANidine (ZANAFLEX) 4 MG tablet Take 4 mg by mouth 2 times daily as needed   Yes Historical Provider, MD   metoprolol (LOPRESSOR) 100 MG tablet Take 100 mg by mouth 2 times daily   Yes Historical Provider, MD rOPINIRole (REQUIP) 2 MG tablet Take 2 mg by mouth 4 times daily    Yes Historical Provider, MD   gabapentin (NEURONTIN) 300 MG capsule Take 300 mg by mouth 4 times daily.     Yes Historical Provider, MD   atorvastatin (LIPITOR) 20 MG tablet Take 40 mg by mouth daily    Yes Historical Provider, MD   acetaminophen (TYLENOL) 500 MG tablet Take 1 tablet by mouth 4 times daily as needed for Pain 12/7/19   Maris Poon PA-C   ipratropium-albuterol (DUONEB) 0.5-2.5 (3) MG/3ML SOLN nebulizer solution Inhale 3 mLs into the lungs every 4 hours as needed for Shortness of Breath 8/1/19   Ebonie Watters MD   omeprazole (PRILOSEC) 20 MG capsule Take 40 mg by mouth Daily     Historical Provider, MD        Allergies:  Lisinopril, Codeine, Cymbalta [duloxetine hcl], Duloxetine, Penicillins, and Sulfa antibiotics     Review of Systems:   12 point ROS negative in all areas as listed below except as in [place]  Constitutional, EENT, Cardiovascular, pulmonary, GI, , Musculoskeletal, skin, neurological, hematological, endocrine, Psychiatric    Physical Examination:    Vitals:    09/10/21 0850   BP: (!) 147/99   Pulse: 146   Resp: 22   Temp: 98 °F (36.7 °C)   SpO2: 91%    Weight: 232 lb (105.2 kg)         General Appearance:  Alert, cooperative, no distress, appears stated age   Head:  Normocephalic, without obvious abnormality, atraumatic   Eyes:  PERRL, conjunctiva/corneas clear       Nose: Nares normal, no drainage or sinus tenderness   Throat: Lips, mucosa, and tongue normal   Neck: Supple, symmetrical, trachea midline, no adenopathy, thyroid: not enlarged, symmetric, no tenderness/mass/nodules, no carotid bruit or JVD       Lungs:   Clear to auscultation bilaterally, respirations unlabored   Chest Wall:  No tenderness or deformity   Heart:  +irregularly irregular and tachycardic, S1, S2 normal, no murmur, rub or gallop   Abdomen:   Soft, non-tender, bowel sounds active all four quadrants,  no masses, no organomegaly Extremities: Extremities normal, atraumatic, no cyanosis or edema   Pulses: 2+ and symmetric   Skin: Skin color, texture, turgor normal, no rashes or lesions   Pysch: Normal mood and affect   Neurologic: Normal gross motor and sensory exam.         Labs  CBC:   Lab Results   Component Value Date    WBC 15.7 09/10/2021    RBC 3.73 09/10/2021    HGB 10.2 09/10/2021    HCT 31.4 09/10/2021    MCV 84.0 09/10/2021    RDW 17.6 09/10/2021     09/10/2021     CMP:    Lab Results   Component Value Date     09/10/2021    K 4.5 09/10/2021    K 3.7 2021    CL 94 09/10/2021    CO2 33 09/10/2021    BUN 13 09/10/2021    CREATININE 0.6 09/10/2021    GFRAA >60 09/10/2021    GFRAA >60 09/15/2012    AGRATIO 0.6 2021    LABGLOM >60 09/10/2021    GLUCOSE 148 09/10/2021    PROT 6.9 2021    PROT 7.5 09/15/2012    CALCIUM 8.6 09/10/2021    BILITOT 0.4 2021    ALKPHOS 137 2021    AST 20 2021    ALT 13 2021     PT/INR:  No results found for: PTINR  Lab Results   Component Value Date    CKTOTAL 90 10/02/2019    TROPONINI <0.01 09/10/2021       EKG:  I have reviewed EKG with the following interpretation:  Impression:  See HPI    EK/10/2021  Atrial fibrillation with rapid ventricular responseNonspecific ST and T wave abnormality , probably digitalis effectAbnormal     CXR 2021  Mild cardiomegaly and probable mild pulmonary interstitial edema which is less prominent from the prior study. Recommend short-term follow-up. ECHO 2019   Summary   LV systolic function is normal with ejection fraction estimated at 55 %. No regional wall motion abnormalities are noted. There is mild concentric left ventricular hypertrophy. Normal diastolic function with normal filling pressure. The left atrium is mildly dilated in size. Aortic valve appears mildly sclerotic but opens adequately. Mild mitral and tricuspid regurgitation.    Systolic pulmonary artery pressure (SPAP) is estimated at 39mmHg (right   atrial pressure 8mmHg). Lexiscan 9/13/2018  Summary Normal LV size and systolic function. Left ventricular ejection fraction of 69 %. There is a very small sized mild intensity fixed defect of the anteroapical  wall. This is consistent with breast attenuation artifact. No evidence of myocardium at risk for significant reversible ischemia. [id number] Score for Atrial Fibrillation Stroke Risk   Risk   Factors  Component Value   C CHF Yes 1   H HTN Yes 1   A2 Age >= 75 No,  (66 y.o.) 0   D DM No 0   S2 Prior Stroke/TIA No 0   V Vascular Disease No 0   A Age 74-69 No,  (66 y.o.) 0   Sc Sex female 1    [id number]  Score  3   Score last updated 9/10/21 11:23 AM EDT      Assessment:  Nghia Spencer is a 63 y.o. patient who presented to [facility] 9/5/21 painful and draining abscess to her buttock. groin and emergently taken to the OR 9/6/21 by Dr. Briana Leroy for debridement of necrotizing fasciitis. She has a PMH of HTN, HLD, GERD, kidney stone, and depression. She was admitted to med-surg and then transferred to PCU due to increased HR to the 160's and irregular. Note EKG afib with RVR 149bpm; IVCD; nonspecific IVCD (NSR on 9/5 EKG)  CXR showed mild cardiomegaly and probable mild pulmonary interstitial edema; Eugene <0.01 x2; TSH=3.43.. Most recent Echo 7/27/2019 showed EF 55%; mild cLVH; left atrium mildly dilated; aortic valve appears mildly sclerotic but opens adequately; mild MR and TR. [facility] negative for ischemia with EF 69%; breast artifact. She is POD#4 s/p extensive debridement right groin, perineum, buttock, and abdominal wall. Cultures + E. Coli and S. Epidermidis. She reports feeling \"heart racing\" earlier this AM when getting cleaned up and working with PT. Tele showed rapid afib up to 200's bpm. Started on diltiazem gtt after IVP 10mg and given dose of IV digoxin 0.25mg. [place] She feels better now but c/o generalized weakness.    Diagnosis of new-onset afib with RVR in middle-aged female with necrotizing fasciitis s/p debridement and sepsis. Recs:  1. Increase diltiazem gtt to 15mg/hr. Give another IV 10mg dose. 2. Give digoxin 0.25mg q 4 hours x 4 doses. 3. Continue lovenox 105mg SQ BID for AC  4. Continue metoprolol 25mg BID, aldactone 25mg qd, lasix 20mg qd  5. ECHO pending  6. Treat with abx as appropriate per IM and surgery team  7. Follow telemetry. 8. Keep lytes repleted properly    Patient Active Problem List   Diagnosis    Hypertension    Hyperlipidemia    Depression    Class 2 obesity due to excess calories with body mass index (BMI) of 39.0 to 39.9 in adult    Acute pulmonary edema (HCC)    Acute congestive heart failure (HCC)    Iron deficiency anemia    Acute respiratory failure with hypoxia (HCC)    COPD exacerbation (HCC)    Respiratory alkalosis    Lactic acidosis    Nausea & vomiting    Hyperventilation    Hypokalemia    Hypoxia    Acute hypoxemic respiratory failure (HCC)    Abnormal chest CT    Chronic diastolic congestive heart failure (HCC)    Abnormal CT of the chest    COPD with acute exacerbation (HCC)    Tobacco abuse    Exercise hypoxemia    Pneumonia due to organism    Status post total replacement of right hip    CHF (congestive heart failure), NYHA class I, chronic, systolic (HCC)    Necrotizing soft tissue infection    Severe sepsis (HCC)    Necrotizing fasciitis (HCC)    Acute kidney injury ([address]    ILD (interstitial lung disease) (HCC)    Chronic hypoxemic respiratory failure (HCC)    Sepsis due to Escherichia coli with acute renal failure without septic shock ([address]    Atrial fibrillation with rapid ventricular response ([address]       Thank you for allowing to us to participate in the care or [employer]. Further evaluation will be based upon the patient's clinical course and testing results.

## 2021-09-10 NOTE — PROGRESS NOTES
Inpatient Occupational Therapy  Evaluation and Treatment    Unit: [address] [place]  Date:  9/10/2021  Patient Name:    Pam Feliciano  Admitting diagnosis:  Severe sepsis ([address] [A41.9, R65.20]  Admit Date:  9/5/2021  Precautions/Restrictions/WB Status/ Lines/ Wounds/ Oxygen: fall risk, bed/chair alarm, purewick catheter, supplemental O2 (2L) and telemetry, wound vac    Treatment Time:  0815-0855  Treatment Number: 1     Billable Treatment Time: 30 minutes   Total Treatment Time:   40   minutes    Patient Goals for Therapy:  \"get up to [facility]\"      Discharge Recommendations: SNF  DME needs for discharge: defer to facility       Therapy recommendations for staff:   Bilateral UE ROM at this time, due to not tolerating change in position due to pain    History of Present Illness: per H &P Matthew Achilles: 63 y.o. female with PMH below, presents with boil on her butt which started draining but still spreading to butt/groin, region painful. Pt reports that she started having pain of her R buttock about a week ago. She noted a boil on her R buttock at the time. She says she tried using Neosporin on it but it continued to worsen/spread and become more painful. She says it was originally on her R buttock but now feels darline it is more near her \"coochie. \"  She says the area has been draining. Entire buttock, hip groin is severely painful. She was emergently taken to the OR by Dr. Lydia Evans. Home Health S4 Level Recommendation:  NA  AM-PAC Score: AM-PAC Inpatient Daily Activity Raw Score: 15    Preadmission Environment    Pt. Lives with boyfriend at this time, but plans to go back to her home in [place] once discharged . Lives alone in [place]. Pt does not work. Boyfriend does not work but is unable to provide additional support upon discharge per pt. Family members live in the area but all work and are unable to provide additional support. Below information refers to the [place] home.   Home environment:            mobile home/trailer  Steps to enter first floor: 4 steps to enter and hand rail unilateral  Steps to second floor:         N/A  Bathroom: tub/shower unit and comfort height toilet with rails around toilet; shower is currently not working so pt has been using [facility] home for bathing. Equipment owned: [address] [place] [address] and [facility]. Pt has a home O2 concentrator and portable tank but does not currently use either. Below information refers to [facility]'s home:  Home environment:            One story  Steps to enter first floor: 4 short steps to enter and no rail  Steps to second floor:         N/A  Bathroom: tub/shower unit and shower chair, regular height toilet without rails (pt uses sink and tub for support)     Preadmission Status:  Pt. Able to drive: Yes  Pt Fully independent with ADLs: Yes  Pt. Required assistance from family for: Independent PTA  Pt. independent for transfers and gait and walked with [facility]  History of falls          Yes - most recently 8 months ago.     Pain   Yes  Location: R side groin, buttock  Rating: 10 /10  Pain Medicine Status: Received pain med prior to tx (RN in room at beginning of session)  Pt agreeable to attempt to get up to [facility] and continue therapy after receiving IV pain medication from nursing.      Cognition    A&O Person, Place and Situation ; knows it is middle of September, 2021. Does not know date. Able to follow 2 step commands     Subjective  Patient lying supine in bed with no family present. Pt agreeable to this OT eval & tx.      Upper Extremity ROM:    Impaired L shld flexion 0-90, elbow to distal WFLs   R UE WFLs    Upper Extremity Strength:    BUE strength impaired but not formally assessed w/ MMT    Upper Extremity Sensation    WFL    Upper Extremity Proprioception:  NT    Coordination and Tone  WFL    Balance  Functional Sitting Balance:  Impaired, unable to tolerate sitting due to pain  Functional Standing Balance:NT    Bed mobility:  Pt wanting to get to [facility] and rolled to right side with Min assist. Pt Strengths for achieving goals include: Pt motivated and PLOF  Barriers to achieving goals include:  Complexity of condition and Pain     Plan: To be seen 3-5 x/wk while in acute care setting for therapeutic exercises, bed mobility, transfers, dressing, bathing, family/patient education, ADL/IADL retraining, energy conservation training.      Moriah Owen, OTR/L [id number]      If patient discharges from this facility prior to next visit, this note will serve as the Discharge Summary hand grasp, leg strength strong and equal bilaterally

## 2021-09-10 NOTE — FLOWSHEET NOTE
09/10/21 1330   Negative Pressure Wound Therapy Groin Anterior;Proximal;Right;Upper   Placement Date/Time: 09/08/21 1210   Pre-existing: No  Inserted by: Mendel Smalls CWDAYANNAN  Location: Groin  Wound Location Orientation: Anterior;Proximal;Right;Upper   $ Standard NPWT >50 sq cm PER TX $ Yes   Wound Type Surgical   Dressing Type Black foam;Cleanse Choice;Veraflo   Number of pieces used   (4-2 black, 2 gray)   Cycle Continuous   Target Pressure (mmHg) 125   Intensity 1   Instillation Volume  55 mL   Soak Time  10 minutes   Vac Frequency 3 hours   Dressing Changed Changed/New   Drainage Amount Small   Drainage Description Serosanguinous   Dressing Change Due 09/13/21   Wound Assessment Pink;Red     Rght groin, right perineum/rt buttock:  85%  Red, moist tissue, 15% scattered gray and yellow tissue. No odor or purulence noted. Periwound skin intact. Wound assessed with Jaleesa Surgery NP due to increased leuks. Pus pocket found, reasssed later in day. Area resolved. Ok to replace VAC dressing. Pt premedicated with iv pain med, lidocaine instilled into wound base and allowd to dwell x 10 minutes. Periwound skin prepped using barrier wipe, barrier strips,  and vac drape. 2 black foam and 2 gray foam used in wound base, good seal obtained. Veraflo settings continued. Next vac change on Monday. Pt tolerated fair.

## 2021-09-10 NOTE — PLAN OF CARE
Problem: Pain:  Goal: Pain level will decrease  Description: Pain level will decrease  Outcome: Ongoing  Goal: Control of acute pain  Description: Control of acute pain  Outcome: Ongoing  Goal: Control of chronic pain  Description: Control of chronic pain  Outcome: Ongoing  Goal: Patient's pain/discomfort is manageable  Description: Patient's pain/discomfort is manageable  Outcome: Ongoing     Problem: Infection:  Goal: Will remain free from infection  Description: Will remain free from infection  Outcome: Ongoing     Problem: Safety:  Goal: Free from accidental physical injury  Description: Free from accidental physical injury  Outcome: Ongoing  Goal: Free from intentional harm  Description: Free from intentional harm  Outcome: Ongoing     Problem: Daily Care:  Goal: Daily care needs are met  Description: Daily care needs are met  Outcome: Ongoing     Problem: Skin Integrity:  Goal: Skin integrity will stabilize  Description: Skin integrity will stabilize  Outcome: Ongoing     Problem: Discharge Planning:  Goal: Patients continuum of care needs are met  Description: Patients continuum of care needs are met  Outcome: Ongoing     Problem: Skin Integrity:  Goal: Will show no infection signs and symptoms  Description: Will show no infection signs and symptoms  Outcome: Ongoing  Goal: Absence of new skin breakdown  Description: Absence of new skin breakdown  Outcome: Ongoing

## 2021-09-11 LAB
ALBUMIN SERPL-MCNC: 2.2 G/DL (ref 3.4–5)
ANION GAP SERPL CALCULATED.3IONS-SCNC: 8 MMOL/L (ref 3–16)
ANISOCYTOSIS: ABNORMAL
BANDED NEUTROPHILS RELATIVE PERCENT: 2 % (ref 0–7)
BASOPHILS ABSOLUTE: 0.2 K/UL (ref 0–0.2)
BASOPHILS RELATIVE PERCENT: 1 %
BUN BLDV-MCNC: 15 MG/DL (ref 7–20)
CALCIUM SERPL-MCNC: 9 MG/DL (ref 8.3–10.6)
CHLORIDE BLD-SCNC: 94 MMOL/L (ref 99–110)
CO2: 32 MMOL/L (ref 21–32)
CREAT SERPL-MCNC: 0.6 MG/DL (ref 0.6–1.1)
EOSINOPHILS ABSOLUTE: 0.4 K/UL (ref 0–0.6)
EOSINOPHILS RELATIVE PERCENT: 2 %
GFR AFRICAN AMERICAN: >60
GFR NON-AFRICAN AMERICAN: >60
GLUCOSE BLD-MCNC: 145 MG/DL (ref 70–99)
GLUCOSE BLD-MCNC: 146 MG/DL (ref 70–99)
GLUCOSE BLD-MCNC: 161 MG/DL (ref 70–99)
GLUCOSE BLD-MCNC: 163 MG/DL (ref 70–99)
GLUCOSE BLD-MCNC: 193 MG/DL (ref 70–99)
HCT VFR BLD CALC: 32.8 % (ref 36–48)
HEMOGLOBIN: 10.5 G/DL (ref 12–16)
HYPOCHROMIA: ABNORMAL
LV EF: 58 %
LVEF MODALITY: NORMAL
LYMPHOCYTES ABSOLUTE: 1.5 K/UL (ref 1–5.1)
LYMPHOCYTES RELATIVE PERCENT: 8 %
MAGNESIUM: 2 MG/DL (ref 1.8–2.4)
MCH RBC QN AUTO: 27.2 PG (ref 26–34)
MCHC RBC AUTO-ENTMCNC: 31.9 G/DL (ref 31–36)
MCV RBC AUTO: 85.2 FL (ref 80–100)
MONOCYTES ABSOLUTE: 1.5 K/UL (ref 0–1.3)
MONOCYTES RELATIVE PERCENT: 8 %
NEUTROPHILS ABSOLUTE: 15.3 K/UL (ref 1.7–7.7)
NEUTROPHILS RELATIVE PERCENT: 79 %
PDW BLD-RTO: 17.5 % (ref 12.4–15.4)
PERFORMED ON: ABNORMAL
PHOSPHORUS: 3.6 MG/DL (ref 2.5–4.9)
PLATELET # BLD: 322 K/UL (ref 135–450)
PMV BLD AUTO: 7.7 FL (ref 5–10.5)
POLYCHROMASIA: ABNORMAL
POTASSIUM SERPL-SCNC: 4.7 MMOL/L (ref 3.5–5.1)
RBC # BLD: 3.85 M/UL (ref 4–5.2)
SODIUM BLD-SCNC: 134 MMOL/L (ref 136–145)
WBC # BLD: 18.9 K/UL (ref 4–11)

## 2021-09-11 PROCEDURE — 99231 SBSQ HOSP IP/OBS SF/LOW 25: CPT | Performed by: SURGERY

## 2021-09-11 PROCEDURE — 2700000000 HC OXYGEN THERAPY PER DAY

## 2021-09-11 PROCEDURE — 2500000003 HC RX 250 WO HCPCS: Performed by: INTERNAL MEDICINE

## 2021-09-11 PROCEDURE — 2580000003 HC RX 258: Performed by: INTERNAL MEDICINE

## 2021-09-11 PROCEDURE — 6360000002 HC RX W HCPCS: Performed by: INTERNAL MEDICINE

## 2021-09-11 PROCEDURE — 83735 ASSAY OF MAGNESIUM: CPT

## 2021-09-11 PROCEDURE — 2060000000 HC ICU INTERMEDIATE R&B

## 2021-09-11 PROCEDURE — 36415 COLL VENOUS BLD VENIPUNCTURE: CPT

## 2021-09-11 PROCEDURE — 94761 N-INVAS EAR/PLS OXIMETRY MLT: CPT

## 2021-09-11 PROCEDURE — 6370000000 HC RX 637 (ALT 250 FOR IP): Performed by: INTERNAL MEDICINE

## 2021-09-11 PROCEDURE — 99232 SBSQ HOSP IP/OBS MODERATE 35: CPT | Performed by: INTERNAL MEDICINE

## 2021-09-11 PROCEDURE — C9113 INJ PANTOPRAZOLE SODIUM, VIA: HCPCS | Performed by: INTERNAL MEDICINE

## 2021-09-11 PROCEDURE — 93306 TTE W/DOPPLER COMPLETE: CPT

## 2021-09-11 PROCEDURE — 85025 COMPLETE CBC W/AUTO DIFF WBC: CPT

## 2021-09-11 PROCEDURE — 99233 SBSQ HOSP IP/OBS HIGH 50: CPT | Performed by: INTERNAL MEDICINE

## 2021-09-11 PROCEDURE — 80069 RENAL FUNCTION PANEL: CPT

## 2021-09-11 RX ORDER — DIGOXIN 125 MCG
125 TABLET ORAL DAILY
Status: DISCONTINUED | OUTPATIENT
Start: 2021-09-11 | End: 2021-09-15 | Stop reason: HOSPADM

## 2021-09-11 RX ADMIN — OXYBUTYNIN CHLORIDE 5 MG: 5 TABLET ORAL at 10:10

## 2021-09-11 RX ADMIN — ENOXAPARIN SODIUM 105 MG: 150 INJECTION SUBCUTANEOUS at 20:43

## 2021-09-11 RX ADMIN — ROPINIROLE HYDROCHLORIDE 1 MG: 1 TABLET, FILM COATED ORAL at 16:27

## 2021-09-11 RX ADMIN — HYDROMORPHONE HYDROCHLORIDE 0.5 MG: 1 INJECTION, SOLUTION INTRAMUSCULAR; INTRAVENOUS; SUBCUTANEOUS at 19:42

## 2021-09-11 RX ADMIN — GABAPENTIN 300 MG: 300 CAPSULE ORAL at 10:09

## 2021-09-11 RX ADMIN — HYDROMORPHONE HYDROCHLORIDE 0.5 MG: 1 INJECTION, SOLUTION INTRAMUSCULAR; INTRAVENOUS; SUBCUTANEOUS at 10:09

## 2021-09-11 RX ADMIN — HYDROMORPHONE HYDROCHLORIDE 0.5 MG: 1 INJECTION, SOLUTION INTRAMUSCULAR; INTRAVENOUS; SUBCUTANEOUS at 01:28

## 2021-09-11 RX ADMIN — PANTOPRAZOLE SODIUM 40 MG: 40 INJECTION, POWDER, FOR SOLUTION INTRAVENOUS at 10:10

## 2021-09-11 RX ADMIN — METOPROLOL TARTRATE 25 MG: 25 TABLET, FILM COATED ORAL at 10:10

## 2021-09-11 RX ADMIN — MEROPENEM 500 MG: 500 INJECTION, POWDER, FOR SOLUTION INTRAVENOUS at 19:46

## 2021-09-11 RX ADMIN — HYDROMORPHONE HYDROCHLORIDE 0.5 MG: 1 INJECTION, SOLUTION INTRAMUSCULAR; INTRAVENOUS; SUBCUTANEOUS at 16:27

## 2021-09-11 RX ADMIN — ESCITALOPRAM OXALATE 10 MG: 10 TABLET ORAL at 10:10

## 2021-09-11 RX ADMIN — ENOXAPARIN SODIUM 105 MG: 150 INJECTION SUBCUTANEOUS at 10:11

## 2021-09-11 RX ADMIN — HYDROMORPHONE HYDROCHLORIDE 0.5 MG: 1 INJECTION, SOLUTION INTRAMUSCULAR; INTRAVENOUS; SUBCUTANEOUS at 13:30

## 2021-09-11 RX ADMIN — DIGOXIN 125 MCG: 125 TABLET ORAL at 10:09

## 2021-09-11 RX ADMIN — HYDROMORPHONE HYDROCHLORIDE 0.5 MG: 1 INJECTION, SOLUTION INTRAMUSCULAR; INTRAVENOUS; SUBCUTANEOUS at 06:36

## 2021-09-11 RX ADMIN — MEROPENEM 500 MG: 500 INJECTION, POWDER, FOR SOLUTION INTRAVENOUS at 12:16

## 2021-09-11 RX ADMIN — GABAPENTIN 300 MG: 300 CAPSULE ORAL at 20:38

## 2021-09-11 RX ADMIN — METOPROLOL TARTRATE 25 MG: 25 TABLET, FILM COATED ORAL at 20:38

## 2021-09-11 RX ADMIN — OXYBUTYNIN CHLORIDE 5 MG: 5 TABLET ORAL at 20:38

## 2021-09-11 RX ADMIN — MEROPENEM 500 MG: 500 INJECTION, POWDER, FOR SOLUTION INTRAVENOUS at 03:56

## 2021-09-11 RX ADMIN — OXYBUTYNIN CHLORIDE 5 MG: 5 TABLET ORAL at 16:27

## 2021-09-11 RX ADMIN — CLINDAMYCIN PHOSPHATE 600 MG: 600 INJECTION, SOLUTION INTRAVENOUS at 16:33

## 2021-09-11 RX ADMIN — SPIRONOLACTONE 25 MG: 25 TABLET ORAL at 10:09

## 2021-09-11 RX ADMIN — HYDROMORPHONE HYDROCHLORIDE 0.5 MG: 1 INJECTION, SOLUTION INTRAMUSCULAR; INTRAVENOUS; SUBCUTANEOUS at 23:57

## 2021-09-11 RX ADMIN — ROPINIROLE HYDROCHLORIDE 1 MG: 1 TABLET, FILM COATED ORAL at 20:38

## 2021-09-11 RX ADMIN — CLINDAMYCIN PHOSPHATE 600 MG: 600 INJECTION, SOLUTION INTRAVENOUS at 10:19

## 2021-09-11 RX ADMIN — GABAPENTIN 300 MG: 300 CAPSULE ORAL at 16:27

## 2021-09-11 RX ADMIN — FUROSEMIDE 20 MG: 20 TABLET ORAL at 10:09

## 2021-09-11 RX ADMIN — SODIUM CHLORIDE 25 ML: 9 INJECTION, SOLUTION INTRAVENOUS at 16:32

## 2021-09-11 RX ADMIN — ROPINIROLE HYDROCHLORIDE 1 MG: 1 TABLET, FILM COATED ORAL at 10:09

## 2021-09-11 RX ADMIN — SODIUM CHLORIDE 25 ML: 9 INJECTION, SOLUTION INTRAVENOUS at 12:14

## 2021-09-11 ASSESSMENT — PAIN DESCRIPTION - LOCATION: LOCATION: GROIN

## 2021-09-11 ASSESSMENT — PAIN SCALES - GENERAL
PAINLEVEL_OUTOF10: 10
PAINLEVEL_OUTOF10: 10
PAINLEVEL_OUTOF10: 9
PAINLEVEL_OUTOF10: 9
PAINLEVEL_OUTOF10: 10
PAINLEVEL_OUTOF10: 9
PAINLEVEL_OUTOF10: 5
PAINLEVEL_OUTOF10: 10
PAINLEVEL_OUTOF10: 5
PAINLEVEL_OUTOF10: 10

## 2021-09-11 ASSESSMENT — PAIN DESCRIPTION - PAIN TYPE: TYPE: ACUTE PAIN;SURGICAL PAIN

## 2021-09-11 NOTE — PROGRESS NOTES
[address] Daily Progress Note      Admit Date:  9/5/2021    Subjective:  Ms. Reza Gustafson is seen by cardiology for afib RVR in setting of sepsis. now [facility]  ECTOR  As per cardiac consult by Dr Carlos Tracey is a 63 y.o. patient who presented to [facility] 9/5/21 painful and draining abscess to her buttock. groin and emergently taken to the OR 9/6/21 by Dr. Chris Muro for debridement of necrotizing fasciitis. She has a PMH of HTN, HLD, GERD, kidney stone, morbid obesity BM 39 and depression. She was admitted to med-surg and then transferred to PCU due to increased HR to the 160's and irregular. Note EKG afib with RVR 149bpm; IVCD; nonspecific IVCD (NSR on 9/5 EKG)  CXR showed mild cardiomegaly and probable mild pulmonary interstitial edema; Eugene <0.01 x2; TSH=3.43.. Most recent Echo 7/27/2019 showed EF 55%; mild cLVH; left atrium mildly dilated; aortic valve appears mildly sclerotic but opens adequately; mild MR and TR. Most recent [place] [facility] 9/13/2018 was negative for ischemia with EF 69%; breast artifact. She is POD#4 s/p extensive debridement right groin, perineum, buttock, and abdominal wall. Cultures + E. Coli and S. Epidermidis. She reports feeling \"heart racing\" earlier this AM when getting cleaned up and working with PT. Tele showed rapid afib up to 200's bpm. Started on diltiazem gtt after IVP 10mg and given dose of IV digoxin 0.25mg. Heddy  She feels better now but c/o generalized weakness. Patient with no complaints of chest pain, SOB, dizziness, edema, or orthopnea/PND. I have been asked to provide consultation regarding further management and testing.     ROS:  12 point ROS negative in all areas as listed below except as in [address]  Constitutional, EENT, Cardiovascular, pulmonary, GI, , Musculoskeletal, skin, neurological, hematological, endocrine, Psychiatric    Past Medical History:   Diagnosis Date    Class 2 obesity with serious comorbidity and body mass index (BMI) of 39.0 to 39.9 in adult 9/14/2018  Depression     History of left hip replacement 02/12/2018    Hyperlipidemia     Hypertension     Kidney stone     MRSA (methicillin resistant staph aureus) culture positive 07/27/2019    nasal colonization     Past Surgical History:   Procedure Laterality Date    BRONCHOSCOPY  08/08/2019    BRONCHOSCOPY N/A 8/8/2019    BRONCHOSCOPY ALVEOLAR LAVAGE performed by Arian Ni MD at [place] [address]      FOOT SURGERY      GROIN SURGERY Right 9/6/2021    DEBRIDEMENT OF NECROTIZING SOFT TISSUE INFECTION, RIGHT BUTTOCK, GROIN, RIGHT LOWER QUADRANT, PERINEUM. performed by Millie Angeles MD at [address] [place] [address]      OTHER SURGICAL HISTORY      DEBRIDEMENT OF NECROTIZING SOFT TISSUE INFECTION, RIGHT BUTTOCK, GROIN, RIGHT LOWER QUADRANT, PERINEUM.     TOTAL HIP ARTHROPLASTY Right 08/2020       Objective:   BP (!) 169/78   Pulse 65   Temp 97.1 °F (36.2 °C) (Oral)   Resp 16   Ht 5' 7\" (1.702 m)   Wt 227 lb 4.8 oz (103.1 kg)   SpO2 96%   BMI 35.60 kg/m²       Intake/Output Summary (Last 24 hours) at 9/11/2021 0826  Last data filed at 9/11/2021 0520  Gross per 24 hour   Intake 1012. 85 ml   Output 2400 ml   Net -1387.15 ml       TELEMETRY: NSR  Physical Exam:obese   General: No Respiratory distress, appears well developed and well nourished. Eyes:  Sclera nonicteric  Nose/Sinuses:  negative findings: nose shows no deformity, asymmetry, or inflammation, nasal mucosa normal, septum midline with no perforation or bleeding  Back:  no pain to palpation  Joint:  no active joint inflammation  Musculoskeletal:  negative  Skin:  Warm and dry  Neck:  Negative for JVD and Carotid Bruits. Chest:  Clear to auscultation, respiration easy  Cardiovascular:  RRR, S1S2 normal, no murmur, no rub or thrill.   Abdomen:  Soft normal liver and spleen  Extremities:   No edema, clubbing, cyanosis,  Pulses:pedal pulses are normal.  Neuro: intact    Medications:    enoxaparin  1 mg/kg SubCUTAneous BID    dilTIAZem  10 mg IntraVENous Once    rOPINIRole  1 mg Oral TID    spironolactone  25 mg Oral Daily    furosemide  20 mg Oral Daily    escitalopram  10 mg Oral Daily    clindamycin (CLEOCIN) IV  600 mg IntraVENous Q8H    nicotine  1 patch TransDERmal Daily    meropenem  500 mg IntraVENous Q8H    metoprolol  25 mg Oral BID    pantoprazole  40 mg IntraVENous Daily    gabapentin  300 mg Oral TID    oxybutynin  5 mg Oral TID      dilTIAZem 2.5 mg/hr (09/10/21 2153)    sodium hypochlorite       perflutren lipid microspheres, sodium hypochlorite, ipratropium-albuterol, HYDROmorphone, oxyCODONE **OR** oxyCODONE, ondansetron, ipratropium-albuterol, sodium chloride    Lab Data:  CBC:   Recent Labs     09/09/21  0607 09/10/21  0524 09/11/21  0423   WBC 13.9* 15.7* 18.9*   HGB 10.8* 10.2* 10.5*   HCT 34.3* 31.4* 32.8*   MCV 85.7 84.0 85.2    282 322     BMP:   Recent Labs     09/09/21  0607 09/10/21  0524 09/11/21  0423    133* 134*   K 4.6 4.5 4.7   CL 96* 94* 94*   CO2 30 33* 32   PHOS 3.9 3.6 3.6   BUN 16 13 15   CREATININE 0.6 0.6 0.6     LIVER PROFILE: No results for input(s): AST, ALT, LIPASE, BILIDIR, BILITOT, ALKPHOS in the last 72 hours. Invalid input(s): AMYLASE,  ALB  PT/INR: No results for input(s): PROTIME, INR in the last 72 hours. APTT: No results for input(s): APTT in the last 72 hours. BNP:  No results for input(s): BNP in the last 72 hours. IMAGING:    Conclusions echo 7.29.19   Summary   LV systolic function is normal with ejection fraction estimated at 55 %. No regional wall motion abnormalities are noted. There is mild concentric left ventricular hypertrophy. Normal diastolic function with normal filling pressure. The left atrium is mildly dilated in size. Aortic valve appears mildly sclerotic but opens adequately. Mild mitral and tricuspid regurgitation.    Systolic pulmonary artery pressure (SPAP) is estimated at 39mmHg (right   atrial pressure 8mmHg). EKG 9.10.21  Normal sinus rhythmMinimal voltage criteria for LVH, may be normal variantNonspecific ST abnormalityPoor R wave progressionAbnormal ECGWhen compared with ECG of 10-SEP-2021 08:57, (unconfirmed)Sinus rhythm has replaced Atrial fibrillationVent. rate has decreased BY  80 BPMNonspecific T wave abnormality now evident in Anterior leadsConfirmed by PRASHANT Xavier MD ([id number]) on 9/10/2021 6:29:05 PM  EKG 9.10.21  Atrial fibrillation with rapid ventricular responseNonspecific ST & T wave changesNon-specific intra-ventricular conduction delayAbnormal ECGWhen compared with ECG of 05-SEP-2021 19:28,Atrial fibrillation has replaced Sinus rhythmVent.  rate has increased BY  79 BPMConfirmed by PRASHANT Xavier MD ([id number]) on 9/10/2021 6:28:35 PM    Chest xray 9.5.21     Mild cardiomegaly and probable mild pulmonary interstitial edema which is   less prominent from the prior study.  Recommend short-term follow-up.             TSH 3.43 9.10.21  Assessment:  PAF  Obesity  Rule out LISA  Essential hypertension BP not controlled  Mixed hyperlipidemia no recent labs    Patient Active Problem List    Diagnosis Date Noted    Atrial fibrillation with rapid ventricular response ([address]     Sepsis due to Escherichia coli with acute renal failure without septic shock ([address]     Necrotizing soft tissue infection 09/06/2021    Severe sepsis ([address] 09/06/2021    Necrotizing fasciitis ([address]     Acute kidney injury ([address]     ILD (interstitial lung disease) ([address]     Chronic hypoxemic respiratory failure (HCC)     Status post total replacement of right hip     CHF (congestive heart failure), NYHA class I, chronic, systolic ([address]     Pneumonia due to organism 08/29/2020    Tobacco abuse 11/15/2019    Exercise hypoxemia 11/15/2019    COPD with acute exacerbation (HCC)     Abnormal CT of the chest     Abnormal chest CT     Chronic diastolic

## 2021-09-11 NOTE — PROGRESS NOTES
Occupational/Physical Therapy Attempt Note    Patient transferred from [place] [address] to [facility], will need new PT/OT orders when appropriate to resume therapy.      [facility], OTR/L 6006

## 2021-09-11 NOTE — PROGRESS NOTES
Progress Note    Admit Date:  9/5/2021    63year old female with hypertension, hyperlipidemia, Depression presented to [facility] with c/o abscess to her buttock/groin. She was emergently taken to the OR by Dr. Pedro Pablo Salazar. Admitted to med-surg. Subjective:    Ms. Silverio Hobbs was transferred to PCU for rapid A. fib. She is now back in sinus rhythm. She is having discomfort around the surgical site. She otherwise feels well. Objective:   BP (!) 140/76   Pulse 67   Temp 97.7 °F (36.5 °C) (Oral)   Resp 16   Ht 5' 7\" (1.702 m)   Wt 227 lb 4.8 oz (103.1 kg)   SpO2 95%   BMI 35.60 kg/m²          Intake/Output Summary (Last 24 hours) at 9/11/2021 1005  Last data filed at 9/11/2021 0520  Gross per 24 hour   Intake 1012. 85 ml   Output 1400 ml   Net -387.15 ml       Physical Exam:      General:  Obese middle aged female up in bed  Awake, alert and oriented. Appears to be not in any distress  Mucous Membranes:  Pink , anicteric  Neck: No JVD, no carotid bruit, no thyromegaly  Chest:  Clear to auscultation bilaterally, no added sounds  Cardiovascular: regular rate and rhythm,S1S2 heard, no murmurs or gallops  Abdomen:  Soft, undistended, non tender, no organomegaly, BS present  Large open wound to right Lower abd within the abdominal pannus extending from flank to right groin and vulvar region - wound vac in place   No drainage   Extremities: No edema or cyanosis.  Distal pulses well felt  Neurological : grossly normal        Data:  CBC:   Recent Labs     09/09/21  0607 09/10/21  0524 09/11/21  0423   WBC 13.9* 15.7* 18.9*   HGB 10.8* 10.2* 10.5*   HCT 34.3* 31.4* 32.8*   MCV 85.7 84.0 85.2    282 322     BMP:   Recent Labs     09/09/21  0607 09/10/21  0524 09/11/21  0423    133* 134*   K 4.6 4.5 4.7   CL 96* 94* 94*   CO2 30 33* 32   PHOS 3.9 3.6 3.6   BUN 16 13 15   CREATININE 0.6 0.6 0.6     LIVER PROFILE:   No results for input(s): AST, ALT, LIPASE, BILIDIR, BILITOT, ALKPHOS in the last 72 hours.    Invalid input(s): AMYLASE,  ALB    CULTURES    COVID/Influenza: not detected  Blood: NGTD  Fluid right groin: E. Coli, staph epidermidis     RADIOLOGY  CT ABDOMEN PELVIS WO CONTRAST Additional Contrast? None   Final Result   Probable cellulitis with punctate collections of subcutaneous air in the soft   tissues along the right gluteal region inferiorly and extending anteriorly   into the perineal region. This tracks into the subcutaneous fat along the   right pubic and right groin region and extends anterior and lateral to the   right hip along the abdominal wall laterally which is suspicious for probable   anaerobic infection and cellulitis with possible necrotizing fasciitis. Recommend surgical correlation. Slightly limited exam due to the lack of IV contrast with metallic prosthetic   devices in both hips causing obscuration of the lower pelvis. 10 mm left renal stone which is unchanged with no hydronephrosis. Mild bibasilar atelectasis or infiltrates extending into the lingula which is   more prominent. Previous cholecystectomy and mild hepatosplenomegaly which is unchanged. Mild constipation with no bowel obstruction. The findings were called to Dr. Alaina Lewis at 10:25 p.m. [facility] XR CHEST PORTABLE   Final Result   Mild cardiomegaly and probable mild pulmonary interstitial edema which is   less prominent from the prior study. Recommend short-term follow-up. Assessment/Plan:    Sepsis  - sec to necrotizing fascitis of right lower abdominal wall   - Hypotension, lactic acidosis, leukocytosis  - POA  - BLood cx NGTD  - Wound cx as above. - IV Vanc, Clindamycin, Merrem D#6    Abscess  Necrotizing fascitis of the right buttock/groin, lower abdomen  - taken to OR emergently by surgery team . S/p DEBRIDEMENT RIGHT GROIN, PERINEUM, R buttock, and RLQ abdominal wall    - continue IV Vanc, Merrem, Clindamycin.   Wound cx with E. Coli, staph epidermidis  - PRN Oxycodone, Dilaudid   - wound care by surgery team - wound vac placed  - wbc slightly up . No fevers         Hypotension  - due to sepsis ,resolved   - - resumed now as tolerated    LONNY  - Creatinine 1.9 on admission  - Held Aldactone, Lasix. - given IVF's. Resolved. Creatinine 0.8  - resumed lasix       Chronic hypoxic respiratory failure  ILD, pulmonary fibrosis   - has oxygen at home. States she has not worn it in over a year  - currently on 2 L. Wean as tolerated. - Hussein PRN.   - F/w Dr. Jian Bonner onset Afibb with RVR back in sinus  - likely with acute illness  -See by cardiology. - continue ho start on p.o. dig. Continue beta-blocker. Start on p.o. Cardizem  -Continue anticoagulation  - check TSh , cycle troponins  -Echo pending      Tobacco Dependence  -Recommended cessation  - nicotine patch ordered     H/o polysubstance abuse    GERD  - on PPI    RLS  - on Requip      Hyperlipidemia  - on Atorvastatin. CAD  - on statin, BB. Chronic diastolic CHF  - stable. No s/s decompensation  - on Lasix, Aldactone at home. Holding currently. Obesity  - Body mass index is 35.6 kg/m². - Recommended weight loss    DVT Prophylaxis: Lovenox  Diet: ADULT DIET;  Regular  Adult Oral Nutrition Supplement; Standard High Calorie/High Protein Oral Supplement  Code Status: Full Code      Cheryl Smith MD, 9/11/2021 10:05 AM

## 2021-09-11 NOTE — PROGRESS NOTES
Shift assessment completed. See flow sheet. Medications given. Patient is A&O x4. Patient denies further needs at this time. Call light within reach. Will continue to  Monitor.

## 2021-09-11 NOTE — PROGRESS NOTES
PRN dilaudid given for groin pain. Pt rates pain 9/10. Purewick catheter changed out. Pt repositioned for comfort. No other needs at this time. Call light within reach.

## 2021-09-11 NOTE — PROGRESS NOTES
[facility] GENERAL SURGERY    Surgery Progress Note           POD # 5    PATIENT NAME: Ebonie Gomez     TODAY'S DATE: 9/11/2021    INTERVAL HISTORY:    Pt with mild groin pain, no fevers or chillsm no nausea. OBJECTIVE:   VITALS:  BP (!) 169/78   Pulse 65   Temp 97.1 °F (36.2 °C) (Oral)   Resp 16   Ht 5' 7\" (1.702 m)   Wt 227 lb 4.8 oz (103.1 kg)   SpO2 96%   BMI 35.60 kg/m²     INTAKE/OUTPUT:    I/O last 3 completed shifts: In: 1012.9 [P.O.:480; I.V.:94.2; IV Piggyback:438.7]  Out: 2400 [Urine:2400]  No intake/output data recorded. CONSTITUTIONAL:  awake and alert  ABDOMEN:   normal bowel sounds, soft, non-distended, nontender  INCISION: vac in place    Data:  CBC: Recent Labs     09/09/21  0607 09/10/21  0524 09/11/21  0423   WBC 13.9* 15.7* 18.9*   HGB 10.8* 10.2* 10.5*   HCT 34.3* 31.4* 32.8*    282 322     BMP:    Recent Labs     09/09/21  0607 09/10/21  0524 09/11/21  0423    133* 134*   K 4.6 4.5 4.7   CL 96* 94* 94*   CO2 30 33* 32   BUN 16 13 15   CREATININE 0.6 0.6 0.6   GLUCOSE 109* 148* 146*     Hepatic: No results for input(s): AST, ALT, ALB, BILITOT, ALKPHOS in the last 72 hours. Mag:      Recent Labs     09/09/21  0607 09/10/21  0524 09/11/21 0423   MG 1.50* 2.00 2.00      Phos:     Recent Labs     09/09/21  0607 09/10/21  0524 09/11/21  0423   PHOS 3.9 3.6 3.6      INR: No results for input(s): INR in the last 72 hours. Radiology Review:  NA    ASSESSMENT AND PLAN:  63 y.o. female status post right groin debridement  1. Continue instill wound vac - change Monday  2.   Continue abx         Electronically signed by Adeel Goldstein MD

## 2021-09-12 LAB
ALBUMIN SERPL-MCNC: 2.2 G/DL (ref 3.4–5)
ANION GAP SERPL CALCULATED.3IONS-SCNC: 6 MMOL/L (ref 3–16)
ANISOCYTOSIS: ABNORMAL
BASOPHILS ABSOLUTE: 0 K/UL (ref 0–0.2)
BASOPHILS RELATIVE PERCENT: 0 %
BUN BLDV-MCNC: 15 MG/DL (ref 7–20)
CALCIUM SERPL-MCNC: 8.8 MG/DL (ref 8.3–10.6)
CHLORIDE BLD-SCNC: 92 MMOL/L (ref 99–110)
CHOLESTEROL, TOTAL: 120 MG/DL (ref 0–199)
CO2: 32 MMOL/L (ref 21–32)
CREAT SERPL-MCNC: <0.5 MG/DL (ref 0.6–1.1)
EOSINOPHILS ABSOLUTE: 0.4 K/UL (ref 0–0.6)
EOSINOPHILS RELATIVE PERCENT: 3 %
GFR AFRICAN AMERICAN: >60
GFR NON-AFRICAN AMERICAN: >60
GLUCOSE BLD-MCNC: 123 MG/DL (ref 70–99)
GLUCOSE BLD-MCNC: 136 MG/DL (ref 70–99)
GLUCOSE BLD-MCNC: 165 MG/DL (ref 70–99)
GLUCOSE BLD-MCNC: 173 MG/DL (ref 70–99)
GLUCOSE BLD-MCNC: 180 MG/DL (ref 70–99)
HCT VFR BLD CALC: 32.3 % (ref 36–48)
HDLC SERPL-MCNC: 30 MG/DL (ref 40–60)
HEMOGLOBIN: 10.3 G/DL (ref 12–16)
HYPOCHROMIA: ABNORMAL
LDL CHOLESTEROL CALCULATED: 74 MG/DL
LYMPHOCYTES ABSOLUTE: 2.9 K/UL (ref 1–5.1)
LYMPHOCYTES RELATIVE PERCENT: 20 %
MAGNESIUM: 2.1 MG/DL (ref 1.8–2.4)
MCH RBC QN AUTO: 26.9 PG (ref 26–34)
MCHC RBC AUTO-ENTMCNC: 31.8 G/DL (ref 31–36)
MCV RBC AUTO: 84.7 FL (ref 80–100)
MONOCYTES ABSOLUTE: 0.7 K/UL (ref 0–1.3)
MONOCYTES RELATIVE PERCENT: 5 %
MYELOCYTE PERCENT: 1 %
NEUTROPHILS ABSOLUTE: 10.3 K/UL (ref 1.7–7.7)
NEUTROPHILS RELATIVE PERCENT: 71 %
PDW BLD-RTO: 17.7 % (ref 12.4–15.4)
PERFORMED ON: ABNORMAL
PHOSPHORUS: 3.5 MG/DL (ref 2.5–4.9)
PLATELET # BLD: 329 K/UL (ref 135–450)
PLATELET SLIDE REVIEW: ADEQUATE
PMV BLD AUTO: 7.9 FL (ref 5–10.5)
POTASSIUM SERPL-SCNC: 4.8 MMOL/L (ref 3.5–5.1)
RBC # BLD: 3.81 M/UL (ref 4–5.2)
SLIDE REVIEW: ABNORMAL
SODIUM BLD-SCNC: 130 MMOL/L (ref 136–145)
TRIGL SERPL-MCNC: 79 MG/DL (ref 0–150)
VLDLC SERPL CALC-MCNC: 16 MG/DL
WBC # BLD: 14.3 K/UL (ref 4–11)

## 2021-09-12 PROCEDURE — 6360000002 HC RX W HCPCS: Performed by: INTERNAL MEDICINE

## 2021-09-12 PROCEDURE — 2500000003 HC RX 250 WO HCPCS: Performed by: INTERNAL MEDICINE

## 2021-09-12 PROCEDURE — 6370000000 HC RX 637 (ALT 250 FOR IP): Performed by: INTERNAL MEDICINE

## 2021-09-12 PROCEDURE — 83735 ASSAY OF MAGNESIUM: CPT

## 2021-09-12 PROCEDURE — 2580000003 HC RX 258: Performed by: INTERNAL MEDICINE

## 2021-09-12 PROCEDURE — 36415 COLL VENOUS BLD VENIPUNCTURE: CPT

## 2021-09-12 PROCEDURE — 2700000000 HC OXYGEN THERAPY PER DAY

## 2021-09-12 PROCEDURE — C9113 INJ PANTOPRAZOLE SODIUM, VIA: HCPCS | Performed by: INTERNAL MEDICINE

## 2021-09-12 PROCEDURE — 94761 N-INVAS EAR/PLS OXIMETRY MLT: CPT

## 2021-09-12 PROCEDURE — 80069 RENAL FUNCTION PANEL: CPT

## 2021-09-12 PROCEDURE — 99233 SBSQ HOSP IP/OBS HIGH 50: CPT | Performed by: INTERNAL MEDICINE

## 2021-09-12 PROCEDURE — 85025 COMPLETE CBC W/AUTO DIFF WBC: CPT

## 2021-09-12 PROCEDURE — 80061 LIPID PANEL: CPT

## 2021-09-12 PROCEDURE — 2060000000 HC ICU INTERMEDIATE R&B

## 2021-09-12 PROCEDURE — 99232 SBSQ HOSP IP/OBS MODERATE 35: CPT | Performed by: INTERNAL MEDICINE

## 2021-09-12 RX ORDER — SENNA AND DOCUSATE SODIUM 50; 8.6 MG/1; MG/1
2 TABLET, FILM COATED ORAL 2 TIMES DAILY
Status: DISCONTINUED | OUTPATIENT
Start: 2021-09-12 | End: 2021-09-15 | Stop reason: HOSPADM

## 2021-09-12 RX ADMIN — CLINDAMYCIN PHOSPHATE 600 MG: 600 INJECTION, SOLUTION INTRAVENOUS at 00:08

## 2021-09-12 RX ADMIN — HYDROMORPHONE HYDROCHLORIDE 0.5 MG: 1 INJECTION, SOLUTION INTRAMUSCULAR; INTRAVENOUS; SUBCUTANEOUS at 06:23

## 2021-09-12 RX ADMIN — METOPROLOL TARTRATE 25 MG: 25 TABLET, FILM COATED ORAL at 09:48

## 2021-09-12 RX ADMIN — GABAPENTIN 300 MG: 300 CAPSULE ORAL at 09:48

## 2021-09-12 RX ADMIN — PANTOPRAZOLE SODIUM 40 MG: 40 INJECTION, POWDER, FOR SOLUTION INTRAVENOUS at 09:47

## 2021-09-12 RX ADMIN — MEROPENEM 500 MG: 500 INJECTION, POWDER, FOR SOLUTION INTRAVENOUS at 12:12

## 2021-09-12 RX ADMIN — GABAPENTIN 300 MG: 300 CAPSULE ORAL at 13:39

## 2021-09-12 RX ADMIN — ROPINIROLE HYDROCHLORIDE 1 MG: 1 TABLET, FILM COATED ORAL at 21:07

## 2021-09-12 RX ADMIN — CLINDAMYCIN PHOSPHATE 600 MG: 600 INJECTION, SOLUTION INTRAVENOUS at 15:54

## 2021-09-12 RX ADMIN — FUROSEMIDE 20 MG: 20 TABLET ORAL at 09:48

## 2021-09-12 RX ADMIN — HYDROMORPHONE HYDROCHLORIDE 0.5 MG: 1 INJECTION, SOLUTION INTRAMUSCULAR; INTRAVENOUS; SUBCUTANEOUS at 17:28

## 2021-09-12 RX ADMIN — OXYBUTYNIN CHLORIDE 5 MG: 5 TABLET ORAL at 21:07

## 2021-09-12 RX ADMIN — OXYBUTYNIN CHLORIDE 5 MG: 5 TABLET ORAL at 13:39

## 2021-09-12 RX ADMIN — GABAPENTIN 300 MG: 300 CAPSULE ORAL at 21:07

## 2021-09-12 RX ADMIN — SODIUM CHLORIDE 250 ML: 9 INJECTION, SOLUTION INTRAVENOUS at 06:29

## 2021-09-12 RX ADMIN — SODIUM CHLORIDE 250 ML: 9 INJECTION, SOLUTION INTRAVENOUS at 03:12

## 2021-09-12 RX ADMIN — METOPROLOL TARTRATE 25 MG: 25 TABLET, FILM COATED ORAL at 21:07

## 2021-09-12 RX ADMIN — ENOXAPARIN SODIUM 40 MG: 40 INJECTION SUBCUTANEOUS at 09:48

## 2021-09-12 RX ADMIN — HYDROMORPHONE HYDROCHLORIDE 0.5 MG: 1 INJECTION, SOLUTION INTRAMUSCULAR; INTRAVENOUS; SUBCUTANEOUS at 03:06

## 2021-09-12 RX ADMIN — MEROPENEM 500 MG: 500 INJECTION, POWDER, FOR SOLUTION INTRAVENOUS at 03:13

## 2021-09-12 RX ADMIN — HYDROMORPHONE HYDROCHLORIDE 0.5 MG: 1 INJECTION, SOLUTION INTRAMUSCULAR; INTRAVENOUS; SUBCUTANEOUS at 21:07

## 2021-09-12 RX ADMIN — HYDROMORPHONE HYDROCHLORIDE 0.5 MG: 1 INJECTION, SOLUTION INTRAMUSCULAR; INTRAVENOUS; SUBCUTANEOUS at 13:39

## 2021-09-12 RX ADMIN — MEROPENEM 500 MG: 500 INJECTION, POWDER, FOR SOLUTION INTRAVENOUS at 21:14

## 2021-09-12 RX ADMIN — ROPINIROLE HYDROCHLORIDE 1 MG: 1 TABLET, FILM COATED ORAL at 09:48

## 2021-09-12 RX ADMIN — SODIUM CHLORIDE 250 ML: 9 INJECTION, SOLUTION INTRAVENOUS at 00:07

## 2021-09-12 RX ADMIN — ESCITALOPRAM OXALATE 10 MG: 10 TABLET ORAL at 09:48

## 2021-09-12 RX ADMIN — DOCUSATE SODIUM 50MG AND SENNOSIDES 8.6MG 2 TABLET: 8.6; 5 TABLET, FILM COATED ORAL at 21:00

## 2021-09-12 RX ADMIN — SPIRONOLACTONE 25 MG: 25 TABLET ORAL at 09:48

## 2021-09-12 RX ADMIN — ONDANSETRON HYDROCHLORIDE 4 MG: 2 INJECTION, SOLUTION INTRAMUSCULAR; INTRAVENOUS at 09:51

## 2021-09-12 RX ADMIN — HYDROMORPHONE HYDROCHLORIDE 0.5 MG: 1 INJECTION, SOLUTION INTRAMUSCULAR; INTRAVENOUS; SUBCUTANEOUS at 09:49

## 2021-09-12 RX ADMIN — ROPINIROLE HYDROCHLORIDE 1 MG: 1 TABLET, FILM COATED ORAL at 13:39

## 2021-09-12 RX ADMIN — DIGOXIN 125 MCG: 125 TABLET ORAL at 09:48

## 2021-09-12 RX ADMIN — OXYBUTYNIN CHLORIDE 5 MG: 5 TABLET ORAL at 09:48

## 2021-09-12 RX ADMIN — CLINDAMYCIN PHOSPHATE 600 MG: 600 INJECTION, SOLUTION INTRAVENOUS at 06:30

## 2021-09-12 ASSESSMENT — PAIN SCALES - GENERAL
PAINLEVEL_OUTOF10: 9
PAINLEVEL_OUTOF10: 8
PAINLEVEL_OUTOF10: 9
PAINLEVEL_OUTOF10: 10

## 2021-09-12 ASSESSMENT — PAIN DESCRIPTION - PAIN TYPE
TYPE: ACUTE PAIN;SURGICAL PAIN
TYPE: SURGICAL PAIN;ACUTE PAIN

## 2021-09-12 NOTE — PROGRESS NOTES
Dakins changed. Patient is resting comfortably in bed and denies further needs at this time. Call light within reach.

## 2021-09-12 NOTE — PROGRESS NOTES
Pt resting in bed, no s/s of distress,  Pt alert and oriented. Pt denying pain or any needs at this time. Snack and drink offered. Med given per [facility]. Bed in lowest position, call light within reach. Will continue to monitor.

## 2021-09-12 NOTE — PROGRESS NOTES
[facility] Daily Progress Note      Admit Date:  9/5/2021    Subjective:  Ms. CHILDREN'S [facility] is seen by cardiology for afib RVR in setting of sepsis. now inNSR  Denies chest pain, shortness of breath, edema, dizziness, palpitations and syncope. [place]  As per cardiac consult by Dr Suha Zavala is a 63 y.o. patient who presented to [facility] 9/5/21 painful and draining abscess to her buttock. groin and emergently taken to the OR 9/6/21 by Dr. Jimenez for debridement of necrotizing fasciitis. She has a PMH of HTN, HLD, GERD, kidney stone, morbid obesity BM 39 and depression. She was admitted to med-surg and then transferred to PCU due to increased HR to the 160's and irregular. Note EKG afib with RVR 149bpm; IVCD; nonspecific IVCD (NSR on 9/5 EKG)  CXR showed mild cardiomegaly and probable mild pulmonary interstitial edema; [place] <0.01 x2; TSH=3.43.. Most recent Echo 7/27/2019 showed EF 55%; mild cLVH; left atrium mildly dilated; aortic valve appears mildly sclerotic but opens adequately; mild MR and TR. Most recent Blase Gell 9/13/2018 was negative for ischemia with EF 69%; breast artifact. She is POD#4 s/p extensive debridement right groin, perineum, buttock, and abdominal wall. Cultures + E. Coli and S. Epidermidis. She reports feeling \"heart racing\" earlier this AM when getting cleaned up and working with PT. Tele showed rapid afib up to 200's bpm. Started on diltiazem gtt after IVP 10mg and given dose of IV digoxin 0.25mg. All Christos She feels better now but c/o generalized weakness. Patient with no complaints of chest pain, SOB, dizziness, edema, or orthopnea/PND. I have been asked to provide consultation regarding further management and testing.     ROS:  12 point ROS negative in all areas as listed below except as in [place]  Constitutional, EENT,  GI, , Musculoskeletal, skin, neurological, hematological, endocrine, Psychiatric    Past Medical History:   Diagnosis Date    Class 2 obesity with serious comorbidity and body mass index (BMI) of 39.0 to 39.9 in adult 9/14/2018    Depression     History of left hip replacement 02/12/2018    Hyperlipidemia     Hypertension     Kidney stone     MRSA (methicillin resistant staph aureus) culture positive 07/27/2019    nasal colonization     Past Surgical History:   Procedure Laterality Date    BRONCHOSCOPY  08/08/2019    BRONCHOSCOPY N/A 8/8/2019    BRONCHOSCOPY ALVEOLAR LAVAGE performed by Kamilla Campo MD at [place] [id number]      FOOT SURGERY      GROIN SURGERY Right 9/6/2021    DEBRIDEMENT OF NECROTIZING SOFT TISSUE INFECTION, RIGHT BUTTOCK, GROIN, RIGHT LOWER QUADRANT, PERINEUM. performed by Renny Parnell MD at [address] [facility]      OTHER SURGICAL HISTORY      DEBRIDEMENT OF NECROTIZING SOFT TISSUE INFECTION, RIGHT BUTTOCK, GROIN, RIGHT LOWER QUADRANT, PERINEUM.     TOTAL HIP ARTHROPLASTY Right 08/2020       Objective:   /73   Pulse 63   Temp 97.7 °F (36.5 °C) (Oral)   Resp 17   Ht 5' 7\" (1.702 m)   Wt 228 lb (103.4 kg)   SpO2 95%   BMI 35.71 kg/m²       Intake/Output Summary (Last 24 hours) at 9/12/2021 0757  Last data filed at 9/12/2021 0539  Gross per 24 hour   Intake 420 ml   Output 2500 ml   Net -2080 ml       TELEMETRY: NSR  Physical Exam:obese   General: No Respiratory distress, appears well developed and well nourished. Eyes:  Sclera nonicteric  Nose/Sinuses:  negative findings: nose shows no deformity, asymmetry, or inflammation, nasal mucosa normal, septum midline with no perforation or bleeding  Back:  no pain to palpation  Joint:  no active joint inflammation  Musculoskeletal:  negative  Skin:  Warm and dry  Neck:  Negative for JVD and Carotid Bruits. Chest:  Clear to auscultation, respiration easy  Cardiovascular:  RRR, S1S2 normal, no murmur, no rub or thrill.   Abdomen:  Soft normal liver and spleen  Extremities:   No edema, clubbing, cyanosis,  Pulses:pedal pulses are normal.  Neuro: intact    Medications:    digoxin  125 mcg Oral Daily    enoxaparin  1 mg/kg SubCUTAneous BID    dilTIAZem  10 mg IntraVENous Once    rOPINIRole  1 mg Oral TID    spironolactone  25 mg Oral Daily    furosemide  20 mg Oral Daily    escitalopram  10 mg Oral Daily    clindamycin (CLEOCIN) IV  600 mg IntraVENous Q8H    nicotine  1 patch TransDERmal Daily    meropenem  500 mg IntraVENous Q8H    metoprolol  25 mg Oral BID    pantoprazole  40 mg IntraVENous Daily    gabapentin  300 mg Oral TID    oxybutynin  5 mg Oral TID      sodium hypochlorite       perflutren lipid microspheres, sodium hypochlorite, ipratropium-albuterol, HYDROmorphone, oxyCODONE **OR** oxyCODONE, ondansetron, ipratropium-albuterol, sodium chloride    Lab Data:  CBC:   Recent Labs     09/10/21  0524 09/11/21  0423 09/12/21  0416   WBC 15.7* 18.9* 14.3*   HGB 10.2* 10.5* 10.3*   HCT 31.4* 32.8* 32.3*   MCV 84.0 85.2 84.7    322 329     BMP:   Recent Labs     09/10/21  0524 09/11/21  0423 09/12/21  0416   * 134* 130*   K 4.5 4.7 4.8   CL 94* 94* 92*   CO2 33* 32 32   PHOS 3.6 3.6 3.5   BUN 13 15 15   CREATININE 0.6 0.6 <0.5*     LIVER PROFILE: No results for input(s): AST, ALT, LIPASE, BILIDIR, BILITOT, ALKPHOS in the last 72 hours. Invalid input(s): AMYLASE,  ALB  PT/INR: No results for input(s): PROTIME, INR in the last 72 hours. APTT: No results for input(s): APTT in the last 72 hours. BNP:  No results for input(s): BNP in the last 72 hours. IMAGING:   Echo doppler of heart 9.11.21   Summary   Left ventricular systolic function is normal with ejection fraction   estimated at 55-60 %. No regional wall motion abnormalities are noted. Sigmoid septum is present with normal thickness of remaining left   ventricular wall segments. Grade I diastolic dysfunction with normal filling pressure. The right atrium is mildly dilated.    Mild tricuspid regurgitation. Normal systolic pulmonary artery pressure (SPAP) estimated at 35 mmHg (RA   pressure 8 mmHg). 7/29/2019 55%, mild LVH, A=LAE, av sclerosis, mild MR and TR SPAP 39mmHg. Conclusions echo 7.29.19   Summary   LV systolic function is normal with ejection fraction estimated at 55 %. No regional wall motion abnormalities are noted. There is mild concentric left ventricular hypertrophy. Normal diastolic function with normal filling pressure. The left atrium is mildly dilated in size. Aortic valve appears mildly sclerotic but opens adequately. Mild mitral and tricuspid regurgitation. Systolic pulmonary artery pressure (SPAP) is estimated at 39mmHg (right   atrial pressure 8mmHg). EKG 9.10.21  Normal sinus rhythmMinimal voltage criteria for LVH, may be normal variantNonspecific ST abnormalityPoor R wave progressionAbnormal ECGWhen compared with ECG of 10-SEP-2021 08:57, (unconfirmed)Sinus rhythm has replaced Atrial fibrillationVent. rate has decreased BY  80 BPMNonspecific T wave abnormality now evident in Anterior leadsConfirmed by PRASHANT Jennings MD ([id number]) on 9/10/2021 6:29:05 PM  EKG 9.10.21  Atrial fibrillation with rapid ventricular responseNonspecific ST & T wave changesNon-specific intra-ventricular conduction delayAbnormal ECGWhen compared with ECG of 05-SEP-2021 19:28,Atrial fibrillation has replaced Sinus rhythmVent.  rate has increased BY  79 BPMConfirmed by PRASHANT Jennings MD ([id number]) on 9/10/2021 6:28:35 PM    Chest xray 9.5.21     Mild cardiomegaly and probable mild pulmonary interstitial edema which is   less prominent from the prior study.  Recommend short-term follow-up.             TSH 3.43 9.10.21  Assessment:  PAF resolved  Right atrium is enlarged  Obesity  Rule out LISA Patient to discuss with Dr Karina Pathak next office visit  Essential hypertension BP  controlled  Mixed hyperlipidemia no recent labs    Patient Active Problem List    Diagnosis Date Noted    Atrial fibrillation with rapid ventricular response ([address]     Sepsis due to Escherichia coli with acute renal failure without septic shock ([address]     Necrotizing soft tissue infection 09/06/2021    Severe sepsis ([address] 09/06/2021    Necrotizing fasciitis ([address]     Acute kidney injury ([address]     ILD (interstitial lung disease) (HCC)     Chronic hypoxemic respiratory failure (HCC)     Status post total replacement of right hip     CHF (congestive heart failure), NYHA class I, chronic, systolic (HCC)     Pneumonia due to organism 08/29/2020    Tobacco abuse 11/15/2019    Exercise hypoxemia 11/15/2019    COPD with acute exacerbation (HCC)     Abnormal CT of the chest     Abnormal chest CT     Chronic diastolic congestive heart failure (HCC)     Acute hypoxemic respiratory failure (HCC)     Hypoxia 07/27/2019    Lactic acidosis 07/18/2019    Nausea & vomiting 07/18/2019    Hyperventilation     Hypokalemia     Respiratory alkalosis 07/17/2019    Acute respiratory failure with hypoxia (HCC)     COPD exacerbation (HCC)     Class 2 obesity due to excess calories with body mass index (BMI) of 39.0 to 39.9 in adult 09/14/2018    Hypertension     Hyperlipidemia     Depression     Acute pulmonary edema (HCC)     Acute congestive heart failure (HCC)     Iron deficiency anemia        Plan:  1. Stop  full anticoagulation will change to DVT prophylaxis dose today  2. PO dig and beta blockers  3. Out patient sleep study  4. Reviewd echo and discussed findings with patient needs sleep study  5. Check lipid panel  6. Outpatient sleep study. Cardiology will sign off please call if needed.   ·     Chaparro Ham MD, MD 9/12/2021 7:57 AM

## 2021-09-12 NOTE — PROGRESS NOTES
Progress Note    Admit Date:  9/5/2021    63year old female with hypertension, hyperlipidemia, Depression presented to [facility] with c/o abscess to her buttock/groin. She was emergently taken to the OR by Dr. Philippe Pérez. Admitted to med-surg. Subjective:    Ms. Wilda Cabello still in sinus rhythm. She was nauseated this morning. Still has discomfort at the surgical site. She is afebrile. Objective:   /76   Pulse 78   Temp 97.4 °F (36.3 °C) (Oral)   Resp 18   Ht 5' 7\" (1.702 m)   Wt 228 lb (103.4 kg)   SpO2 98%   BMI 35.71 kg/m²          Intake/Output Summary (Last 24 hours) at 9/12/2021 1140  Last data filed at 9/12/2021 0932  Gross per 24 hour   Intake 360 ml   Output 1500 ml   Net -1140 ml       Physical Exam:      General:  Obese middle aged female up in bed/somewhat sick appearing  Awake, alert and oriented. Appears to be not in any distress  Mucous Membranes:  Pink , anicteric  Neck: No JVD, no carotid bruit, no thyromegaly  Chest:  Clear to auscultation bilaterally, no added sounds  Cardiovascular: Regular rate and rhythm S1S2 heard, no murmurs or gallops  Abdomen:  Soft, undistended, non tender, no organomegaly, BS present  Large open wound to right Lower abd within the abdominal pannus extending from flank to right groin and vulvar region - wound vac in place   No drainage   Extremities: No edema or cyanosis. Distal pulses well felt  Neurological : grossly normal        Data:  CBC:   Recent Labs     09/10/21  0524 09/11/21  0423 09/12/21  0416   WBC 15.7* 18.9* 14.3*   HGB 10.2* 10.5* 10.3*   HCT 31.4* 32.8* 32.3*   MCV 84.0 85.2 84.7    322 329     BMP:   Recent Labs     09/10/21  0524 09/11/21  0423 09/12/21  0416   * 134* 130*   K 4.5 4.7 4.8   CL 94* 94* 92*   CO2 33* 32 32   PHOS 3.6 3.6 3.5   BUN 13 15 15   CREATININE 0.6 0.6 <0.5*     LIVER PROFILE:   No results for input(s): AST, ALT, LIPASE, BILIDIR, BILITOT, ALKPHOS in the last 72 hours. Invalid input(s):   AMYLASE, ALB    CULTURES    COVID/Influenza: not detected  Blood: NGTD  Fluid right groin: E. Coli, staph epidermidis     RADIOLOGY  CT ABDOMEN PELVIS WO CONTRAST Additional Contrast? None   Final Result   Probable cellulitis with punctate collections of subcutaneous air in the soft   tissues along the right gluteal region inferiorly and extending anteriorly   into the perineal region. This tracks into the subcutaneous fat along the   right pubic and right groin region and extends anterior and lateral to the   right hip along the abdominal wall laterally which is suspicious for probable   anaerobic infection and cellulitis with possible necrotizing fasciitis. Recommend surgical correlation. Slightly limited exam due to the lack of IV contrast with metallic prosthetic   devices in both hips causing obscuration of the lower pelvis. 10 mm left renal stone which is unchanged with no hydronephrosis. Mild bibasilar atelectasis or infiltrates extending into the lingula which is   more prominent. Previous cholecystectomy and mild hepatosplenomegaly which is unchanged. Mild constipation with no bowel obstruction. The findings were called to Dr. Gail Caldwell at 10:25 p.m. [employer] XR CHEST PORTABLE   Final Result   Mild cardiomegaly and probable mild pulmonary interstitial edema which is   less prominent from the prior study. Recommend short-term follow-up. Assessment/Plan:    Sepsis  - sec to necrotizing fascitis of right lower abdominal wall   - Hypotension, lactic acidosis, leukocytosis  - POA  - BLood cx NGTD  - Wound cx as above. - IV Vanc, Clindamycin, Merrem D#7.  -Her leukocytosis is improving    Abscess  Necrotizing fascitis of the right buttock/groin, lower abdomen  - taken to OR emergently by surgery team . S/p DEBRIDEMENT RIGHT GROIN, PERINEUM, R buttock, and RLQ abdominal wall  - continue IV Vanc, Merrem, Clindamycin.   Wound cx with E. Coli, staph epidermidis  - PRN Oxycodone, Dilaudid   - wound care by surgery team - wound vac placed  -WBCs         Hypotension resolved  - due to sepsis ,resolved   - - resumed now as tolerated    LONNY resolved  - Creatinine 1.9 on admission  -Initially held Aldactone, Lasix. - given IVF's. Resolved. Creatinine 0.8  - resumed lasix       Chronic hypoxic respiratory failure  ILD, pulmonary fibrosis   - has oxygen at home. States she has not worn it in over a year  - currently on 2 L. Wean as tolerated. - Duonebs PRN.   - F/w Dr. Lady Schroeder onset Afibb with RVR back in sinus  - likely with acute illness  -See by cardiology. - continue ho start on p.o. dig. Continue beta-blocker. Started on p.o. Cardizem  -Continue anticoagulation  - check TSh , cycle troponins  -Echo pending      Tobacco Dependence  -Recommended cessation  - nicotine patch ordered     H/o polysubstance abuse    GERD  - on PPI    RLS  - on Requip      Hyperlipidemia  - on Atorvastatin. CAD  - on statin, BB. Chronic diastolic CHF  - stable. No s/s decompensation  - on Lasix, Aldactone at home. Holding currently. Obesity  - Body mass index is 35.71 kg/m². - Recommended weight loss    DVT Prophylaxis: Lovenox  Diet: ADULT DIET;  Regular  Adult Oral Nutrition Supplement; Standard High Calorie/High Protein Oral Supplement  Code Status: Full Code      Alaina Orozco MD, 9/12/2021 11:40 AM

## 2021-09-12 NOTE — PROGRESS NOTES
Shift assessment completed. See flow sheet. Medications given. Patinet is A&O x4. Patient denies further needs at this time. Call light within reach. Will continue to monitor. Oxygen saturation 98%. Decreased O2 to 2L.

## 2021-09-12 NOTE — PROGRESS NOTES
Patient states that she had 5 rings on admission that are missing. According to patient she last saw them when she was on [facility] before she was transferred to PCU. At that time they were in the small round denture container. [facility] was contacted by this nurse. [facility] stated Luis Felipe Sensor) that they remember her significant other coming down to get her items after transfer and they remember him taking the container with the jewelry as well. Patient was informed of this and the significant other went out to search the car as well as home to look. When he returned he stated that he never went down to get her stuff. Charge was notified and security contacted. Security is checking the property room at this time.

## 2021-09-12 NOTE — PROGRESS NOTES
Bedside report and transfer of care given to [facility]. Pt currently resting in bed with the call light within reach. Pt denies any other care needs at this time. Pt stable at this time.

## 2021-09-13 PROBLEM — E43 SEVERE PROTEIN-CALORIE MALNUTRITION (HCC): Status: ACTIVE | Noted: 2021-09-13

## 2021-09-13 PROBLEM — E44.1 MILD PROTEIN-CALORIE MALNUTRITION (HCC): Status: ACTIVE | Noted: 2021-09-13

## 2021-09-13 LAB
ALBUMIN SERPL-MCNC: 2.6 G/DL (ref 3.4–5)
ANION GAP SERPL CALCULATED.3IONS-SCNC: 9 MMOL/L (ref 3–16)
ANISOCYTOSIS: ABNORMAL
BASOPHILS ABSOLUTE: 0 K/UL (ref 0–0.2)
BASOPHILS RELATIVE PERCENT: 0 %
BUN BLDV-MCNC: 15 MG/DL (ref 7–20)
CALCIUM SERPL-MCNC: 9.3 MG/DL (ref 8.3–10.6)
CHLORIDE BLD-SCNC: 95 MMOL/L (ref 99–110)
CO2: 29 MMOL/L (ref 21–32)
CREAT SERPL-MCNC: 0.6 MG/DL (ref 0.6–1.1)
EOSINOPHILS ABSOLUTE: 0.5 K/UL (ref 0–0.6)
EOSINOPHILS RELATIVE PERCENT: 4 %
GFR AFRICAN AMERICAN: >60
GFR NON-AFRICAN AMERICAN: >60
GLUCOSE BLD-MCNC: 139 MG/DL (ref 70–99)
GLUCOSE BLD-MCNC: 143 MG/DL (ref 70–99)
GLUCOSE BLD-MCNC: 153 MG/DL (ref 70–99)
HCT VFR BLD CALC: 33.3 % (ref 36–48)
HEMOGLOBIN: 10.7 G/DL (ref 12–16)
HYPOCHROMIA: ABNORMAL
LYMPHOCYTES ABSOLUTE: 3.5 K/UL (ref 1–5.1)
LYMPHOCYTES RELATIVE PERCENT: 26 %
MAGNESIUM: 2 MG/DL (ref 1.8–2.4)
MCH RBC QN AUTO: 27.1 PG (ref 26–34)
MCHC RBC AUTO-ENTMCNC: 32.1 G/DL (ref 31–36)
MCV RBC AUTO: 84.5 FL (ref 80–100)
MONOCYTES ABSOLUTE: 0.4 K/UL (ref 0–1.3)
MONOCYTES RELATIVE PERCENT: 3 %
MYELOCYTE PERCENT: 1 %
NEUTROPHILS ABSOLUTE: 9 K/UL (ref 1.7–7.7)
NEUTROPHILS RELATIVE PERCENT: 66 %
PDW BLD-RTO: 17.3 % (ref 12.4–15.4)
PERFORMED ON: ABNORMAL
PERFORMED ON: ABNORMAL
PHOSPHORUS: 3.7 MG/DL (ref 2.5–4.9)
PLATELET # BLD: 352 K/UL (ref 135–450)
PLATELET SLIDE REVIEW: ADEQUATE
PMV BLD AUTO: 7.9 FL (ref 5–10.5)
POTASSIUM SERPL-SCNC: 4.9 MMOL/L (ref 3.5–5.1)
RBC # BLD: 3.93 M/UL (ref 4–5.2)
SLIDE REVIEW: ABNORMAL
SODIUM BLD-SCNC: 133 MMOL/L (ref 136–145)
WBC # BLD: 13.5 K/UL (ref 4–11)

## 2021-09-13 PROCEDURE — 2580000003 HC RX 258: Performed by: INTERNAL MEDICINE

## 2021-09-13 PROCEDURE — C9113 INJ PANTOPRAZOLE SODIUM, VIA: HCPCS | Performed by: INTERNAL MEDICINE

## 2021-09-13 PROCEDURE — 2700000000 HC OXYGEN THERAPY PER DAY

## 2021-09-13 PROCEDURE — 2500000003 HC RX 250 WO HCPCS: Performed by: INTERNAL MEDICINE

## 2021-09-13 PROCEDURE — 85025 COMPLETE CBC W/AUTO DIFF WBC: CPT

## 2021-09-13 PROCEDURE — 97606 NEG PRS WND THER DME>50 SQCM: CPT

## 2021-09-13 PROCEDURE — 99232 SBSQ HOSP IP/OBS MODERATE 35: CPT | Performed by: SURGERY

## 2021-09-13 PROCEDURE — 99232 SBSQ HOSP IP/OBS MODERATE 35: CPT | Performed by: INTERNAL MEDICINE

## 2021-09-13 PROCEDURE — 6370000000 HC RX 637 (ALT 250 FOR IP): Performed by: INTERNAL MEDICINE

## 2021-09-13 PROCEDURE — 6360000002 HC RX W HCPCS: Performed by: INTERNAL MEDICINE

## 2021-09-13 PROCEDURE — 94761 N-INVAS EAR/PLS OXIMETRY MLT: CPT

## 2021-09-13 PROCEDURE — 97530 THERAPEUTIC ACTIVITIES: CPT

## 2021-09-13 PROCEDURE — 36415 COLL VENOUS BLD VENIPUNCTURE: CPT

## 2021-09-13 PROCEDURE — 80069 RENAL FUNCTION PANEL: CPT

## 2021-09-13 PROCEDURE — 97162 PT EVAL MOD COMPLEX 30 MIN: CPT

## 2021-09-13 PROCEDURE — 2060000000 HC ICU INTERMEDIATE R&B

## 2021-09-13 PROCEDURE — 83735 ASSAY OF MAGNESIUM: CPT

## 2021-09-13 PROCEDURE — 97164 PT RE-EVAL EST PLAN CARE: CPT

## 2021-09-13 PROCEDURE — 97168 OT RE-EVAL EST PLAN CARE: CPT

## 2021-09-13 PROCEDURE — 97116 GAIT TRAINING THERAPY: CPT

## 2021-09-13 RX ORDER — KETOROLAC TROMETHAMINE 30 MG/ML
15 INJECTION, SOLUTION INTRAMUSCULAR; INTRAVENOUS EVERY 6 HOURS PRN
Status: DISCONTINUED | OUTPATIENT
Start: 2021-09-13 | End: 2021-09-15 | Stop reason: HOSPADM

## 2021-09-13 RX ORDER — POLYETHYLENE GLYCOL 3350 17 G/17G
17 POWDER, FOR SOLUTION ORAL DAILY
Status: DISCONTINUED | OUTPATIENT
Start: 2021-09-13 | End: 2021-09-15 | Stop reason: HOSPADM

## 2021-09-13 RX ORDER — LIDOCAINE HYDROCHLORIDE 20 MG/ML
5 INJECTION, SOLUTION EPIDURAL; INFILTRATION; INTRACAUDAL; PERINEURAL ONCE
Status: DISCONTINUED | OUTPATIENT
Start: 2021-09-13 | End: 2021-09-15 | Stop reason: HOSPADM

## 2021-09-13 RX ADMIN — GABAPENTIN 300 MG: 300 CAPSULE ORAL at 15:22

## 2021-09-13 RX ADMIN — DIGOXIN 125 MCG: 125 TABLET ORAL at 08:28

## 2021-09-13 RX ADMIN — POLYETHYLENE GLYCOL (3350) 17 G: 17 POWDER, FOR SOLUTION ORAL at 15:23

## 2021-09-13 RX ADMIN — METOPROLOL TARTRATE 25 MG: 25 TABLET, FILM COATED ORAL at 21:58

## 2021-09-13 RX ADMIN — ROPINIROLE HYDROCHLORIDE 1 MG: 1 TABLET, FILM COATED ORAL at 15:22

## 2021-09-13 RX ADMIN — OXYCODONE HYDROCHLORIDE 10 MG: 5 TABLET ORAL at 17:57

## 2021-09-13 RX ADMIN — ENOXAPARIN SODIUM 40 MG: 40 INJECTION SUBCUTANEOUS at 08:29

## 2021-09-13 RX ADMIN — OXYBUTYNIN CHLORIDE 5 MG: 5 TABLET ORAL at 15:22

## 2021-09-13 RX ADMIN — SPIRONOLACTONE 25 MG: 25 TABLET ORAL at 08:28

## 2021-09-13 RX ADMIN — MEROPENEM 500 MG: 500 INJECTION, POWDER, FOR SOLUTION INTRAVENOUS at 05:01

## 2021-09-13 RX ADMIN — GABAPENTIN 300 MG: 300 CAPSULE ORAL at 21:58

## 2021-09-13 RX ADMIN — OXYBUTYNIN CHLORIDE 5 MG: 5 TABLET ORAL at 08:28

## 2021-09-13 RX ADMIN — ROPINIROLE HYDROCHLORIDE 1 MG: 1 TABLET, FILM COATED ORAL at 08:28

## 2021-09-13 RX ADMIN — KETOROLAC TROMETHAMINE 15 MG: 30 INJECTION, SOLUTION INTRAMUSCULAR; INTRAVENOUS at 15:23

## 2021-09-13 RX ADMIN — OXYCODONE HYDROCHLORIDE 10 MG: 5 TABLET ORAL at 08:29

## 2021-09-13 RX ADMIN — METRONIDAZOLE 500 MG: 500 INJECTION, SOLUTION INTRAVENOUS at 15:30

## 2021-09-13 RX ADMIN — HYDROMORPHONE HYDROCHLORIDE 0.5 MG: 1 INJECTION, SOLUTION INTRAMUSCULAR; INTRAVENOUS; SUBCUTANEOUS at 04:56

## 2021-09-13 RX ADMIN — ONDANSETRON HYDROCHLORIDE 4 MG: 2 INJECTION, SOLUTION INTRAMUSCULAR; INTRAVENOUS at 18:11

## 2021-09-13 RX ADMIN — FUROSEMIDE 20 MG: 20 TABLET ORAL at 08:29

## 2021-09-13 RX ADMIN — ESCITALOPRAM OXALATE 10 MG: 10 TABLET ORAL at 08:29

## 2021-09-13 RX ADMIN — PANTOPRAZOLE SODIUM 40 MG: 40 INJECTION, POWDER, FOR SOLUTION INTRAVENOUS at 08:29

## 2021-09-13 RX ADMIN — METOPROLOL TARTRATE 25 MG: 25 TABLET, FILM COATED ORAL at 08:29

## 2021-09-13 RX ADMIN — CLINDAMYCIN PHOSPHATE 600 MG: 600 INJECTION, SOLUTION INTRAVENOUS at 06:36

## 2021-09-13 RX ADMIN — OXYBUTYNIN CHLORIDE 5 MG: 5 TABLET ORAL at 21:59

## 2021-09-13 RX ADMIN — CLINDAMYCIN PHOSPHATE 600 MG: 600 INJECTION, SOLUTION INTRAVENOUS at 01:08

## 2021-09-13 RX ADMIN — HYDROMORPHONE HYDROCHLORIDE 0.5 MG: 1 INJECTION, SOLUTION INTRAMUSCULAR; INTRAVENOUS; SUBCUTANEOUS at 01:04

## 2021-09-13 RX ADMIN — DOCUSATE SODIUM 50MG AND SENNOSIDES 8.6MG 2 TABLET: 8.6; 5 TABLET, FILM COATED ORAL at 17:57

## 2021-09-13 RX ADMIN — GABAPENTIN 300 MG: 300 CAPSULE ORAL at 08:29

## 2021-09-13 RX ADMIN — DOCUSATE SODIUM 50MG AND SENNOSIDES 8.6MG 2 TABLET: 8.6; 5 TABLET, FILM COATED ORAL at 06:33

## 2021-09-13 RX ADMIN — ROPINIROLE HYDROCHLORIDE 1 MG: 1 TABLET, FILM COATED ORAL at 21:58

## 2021-09-13 RX ADMIN — OXYCODONE HYDROCHLORIDE 10 MG: 5 TABLET ORAL at 21:58

## 2021-09-13 RX ADMIN — SODIUM CHLORIDE 25 ML: 9 INJECTION, SOLUTION INTRAVENOUS at 08:50

## 2021-09-13 RX ADMIN — BISACODYL 10 MG: 5 TABLET, COATED ORAL at 21:59

## 2021-09-13 RX ADMIN — OXYCODONE HYDROCHLORIDE 10 MG: 5 TABLET ORAL at 12:44

## 2021-09-13 RX ADMIN — METRONIDAZOLE 500 MG: 500 INJECTION, SOLUTION INTRAVENOUS at 08:54

## 2021-09-13 RX ADMIN — SODIUM CHLORIDE 25 ML: 9 INJECTION, SOLUTION INTRAVENOUS at 15:29

## 2021-09-13 RX ADMIN — CEFTRIAXONE SODIUM 1000 MG: 1 INJECTION, POWDER, FOR SOLUTION INTRAMUSCULAR; INTRAVENOUS at 08:51

## 2021-09-13 ASSESSMENT — PAIN SCALES - GENERAL
PAINLEVEL_OUTOF10: 10
PAINLEVEL_OUTOF10: 9
PAINLEVEL_OUTOF10: 10
PAINLEVEL_OUTOF10: 8
PAINLEVEL_OUTOF10: 8
PAINLEVEL_OUTOF10: 10
PAINLEVEL_OUTOF10: 8
PAINLEVEL_OUTOF10: 7
PAINLEVEL_OUTOF10: 10
PAINLEVEL_OUTOF10: 8
PAINLEVEL_OUTOF10: 10

## 2021-09-13 NOTE — PROGRESS NOTES
Patient had a medium sized bowl movement. It was brown and hard. Patient had to strain significantly which resulted in vomiting. Zofran give. Patient back to bed. Call light within reach.

## 2021-09-13 NOTE — PROGRESS NOTES
Inpatient Physical Therapy Re-evaluation and Treatment    Unit: PCU  Date:  9/13/2021  Patient Name:    Josué Sykes  Admitting diagnosis:  Severe sepsis ([facility] [A41.9, R65.20]  Admit Date:  9/5/2021  Precautions/Restrictions/WB Status/ Lines/ Wounds/ Oxygen: Fall risk, Bed/chair alarm, Lines -Supplemental O2 (2L) and Purewick catheter, Telemetry and wound vac to R groin and lateral hip area    Treatment Time: 1600 - 1633  Treatment Number:  1   Timed Code Treatment Minutes: 23 minutes  Total Treatment Minutes: 33 minutes    Patient Goals for Therapy: \" Get strong and go to Rehab \"          Discharge Recommendations: SNF  DME needs for discharge: defer to facility       Therapy recommendation for EMS Transport: requires transport by cot due to diffculty to tolerate sitting in the wheelchair    Therapy recommendations for staff:   Assist of 1 x RW and gait belt from bed to chair/ [facility] for transfer and ambulation. History of Present Illness:   Per H&P Dr. Delgado Sor 9/06: \"The patient is a 63 y.o. female with PMH below, presents with boil on her butt which started draining but still spreading to butt/groin, region painful. Pt reports that she started having pain of her R buttock about a week ago. She noted a boil on her R buttock at the time. She says she tried using Neosporin on it but it continued to worsen/spread and become more painful. She says it was originally on her R buttock but now feels darline it is more near her \"coochie. \"  She says the area has been draining. Entire buttock, hip groin is severely painful. She was emergently taken to the OR by Dr. Sunny Nash. \"  9/06 - debridement of right groin, perineum, right buttocks and RLQ abdominal wall. Home Health S4 Level Recommendation:  NA  AM-PAC Mobility Score    AM-PAC Inpatient Mobility Raw Score : 16  AM-PAC Inpatient Mobility without Stair Climbing Raw Score : 14    Preadmission Environment    Pt.  Lives with boyfriend at this time, but plans to go back to her home in [place] once discharged . Lives alone in [place]. Pt does not work. Boyfriend does not work but is unable to provide additional support upon discharge per pt. Family members live in the area but all work and are unable to provide additional support. Below information refers to the [place] home. Home environment:  mobile home/trailer  Steps to enter first floor: 4 steps to enter and hand rail unilateral  Steps to second floor: N/A  Bathroom: tub/shower unit and comfort height toilet with rails around toilet; shower is currently not working so pt has been using luma's home for bathing. Equipment owned: [address] [place] [address] and [address] [place] [address]. Pt has a home O2 concentrator and portable tank but does not currently use either. Below information refers to luma's home:  Home environment:  One story  Steps to enter first floor: 4 short steps to enter and no rail  Steps to second floor: N/A  Bathroom: tub/shower unit and shower chair, regular height toilet without rails (pt uses sink and tub for support)    Preadmission Status:  Pt. Able to drive: Yes  Pt Fully independent with ADLs: Yes  Pt. Required assistance from family for: Independent PTA  Pt. independent for transfers and gait and walked with [employer]  History of falls Yes - most recently 8 months ago. Pain   Yes  Location: R side groin, buttock  Rating: 10 /10 at rest initially went down to 8/10 at the end of the session. Pain Medicine Status: Received pain med prior to tx     Cognition    A&O Person, Place and Situation   Able to follow 2 step commands    Subjective  Patient lying supine in bed with no family present. Pt agreeable to this PT eval & tx. Upper Extremity ROM/Strength  Please see OT evaluation. Lower Extremity ROM / Strength   AROM WFL: Yes   BLE strength impaired, but not formally assessed with MMT.     Lower Extremity Sensation    [facility]    Lower Extremity Proprioception:   WFL    Coordination and Tone  WFL    Balance  Sitting:  Fair -; CGA  Comments: ~ 5 min    Standing: Fair -; CGA  Comments: ~ 3 min    Bed Mobility   Supine to Sit:    Min A  using bed rails  Sit to Supine:   Not Tested  Rolling:   Min A    Scooting in sitting: Min A  and 2 persons  Scooting in supine:  Not Tested    Transfer Training     Sit to stand:   CGA with RW  Stand to sit:   CGA with RW  Bed to Chair:   CGA with use of gait belt and rolling walker (RW)    Gait gait completed as indicated below  Distance:      7 ft  Deviations (firm surface/linoleum):  decreased clinton, increased GOGO, antalgic pattern, decreased step length bilaterally and decreased stance time bilaterally  Assistive Device Used:    gait belt and rolling walker (RW)  Level of Assist:    CGA  Comment: Patient limited in walking distance due to R hip pain and pain at wound site. Stair Training deferred, pt unsafe/ not appropriate to complete stairs at this time  # of Steps:   N/A  Level of Assist:  N/A  UE Support:  NA  Assistive Device:  N/A  Pattern:   N/A  Comments: N/A    Activity Tolerance   Pt completed therapy session with Pain noted with functional mobility      Positioning Needs   Pt reclined in chair, alarm set, positioned in proper neutral alignment and pressure relief provided. Call light provided and all needs within reach    Exercises Initiated  all completed bilaterally unless indicated  Ankle Pumps x 10 reps  Heel slides x 5 reps    Other  None. Patient/Family Education   Pt educated on role of inpatient PT, POC, importance of continued activity, DC recommendations, safety awareness, transfer techniques, energy conservation, pacing activity and calling for assist with mobility. Assessment  Patient was very motivated to participate in Physical therapy session and tolerated bed mobility and functional transfers including short distance walking with assist of 1 with RW.      Recommending SNF upon discharge as patient functioning well below baseline, demonstrates good rehab potential and unable to return home due to limited or no family support, inability to negotiate stairs to enter home/bedroom/bathroom and home environment not conducive to patient recovery. Pt reports she does not have available assistance from friends/family/other if needed. Goals : To be met in 3 visits:  1). Independent with LE Ex x 10 reps    To be met in 6 visits:  1). Supine to/from sit: Supervision  2). Sit to/from stand: SBA  3). Bed to chair: Supervision  4). Gait: Ambulate 50 ft.  with  Supervision and use of LRAD (least restrictive assistive device)  5). Tolerate B LE exercises 3 sets of 10-15 reps    Rehabilitation Potential: Good  Strengths for achieving goals include:   Pt motivated, PLOF and Pt cooperative   Barriers to achieving goals include:    Pain    Plan    To be seen 3-5 x / week  while in acute care setting for therapeutic exercises, bed mobility, transfers, progressive gait training, balance training, and family/patient education. Signature: Anya Sharp MS PT, # [id number]    If patient discharges from this facility prior to next visit, this note will serve as the Discharge Summary.

## 2021-09-13 NOTE — PROGRESS NOTES
Shift assessment completed. See flow sheet. Medications given. Patient is A&O x4. Patient asked for pain medication. Pain medication give. Patient denies further needs at this time. Call light within reach. Will continue to monitor.

## 2021-09-13 NOTE — PLAN OF CARE
Nutrition Problem #1: Inadequate oral intake  Intervention: Food and/or Nutrient Delivery: Continue Current Diet, Modify Oral Nutrition Supplement  Nutritional Goals: pt will consume 50% >\= po intake of ADULT DIET: regular x 3 meals per day and 50% or greater of Ensure high-protein ONS x 2 with each meal

## 2021-09-13 NOTE — PROGRESS NOTES
General Surgery - Jaleesa Dillard Pro, APRN - CNP, CNP  Daily Progress Note    Pt Name: Jonathon Chan  Medical Record Number: [id number]  Date of Birth 1960   Today's Date: 9/13/2021    ASSESSMENT  1. POD # 7 s/p debridement of right groin, perineum, right buttocks and RLQ abdominal wall. 2. Right groin dressing changed: + granulation tissue, pink, moist, some light oozing of blood noted. No alireza purulence, no odor. Tissues looked good. Will replace My Severe today with change Dakins to NS   3. Leuks 18.9->14.3->13.5  4. ARF resolved   5. Culture: staph epidermidis and e coli: e coli is pan sensitive, Bacteroids, Prevotella     6. VS: afebrile, HR a fib with RVR  7. UO good: pt with external catheter  8. Pt reports \"she feels OK\"      PLAN  1. PT: OOB to chair/offloading  2. Veriflo: next change is Wednesday  3. Pain control: PO pain meds, add Toradol   4. Rocephin and Flagyl  5. Regular diet  6. Labs in the am  7. Pt wound looks good: continue VAC therapy. PT needs to be OOB. Select precert started. Can D/c when accepted from a surgical standpoint. Hany Soto is virtually unchanged yesterday. Pain is somewhatwell controlled. She has no nausea and no vomiting. She has passed flatus and has not had a bowel movement. She is tolerating some regular diet. Current activity is up with assistance    OBJECTIVE  VITALS:  height is 5' 7\" (1.702 m) and weight is 224 lb (101.6 kg). Her oral temperature is 98.1 °F (36.7 °C). Her blood pressure is 114/70 and her pulse is 76. Her respiration is 18 and oxygen saturation is 96%.    VITALS:  /70   Pulse 76   Temp 98.1 °F (36.7 °C) (Oral)   Resp 18   Ht 5' 7\" (1.702 m) Comment: obtained on 9\6\21  Wt 224 lb (101.6 kg)   SpO2 96%   BMI 35.08 kg/m²   INTAKE/OUTPUT:      Intake/Output Summary (Last 24 hours) at 9/13/2021 1527  Last data filed at 9/13/2021 1431  Gross per 24 hour   Intake 840 ml   Output 2300 ml   Net -1460 ml     GENERAL: alert, cooperative, no acute distress    I/O last 3 completed shifts: In: 840 [P.O.:840]  Out: 2300 [Urine:2300]  No intake/output data recorded. LABS  Recent Labs     09/13/21  0416   WBC 13.5*   HGB 10.7*   HCT 33.3*      *   K 4.9   CL 95*   CO2 29   BUN 15   CREATININE 0.6   MG 2.00   PHOS 3.7   CALCIUM 9.3     CBC with Differential:    Lab Results   Component Value Date    WBC 13.5 09/13/2021    RBC 3.93 09/13/2021    HGB 10.7 09/13/2021    HCT 33.3 09/13/2021     09/13/2021    MCV 84.5 09/13/2021    MCH 27.1 09/13/2021    MCHC 32.1 09/13/2021    RDW 17.3 09/13/2021    NRBC 1 09/09/2021    SEGSPCT 69.5 09/15/2012    BANDSPCT 2 09/11/2021    LYMPHOPCT 26.0 09/13/2021    MONOPCT 3.0 09/13/2021    MYELOPCT 1 09/13/2021    BASOPCT 0.0 09/13/2021    MONOSABS 0.4 09/13/2021    LYMPHSABS 3.5 09/13/2021    EOSABS 0.5 09/13/2021    BASOSABS 0.0 09/13/2021    DIFFTYPE Auto 09/15/2012     CMP:    Lab Results   Component Value Date     09/13/2021    K 4.9 09/13/2021    K 3.7 03/27/2021    CL 95 09/13/2021    CO2 29 09/13/2021    BUN 15 09/13/2021    CREATININE 0.6 09/13/2021    GFRAA >60 09/13/2021    GFRAA >60 09/15/2012    AGRATIO 0.6 09/05/2021    LABGLOM >60 09/13/2021    GLUCOSE 143 09/13/2021    PROT 6.9 09/05/2021    PROT 7.5 09/15/2012    LABALBU 2.6 09/13/2021    CALCIUM 9.3 09/13/2021    BILITOT 0.4 09/05/2021    ALKPHOS 137 09/05/2021    AST 20 09/05/2021    ALT 13 09/05/2021         HAIDER Borjas - CNP  Electronically signed 9/13/2021 at 3:27 PM       Patient seen and examined. I agree with the assessment and plan from CARL Lacy. Patient R groin wound is clean and granulating. D/C planning in process.     Christine Kinsey MD

## 2021-09-13 NOTE — FLOWSHEET NOTE
09/13/21 1400   Negative Pressure Wound Therapy Groin Anterior;Proximal;Right;Upper   Placement Date/Time: 09/08/21 1210   Pre-existing: No  Inserted by: Lynnell Favre CWOCN  Location: Groin  Wound Location Orientation: Anterior;Proximal;Right;Upper   $ Standard NPWT >50 sq cm PER TX $ Yes   Wound Type Surgical   Unit Type Veraflo   Dressing Type Black foam;Veraflo   Number of pieces used 3   Cycle Continuous   Target Pressure (mmHg) 125   Intensity 1   Irrigation Solution Sodium chloride 0.9%   Instillation Volume  55 mL   Soak Time  5 minutes   Vac Frequency 3 hours   Canister changed? Yes   Dressing Status Changed   Drainage Amount Moderate   Drainage Description Serosanguinous   Dressing Change Due 09/15/21   Wound Assessment Granulation tissue   April-wound Assessment Intact   Pt seen for VAC dressing change. Pt given po pain meds, Lidocaine instilled into wound bed and allowed to dwell x 25 minutes. Dr Jewel Lima and Surgery CNP at bedside and visualized wound. Wound cleansed with NS, patted dry. Periwound skin prepped using barrier wipes, barrier seals and vac drape. 3 black foams used in wound bed, good seal obtained at 125mm hg continuous. Veraflo settings changed to 55 ml's NS, every 3 hours, 5 minute soak. Pt tolerated fair. Spoke with , pt denied by [facility] due to wound size. Recommended LTAC referral.      Right groin, perineum, right buttock:  [id number], 95% red, moist and beefy tissue, 5% black noted in base. No purulence, induration or odor.

## 2021-09-13 NOTE — PROGRESS NOTES
Progress Note    Admit Date:  9/5/2021    63year old female with hypertension, hyperlipidemia, Depression presented to [facility] with c/o abscess to her buttock/groin. She was emergently taken to the OR by Dr. Carmen Quiñones. Admitted to med-surg. Subjective:    Ms. Palafox still in sinus rhythm. She was nauseated this morning. Still has discomfort at the surgical site. She is afebrile. Objective:   BP (!) 177/80   Pulse 65   Temp 97.5 °F (36.4 °C) (Oral)   Resp 18   Ht 5' 7\" (1.702 m)   Wt 224 lb (101.6 kg)   SpO2 97%   BMI 35.08 kg/m²          Intake/Output Summary (Last 24 hours) at 9/13/2021 0706  Last data filed at 9/13/2021 0523  Gross per 24 hour   Intake 300 ml   Output 2300 ml   Net -2000 ml       Physical Exam:      General:  Obese middle aged female up in bed/somewhat sick appearing  Awake, alert and oriented. Appears to be not in any distress  Mucous Membranes:  Pink , anicteric  Neck: No JVD, no carotid bruit, no thyromegaly  Chest:  Clear to auscultation bilaterally, no added sounds  Cardiovascular: Regular rate and rhythm S1S2 heard, no murmurs or gallops  Abdomen:  Soft, undistended, non tender, no organomegaly, BS present  Large open wound to right Lower abd within the abdominal pannus extending from flank to right groin and vulvar region - wound vac in place   No drainage   Extremities: No edema or cyanosis. Distal pulses well felt  Neurological : grossly normal        Data:  CBC:   Recent Labs     09/11/21 0423 09/12/21 0416 09/13/21 0416   WBC 18.9* 14.3* 13.5*   HGB 10.5* 10.3* 10.7*   HCT 32.8* 32.3* 33.3*   MCV 85.2 84.7 84.5    329 352     BMP:   Recent Labs     09/11/21 0423 09/12/21 0416 09/13/21 0416   * 130* 133*   K 4.7 4.8 4.9   CL 94* 92* 95*   CO2 32 32 29   PHOS 3.6 3.5 3.7   BUN 15 15 15   CREATININE 0.6 <0.5* 0.6     LIVER PROFILE:   No results for input(s): AST, ALT, LIPASE, BILIDIR, BILITOT, ALKPHOS in the last 72 hours. Invalid input(s):   AMYLASE, ALB    CULTURES    COVID/Influenza: not detected  Blood: NGTD  Fluid right groin: E. Coli, staph epidermidis,provotella,bacteroides    RADIOLOGY  CT ABDOMEN PELVIS WO CONTRAST Additional Contrast? None   Final Result   Probable cellulitis with punctate collections of subcutaneous air in the soft   tissues along the right gluteal region inferiorly and extending anteriorly   into the perineal region. This tracks into the subcutaneous fat along the   right pubic and right groin region and extends anterior and lateral to the   right hip along the abdominal wall laterally which is suspicious for probable   anaerobic infection and cellulitis with possible necrotizing fasciitis. Recommend surgical correlation. Slightly limited exam due to the lack of IV contrast with metallic prosthetic   devices in both hips causing obscuration of the lower pelvis. 10 mm left renal stone which is unchanged with no hydronephrosis. Mild bibasilar atelectasis or infiltrates extending into the lingula which is   more prominent. Previous cholecystectomy and mild hepatosplenomegaly which is unchanged. Mild constipation with no bowel obstruction. The findings were called to Dr. Abraham De La Garza at 10:25 p.m. Graylon Rashel XR CHEST PORTABLE   Final Result   Mild cardiomegaly and probable mild pulmonary interstitial edema which is   less prominent from the prior study. Recommend short-term follow-up. ECHO  Left ventricular systolic function is normal with ejection fraction   estimated at 55-60 %. No regional wall motion abnormalities are noted. Sigmoid septum is present with normal thickness of remaining left   ventricular wall segments. Grade I diastolic dysfunction with normal filling pressure. The right atrium is mildly dilated. Mild tricuspid regurgitation. Normal systolic pulmonary artery pressure (SPAP) estimated at 35 mmHg (RA   pressure 8 mmHg).    7/29/2019 55%, mild LVH, A=LAE, av sclerosis, mild MR and TR SPAP 39mmHg. Assessment/Plan:    Sepsis  - sec to necrotizing fascitis of right lower abdominal wall   - Hypotension, lactic acidosis, leukocytosis  - POA  - BLood cx NGTD  - Wound cx as above. - IV Vanc, Clindamycin, Merrem D#8.  -Her leukocytosis is improving    Abscess  Necrotizing fascitis of the right buttock/groin, lower abdomen  - taken to OR emergently by surgery team . S/p DEBRIDEMENT RIGHT GROIN, PERINEUM, R buttock, and RLQ abdominal wall  - continue IV Vanc, Merrem, Clindamycin. Wound cx with E. Coli, staph epidermidis, Prevotella  - PRN Oxycodone, Dilaudid   - wound care by surgery team - wound vac placed  -WBCs  abx Flagyl and Rocephin based on sensitivities. Hypotension resolved  - due to sepsis ,resolved   - - resumed now as tolerated    LONNY resolved  - Creatinine 1.9 on admission  -Initially held Aldactone, Lasix. - given IVF's. Resolved. Creatinine 0.8  - resumed lasix and aldactone    Chronic hypoxic respiratory failure  ILD, pulmonary fibrosis   - has oxygen at home. States she has not worn it in over a year  - currently on 2 L. Wean as tolerated. - Duonebs PRN.   - F/w Dr. Fallon Valenzuela onset Afibb with RVR back in sinus  - likely with acute illness  -See by cardiology. - continue ho start on p.o. dig. Continue beta-blocker.    -Continue anticoagulation  - check TSh-normal. , cycle troponins  -Echo as above   Now on sinus       Tobacco Dependence  -Recommended cessation  - nicotine patch ordered     H/o polysubstance abuse    GERD  - on PPI    RLS  - on Requip      Hyperlipidemia  - on Atorvastatin. CAD  - on statin, BB. Chronic diastolic CHF  - stable. No s/s decompensation  - on Lasix, Aldactone at home. Holding currently. Obesity  - Body mass index is 35.08 kg/m². - Recommended weight loss    DVT Prophylaxis: Lovenox  Diet: ADULT DIET;  Regular  Adult Oral Nutrition Supplement; Standard High Calorie/High Protein Oral Supplement  Code Status: Full Talha Salgado MD, 9/13/2021 7:06 AM

## 2021-09-13 NOTE — PROGRESS NOTES
(ml/day):  3504-6500 ml; Method Used for Fluid Requirements:  1 ml/kcal      Nutrition Related Findings:  pt is A&O x 4 upon assessment but was having her wound cared for at the time of assessment; pt has an 8.2% wt change or 20lb wt loss since 6\30\21; pt wears upper dentures; pt has active bowel sounds in all 4 quadrants, her abdomen is soft and non-tender (except where wound is in lower abdomen); her last BM was on 9\4\21; pt has necrotizing fasciitis that required debridement on 9/6/21 - located in the R groin, perinueum, R buttock, and RLQ of the abdominal wall; pt has poor po intake of 1-25% of her meals x 5 meals since 9\10\21; pt [facility] 2 Ensure ONS [facility] asked this RD and this RD modified ONS to 2 Ensure high-protein with each meal; patient appeared to be very uncomfortable this afternoon while having wound care completed by RN and wound RN      Wounds:  Wound Vac, Surgical Incision (necrotizing fasciitis that required debridement)       Current Nutrition Therapies:    ADULT DIET; Regular  Adult Oral Nutrition Supplement;  Low Calorie/High Protein Oral Supplement    Anthropometric Measures:  · Height: 5' 7\" (170.2 cm) (obtained on 9\6\21)  · Current Body Weight: 224 lb (101.6 kg) (obtained on 7\35\03 bedscale)   · Admission Body Weight: 232 lb (105.2 kg) (obtained on 9\6\21 Bedscale)    · Usual Body Weight: 244 lb (110.7 kg) (obtained on 6/30/21 previous PCP encounter)     · Ideal Body Weight: 135 lbs; % Ideal Body Weight 165.9 %   · BMI: 35.1  · BMI Categories: Obese Class 2 (BMI 35.0 -39.9)       Nutrition Diagnosis:   · Inadequate oral intake related to acute injury/trauma, inadequate protein-energy intake, increase demand for energy/nutrients, pain as evidenced by intake 0-25%, intake 26-50%, weight loss 7.5% in 3 months, weight loss, wounds, lab values      Nutrition Interventions:   Food and/or Nutrient Delivery:  Continue Current Diet, Modify Oral Nutrition Supplement  Nutrition Education/Counseling:  No recommendation at this time   Coordination of Nutrition Care:  Continue to monitor while inpatient    Goals:  pt will consume 50% >\= po intake of ADULT DIET: regular x 3 meals per day and 50% or greater of Ensure high-protein ONS x 2 with each meal       Nutrition Monitoring and Evaluation:   Behavioral-Environmental Outcomes:  None Identified   Food/Nutrient Intake Outcomes:  Food and Nutrient Intake, Supplement Intake  Physical Signs/Symptoms Outcomes:  Biochemical Data, Weight, Skin, GI Status, Hemodynamic Status, Nutrition Focused Physical Findings     Discharge Planning:    Continue current diet, Continue Oral Nutrition Supplement     Electronically signed by Denise Sears on 9/13/21 at 3:03 PM EDT    Contact: [phone]

## 2021-09-13 NOTE — PROGRESS NOTES
Pt resting in bed with eyes closed. She c/o pain to R groin, PRN diludid given per MAR. Assessment complete-see flowsheet. Medications given-see MAR. Wound vac in place with good output. Pt denies further needs, call light and bedside table within reach. Will continue to monitor.

## 2021-09-13 NOTE — PROGRESS NOTES
Inpatient Occupational Therapy Re-Evaluation and Treatment    Unit: PCU  Date:  9/13/2021  Patient Name:    Fatuma Ocampo  Admitting diagnosis:  Severe sepsis ([address] [A41.9, R65.20]  Admit Date:  9/5/2021  Precautions/Restrictions/WB Status/ Lines/ Wounds/ Oxygen: fall risk, bed/chair alarm, purewick catheter, supplemental O2 (2L) and telemetry, wound vac    Treatment Time:  0757-3612  Treatment Number: 1     Billable Treatment Time: 23 minutes   Total Treatment Time:   33   minutes    Patient Goals for Therapy:  \"get better\"      Discharge Recommendations: SNF  DME needs for discharge: defer to facility       Therapy recommendations for staff:   Assist of 1 with RW, gait belt to chair/BSC    History of Present Illness: per H &P Verlene Fret: 63 y.o. female with PMH below, presents with boil on her butt which started draining but still spreading to butt/groin, region painful. Pt reports that she started having pain of her R buttock about a week ago. She noted a boil on her R buttock at the time. She says she tried using Neosporin on it but it continued to worsen/spread and become more painful. She says it was originally on her R buttock but now feels darline it is more near her \"coochie. \"  She says the area has been draining. Entire buttock, hip groin is severely painful. She was emergently taken to the OR by Dr. Marcus Blevins. Home Health S4 Level Recommendation:  NA  AM-PAC Score: AM-PAC Inpatient Daily Activity Raw Score: 14    Preadmission Environment    Pt. Lives with boyfriend at this time, but plans to go back to her home in [place] once discharged. Lives with a nephew in [place], limited assistance available. Pt does not work. Boyfriend does not work but is unable to provide additional support upon discharge per pt. Family members live in the area but all work and are unable to provide additional support. Below information refers to the [place] home.   Home environment:            mobile home/trailer  Steps to enter first floor: 4 steps to enter and hand rail unilateral  Steps to second floor:         N/A  Bathroom: tub/shower unit and comfort height toilet with rails around toilet; shower is currently not working so pt has been using boyfriend's home for bathing. Equipment owned: [address] [place] [address] and [facility]. Pt has a home O2 concentrator and portable tank but does not currently use either. Below information refers to boyfriend's home:  Home environment:            One story  Steps to enter first floor: 4 short steps to enter and no rail  Steps to second floor:         N/A  Bathroom: tub/shower unit and shower chair, regular height toilet without rails (pt uses sink and tub for support)     Preadmission Status:  Pt. Able to drive: Yes  Pt Fully independent with ADLs: Yes  Pt. Required assistance from family for: Independent PTA  Pt. independent for transfers and gait and walked with [employer]  History of falls          Yes - most recently 8 months ago.     Pain   Yes  Location: R side groin, buttock  Rating: 10 /10 initially, 8/10 at end of session up in chair  Pain Medicine Status: Received pain med prior to tx     Cognition    A&O Person, Place and Situation  Able to follow 2 step commands     Subjective  Patient lying supine in bed with no family present. Pt agreeable to this OT eval & tx. Upper Extremity ROM:    B shoulder flexion limited (chronic), patient unable to reach for hair  Distal WFL    Upper Extremity Strength:    BUE strength impaired but not formally assessed w/ MMT    Upper Extremity Sensation    WFL    Upper Extremity Proprioception:  NT    Coordination and Tone  [facility]    Balance  Functional Sitting Balance:   WNL  Functional Standing Balance:Impaired CGA with RW    Bed mobility:    Supine to sit:   CGA  Sit to supine:   Not Tested  Rolling:    CGA , toward right side   Scooting in sitting:  Min A of 2 to scoot back in chair  Scooting to head of bed:   Not Tested    Bridging:   Not Tested    Transfers:    Sit to stand:  CGA with cues for hand placement  Stand to sit:  CGA with cues for hand placement  Bed to chair:   Min A with RW, assist to manage multiple lines including wound vac  Standard toilet: Not Tested  Bed to [facility]:  Not Tested    Purewick in place to manage urine. Dressing:      UE:   Not Tested  LE:    Not Tested    Bathing:    UE:  Not Tested  LE:  Not Tested    Eating:   Not Tested    Grooming:  Max A wash and comb hair (used shampoo cap), limited patient participation due to B shoulder limitations (chronic)    Activity Tolerance   Pt completed therapy session with Dizziness noted with position change after supine to sit, resolved quickly    Positioning Needs:   Up in chair, call light and needs in reach. Alarm Set    Exercise / Activities Initiated:   N/A    Patient/Family Education:   Role of OT  Recommendations for DC    Assessment of Deficits: Pt seen for Occupational therapy evaluation in acute care setting. Pt demonstrated decreased Activity tolerance, ADLs, IADLs, Balance , Bathing, Bed mobility, Safety Awareness, Strength and Transfers. Pt functioning below baseline and will likely benefit from skilled occupational therapy services to maximize safety and independence. Goal(s) : To be met in 3 Visits:  1). Bed to toilet/BSC: SBA    To be met in 5 Visits:  1). Supine to/from Sit:  SBA  2). Upper Body Bathing:   Min A  3). Lower Body Bathing:   Min A  4). Upper Body Dressing:  Min A  5). Lower Body Dressing:  Min A  6). Pt to demonstrate UE exs x 15 reps with minimal cues    Rehabilitation Potential:  Good for goals listed above. Strengths for achieving goals include: Pt motivated and PLOF  Barriers to achieving goals include:  Complexity of condition and Pain     Plan: To be seen 3-5 x/wk while in acute care setting for therapeutic exercises, bed mobility, transfers, dressing, bathing, family/patient education, ADL/IADL retraining, energy conservation training.      Elli Moise, OTR/L [id number]        If patient discharges from this facility prior to next visit, this note will serve as the Discharge Summary

## 2021-09-14 LAB
ALBUMIN SERPL-MCNC: 2.7 G/DL (ref 3.4–5)
ANION GAP SERPL CALCULATED.3IONS-SCNC: 9 MMOL/L (ref 3–16)
BASOPHILS ABSOLUTE: 0 K/UL (ref 0–0.2)
BASOPHILS RELATIVE PERCENT: 0 %
BUN BLDV-MCNC: 21 MG/DL (ref 7–20)
CALCIUM SERPL-MCNC: 9.3 MG/DL (ref 8.3–10.6)
CHLORIDE BLD-SCNC: 92 MMOL/L (ref 99–110)
CO2: 30 MMOL/L (ref 21–32)
CREAT SERPL-MCNC: 0.6 MG/DL (ref 0.6–1.1)
EOSINOPHILS ABSOLUTE: 0.4 K/UL (ref 0–0.6)
EOSINOPHILS RELATIVE PERCENT: 3 %
GFR AFRICAN AMERICAN: >60
GFR NON-AFRICAN AMERICAN: >60
GLUCOSE BLD-MCNC: 130 MG/DL (ref 70–99)
HCT VFR BLD CALC: 34.1 % (ref 36–48)
HEMOGLOBIN: 10.7 G/DL (ref 12–16)
LYMPHOCYTES ABSOLUTE: 4.1 K/UL (ref 1–5.1)
LYMPHOCYTES RELATIVE PERCENT: 29 %
MAGNESIUM: 2.1 MG/DL (ref 1.8–2.4)
MCH RBC QN AUTO: 26.8 PG (ref 26–34)
MCHC RBC AUTO-ENTMCNC: 31.4 G/DL (ref 31–36)
MCV RBC AUTO: 85.5 FL (ref 80–100)
MONOCYTES ABSOLUTE: 0.7 K/UL (ref 0–1.3)
MONOCYTES RELATIVE PERCENT: 5 %
NEUTROPHILS ABSOLUTE: 8.8 K/UL (ref 1.7–7.7)
NEUTROPHILS RELATIVE PERCENT: 63 %
PDW BLD-RTO: 17.2 % (ref 12.4–15.4)
PHOSPHORUS: 4.4 MG/DL (ref 2.5–4.9)
PLATELET # BLD: 391 K/UL (ref 135–450)
PLATELET SLIDE REVIEW: ADEQUATE
PMV BLD AUTO: 7.9 FL (ref 5–10.5)
POTASSIUM SERPL-SCNC: 4.7 MMOL/L (ref 3.5–5.1)
RBC # BLD: 3.99 M/UL (ref 4–5.2)
SLIDE REVIEW: ABNORMAL
SODIUM BLD-SCNC: 131 MMOL/L (ref 136–145)
WBC # BLD: 14 K/UL (ref 4–11)

## 2021-09-14 PROCEDURE — 6370000000 HC RX 637 (ALT 250 FOR IP): Performed by: INTERNAL MEDICINE

## 2021-09-14 PROCEDURE — 2580000003 HC RX 258: Performed by: INTERNAL MEDICINE

## 2021-09-14 PROCEDURE — 99232 SBSQ HOSP IP/OBS MODERATE 35: CPT | Performed by: INTERNAL MEDICINE

## 2021-09-14 PROCEDURE — 85025 COMPLETE CBC W/AUTO DIFF WBC: CPT

## 2021-09-14 PROCEDURE — 80069 RENAL FUNCTION PANEL: CPT

## 2021-09-14 PROCEDURE — 2060000000 HC ICU INTERMEDIATE R&B

## 2021-09-14 PROCEDURE — 99232 SBSQ HOSP IP/OBS MODERATE 35: CPT | Performed by: SURGERY

## 2021-09-14 PROCEDURE — 36415 COLL VENOUS BLD VENIPUNCTURE: CPT

## 2021-09-14 PROCEDURE — 97116 GAIT TRAINING THERAPY: CPT

## 2021-09-14 PROCEDURE — 6360000002 HC RX W HCPCS: Performed by: INTERNAL MEDICINE

## 2021-09-14 PROCEDURE — 94761 N-INVAS EAR/PLS OXIMETRY MLT: CPT

## 2021-09-14 PROCEDURE — 2700000000 HC OXYGEN THERAPY PER DAY

## 2021-09-14 PROCEDURE — 97110 THERAPEUTIC EXERCISES: CPT

## 2021-09-14 PROCEDURE — 83735 ASSAY OF MAGNESIUM: CPT

## 2021-09-14 PROCEDURE — C9113 INJ PANTOPRAZOLE SODIUM, VIA: HCPCS | Performed by: INTERNAL MEDICINE

## 2021-09-14 PROCEDURE — 2500000003 HC RX 250 WO HCPCS: Performed by: INTERNAL MEDICINE

## 2021-09-14 RX ORDER — LANOLIN ALCOHOL/MO/W.PET/CERES
3 CREAM (GRAM) TOPICAL NIGHTLY PRN
Status: DISCONTINUED | OUTPATIENT
Start: 2021-09-14 | End: 2021-09-15 | Stop reason: HOSPADM

## 2021-09-14 RX ADMIN — METOPROLOL TARTRATE 25 MG: 25 TABLET, FILM COATED ORAL at 10:22

## 2021-09-14 RX ADMIN — POLYETHYLENE GLYCOL (3350) 17 G: 17 POWDER, FOR SOLUTION ORAL at 10:23

## 2021-09-14 RX ADMIN — GABAPENTIN 300 MG: 300 CAPSULE ORAL at 20:56

## 2021-09-14 RX ADMIN — OXYCODONE HYDROCHLORIDE 10 MG: 5 TABLET ORAL at 15:47

## 2021-09-14 RX ADMIN — OXYCODONE HYDROCHLORIDE 10 MG: 5 TABLET ORAL at 20:56

## 2021-09-14 RX ADMIN — KETOROLAC TROMETHAMINE 15 MG: 30 INJECTION, SOLUTION INTRAMUSCULAR; INTRAVENOUS at 18:36

## 2021-09-14 RX ADMIN — ONDANSETRON HYDROCHLORIDE 4 MG: 2 INJECTION, SOLUTION INTRAMUSCULAR; INTRAVENOUS at 20:56

## 2021-09-14 RX ADMIN — ONDANSETRON HYDROCHLORIDE 4 MG: 2 INJECTION, SOLUTION INTRAMUSCULAR; INTRAVENOUS at 08:15

## 2021-09-14 RX ADMIN — Medication 3 MG: at 01:07

## 2021-09-14 RX ADMIN — METRONIDAZOLE 500 MG: 500 INJECTION, SOLUTION INTRAVENOUS at 01:01

## 2021-09-14 RX ADMIN — SODIUM CHLORIDE 25 ML: 9 INJECTION, SOLUTION INTRAVENOUS at 15:51

## 2021-09-14 RX ADMIN — CEFTRIAXONE SODIUM 1000 MG: 1 INJECTION, POWDER, FOR SOLUTION INTRAMUSCULAR; INTRAVENOUS at 08:27

## 2021-09-14 RX ADMIN — OXYBUTYNIN CHLORIDE 5 MG: 5 TABLET ORAL at 10:22

## 2021-09-14 RX ADMIN — METRONIDAZOLE 500 MG: 500 INJECTION, SOLUTION INTRAVENOUS at 10:39

## 2021-09-14 RX ADMIN — DOCUSATE SODIUM 50MG AND SENNOSIDES 8.6MG 2 TABLET: 8.6; 5 TABLET, FILM COATED ORAL at 04:20

## 2021-09-14 RX ADMIN — OXYCODONE HYDROCHLORIDE 10 MG: 5 TABLET ORAL at 04:20

## 2021-09-14 RX ADMIN — ENOXAPARIN SODIUM 40 MG: 40 INJECTION SUBCUTANEOUS at 10:19

## 2021-09-14 RX ADMIN — OXYBUTYNIN CHLORIDE 5 MG: 5 TABLET ORAL at 20:56

## 2021-09-14 RX ADMIN — ROPINIROLE HYDROCHLORIDE 1 MG: 1 TABLET, FILM COATED ORAL at 15:48

## 2021-09-14 RX ADMIN — OXYCODONE HYDROCHLORIDE 10 MG: 5 TABLET ORAL at 10:15

## 2021-09-14 RX ADMIN — FUROSEMIDE 20 MG: 20 TABLET ORAL at 10:22

## 2021-09-14 RX ADMIN — GABAPENTIN 300 MG: 300 CAPSULE ORAL at 15:48

## 2021-09-14 RX ADMIN — Medication 3 MG: at 20:56

## 2021-09-14 RX ADMIN — ESCITALOPRAM OXALATE 10 MG: 10 TABLET ORAL at 12:58

## 2021-09-14 RX ADMIN — PANTOPRAZOLE SODIUM 40 MG: 40 INJECTION, POWDER, FOR SOLUTION INTRAVENOUS at 10:20

## 2021-09-14 RX ADMIN — METRONIDAZOLE 500 MG: 500 INJECTION, SOLUTION INTRAVENOUS at 15:52

## 2021-09-14 RX ADMIN — DOCUSATE SODIUM 50MG AND SENNOSIDES 8.6MG 2 TABLET: 8.6; 5 TABLET, FILM COATED ORAL at 18:36

## 2021-09-14 RX ADMIN — GABAPENTIN 300 MG: 300 CAPSULE ORAL at 10:15

## 2021-09-14 RX ADMIN — OXYBUTYNIN CHLORIDE 5 MG: 5 TABLET ORAL at 15:48

## 2021-09-14 RX ADMIN — ROPINIROLE HYDROCHLORIDE 1 MG: 1 TABLET, FILM COATED ORAL at 20:56

## 2021-09-14 RX ADMIN — DIGOXIN 125 MCG: 125 TABLET ORAL at 10:23

## 2021-09-14 RX ADMIN — ROPINIROLE HYDROCHLORIDE 1 MG: 1 TABLET, FILM COATED ORAL at 10:20

## 2021-09-14 RX ADMIN — SPIRONOLACTONE 25 MG: 25 TABLET ORAL at 10:21

## 2021-09-14 RX ADMIN — METOPROLOL TARTRATE 25 MG: 25 TABLET, FILM COATED ORAL at 20:56

## 2021-09-14 ASSESSMENT — PAIN DESCRIPTION - LOCATION
LOCATION: GROIN

## 2021-09-14 ASSESSMENT — PAIN DESCRIPTION - ONSET: ONSET: GRADUAL

## 2021-09-14 ASSESSMENT — PAIN DESCRIPTION - PROGRESSION
CLINICAL_PROGRESSION: NOT CHANGED
CLINICAL_PROGRESSION: NOT CHANGED

## 2021-09-14 ASSESSMENT — PAIN SCALES - GENERAL
PAINLEVEL_OUTOF10: 8
PAINLEVEL_OUTOF10: 10
PAINLEVEL_OUTOF10: 8
PAINLEVEL_OUTOF10: 8
PAINLEVEL_OUTOF10: 5
PAINLEVEL_OUTOF10: 3
PAINLEVEL_OUTOF10: 8
PAINLEVEL_OUTOF10: 9

## 2021-09-14 ASSESSMENT — PAIN DESCRIPTION - PAIN TYPE
TYPE: ACUTE PAIN;SURGICAL PAIN
TYPE: ACUTE PAIN
TYPE: ACUTE PAIN

## 2021-09-14 ASSESSMENT — PAIN - FUNCTIONAL ASSESSMENT: PAIN_FUNCTIONAL_ASSESSMENT: PREVENTS OR INTERFERES WITH MANY ACTIVE NOT PASSIVE ACTIVITIES

## 2021-09-14 ASSESSMENT — PAIN DESCRIPTION - ORIENTATION: ORIENTATION: MID;UPPER

## 2021-09-14 ASSESSMENT — PAIN DESCRIPTION - DESCRIPTORS: DESCRIPTORS: DULL;DISCOMFORT

## 2021-09-14 ASSESSMENT — PAIN DESCRIPTION - FREQUENCY: FREQUENCY: INTERMITTENT

## 2021-09-14 NOTE — PROGRESS NOTES
Progress Note    Admit Date:  9/5/2021    63year old female with hypertension, hyperlipidemia, Depression presented to [facility] with c/o abscess to her buttock/groin. She was emergently taken to the OR by Dr. Betina Garcia. Admitted to med-surg. Subjective:    Ms. Piotr Harrison still in sinus rhythm. She was nauseated this morning. Still has discomfort at the surgical site. She is afebrile. Objective:   /77   Pulse 67   Temp 98.4 °F (36.9 °C) (Oral)   Resp 20   Ht 5' 7\" (1.702 m) Comment: obtained on 9\6\21  Wt 232 lb 7 oz (105.4 kg)   SpO2 96%   BMI 36.40 kg/m²          Intake/Output Summary (Last 24 hours) at 9/14/2021 0709  Last data filed at 9/14/2021 0431  Gross per 24 hour   Intake 960 ml   Output 450 ml   Net 510 ml       Physical Exam:      General:  Obese middle aged female up in bed/somewhat sick appearing  Awake, alert and oriented. Appears to be not in any distress  Mucous Membranes:  Pink , anicteric  Neck: No JVD, no carotid bruit, no thyromegaly  Chest:  Clear to auscultation bilaterally, no added sounds  Cardiovascular: Regular rate and rhythm S1S2 heard, no murmurs or gallops  Abdomen:  Soft, undistended, non tender, no organomegaly, BS present  Large open wound to right Lower abd within the abdominal pannus extending from flank to right groin and vulvar region - wound vac in place   No drainage   Extremities: No edema or cyanosis.  Distal pulses well felt  Neurological : grossly normal        Data:  CBC:   Recent Labs     09/12/21 0416 09/13/21 0416 09/14/21 0437   WBC 14.3* 13.5* 14.0*   HGB 10.3* 10.7* 10.7*   HCT 32.3* 33.3* 34.1*   MCV 84.7 84.5 85.5    352 391     BMP:   Recent Labs     09/12/21 0416 09/13/21 0416 09/14/21 0437   * 133* 131*   K 4.8 4.9 4.7   CL 92* 95* 92*   CO2 32 29 30   PHOS 3.5 3.7 4.4   BUN 15 15 21*   CREATININE <0.5* 0.6 0.6     LIVER PROFILE:   No results for input(s): AST, ALT, LIPASE, BILIDIR, BILITOT, ALKPHOS in the last 72 hours.    Invalid input(s): AMYLASE,  ALB    CULTURES    COVID/Influenza: not detected  Blood: NGTD  Fluid right groin: E. Coli, staph epidermidis,provotella,bacteroides    RADIOLOGY  CT ABDOMEN PELVIS WO CONTRAST Additional Contrast? None   Final Result   Probable cellulitis with punctate collections of subcutaneous air in the soft   tissues along the right gluteal region inferiorly and extending anteriorly   into the perineal region. This tracks into the subcutaneous fat along the   right pubic and right groin region and extends anterior and lateral to the   right hip along the abdominal wall laterally which is suspicious for probable   anaerobic infection and cellulitis with possible necrotizing fasciitis. Recommend surgical correlation. Slightly limited exam due to the lack of IV contrast with metallic prosthetic   devices in both hips causing obscuration of the lower pelvis. 10 mm left renal stone which is unchanged with no hydronephrosis. Mild bibasilar atelectasis or infiltrates extending into the lingula which is   more prominent. Previous cholecystectomy and mild hepatosplenomegaly which is unchanged. Mild constipation with no bowel obstruction. The findings were called to Dr. Kendrick Harrison at 10:25 p.m. [facility]   Final Result   Mild cardiomegaly and probable mild pulmonary interstitial edema which is   less prominent from the prior study. Recommend short-term follow-up. ECHO  Left ventricular systolic function is normal with ejection fraction   estimated at 55-60 %. No regional wall motion abnormalities are noted. Sigmoid septum is present with normal thickness of remaining left   ventricular wall segments. Grade I diastolic dysfunction with normal filling pressure. The right atrium is mildly dilated. Mild tricuspid regurgitation. Normal systolic pulmonary artery pressure (SPAP) estimated at 35 mmHg (RA   pressure 8 mmHg).    7/29/2019 55%, mild LVH, A=LAE, av sclerosis, mild MR and TR SPAP 39mmHg. Assessment/Plan:    Sepsis  - sec to necrotizing fascitis of right lower abdominal wall   - Hypotension, lactic acidosis, leukocytosis  - POA  - BLood cx NGTD  - Wound cx as above. - IV Vanc, Clindamycin, Merrem - finished 8 days   bax changed to rocephin and flagyl day 2   -Her leukocytosis is improving    Abscess  Necrotizing fascitis of the right buttock/groin, lower abdomen  - taken to OR emergently by surgery team . S/p DEBRIDEMENT RIGHT GROIN, PERINEUM, R buttock, and RLQ abdominal wall  - continue IV Vanc, Merrem, Clindamycin. Wound cx with E. Coli, staph epidermidis, Prevotella  - PRN Oxycodone, Dilaudid   - wound care by surgery team - wound vac placed  -WBCs elevated   abx Flagyl and Rocephin based on sensitivities. Hypotension resolved  - due to sepsis ,resolved   - - resumed now as tolerated    LONNY resolved  - Creatinine 1.9 on admission  -Initially held Aldactone, Lasix. - given IVF's. Resolved. Creatinine 0.8  - resumed lasix and aldactone    Chronic hypoxic respiratory failure  ILD, pulmonary fibrosis   - has oxygen at home. States she has not worn it in over a year  - currently on 2 L. Wean as tolerated. - Duonebs PRN.   - F/w Dr. Tiffanie Gonzales onset Afibb with RVR back in sinus  - likely with acute illness  -See by cardiology. - continue ho start on p.o. dig. Continue beta-blocker.    -Continue anticoagulation  - check TSh-normal. , cycle troponins  -Echo as above   Now on sinus       Tobacco Dependence  -Recommended cessation  - nicotine patch ordered     H/o polysubstance abuse    GERD  - on PPI    RLS  - on Requip      Hyperlipidemia  - on Atorvastatin. CAD  - on statin, BB. Chronic diastolic CHF  - stable. No s/s decompensation  - on Lasix, Aldactone at home. Holding currently. Obesity  - Body mass index is 36.4 kg/m². - Recommended weight loss    DVT Prophylaxis: Lovenox  Diet: ADULT DIET;  Regular  Adult Oral Nutrition Supplement;  Low Calorie/High Protein Oral Supplement  Code Status: Full Code    [facility], 9/14/2021 7:09 AM

## 2021-09-14 NOTE — PROGRESS NOTES
Bedside report and transfer of care given to Lupis Martines RN. Pt currently resting in bed with the call light within reach. Pt denies any other care needs at this time. Pt stable at this time.

## 2021-09-14 NOTE — PROGRESS NOTES
Bedside report and transfer of care given to Irving Mora RN. Pt currently resting in bed with the call light within reach. Pt denies any other care needs at this time. Pt stable at this time.

## 2021-09-14 NOTE — PROGRESS NOTES
Inpatient Physical Therapy Daily Treatment Note    Unit: PCU  Date:  9/14/2021  Patient Name:    Fatuma Ocampo  Admitting diagnosis:  Severe sepsis ([facility] [address] [A41.9, R65.20]  Admit Date:  9/5/2021  Precautions/Restrictions:  Fall risk, Bed/chair alarm, Lines -IV, Supplemental O2 (2L/min) and Purewick catheter, Telemetry and wound vac to R groin and lateral hip area      Discharge Recommendations: SNF vs LTAC  DME needs for discharge: defer to facility       Therapy recommendation for EMS Transport: can transport by wheelchair    Therapy recommendations for staff:   Stand by assist with use of rolling walker (RW) and gait belt for all transfers and ambulation to/from [facility]  to/from chair  to/from bathroom    History of Present Illness: Per H&P Dr. Dejan Maier 9/06: \"The patient is S 49 y.o. female with PMH below, presents with boil on her butt which started draining but still spreading to butt/groin, region painful.  Pt reports that she started having pain of her R buttock about a week ago. She noted a boil on her R buttock at the time. Vicente Hines says she tried using Neosporin on it but it continued to worsen/spread and become more painful. Vicente Hines says it was originally on her R buttock but now feels darline it is more near her \"coochie. \"  She says the area has been draining.  Entire buttock, hip groin is severely painful.  She was emergently taken to the OR by Dr. Marcus Blevins. \"  9/06 - debridement of right groin, perineum, right buttocks and RLQ abdominal wall. Home Health S4 Level Recommendation: Level 3 Safety  AM-PAC Mobility Score   AM-PAC Inpatient Mobility Raw Score : 17  AM-PAC Inpatient Mobility without Stair Climbing Raw Score : 15    Treatment Time: 0427 - 9664  Treatment number: 2  Timed Code Treatment Minutes: 26 minutes  Total Treatment Minutes:  26  minutes    Cognition    A&O x4   Able to follow 2 step commands    Subjective  Patient lying supine in bed with no family present   Pt agreeable to this PT tx.      Pain Recommending SNF upon discharge as patient functioning well below baseline, demonstrates good rehab potential and unable to return home due to limited or no family support, inability to negotiate stairs to enter home/bedroom/bathroom, burden of care beyond caregiver ability, home environment not conducive to patient recovery and limited safety awareness. Goals (all goals ongoing unless otherwise indicated)  To be met in 3 visits:  1). Independent with LE Ex x 10 reps     To be met in 6 visits:  1). Supine to/from sit: Supervision  2). Sit to/from stand: SBA  3). Bed to chair: Supervision  4). Gait: Ambulate 50 ft.  with  Supervision and use of LRAD (least restrictive assistive device)  5). Tolerate B LE exercises 3 sets of 10-15 reps    Plan   Continue with plan of care. Signature: aNnette Sandoval MS PT, # [id number]    If patient discharges from this facility prior to next visit, this note will serve as the Discharge Summary.

## 2021-09-14 NOTE — PROGRESS NOTES
[facility] GENERAL SURGERY DAILY PROGRESS NOTE    SUBJECTIVE: Awake, alert. Feels better.     OBJECTIVE: CURRENT VITALS:  /74   Pulse 70   Temp 97.8 °F (36.6 °C) (Oral)   Resp 18   Ht 5' 7\" (1.702 m) Comment: obtained on 9\6\21  Wt 232 lb 7 oz (105.4 kg)   SpO2 94%   BMI 36.40 kg/m²          VAC in pace R groin    LABS:    CBC: Recent Labs     09/12/21 0416 09/13/21 0416 09/14/21 0437   WBC 14.3* 13.5* 14.0*   RBC 3.81* 3.93* 3.99*   HGB 10.3* 10.7* 10.7*   HCT 32.3* 33.3* 34.1*   MCV 84.7 84.5 85.5   RDW 17.7* 17.3* 17.2*    352 391     BMP:   Recent Labs     09/12/21 0416 09/13/21 0416 09/14/21 0437   * 133* 131*   K 4.8 4.9 4.7   CL 92* 95* 92*   CO2 32 29 30   PHOS 3.5 3.7 4.4   BUN 15 15 21*   CREATININE <0.5* 0.6 0.6     Recent Labs     09/12/21 0416 09/13/21 0416 09/14/21 0437   MG 2.10 2.00 2.10             ASSESSMENT:   POD 8 debridement necrotizing soft tissue infection R groin      PLAN:   OOB  D/C planning  VAC for wound care  Antibiotics         Sylvain Leon MD

## 2021-09-14 NOTE — PROGRESS NOTES
Shift assessment completed by prior RN, see flow sheet, no changes upon reassessment. Patient is A&O x4. Medications administered. Patient denies further needs at this time. Call light within reach. Will continue to monitor.

## 2021-09-14 NOTE — PLAN OF CARE
Problem: Pain:  Goal: Pain level will decrease  Description: Pain level will decrease  Outcome: Met This Shift  Goal: Control of acute pain  Description: Control of acute pain  Outcome: Met This Shift  Goal: Control of chronic pain  Description: Control of chronic pain  Outcome: Met This Shift  Goal: Patient's pain/discomfort is manageable  Description: Patient's pain/discomfort is manageable  Outcome: Met This Shift     Problem: Infection:  Goal: Will remain free from infection  Description: Will remain free from infection  Outcome: Met This Shift     Problem: Safety:  Goal: Free from accidental physical injury  Description: Free from accidental physical injury  Outcome: Met This Shift  Goal: Free from intentional harm  Description: Free from intentional harm  Outcome: Met This Shift     Problem: Daily Care:  Goal: Daily care needs are met  Description: Daily care needs are met  Outcome: Met This Shift     Problem: Skin Integrity:  Goal: Skin integrity will stabilize  Description: Skin integrity will stabilize  Outcome: Met This Shift     Problem: Discharge Planning:  Goal: Patients continuum of care needs are met  Description: Patients continuum of care needs are met  Outcome: Met This Shift     Problem: Skin Integrity:  Goal: Will show no infection signs and symptoms  Description: Will show no infection signs and symptoms  Outcome: Met This Shift  Goal: Absence of new skin breakdown  Description: Absence of new skin breakdown  Outcome: Met This Shift     Problem: Nutrition  Goal: Optimal nutrition therapy  9/14/2021 0042 by Mame Motley RN  Outcome: Met This Shift  9/13/2021 1502 by Sarah Cadet  Outcome: Ongoing  Goal: Understanding of nutritional guidelines  9/14/2021 0042 by Mame Motley RN  Outcome: Met This Shift  9/13/2021 1502 by Sarah Cadet  Outcome: Ongoing

## 2021-09-15 VITALS
DIASTOLIC BLOOD PRESSURE: 66 MMHG | HEIGHT: 67 IN | BODY MASS INDEX: 36.48 KG/M2 | WEIGHT: 232.44 LBS | OXYGEN SATURATION: 96 % | SYSTOLIC BLOOD PRESSURE: 122 MMHG | TEMPERATURE: 97 F | HEART RATE: 70 BPM | RESPIRATION RATE: 14 BRPM

## 2021-09-15 LAB
ALBUMIN SERPL-MCNC: 2.4 G/DL (ref 3.4–5)
ANION GAP SERPL CALCULATED.3IONS-SCNC: 8 MMOL/L (ref 3–16)
ANISOCYTOSIS: ABNORMAL
BANDED NEUTROPHILS RELATIVE PERCENT: 2 % (ref 0–7)
BASOPHILS ABSOLUTE: 0 K/UL (ref 0–0.2)
BASOPHILS RELATIVE PERCENT: 0 %
BUN BLDV-MCNC: 22 MG/DL (ref 7–20)
CALCIUM SERPL-MCNC: 9.1 MG/DL (ref 8.3–10.6)
CHLORIDE BLD-SCNC: 92 MMOL/L (ref 99–110)
CO2: 29 MMOL/L (ref 21–32)
CREAT SERPL-MCNC: 0.7 MG/DL (ref 0.6–1.1)
EOSINOPHILS ABSOLUTE: 0.7 K/UL (ref 0–0.6)
EOSINOPHILS RELATIVE PERCENT: 6 %
GFR AFRICAN AMERICAN: >60
GFR NON-AFRICAN AMERICAN: >60
GLUCOSE BLD-MCNC: 130 MG/DL (ref 70–99)
HCT VFR BLD CALC: 31.2 % (ref 36–48)
HEMOGLOBIN: 10.4 G/DL (ref 12–16)
LYMPHOCYTES ABSOLUTE: 2.9 K/UL (ref 1–5.1)
LYMPHOCYTES RELATIVE PERCENT: 24 %
MAGNESIUM: 2.2 MG/DL (ref 1.8–2.4)
MCH RBC QN AUTO: 28.1 PG (ref 26–34)
MCHC RBC AUTO-ENTMCNC: 33.3 G/DL (ref 31–36)
MCV RBC AUTO: 84.4 FL (ref 80–100)
MONOCYTES ABSOLUTE: 0.6 K/UL (ref 0–1.3)
MONOCYTES RELATIVE PERCENT: 5 %
NEUTROPHILS ABSOLUTE: 7.9 K/UL (ref 1.7–7.7)
NEUTROPHILS RELATIVE PERCENT: 63 %
PDW BLD-RTO: 17.6 % (ref 12.4–15.4)
PHOSPHORUS: 4.8 MG/DL (ref 2.5–4.9)
PLATELET # BLD: 352 K/UL (ref 135–450)
PLATELET SLIDE REVIEW: ADEQUATE
PMV BLD AUTO: 7.9 FL (ref 5–10.5)
POLYCHROMASIA: ABNORMAL
POTASSIUM SERPL-SCNC: 4.9 MMOL/L (ref 3.5–5.1)
RBC # BLD: 3.7 M/UL (ref 4–5.2)
SLIDE REVIEW: ABNORMAL
SODIUM BLD-SCNC: 129 MMOL/L (ref 136–145)
TOXIC GRANULATION: PRESENT
WBC # BLD: 12.2 K/UL (ref 4–11)

## 2021-09-15 PROCEDURE — 6370000000 HC RX 637 (ALT 250 FOR IP): Performed by: INTERNAL MEDICINE

## 2021-09-15 PROCEDURE — 2500000003 HC RX 250 WO HCPCS: Performed by: INTERNAL MEDICINE

## 2021-09-15 PROCEDURE — 80069 RENAL FUNCTION PANEL: CPT

## 2021-09-15 PROCEDURE — 85025 COMPLETE CBC W/AUTO DIFF WBC: CPT

## 2021-09-15 PROCEDURE — 99231 SBSQ HOSP IP/OBS SF/LOW 25: CPT | Performed by: SURGERY

## 2021-09-15 PROCEDURE — 99239 HOSP IP/OBS DSCHRG MGMT >30: CPT | Performed by: INTERNAL MEDICINE

## 2021-09-15 PROCEDURE — 6360000002 HC RX W HCPCS: Performed by: INTERNAL MEDICINE

## 2021-09-15 PROCEDURE — 2580000003 HC RX 258: Performed by: INTERNAL MEDICINE

## 2021-09-15 PROCEDURE — 36415 COLL VENOUS BLD VENIPUNCTURE: CPT

## 2021-09-15 PROCEDURE — 83735 ASSAY OF MAGNESIUM: CPT

## 2021-09-15 PROCEDURE — 6360000002 HC RX W HCPCS

## 2021-09-15 PROCEDURE — C9113 INJ PANTOPRAZOLE SODIUM, VIA: HCPCS | Performed by: INTERNAL MEDICINE

## 2021-09-15 RX ORDER — DIGOXIN 125 MCG
125 TABLET ORAL DAILY
Qty: 30 TABLET | Refills: 0
Start: 2021-09-15

## 2021-09-15 RX ORDER — METRONIDAZOLE 500 MG/1
500 TABLET ORAL 3 TIMES DAILY
Qty: 24 TABLET | Refills: 0
Start: 2021-09-15 | End: 2021-09-23

## 2021-09-15 RX ORDER — MORPHINE SULFATE 2 MG/ML
1 INJECTION, SOLUTION INTRAMUSCULAR; INTRAVENOUS ONCE
Status: COMPLETED | OUTPATIENT
Start: 2021-09-15 | End: 2021-09-15

## 2021-09-15 RX ORDER — CIPROFLOXACIN 500 MG/1
500 TABLET, FILM COATED ORAL 2 TIMES DAILY
Qty: 10 TABLET | Refills: 0
Start: 2021-09-15 | End: 2021-09-20

## 2021-09-15 RX ORDER — OXYCODONE HYDROCHLORIDE AND ACETAMINOPHEN 5; 325 MG/1; MG/1
1 TABLET ORAL EVERY 4 HOURS PRN
Qty: 12 TABLET | Refills: 0 | Status: SHIPPED | OUTPATIENT
Start: 2021-09-15 | End: 2021-09-17

## 2021-09-15 RX ORDER — TIZANIDINE 4 MG/1
4 TABLET ORAL 2 TIMES DAILY
Status: DISCONTINUED | OUTPATIENT
Start: 2021-09-15 | End: 2021-09-15 | Stop reason: HOSPADM

## 2021-09-15 RX ORDER — MORPHINE SULFATE 2 MG/ML
INJECTION, SOLUTION INTRAMUSCULAR; INTRAVENOUS
Status: COMPLETED
Start: 2021-09-15 | End: 2021-09-15

## 2021-09-15 RX ADMIN — ROPINIROLE HYDROCHLORIDE 1 MG: 1 TABLET, FILM COATED ORAL at 13:09

## 2021-09-15 RX ADMIN — METRONIDAZOLE 500 MG: 500 INJECTION, SOLUTION INTRAVENOUS at 01:31

## 2021-09-15 RX ADMIN — TIZANIDINE 4 MG: 4 TABLET ORAL at 08:57

## 2021-09-15 RX ADMIN — METOPROLOL TARTRATE 25 MG: 25 TABLET, FILM COATED ORAL at 08:57

## 2021-09-15 RX ADMIN — METRONIDAZOLE 500 MG: 500 INJECTION, SOLUTION INTRAVENOUS at 09:56

## 2021-09-15 RX ADMIN — FUROSEMIDE 20 MG: 20 TABLET ORAL at 08:57

## 2021-09-15 RX ADMIN — PANTOPRAZOLE SODIUM 40 MG: 40 INJECTION, POWDER, FOR SOLUTION INTRAVENOUS at 08:58

## 2021-09-15 RX ADMIN — DOCUSATE SODIUM 50MG AND SENNOSIDES 8.6MG 2 TABLET: 8.6; 5 TABLET, FILM COATED ORAL at 05:53

## 2021-09-15 RX ADMIN — OXYCODONE HYDROCHLORIDE 10 MG: 5 TABLET ORAL at 08:56

## 2021-09-15 RX ADMIN — MORPHINE SULFATE 1 MG: 2 INJECTION, SOLUTION INTRAMUSCULAR; INTRAVENOUS at 12:13

## 2021-09-15 RX ADMIN — ESCITALOPRAM OXALATE 10 MG: 10 TABLET ORAL at 08:57

## 2021-09-15 RX ADMIN — OXYBUTYNIN CHLORIDE 5 MG: 5 TABLET ORAL at 13:09

## 2021-09-15 RX ADMIN — ENOXAPARIN SODIUM 40 MG: 40 INJECTION SUBCUTANEOUS at 08:58

## 2021-09-15 RX ADMIN — POLYETHYLENE GLYCOL (3350) 17 G: 17 POWDER, FOR SOLUTION ORAL at 08:58

## 2021-09-15 RX ADMIN — GABAPENTIN 300 MG: 300 CAPSULE ORAL at 13:09

## 2021-09-15 RX ADMIN — GABAPENTIN 300 MG: 300 CAPSULE ORAL at 08:57

## 2021-09-15 RX ADMIN — DIGOXIN 125 MCG: 125 TABLET ORAL at 08:57

## 2021-09-15 RX ADMIN — OXYBUTYNIN CHLORIDE 5 MG: 5 TABLET ORAL at 08:57

## 2021-09-15 RX ADMIN — CEFTRIAXONE SODIUM 1000 MG: 1 INJECTION, POWDER, FOR SOLUTION INTRAMUSCULAR; INTRAVENOUS at 09:00

## 2021-09-15 RX ADMIN — SPIRONOLACTONE 25 MG: 25 TABLET ORAL at 08:57

## 2021-09-15 RX ADMIN — OXYCODONE HYDROCHLORIDE 10 MG: 5 TABLET ORAL at 01:44

## 2021-09-15 RX ADMIN — ROPINIROLE HYDROCHLORIDE 1 MG: 1 TABLET, FILM COATED ORAL at 08:57

## 2021-09-15 ASSESSMENT — PAIN SCALES - GENERAL
PAINLEVEL_OUTOF10: 9
PAINLEVEL_OUTOF10: 10
PAINLEVEL_OUTOF10: 8

## 2021-09-15 ASSESSMENT — PAIN DESCRIPTION - PAIN TYPE
TYPE: SURGICAL PAIN
TYPE: SURGICAL PAIN

## 2021-09-15 ASSESSMENT — PAIN DESCRIPTION - PROGRESSION
CLINICAL_PROGRESSION: NOT CHANGED

## 2021-09-15 ASSESSMENT — PAIN DESCRIPTION - LOCATION
LOCATION: ABDOMEN
LOCATION: ABDOMEN

## 2021-09-15 ASSESSMENT — PAIN DESCRIPTION - ORIENTATION
ORIENTATION: RIGHT;LEFT
ORIENTATION: RIGHT;LOWER

## 2021-09-15 NOTE — FLOWSHEET NOTE
09/15/21 0845   Vital Signs   Temp 97 °F (36.1 °C)   Temp Source Oral   Pulse 70   Heart Rate Source Monitor   Resp 14   /66   BP Location Left upper arm   Patient Position Semi fowlers   Level of Consciousness Alert (0)   MEWS Score 0   Oxygen Therapy   SpO2 96 %   Pulse Oximeter Device Mode Continuous   O2 Device Nasal cannula   O2 Flow Rate (L/min) 2 L/min   Shift assessment complete, see doc flowsheet. Pt assisted up to [facility]. Pt with small hard bowel movement. Pt assisted back to bed. Pt with complaints of nausea- emesis x1. Pt also with complaints of 8/10 pain to groin. PRN Oxycodone administered, see MAR. Wound Vac to wound in groin folds. Pt educated on 1500 fluid restriction. Pt verbalized understanding. No needs expressed at this time. Pt repositioned to promote comfort. Will continue to monitor.

## 2021-09-15 NOTE — PROGRESS NOTES
Progress Note    Admit Date:  9/5/2021    63year old female with hypertension, hyperlipidemia, Depression presented to [facility] with c/o abscess to her buttock/groin. She was emergently taken to the OR by Dr. Lisa Cooper. Admitted to med-surg. Subjective:    Ms. Panfilo Middleton still in sinus rhythm. Feels better today            Objective:   /81   Pulse 60   Temp 97.4 °F (36.3 °C) (Oral)   Resp 18   Ht 5' 7\" (1.702 m) Comment: obtained on 9\6\21  Wt 232 lb 7 oz (105.4 kg)   SpO2 95%   BMI 36.40 kg/m²          Intake/Output Summary (Last 24 hours) at 9/15/2021 0749  Last data filed at 9/15/2021 0436  Gross per 24 hour   Intake 1020 ml   Output 1650 ml   Net -630 ml       Physical Exam:      General:  Obese middle aged female up in bed/somewhat sick appearing  Awake, alert and oriented. Appears to be not in any distress  Mucous Membranes:  Pink , anicteric  Neck: No JVD, no carotid bruit, no thyromegaly  Chest:  Clear to auscultation bilaterally, no added sounds  Cardiovascular: Regular rate and rhythm S1S2 heard, no murmurs or gallops  Abdomen:  Soft, undistended, non tender, no organomegaly, BS present  Large open wound to right Lower abd within the abdominal pannus extending from flank to right groin and vulvar region - wound vac in place   No drainage   Extremities: No edema or cyanosis. Distal pulses well felt  Neurological : grossly normal        Data:  CBC:   Recent Labs     09/13/21  0416 09/14/21  0437 09/15/21  0427   WBC 13.5* 14.0* 12.2*   HGB 10.7* 10.7* 10.4*   HCT 33.3* 34.1* 31.2*   MCV 84.5 85.5 84.4    391 352     BMP:   Recent Labs     09/13/21  0416 09/14/21  0437 09/15/21  0427   * 131* 129*   K 4.9 4.7 4.9   CL 95* 92* 92*   CO2 29 30 29   PHOS 3.7 4.4 4.8   BUN 15 21* 22*   CREATININE 0.6 0.6 0.7     LIVER PROFILE:   No results for input(s): AST, ALT, LIPASE, BILIDIR, BILITOT, ALKPHOS in the last 72 hours. Invalid input(s):   AMYLASE,  ALB    CULTURES    COVID/Influenza: not detected  Blood: NGTD  Fluid right groin: E. Coli, staph epidermidis,provotella,bacteroides    RADIOLOGY  CT ABDOMEN PELVIS WO CONTRAST Additional Contrast? None   Final Result   Probable cellulitis with punctate collections of subcutaneous air in the soft   tissues along the right gluteal region inferiorly and extending anteriorly   into the perineal region. This tracks into the subcutaneous fat along the   right pubic and right groin region and extends anterior and lateral to the   right hip along the abdominal wall laterally which is suspicious for probable   anaerobic infection and cellulitis with possible necrotizing fasciitis. Recommend surgical correlation. Slightly limited exam due to the lack of IV contrast with metallic prosthetic   devices in both hips causing obscuration of the lower pelvis. 10 mm left renal stone which is unchanged with no hydronephrosis. Mild bibasilar atelectasis or infiltrates extending into the lingula which is   more prominent. Previous cholecystectomy and mild hepatosplenomegaly which is unchanged. Mild constipation with no bowel obstruction. The findings were called to Dr. Zoraida Aguilar at 10:25 p.m. [employer] XR CHEST PORTABLE   Final Result   Mild cardiomegaly and probable mild pulmonary interstitial edema which is   less prominent from the prior study. Recommend short-term follow-up. ECHO  Left ventricular systolic function is normal with ejection fraction   estimated at 55-60 %. No regional wall motion abnormalities are noted. Sigmoid septum is present with normal thickness of remaining left   ventricular wall segments. Grade I diastolic dysfunction with normal filling pressure. The right atrium is mildly dilated. Mild tricuspid regurgitation. Normal systolic pulmonary artery pressure (SPAP) estimated at 35 mmHg (RA   pressure 8 mmHg).    7/29/2019 55%, mild LVH, A=LAE, av sclerosis, mild MR and TR SPAP 39mmHg. Assessment/Plan:    Sepsis  - sec to necrotizing fascitis of right lower abdominal wall   - Hypotension, lactic acidosis, leukocytosis  - POA  - BLood cx NGTD  - Wound cx as above. - IV Vanc, Clindamycin, Merrem - finished 8 days   bax changed to rocephin and flagyl day 2   -Her leukocytosis is improving    Abscess  Necrotizing fascitis of the right buttock/groin, lower abdomen  - taken to OR emergently by surgery team . S/p DEBRIDEMENT RIGHT GROIN, PERINEUM, R buttock, and RLQ abdominal wall  - continue IV Vanc, Merrem, Clindamycin. Wound cx with E. Coli, staph epidermidis, Prevotella  - PRN Oxycodone, Dilaudid   - wound care by surgery team - wound vac placed  -WBCs elevated   abx Flagyl and Rocephin based on sensitivities. Hypotension resolved  - due to sepsis ,resolved   - - resumed now as tolerated    LONNY resolved  - Creatinine 1.9 on admission  -Initially held Aldactone, Lasix. - given IVF's. Resolved. Creatinine 0.8  - resumed lasix and aldactone    Chronic hypoxic respiratory failure  ILD, pulmonary fibrosis   - has oxygen at home. States she has not worn it in over a year  - currently on 2 L. Wean as tolerated. - Duonebs PRN.   - F/w Dr. Adis Hopper onset Afibb with RVR back in sinus  - likely with acute illness  -See by cardiology. - continue ho start on p.o. dig. Continue beta-blocker.    -Continue anticoagulation  - check TSh-normal. , cycle troponins  -Echo as above   Now in sinus       Tobacco Dependence  -Recommended cessation  - nicotine patch ordered     H/o polysubstance abuse    GERD  - on PPI    RLS  - on Requip      Hyperlipidemia  - on Atorvastatin. CAD  - on statin, BB. Chronic diastolic CHF  - stable. No s/s decompensation  - on Lasix, Aldactone at home. Restarted     Mild hyponatremia  1500 ml FR     Obesity  - Body mass index is 36.4 kg/m². - Recommended weight loss    DVT Prophylaxis: Lovenox  Diet: ADULT DIET; Regular  Adult Oral Nutrition Supplement;  Low Calorie/High Protein Oral Supplement  Code Status: Full Code    precert pending     Shamika Bennett MD, 9/15/2021 7:49 AM

## 2021-09-15 NOTE — FLOWSHEET NOTE
09/14/21 2056   Vital Signs   Level of Consciousness Alert (0)   Pain Assessment   Pain Assessment 0-10   Pain Level 8   Patient's Stated Pain Goal 4   Pain Type Acute pain   Pain Location Groin   Pain Orientation Mid;Upper   Pain Descriptors Dull;Discomfort   Pain Frequency Intermittent   Pain Onset Gradual   Clinical Progression Not changed   Functional Pain Assessment Prevents or interferes with many active not passive activities   Non-Pharmaceutical Pain Intervention(s) Rest;Repositioned   Response to Pain Intervention Asleep with RR greater than 10     Shift assessment completed, see flow sheet. Patient is A&O x4. Medications administered. Patient denies further needs at this time. Call light within reach. Will continue to monitor.

## 2021-09-15 NOTE — PROGRESS NOTES
Discharge med reconciliation completed for d/c to [facility] [place] [facility]. Paulo Perez. Pt. Sitting up in the chair eating late lunch. Pt transferred to chair with standby assist of 2; gait steady with transfer.  [facility] Clinical

## 2021-09-15 NOTE — PROGRESS NOTES
9161  Gross per 24 hour   Intake 420 ml   Output 400 ml   Net 20 ml     GENERAL: alert, cooperative, no acute distress    I/O last 3 completed shifts: In: 1020 [P.O.:1020]  Out: 1650 [Urine:1650]  No intake/output data recorded. LABS  Recent Labs     09/15/21  0427   WBC 12.2*   HGB 10.4*   HCT 31.2*      *   K 4.9   CL 92*   CO2 29   BUN 22*   CREATININE 0.7   MG 2.20   PHOS 4.8   CALCIUM 9.1     CBC with Differential:    Lab Results   Component Value Date    WBC 12.2 09/15/2021    RBC 3.70 09/15/2021    HGB 10.4 09/15/2021    HCT 31.2 09/15/2021     09/15/2021    MCV 84.4 09/15/2021    MCH 28.1 09/15/2021    MCHC 33.3 09/15/2021    RDW 17.6 09/15/2021    NRBC 1 09/09/2021    SEGSPCT 69.5 09/15/2012    BANDSPCT 2 09/15/2021    LYMPHOPCT 24.0 09/15/2021    MONOPCT 5.0 09/15/2021    MYELOPCT 1 09/13/2021    BASOPCT 0.0 09/15/2021    MONOSABS 0.6 09/15/2021    LYMPHSABS 2.9 09/15/2021    EOSABS 0.7 09/15/2021    BASOSABS 0.0 09/15/2021    DIFFTYPE Auto 09/15/2012     CMP:    Lab Results   Component Value Date     09/15/2021    K 4.9 09/15/2021    K 3.7 03/27/2021    CL 92 09/15/2021    CO2 29 09/15/2021    BUN 22 09/15/2021    CREATININE 0.7 09/15/2021    GFRAA >60 09/15/2021    GFRAA >60 09/15/2012    AGRATIO 0.6 09/05/2021    LABGLOM >60 09/15/2021    GLUCOSE 130 09/15/2021    PROT 6.9 09/05/2021    PROT 7.5 09/15/2012    LABALBU 2.4 09/15/2021    CALCIUM 9.1 09/15/2021    BILITOT 0.4 09/05/2021    ALKPHOS 137 09/05/2021    AST 20 09/05/2021    ALT 13 09/05/2021         Amber Danna Crigler, APRN - CNP  Electronically signed 9/15/2021 at 12:48 PM     Patient seen and examined. I agree with the assessment and plan from CARL Lacy. Wound clean. Continue VAC  OK for LTAC.     Cora Rose MD

## 2021-09-15 NOTE — DISCHARGE INSTR - COC
Continuity of Care Form    Patient Name: Fatuma Ocampo   :  1960  MRN:  [id number]    Admit date:  2021  Discharge date:  9/15/2021    Code Status Order: Full Code   Advance Directives:      Admitting Physician:  Kimber Contreras MD  PCP: Mei Landers    Discharging Nurse: Sudeep Reyes [facility] Unit/Room#: /7302-80  Discharging Unit Phone Number: [phone]    Emergency Contact:   Extended Emergency Contact Information  Primary Emergency Contact: [facility]  Home Phone: [phone]  Mobile Phone: [phone]  Relation: Child  Secondary Emergency Contact: Howard Lao  Home Phone: [phone]  Mobile Phone: [phone]  Relation: Brother/Sister    Past Surgical History:  Past Surgical History:   Procedure Laterality Date    BRONCHOSCOPY  2019    BRONCHOSCOPY N/A 2019    BRONCHOSCOPY ALVEOLAR LAVAGE performed by Nicanor Hopkins MD at [place] [address]      FOOT SURGERY      GROIN SURGERY Right 2021    DEBRIDEMENT OF NECROTIZING SOFT TISSUE INFECTION, RIGHT BUTTOCK, GROIN, RIGHT LOWER QUADRANT, PERINEUM.  performed by Denice Carty MD at [address] [place] [address]      OTHER SURGICAL HISTORY      DEBRIDEMENT OF NECROTIZING SOFT TISSUE INFECTION, RIGHT BUTTOCK, GROIN, RIGHT LOWER QUADRANT, PERINEUM.     TOTAL HIP ARTHROPLASTY Right 2020       Immunization History:   Immunization History   Administered Date(s) Administered    Influenza Nasal 2013    Influenza Vaccine, unspecified formulation 2012, 10/06/2014, 2016    Influenza Virus Vaccine 2012, 2013, 10/06/2014, 10/29/2015, 2016, 10/09/2017, 2018    Influenza Whole 2013    Pneumococcal Conjugate 7-valent (Prevnar7) 2013    Pneumococcal Polysaccharide ([id number]) 2018    Zoster Live (Zostavax) 03/15/2017       Active Problems:  Patient Active Problem List   Diagnosis Code    Hypertension I10    Hyperlipidemia E78.5    Depression F32.9    Class 2 obesity due to excess calories with body mass index (BMI) of 39.0 to 39.9 in adult E66.09, Z68.39    Acute pulmonary edema (HCC) J81.0    Acute congestive heart failure ([facility]) I50.9    Iron deficiency anemia D50.9    Acute respiratory failure with hypoxia ([facility]) J96.01    COPD exacerbation ([facility]) J44.1    Respiratory alkalosis E87.3    Lactic acidosis E87.2    Nausea & vomiting R11.2    Hyperventilation R06.4    Hypokalemia E87.6    Hypoxia R09.02    Acute hypoxemic respiratory failure ([facility]) J96.01    Abnormal chest CT R93.89    Chronic diastolic congestive heart failure ([facility]) I50.32    Abnormal CT of the chest R93.89    COPD with acute exacerbation ([facility]) J44.1    Tobacco abuse Z72.0    Exercise hypoxemia R09.02    Pneumonia due to organism J18.9    Status post total replacement of right hip Z96.641    CHF (congestive heart failure), NYHA class I, chronic, systolic ([facility]) K15.24    Necrotizing soft tissue infection M79.89    Severe sepsis ([facility]) A41.9, R65.20    Necrotizing fasciitis ([facility]) M72.6    Acute kidney injury ([facility] [address] N17.9    ILD (interstitial lung disease) ([facility]) J84.9    Chronic hypoxemic respiratory failure ([facility]) J96.11    Sepsis due to Escherichia coli with acute renal failure without septic shock ([facility]) A41.51, R65.20, N17.9    Atrial fibrillation with rapid ventricular response ([facility]) I48.91    Severe protein-calorie malnutrition ([facility]) E43       Isolation/Infection:   Isolation            No Isolation          Patient Infection Status       Infection Onset Added Last Indicated Last Indicated By Review Planned Expiration Resolved Resolved By    None active    Resolved    COVID-19 Rule Out 09/05/21 09/05/21 09/05/21 COVID-19 & Influenza Combo (Ordered)   09/05/21 Rule-Out Test Resulted    COVID-19 Rule Out 03/27/21 03/27/21 03/27/21 COVID-19, Rapid (Ordered)   03/27/21 Odor None 09/10/21 2128   Number of days: 6        Elimination:  Continence:   · Bowel: Yes  · Bladder: Yes  Urinary Catheter: None   Colostomy/Ileostomy/Ileal Conduit: No       Date of Last BM: 9/15/21    Intake/Output Summary (Last 24 hours) at 9/15/2021 1126  Last data filed at 9/15/2021 0436  Gross per 24 hour   Intake 780 ml   Output 400 ml   Net 380 ml     I/O last 3 completed shifts: In: 1020 [P.O.:1020]  Out: [address] [Urine:1650]    Safety Concerns: At Risk for Falls    Impairments/Disabilities:      None    Nutrition Therapy:  Current Nutrition Therapy:   - Oral Diet:  regular with oral replacements    Routes of Feeding: Oral  Liquids: Thin Liquids  Daily Fluid Restriction: yes - amount 1500  Last Modified Barium Swallow with Video (Video Swallowing Test): not done    Treatments at the Time of Hospital Discharge:   Respiratory Treatments: 2  Oxygen Therapy:  is on oxygen at 2 L/min per nasal cannula. Ventilator:    - No ventilator support    Rehab Therapies: Physical Therapy, Occupational Therapy and SN/wound care  Weight Bearing Status/Restrictions: No weight bearing restirctions  Other Medical Equipment (for information only, NOT a DME order):  walker  Other Treatments:     Patient's personal belongings (please select all that are sent with patient):  Ana CEBALLOS SIGNATURE:  Electronically signed by Hoda Downey RN on 9/15/21 at 1:55 PM EDT    CASE MANAGEMENT/SOCIAL WORK SECTION    Inpatient Status Date: 9/6/2021    Readmission Risk Assessment Score:  Readmission Risk              Risk of Unplanned Readmission:  18           Discharging to Facility/ Agency   Name: [facility], [place]  Address: [address] [place] [address] 411, [address] [place] [address]  Fax [phone]  ·       / signature: Electronically signed by Attila Aparicio RN on 9/15/21 at 1:31 PM EDT    PHYSICIAN SECTION    Prognosis: Fair    Condition at Discharge: Stable    Rehab Potential (if transferring to Rehab): Fair    Recommended Labs or Other Treatments After Discharge: Please place wound vac per protocol to pt right groin wound. 125 mmHg. Change MWF. Please follow-up with Dr. Baltazar Mathew in office in 1 week. Call [phone] to make your appointment. Please call Dr. Maggie Bowman office and or return to the hospital with persistent fever greater than 100, increased redness/swelling/pain in the right groin/wound area. CBC with diff 9/16. Physician Certification: I certify the above information and transfer of Garima Beyer  is necessary for the continuing treatment of the diagnosis listed and that she requires [facility] for less 30 days.      Update Admission H&P: No change in H&P    PHYSICIAN SIGNATURE:  Electronically signed by Eric Morales MD on 9/15/21 at 11:26 AM EDT

## 2021-09-15 NOTE — PROGRESS NOTES
Pt off the unit with all personal belongings with [facility]. Report called to Pebbles Chairez RN at [employer].

## 2021-09-15 NOTE — FLOWSHEET NOTE
Patient resting in bed. No signs of distress. C/o RLQ abd pain 9/10 PRN Oxy given. Patient incontinent of urine, large amount, depends changed, [place]-area cleansed, new pur-wick placed, suction on.     09/15/21 0132   Vital Signs   Temp 97.4 °F (36.3 °C)   Temp Source Oral   Pulse 60   Resp 18   /81   SpO2 95 %   Intake   P.O. 180 mL   CPOT (Critical Patient)   O2 Device Nasal cannula   Urine Assessment   Incontinence Yes   Urine Color Yellow/straw   Urine Appearance Clear   Unmeasured Output   Urine Occurrence 1  (large amount. depends changed)   Stool Occurrence 0   Emesis Occurrence 0    Call light and bedside table is easy reach.

## 2021-09-15 NOTE — PROGRESS NOTES
Physician Progress Note      Tonny York  [facility] #:                  [id number]  :                       1960  ADMIT DATE:       2021 6:42 PM  [address] [place] DATE:        9/15/2021 2:20 PM  RESPONDING  PROVIDER #:        Rosette Null MD          QUERY TEXT:    Patient admitted with necrotizing fasciitis. Per Op note dated  , \" Wide   excisional debridement of necrotizing soft  tissue infection of the right buttock, right perineum, right groin, and right   lower quadrant abdominal wall\" was documented. To accurately reflect the   procedure performed please document if debridement was excisional or   nonexcisional and the deepest depth of tissue removed as down to and   including:     The medical record reflects the following:  Risk Factors: necrotizing fasciitis  Clinical Indicators: \"Wide excisional debridement of necrotizing soft  tissue infection of the right buttock, right perineum, right groin, and right   lower quadrant abdominal wall\" per OP note  Treatment: excisional debridement, General Surgery consult, IV Vancomycin,   Merrem, Clindamycin, supportive care    Thank you,  Chalo Martínez RN, [facility]  [phone]  Options provided:  -- Excisional debridement of subcutaneous tissue of right buttock, right   perineum, right groin, and right lower quadrant abdominal wall  -- Excisional debridement of fascia of right buttock, right perineum, right   groin, and right lower quadrant abdominal wall  -- Excisional debridement of muscle of right buttock, right perineum, right   groin, and right lower quadrant abdominal wall  -- Other - I will add my own diagnosis  -- Disagree - Not applicable / Not valid  -- Disagree - Clinically unable to determine / Unknown  -- Refer to Clinical Documentation Reviewer    PROVIDER RESPONSE TEXT:    The debridement was of necrotizing soft tissue infection that extended down to   and included excision of infected tissue in the subcutaneous tissues, fascia   and muscle of the R groin, R buttock, R perineum and R lower quadrant   abdominal wall.     Query created by: Kerby Lennox on 9/15/2021 1:04 PM      Electronically signed by:  Joann Honeycutt MD 9/15/2021 5:01 PM

## 2021-09-18 NOTE — DISCHARGE SUMMARY
Name:  Nohemy Crump  Room:  /3252-65  MRN:    [id number]    Discharge Summary      This discharge summary is in conjunction with a complete physical exam done on the day of discharge. Discharging Physician: Miko Jones MD       Admit: 9/5/2021  Discharge:  9/15/2021    HPI taken from admission H&P:      The patient is a 63 y.o. female with PMH below, presents with boil on her butt which started draining but still spreading to butt/groin, region painful. Pt reports that she started having pain of her R buttock about a week ago. She noted a boil on her R buttock at the time. She says she tried using Neosporin on it but it continued to worsen/spread and become more painful. She says it was originally on her R buttock but now feels darline it is more near her \"coochie. \"  She says the area has been draining. Entire buttock, hip groin is severely painful. She was emergently taken to the OR by Dr. Royal Rico.       Diagnoses this Admission and Hospital Course     Sepsis  - sec to necrotizing fascitis of right lower abdominal wall   - Hypotension, lactic acidosis, leukocytosis  - POA  - BLood cx NGTD  - Wound cx as above. - IV Vanc, Clindamycin, Merrem - finished 8 days   bax changed to rocephin and flagyl day 2   -Her leukocytosis is improving       Abscess  Necrotizing fascitis of the right buttock/groin, lower abdomen  - taken to OR emergently by surgery team . S/p DEBRIDEMENT RIGHT GROIN, PERINEUM, R buttock, and RLQ abdominal wall  - continue IV Vanc, Merrem, Clindamycin. Wound cx with E. Coli, staph epidermidis, Prevotella  - PRN Oxycodone, Dilaudid   - wound care by surgery team - wound vac placed  -WBCs elevated   abx Flagyl and Rocephin based on sensitivities. Hypotension resolved  - due to sepsis ,resolved   - - resumed now as tolerated     LONNY resolved  - Creatinine 1.9 on admission  -Initially held Aldactone, Lasix. - given IVF's. Resolved.   Creatinine 0.8  - resumed lasix and aldactone     Chronic hypoxic respiratory failure  ILD, pulmonary fibrosis   - has oxygen at home. States she has not worn it in over a year  - currently on 2 L. Wean as tolerated. - Duonebs PRN.   - F/w Dr. Armstrong Rota onset Afibb with RVR back in sinus  - likely with acute illness  -See by cardiology. - continue ho start on p.o. dig. Continue beta-blocker.    -Continue anticoagulation  - check TSh-normal. , cycle troponins  -Echo as above   Now in sinus       Tobacco Dependence  -Recommended cessation  - nicotine patch ordered      H/o polysubstance abuse    GERD  - on PPI     RLS  - on Requip     Hyperlipidemia  - on Atorvastatin. CAD  - on statin, BB.      Chronic diastolic CHF  - stable. No s/s decompensation  - on Lasix, Aldactone at home. Restarted      Mild hyponatremia  1500 ml FR   - resolved      Obesity  - Body mass index is 36.4 kg/m². - Recommended weight loss    Procedures (Please Review Full Report for Details)  Wide Excisional debridement of necrotizing soft tissue infection of the right buttock right perineum, right groin and RLQ abdominal wall    Consults    General Surgery  Cardiology  Wound Care  Pulmonology    Physical Exam at Discharge:    /66   Pulse 70   Temp 97 °F (36.1 °C) (Oral)   Resp 14   Ht 5' 7\" (1.702 m) Comment: obtained on 9\6\21  Wt 232 lb 7 oz (105.4 kg)   SpO2 96%   BMI 36.40 kg/m²   General:  Obese middle aged female up in bed/somewhat sick appearing  Awake, alert and oriented.  Appears to be not in any distress  Mucous Membranes:  Pink , anicteric  Neck: No JVD, no carotid bruit, no thyromegaly  Chest:  Clear to auscultation bilaterally, no added sounds  Cardiovascular: Regular rate and rhythm S1S2 heard, no murmurs or gallops  Abdomen:  Soft, undistended, non tender, no organomegaly, BS present  Large open wound to right Lower abd within the abdominal pannus extending from flank to right groin and vulvar region - wound vac in place   No drainage Extremities: No edema or cyanosis. Distal pulses well felt  Neurological : grossly normal       CULTURES    Blood cx x2: NGTD    Anerobic cx: Bacteroides ovatus/xylanisolvens - heavy growth, Prevotella species heavy growth    Body Fluid cx - light growth E coli, light growth Staph epidermidis    SARS-COV-2 - Rapid PCR: Not detected    Rapid Influenza A/B: negative      RADIOLOGY    CT ABDOMEN PELVIS WO CONTRAST Additional Contrast? None   Final Result   Probable cellulitis with punctate collections of subcutaneous air in the soft   tissues along the right gluteal region inferiorly and extending anteriorly   into the perineal region. This tracks into the subcutaneous fat along the   right pubic and right groin region and extends anterior and lateral to the   right hip along the abdominal wall laterally which is suspicious for probable   anaerobic infection and cellulitis with possible necrotizing fasciitis. Recommend surgical correlation. Slightly limited exam due to the lack of IV contrast with metallic prosthetic   devices in both hips causing obscuration of the lower pelvis. 10 mm left renal stone which is unchanged with no hydronephrosis. Mild bibasilar atelectasis or infiltrates extending into the lingula which is   more prominent. Previous cholecystectomy and mild hepatosplenomegaly which is unchanged. Mild constipation with no bowel obstruction. The findings were called to Dr. Hafsa Augustin at 10:25 p.m. [facility] Confer XR CHEST PORTABLE   Final Result   Mild cardiomegaly and probable mild pulmonary interstitial edema which is   less prominent from the prior study. Recommend short-term follow-up. TTE 9/11/2021  Summary   Left ventricular systolic function is normal with ejection fraction   estimated at 55-60 %. No regional wall motion abnormalities are noted. Sigmoid septum is present with normal thickness of remaining left   ventricular wall segments.    Grade I diastolic dysfunction with normal filling pressure. The right atrium is mildly dilated. Mild tricuspid regurgitation. Normal systolic pulmonary artery pressure (SPAP) estimated at 35 mmHg (RA   pressure 8 mmHg). 7/29/2019 55%, mild LVH, A=LAE, av sclerosis, mild MR and TR SPAP 39mmHg.     Discharge Medications     Medication List      START taking these medications    ciprofloxacin 500 MG tablet  Commonly known as: CIPRO  Take 1 tablet by mouth 2 times daily for 5 days     digoxin 125 MCG tablet  Commonly known as: LANOXIN  Take 1 tablet by mouth daily     metroNIDAZOLE 500 MG tablet  Commonly known as: FLAGYL  Take 1 tablet by mouth 3 times daily for 8 days        CHANGE how you take these medications    metoprolol tartrate 25 MG tablet  Commonly known as: LOPRESSOR  Take 1 tablet by mouth 2 times daily  What changed:   · medication strength  · how much to take        CONTINUE taking these medications    acetaminophen 500 MG tablet  Commonly known as: TYLENOL  Take 1 tablet by mouth 4 times daily as needed for Pain     * albuterol sulfate  (90 Base) MCG/ACT inhaler  Inhale 2 puffs into the lungs 4 times daily     * albuterol (2.5 MG/3ML) 0.083% nebulizer solution  Commonly known as: PROVENTIL  Take 3 mLs by nebulization every 6 hours as needed for Wheezing     atorvastatin 20 MG tablet  Commonly known as: LIPITOR     escitalopram 10 MG tablet  Commonly known as: LEXAPRO     furosemide 40 MG tablet  Commonly known as: LASIX  Take 1 tablet by mouth daily     gabapentin 300 MG capsule  Commonly known as: NEURONTIN     ipratropium-albuterol 0.5-2.5 (3) MG/3ML Soln nebulizer solution  Commonly known as: DUONEB  Inhale 3 mLs into the lungs every 4 hours as needed for Shortness of Breath     oxybutynin 5 MG tablet  Commonly known as: DITROPAN     pantoprazole 40 MG tablet  Commonly known as: PROTONIX     rOPINIRole 2 MG tablet  Commonly known as: REQUIP     spironolactone 25 MG tablet  Commonly known as: ALDACTONE  Take 1 tablet by mouth daily         * This list has 2 medication(s) that are the same as other medications prescribed for you. Read the directions carefully, and ask your doctor or other care provider to review them with you. STOP taking these medications    omeprazole 20 MG delayed release capsule  Commonly known as: PRILOSEC     oxyCODONE-acetaminophen 5-325 MG per tablet  Commonly known as: PERCOCET     tiZANidine 4 MG tablet  Commonly known as: Nicolas Tomlin your doctor about these medications    oxyCODONE-acetaminophen 5-325 MG per tablet  Commonly known as: PERCOCET  Take 1 tablet by mouth every 4 hours as needed for Pain for up to 2 days. Ask about: Should I take this medication? Where to Get Your Medications      You can get these medications from any pharmacy    Bring a paper prescription for each of these medications  · oxyCODONE-acetaminophen 5-325 MG per tablet     Information about where to get these medications is not yet available    Ask your nurse or doctor about these medications  · ciprofloxacin 500 MG tablet  · digoxin 125 MCG tablet  · metoprolol tartrate 25 MG tablet  · metroNIDAZOLE 500 MG tablet           Discharged in stable condition to SNF. Follow Up: Follow up with physician at [facility]. Total time spent on discharge is 35 minutes    QUINTON Brothers.

## 2021-10-26 DIAGNOSIS — J84.9 ILD (INTERSTITIAL LUNG DISEASE) (HCC): Primary | ICD-10-CM

## 2021-10-26 DIAGNOSIS — J45.20 MILD INTERMITTENT REACTIVE AIRWAY DISEASE WITHOUT COMPLICATION: ICD-10-CM

## 2021-10-26 RX ORDER — IPRATROPIUM BROMIDE AND ALBUTEROL SULFATE 2.5; .5 MG/3ML; MG/3ML
SOLUTION RESPIRATORY (INHALATION)
Qty: 360 ML | Refills: 5 | Status: SHIPPED | OUTPATIENT
Start: 2021-10-26

## 2021-12-29 ENCOUNTER — TELEPHONE (OUTPATIENT)
Dept: PULMONOLOGY | Age: 61
End: 2021-12-29

## 2021-12-29 NOTE — TELEPHONE ENCOUNTER
Patient did not show for 6 mo fu PFT (did not complete PFT) appointment  with Dr Faustina Kwok on 12/29/21    Same Day Cancellation: No    Patient rescheduled:  No    New appointment:   na  Patient was also no show on:     LOV    ASSESSMENT:06/30/21  · Interstitial lung disease: abnormal CT chest with groundglass opacities which have waxed and waned, there is stable pulmonary fibrosis   · Pulmonary Nodules 5 mm or smaller, stable July 2019 to June 2020, most likely perifissural lymph nodes  · Mild intermittent reactive airway disease  · H/O Polysubstance abuse  · RLS - less severe with Wellbutrin  · Chronic CHF  · CAD  · Ongoing tobacco abuse, working on quitting, 35 pack year history      PLAN:  · Supplemental oxygen to maintain SaO2 >92%   · Inhaled bronchodilators PRN, neb and albuterol.     · Tobacco cessation has been advised  · Repeat PFT for Dec 2021, see me after  · LDCT for LCS in June 2022  · S/P COVID vaccine

## 2022-04-13 ENCOUNTER — TELEPHONE (OUTPATIENT)
Dept: PULMONOLOGY | Age: 62
End: 2022-04-13

## 2022-04-13 NOTE — TELEPHONE ENCOUNTER
Patient did not show for 6 mo fu PFT appointment  with Dr Bill Vázquez on 4/13/22    Same Day Cancellation: Yes    Patient rescheduled:  NA    New appointment:     Patient was also no show on: 12/29/2021    LOV 06/30/2021      ASSESSMENT:  · Interstitial lung disease: abnormal CT chest with groundglass opacities which have waxed and waned, there is stable pulmonary fibrosis   · Pulmonary Nodules 5 mm or smaller, stable July 2019 to June 2020, most likely perifissural lymph nodes  · Mild intermittent reactive airway disease  · H/O Polysubstance abuse  · RLS - less severe with Wellbutrin  · Chronic CHF  · CAD  · Ongoing tobacco abuse, working on quitting, 35 pack year history      PLAN:  · Supplemental oxygen to maintain SaO2 >92%   · Inhaled bronchodilators PRN, neb and albuterol.     · Tobacco cessation has been advised  · Repeat PFT for Dec 2021, see me after  · LDCT for LCS in June 2022  · S/P COVID vaccine

## 2022-07-01 ENCOUNTER — TELEPHONE (OUTPATIENT)
Dept: PULMONOLOGY | Age: 62
End: 2022-07-01

## 2022-07-01 NOTE — TELEPHONE ENCOUNTER
Patient cancelled appointment on 7/5/22 with Dr. Zulema Fitzpatrick for office visit     Reason: had total knee replacement on 6/27/22, also needs ct and pft rescheduled, same day. Patient did not reschedule appointment. Appointment rescheduled for na. Last OV    ASSESSMENT:  · Interstitial lung disease: abnormal CT chest with groundglass opacities which have waxed and waned, there is stable pulmonary fibrosis   · Pulmonary Nodules 5 mm or smaller, stable July 2019 to June 2020, most likely perifissural lymph nodes  · Mild intermittent reactive airway disease  · H/O Polysubstance abuse  · RLS - less severe with Wellbutrin  · Chronic CHF  · CAD  · Ongoing tobacco abuse, working on quitting, 35 pack year history      PLAN:  · Supplemental oxygen to maintain SaO2 >92%   · Inhaled bronchodilators PRN, neb and albuterol.     · Tobacco cessation has been advised  · Repeat PFT for Dec 2021, see me after  · LDCT for LCS in June 2022  · S/P COVID vaccine

## 2022-08-05 ENCOUNTER — TELEPHONE (OUTPATIENT)
Dept: PULMONOLOGY | Age: 62
End: 2022-08-05

## 2022-08-05 DIAGNOSIS — J84.9 ILD (INTERSTITIAL LUNG DISEASE) (HCC): ICD-10-CM

## 2022-08-05 DIAGNOSIS — Z87.891 PERSONAL HISTORY OF TOBACCO USE: Primary | ICD-10-CM

## 2022-08-05 DIAGNOSIS — J84.10 PULMONARY FIBROSIS (HCC): ICD-10-CM

## 2022-08-05 DIAGNOSIS — R91.1 PULMONARY NODULE: ICD-10-CM

## 2022-08-08 ENCOUNTER — TELEPHONE (OUTPATIENT)
Dept: PULMONOLOGY | Age: 62
End: 2022-08-08

## 2022-08-08 NOTE — TELEPHONE ENCOUNTER
Patient cancelled appointment on 8/9/22 with Dr. Keith Ortiz for CT/PFT f/u. Reason: pt has had a lot going on recently and can't come (other health issues, complications after surgery)    Patient did reschedule appointment. Appointment rescheduled for 10/12/22 (first available day with same day testing, pt declined to schedule separate days due to distance). Last OV 6/30/21:    ASSESSMENT:  Interstitial lung disease: abnormal CT chest with groundglass opacities which have waxed and waned, there is stable pulmonary fibrosis  Pulmonary Nodules 5 mm or smaller, stable July 2019 to June 2020, most likely perifissural lymph nodes  Mild intermittent reactive airway disease  H/O Polysubstance abuse  RLS - less severe with Wellbutrin  Chronic CHF  CAD  Ongoing tobacco abuse, working on quitting, 35 pack year history     PLAN:  Supplemental oxygen to maintain SaO2 >92%  Inhaled bronchodilators PRN, neb and albuterol.     Tobacco cessation has been advised  Repeat PFT for Dec 2021, see me after  LDCT for LCS in June 2022  S/P COVID vaccine

## 2022-10-12 ENCOUNTER — OFFICE VISIT (OUTPATIENT)
Dept: PULMONOLOGY | Age: 62
End: 2022-10-12
Payer: MEDICARE

## 2022-10-12 ENCOUNTER — HOSPITAL ENCOUNTER (OUTPATIENT)
Dept: CT IMAGING | Age: 62
Discharge: HOME | End: 2022-10-12
Payer: MEDICARE

## 2022-10-12 ENCOUNTER — HOSPITAL ENCOUNTER (OUTPATIENT)
Dept: PULMONOLOGY | Age: 62
Discharge: HOME | End: 2022-10-12
Payer: MEDICARE

## 2022-10-12 VITALS — OXYGEN SATURATION: 98 %

## 2022-10-12 VITALS
SYSTOLIC BLOOD PRESSURE: 128 MMHG | DIASTOLIC BLOOD PRESSURE: 68 MMHG | WEIGHT: 202 LBS | BODY MASS INDEX: 31.71 KG/M2 | HEIGHT: 67 IN | HEART RATE: 68 BPM | OXYGEN SATURATION: 98 %

## 2022-10-12 DIAGNOSIS — J84.9 ILD (INTERSTITIAL LUNG DISEASE) (HCC): ICD-10-CM

## 2022-10-12 DIAGNOSIS — Z87.891 PERSONAL HISTORY OF TOBACCO USE: Primary | ICD-10-CM

## 2022-10-12 DIAGNOSIS — Z87.891 PERSONAL HISTORY OF TOBACCO USE: ICD-10-CM

## 2022-10-12 DIAGNOSIS — J84.10 PULMONARY FIBROSIS (HCC): ICD-10-CM

## 2022-10-12 DIAGNOSIS — R91.1 PULMONARY NODULE: ICD-10-CM

## 2022-10-12 LAB
DLCO %PRED: 46 %
DLCO PRED: NORMAL
DLCO/VA %PRED: NORMAL
DLCO/VA PRED: NORMAL
DLCO/VA: NORMAL
DLCO: NORMAL
EXPIRATORY TIME-POST: NORMAL
EXPIRATORY TIME: NORMAL
FEF 25-75% %CHNG: NORMAL
FEF 25-75% %PRED-POST: NORMAL
FEF 25-75% %PRED-PRE: NORMAL
FEF 25-75% PRED: NORMAL
FEF 25-75%-POST: NORMAL
FEF 25-75%-PRE: NORMAL
FEV1 %PRED-POST: 84 %
FEV1 %PRED-PRE: 83 %
FEV1 PRED: NORMAL
FEV1-POST: NORMAL
FEV1-PRE: NORMAL
FEV1/FVC %PRED-POST: NORMAL
FEV1/FVC %PRED-PRE: NORMAL
FEV1/FVC PRED: NORMAL
FEV1/FVC-POST: 78 %
FEV1/FVC-PRE: 77 %
FVC %PRED-POST: NORMAL
FVC %PRED-PRE: NORMAL
FVC PRED: NORMAL
FVC-POST: NORMAL
FVC-PRE: NORMAL
GAW %PRED: NORMAL
GAW PRED: NORMAL
GAW: NORMAL
IC %PRED: NORMAL
IC PRED: NORMAL
IC: NORMAL
MEP: NORMAL
MIP: NORMAL
MVV %PRED-PRE: NORMAL
MVV PRED: NORMAL
MVV-PRE: NORMAL
PEF %PRED-POST: NORMAL
PEF %PRED-PRE: NORMAL
PEF PRED: NORMAL
PEF%CHNG: NORMAL
PEF-POST: NORMAL
PEF-PRE: NORMAL
RAW %PRED: NORMAL
RAW PRED: NORMAL
RAW: NORMAL
RV %PRED: NORMAL
RV PRED: NORMAL
RV: NORMAL
SVC %PRED: NORMAL
SVC PRED: NORMAL
SVC: NORMAL
TLC %PRED: 75 %
TLC PRED: NORMAL
TLC: NORMAL
VA %PRED: NORMAL
VA PRED: NORMAL
VA: NORMAL
VTG %PRED: NORMAL
VTG PRED: NORMAL
VTG: NORMAL

## 2022-10-12 PROCEDURE — 94760 N-INVAS EAR/PLS OXIMETRY 1: CPT

## 2022-10-12 PROCEDURE — 4004F PT TOBACCO SCREEN RCVD TLK: CPT | Performed by: INTERNAL MEDICINE

## 2022-10-12 PROCEDURE — G8417 CALC BMI ABV UP PARAM F/U: HCPCS | Performed by: INTERNAL MEDICINE

## 2022-10-12 PROCEDURE — 3017F COLORECTAL CA SCREEN DOC REV: CPT | Performed by: INTERNAL MEDICINE

## 2022-10-12 PROCEDURE — 94060 EVALUATION OF WHEEZING: CPT | Performed by: INTERNAL MEDICINE

## 2022-10-12 PROCEDURE — 6370000000 HC RX 637 (ALT 250 FOR IP)

## 2022-10-12 PROCEDURE — G8484 FLU IMMUNIZE NO ADMIN: HCPCS | Performed by: INTERNAL MEDICINE

## 2022-10-12 PROCEDURE — 94060 EVALUATION OF WHEEZING: CPT

## 2022-10-12 PROCEDURE — 99214 OFFICE O/P EST MOD 30 MIN: CPT | Performed by: INTERNAL MEDICINE

## 2022-10-12 PROCEDURE — 94729 DIFFUSING CAPACITY: CPT | Performed by: INTERNAL MEDICINE

## 2022-10-12 PROCEDURE — 94640 AIRWAY INHALATION TREATMENT: CPT

## 2022-10-12 PROCEDURE — G8427 DOCREV CUR MEDS BY ELIG CLIN: HCPCS | Performed by: INTERNAL MEDICINE

## 2022-10-12 PROCEDURE — 94726 PLETHYSMOGRAPHY LUNG VOLUMES: CPT

## 2022-10-12 PROCEDURE — 94726 PLETHYSMOGRAPHY LUNG VOLUMES: CPT | Performed by: INTERNAL MEDICINE

## 2022-10-12 PROCEDURE — 71271 CT THORAX LUNG CANCER SCR C-: CPT

## 2022-10-12 PROCEDURE — 94729 DIFFUSING CAPACITY: CPT

## 2022-10-12 RX ORDER — ALBUTEROL SULFATE 90 UG/1
4 AEROSOL, METERED RESPIRATORY (INHALATION) ONCE
Status: COMPLETED | OUTPATIENT
Start: 2022-10-12 | End: 2022-10-12

## 2022-10-12 RX ADMIN — Medication 4 PUFF: at 10:33

## 2022-10-12 ASSESSMENT — PULMONARY FUNCTION TESTS
FEV1_PERCENT_PREDICTED_POST: 84
FEV1/FVC_PRE: 77
FEV1/FVC_POST: 78
FEV1_PERCENT_PREDICTED_PRE: 83

## 2022-10-12 NOTE — PROGRESS NOTES
Pulmonary Follow-up Note  Chief Complaint: cough, shortness of breath   Referring Provider: hospital f/u    Interval history: nec fasciitis in Sept 2021, now doing well. Went back to using tobacco.  Had PFT and CT today. Dyspnea on exertion is stable. reports that she has been smoking cigarettes. She has a 35.00 pack-year smoking history. She has never used smokeless tobacco.     Review of Systems:    Physical Exam:  Blood pressure 128/68, pulse 68, height 5' 7\" (1.702 m), weight 202 lb (91.6 kg), SpO2 98 %, not currently breastfeeding. Constitutional:  No acute distress. HENT:  Oropharynx is clear and moist.   Neck: No tracheal deviation present. Cardiovascular: Normal heart sounds. No lower extremity edema. Pulmonary/Chest: No wheezes. No rhonchi. No rales. No decreased breath sounds. No accessory muscle usage or stridor. Musculoskeletal: No cyanosis. No clubbing. Skin: Skin is warm and dry. Psychiatric: Normal mood and affect. Neurologic: speech fluent, alert and oriented, strength symmetric          Data:   LDCT 10/12/22  Impression   1. Unchanged solid subcentimeter bilateral pulmonary nodules. 2. Unchanged chronic interstitial lung disease. HRCT 6/17/20  Scattered ground-glass opacities throughout the lungs bilaterally, new   compared to prior, favored to be postinflammatory-infectious. Early   pulmonary edema could also have a similar appearance.        Stable underlying pulmonary fibrosis       Stable small perifissural nodules, likely intrapulmonary lymph nodes by   location       PFT 10/2/19 FEV1 2.01 L 69% ratio 0.8 TLC 3.82 L 66%  DLCO 10.2 40%  PFT 6/17/20 FEV1 2.05 L 71%     TLC 3.88 L 67% DLCO 12.55  PFT 12/23/20 FEV1 2.16 L 80%  TLC 4.03 L 75% DLCO 11.1 46%  PFT 10/12/22 FEV1 2.2 L 83%  TLC 4.03 L 75%  DLCO 10.87 46%     ASSESSMENT:  Interstitial lung disease: abnormal CT chest with groundglass opacities which have waxed and waned, there is stable pulmonary fibrosis Pulmonary Nodules 5 mm or smaller, stable July 2019 to June 2020, most likely perifissural lymph nodes  Mild intermittent reactive airway disease  H/O Polysubstance abuse  RLS - less severe with Wellbutrin  Chronic CHF  CAD  Ongoing tobacco abuse, working on quitting, 35 pack year history      PLAN:  Supplemental oxygen to maintain SaO2 >92%   Inhaled bronchodilators PRN, neb and albuterol. Tobacco cessation has been advised  Repeat PFT dx ILD & LDCT for lung cancer screening in 12 months, see me after   D/C home oxygen    Screening CT scan was considered in a lung cancer screening counseling and shared decision making visit today that included the following elements:   Eligibility: Age: 63. There are no signs or symptoms of lung cancer. Tobacco History 46 pack-years, quit zero years ago  Verbal counseling has been performed by me to include benefits and harms of screening, follow-up diagnostic testing, over-diagnosis, false positive rate, and total radiation exposure;   I have counseled on the importance of adherence to annual lung cancer LDCT screening, the impact of comorbidities and patient is willing to undergo diagnosis and treatment;   I have provided counseling on the importance of maintaining cigarette smoking abstinence if former smoker; or the importance of smoking cessation if current smoker and, if appropriate, furnishing of information about tobacco cessation interventions; and   I have furnished a written order for lung cancer screening with LDCT. Order for Screening chest CT scan should be placed with documentation as below:  Beneficiary date of birth;    Actual pack - year smoking history (number) from above;   Current smoking status, and for former smokers, the number of years since quitting smoking from above  Beneficiary is asymptomatic   National Provider Identifier (NPI) for [facility] [id number]

## 2022-10-26 NOTE — PROCEDURES
[address]                 [address] [address]                               PULMONARY FUNCTION    PATIENT NAME: Loida Chandler                     :        1960  MED REC NO:   [id number]                          ROOM:  ACCOUNT NO:   [de-identified]                           ADMIT DATE: 10/12/2022  PROVIDER:     Sunni Causey MD    DATE OF PROCEDURE:  10/12/2022    REQUESTING PROVIDER: Jessica Welch MD.    Year of Smoking 35-pack per year smoking. Weight 260 pound. Height 65. BMI 43. FINDINGS:  1. Spirometry:  FVC 2.85, which is 83 of predicted. FEV1 2.20, which  is 83 of predicted. There is no bronchodilator response. FEV1/FVC is  77.  2.  Lung volumes:  TLC 4.03, which is 75 of predicted. RV/TLC 31, which  is _____ of predicted. ERV 1.10, which is 131 of predicted. 3.  Diffusion capacity is 10.87, which is 46 of predicted and corrected  alveolar volume to 78. IMPRESSION:  This PFT showed evidence of mild obstructive lung disease  along with reduction in diffusion capacity, most likely diagnosis, where  we have restrictive lung disease along with decrease with diffusion  capacity is lung fibrosis/UIP-IPF versus UIP connective tissue disease;  however, clinical and radiological correlation is indicated with the  patient's CAT scan and the patient's history.         Joseph Cosby MD    D: 10/25/2022 14:30:27       T: 10/26/2022 1:47:36     [email or web address]  Job#: [id number]     Doc#: [id number]

## 2023-01-10 NOTE — PLAN OF CARE
FYI-starting with conservative management as his symptoms are mild Problem: SAFETY  Goal: Free from accidental physical injury  Outcome: Ongoing     Problem: DAILY CARE  Goal: Daily care needs are met  Outcome: Ongoing     Problem: PAIN  Goal: Patient's pain/discomfort is manageable  Outcome: Ongoing     Problem: SKIN INTEGRITY  Goal: Skin integrity is maintained or improved  Outcome: Ongoing     Problem: KNOWLEDGE DEFICIT  Goal: Patient/S.O. demonstrates understanding of disease process, treatment plan, medications, and discharge instructions.   Outcome: Ongoing     Problem: DISCHARGE BARRIERS  Goal: Patient's continuum of care needs are met  Outcome: Ongoing     Problem: Breathing Pattern - Ineffective:  Goal: Ability to achieve and maintain a regular respiratory rate will improve  Description  Ability to achieve and maintain a regular respiratory rate will improve  Outcome: Ongoing     Problem: OXYGENATION/RESPIRATORY FUNCTION  Goal: Patient will maintain patent airway  Outcome: Ongoing  Goal: Patient will achieve/maintain normal respiratory rate/effort  Description  Respiratory rate and effort will be within normal limits for the patient  Outcome: Ongoing     Problem: HEMODYNAMIC STATUS  Goal: Patient has stable vital signs and fluid balance  Outcome: Ongoing     Problem: FLUID AND ELECTROLYTE IMBALANCE  Goal: Fluid and electrolyte balance are achieved/maintained  Outcome: Ongoing     Problem: ACTIVITY INTOLERANCE/IMPAIRED MOBILITY  Goal: Mobility/activity is maintained at optimum level for patient  Outcome: Ongoing     Problem: Tobacco Use:  Goal: Inpatient tobacco use cessation counseling participation  Description  Inpatient tobacco use cessation counseling participation  Outcome: Ongoing     Problem: Falls - Risk of:  Goal: Will remain free from falls  Description  Will remain free from falls  Outcome: Ongoing  Goal: Absence of physical injury  Description  Absence of physical injury  Outcome: Ongoing

## 2023-01-11 ENCOUNTER — TELEPHONE (OUTPATIENT)
Dept: PULMONOLOGY | Age: 63
End: 2023-01-11

## 2023-01-11 NOTE — TELEPHONE ENCOUNTER
Received fax today from [address] stating pt needs pulmonary clearance prior to total left knee replacement. I verified with Roldan Lr that she can see pt for this. Tried to reach pt on her cell but VM is full, couldn't LM. Called home # and LM there. Document scanned under media.

## 2023-03-02 ENCOUNTER — OFFICE VISIT (OUTPATIENT)
Dept: PULMONOLOGY | Age: 63
End: 2023-03-02
Payer: MEDICARE

## 2023-03-02 VITALS
DIASTOLIC BLOOD PRESSURE: 75 MMHG | RESPIRATION RATE: 16 BRPM | BODY MASS INDEX: 33.12 KG/M2 | SYSTOLIC BLOOD PRESSURE: 114 MMHG | HEIGHT: 67 IN | WEIGHT: 211 LBS | OXYGEN SATURATION: 98 % | HEART RATE: 81 BPM

## 2023-03-02 DIAGNOSIS — J45.20 MILD INTERMITTENT REACTIVE AIRWAY DISEASE WITHOUT COMPLICATION: Primary | ICD-10-CM

## 2023-03-02 DIAGNOSIS — J84.9 ILD (INTERSTITIAL LUNG DISEASE) (HCC): ICD-10-CM

## 2023-03-02 PROBLEM — Z72.0 TOBACCO ABUSE: Chronic | Status: ACTIVE | Noted: 2019-11-15

## 2023-03-02 PROBLEM — E66.811 CLASS 1 OBESITY DUE TO EXCESS CALORIES WITH SERIOUS COMORBIDITY AND BODY MASS INDEX (BMI) OF 31.0 TO 31.9 IN ADULT: Chronic | Status: ACTIVE | Noted: 2018-09-14

## 2023-03-02 PROBLEM — R09.02 HYPOXIA: Status: RESOLVED | Noted: 2019-07-27 | Resolved: 2023-03-02

## 2023-03-02 PROBLEM — J45.909 REACTIVE AIRWAY DISEASE: Status: RESOLVED | Noted: 2023-03-02 | Resolved: 2023-03-02

## 2023-03-02 PROBLEM — R09.02 EXERCISE HYPOXEMIA: Status: RESOLVED | Noted: 2019-11-15 | Resolved: 2023-03-02

## 2023-03-02 PROBLEM — E87.20 LACTIC ACIDOSIS: Status: RESOLVED | Noted: 2019-07-18 | Resolved: 2023-03-02

## 2023-03-02 PROBLEM — E43 SEVERE PROTEIN-CALORIE MALNUTRITION (HCC): Status: RESOLVED | Noted: 2021-09-13 | Resolved: 2023-03-02

## 2023-03-02 PROBLEM — E66.811 CLASS 1 OBESITY DUE TO EXCESS CALORIES WITH SERIOUS COMORBIDITY AND BODY MASS INDEX (BMI) OF 31.0 TO 31.9 IN ADULT: Status: ACTIVE | Noted: 2018-09-14

## 2023-03-02 PROBLEM — R65.20 SEVERE SEPSIS (HCC): Status: RESOLVED | Noted: 2021-09-06 | Resolved: 2023-03-02

## 2023-03-02 PROBLEM — J45.909 REACTIVE AIRWAY DISEASE: Status: ACTIVE | Noted: 2023-03-02

## 2023-03-02 PROBLEM — M79.89 NECROTIZING SOFT TISSUE INFECTION: Status: RESOLVED | Noted: 2021-09-06 | Resolved: 2023-03-02

## 2023-03-02 PROBLEM — E87.3 RESPIRATORY ALKALOSIS: Status: RESOLVED | Noted: 2019-07-17 | Resolved: 2023-03-02

## 2023-03-02 PROBLEM — J18.9 PNEUMONIA DUE TO ORGANISM: Status: RESOLVED | Noted: 2020-08-29 | Resolved: 2023-03-02

## 2023-03-02 PROBLEM — R11.2 NAUSEA & VOMITING: Status: RESOLVED | Noted: 2019-07-18 | Resolved: 2023-03-02

## 2023-03-02 PROBLEM — A41.9 SEVERE SEPSIS (HCC): Status: RESOLVED | Noted: 2021-09-06 | Resolved: 2023-03-02

## 2023-03-02 PROBLEM — E66.09 CLASS 1 OBESITY DUE TO EXCESS CALORIES WITH SERIOUS COMORBIDITY AND BODY MASS INDEX (BMI) OF 31.0 TO 31.9 IN ADULT: Chronic | Status: ACTIVE | Noted: 2018-09-14

## 2023-03-02 PROCEDURE — G8417 CALC BMI ABV UP PARAM F/U: HCPCS

## 2023-03-02 PROCEDURE — 4004F PT TOBACCO SCREEN RCVD TLK: CPT

## 2023-03-02 PROCEDURE — 3017F COLORECTAL CA SCREEN DOC REV: CPT

## 2023-03-02 PROCEDURE — 3074F SYST BP LT 130 MM HG: CPT

## 2023-03-02 PROCEDURE — 3078F DIAST BP <80 MM HG: CPT

## 2023-03-02 PROCEDURE — G8484 FLU IMMUNIZE NO ADMIN: HCPCS

## 2023-03-02 PROCEDURE — G8427 DOCREV CUR MEDS BY ELIG CLIN: HCPCS

## 2023-03-02 PROCEDURE — 99214 OFFICE O/P EST MOD 30 MIN: CPT

## 2023-03-02 NOTE — PROGRESS NOTES
PULMONARY, CRITICAL CARE AND SLEEP MEDICINE    Outpatient Pulmonary Progress Note   CC: cough, shortness of breath   Referring Provider: hospital f/u    Interval History 3/2/23:  CMT pre-op   -  Planning left total knee replacement with [facility] Dr. Gretta Suarez as an outpatient in a hospital setting. Already had right TKR less than a year ago and tolerated very. No h/o trouble with anesthesia. Lungs have been stable, rare use of albuterol inhaler. Interval history 10/12/22:  nec fasciitis in Sept 2021, now doing well. Went back to using tobacco.  Had PFT and CT today. Dyspnea on exertion is stable. reports that she has been smoking cigarettes. She has a 35.00 pack-year smoking history. She has never used smokeless tobacco.    PHYSICAL EXAM:   Blood pressure 114/75, pulse 81, resp. rate 16, height 5' 7\" (1.702 m), weight 211 lb (95.7 kg), SpO2 98 %, not currently breastfeeding. Constitutional:  No acute distress. HENT:  Oropharynx is clear and moist. Mallampati III  Neck: No tracheal deviation present. Cardiovascular: Normal heart sounds. No lower extremity edema. Pulmonary/Chest: No wheezes. No rhonchi. + rales. No decreased breath sounds. No accessory muscle usage or stridor. Musculoskeletal: No cyanosis. No clubbing. Skin: Skin is warm and dry. Psychiatric: Normal mood and affect. Neurologic: speech fluent, alert and oriented, strength symmetric      DATA:   LDCT 10/12/22  Impression   1. Unchanged solid subcentimeter bilateral pulmonary nodules. 2. Unchanged chronic interstitial lung disease. HRCT 6/17/20  Scattered ground-glass opacities throughout the lungs bilaterally, new   compared to prior, favored to be postinflammatory-infectious. Early   pulmonary edema could also have a similar appearance.        Stable underlying pulmonary fibrosis       Stable small perifissural nodules, likely intrapulmonary lymph nodes by   location       PFT 10/2/19 FEV1 2.01 L 69% ratio 0. 8 TLC 3.82 L 66%  DLCO 10.2 40%  PFT 6/17/20 FEV1 2.05 L 71%     TLC 3.88 L 67% DLCO 12.55  PFT 12/23/20 FEV1 2.16 L 80%  TLC 4.03 L 75% DLCO 11.1 46%  PFT 10/12/22 FEV1 2.2 L 83%  TLC 4.03 L 75%  DLCO 10.87 46%     ASSESSMENT:  Interstitial lung disease: abnormal CT chest with groundglass opacities which have waxed and waned, there is stable pulmonary fibrosis   Pulmonary Nodules 5 mm or smaller, stable July 2019 to June 2020, most likely perifissural lymph nodes  Mild intermittent reactive airway disease  H/O Polysubstance abuse  RLS - less severe with Wellbutrin  Chronic CHF  CAD  Ongoing tobacco abuse, working on quitting, 35 pack year history      PLAN:  Inhaled bronchodilators PRN, neb and albuterol. Tobacco cessation has been advised  D/C'd home oxygen  Keep scheduled f/u with Dr. Cordon Showers:  Repeat PFT dx ILD & LDCT for lung cancer screening in 12 months, see me after   Pre-operative clearance to Jax Magana      Patient may proceed to the operating room from pulmonary perspective without further testing. Because of underlying disease, she is at increased risk of georgiana-operative complications, including atelectasis, pneumonia and, when general anesthesia is required, failure to liberate from mechanical ventilation. However, this should not preclude her from having the recommended operation. It is my recommendation that her inhaled bronchodilators be continued in the perioperative period and that short acting bronchodilators be used on a prn basis as well. All of this was discussed with the patient today in the office.

## 2023-03-02 NOTE — LETTER
[facility] Pulmonary, Critical Care, and Sleep  [address] [facility] [address]  [address] [address]  Phone: [phone]  Fax: [address] [place] [address]        March 2, 2023    Dayton Days  [address] [address]      Dear Doctor:      Bud Days may proceed to the operating room from pulmonary perspective without further testing. Below are the relevant portions of my assessment and plan of care. Assessment: Interstitial lung disease with waxing and waning changes but overall stable pulmonary fibrosis. Mild intermittent reactive airway disease - stable. Ongoing tobacco abuse with 35 pack-year history. Plan:  Because of underlying disease, she is at increased risk of georgiana-operative complications, including atelectasis, pneumonia and, when general anesthesia is required, failure to liberate from mechanical ventilation. However, this should not preclude her from having the recommended operation. It is my recommendation that her inhaled bronchodilators be continued in the perioperative period and that short acting bronchodilators be used on a prn basis as well. All of this was discussed with the patient today in the office. If you have questions, please do not hesitate to call me.     Sincerely,          Pearl Moreno

## 2023-03-02 NOTE — PATIENT INSTRUCTIONS
It was great to meet you and take care Lesley Morales!  -Ablexis. .. Important practices for better sleep:    Avoid naps. This will ensure you are sleepy at bedtime. If you have to take a nap, sleep less than 1 hour, before 3 pm.  SCHEDULE: Have a fixed bedtime and awakening time. The human body thrives on routines. Only deviate from these set sleep times about 1-2 hours on the weekends (more than this will start altering your internal clock). You will feel better keeping a regular sleep cycle and giving your body a dependable pattern, even (especially) if you are retired or not working. Use light to train your biological clock: When you get up in the morning, exposure yourself to bright lights. When preparing for bed, dim the lights and avoid exposure to screens. The blue light from electronic screens tells our brain that it is time to be awake; it inhibits melatonin production which stops our brain from helping us get to sleep. Go to bed only when sleepy; this reduces the time you are awake in bed (which can lead to frustration and negative thoughts about sleep). If you can't fall asleep within 15-30 minutes, get up and do something boring until you feel sleepy again. Sit quietly in the dark or read the warranty on your refrigerator. Don't expose yourself to bright light during this time (especially screens), which would cue your brain that it is time to wake up. Regular exercise is recommended to help you deepen your sleep (and MANY other reasons), but timing is important--aim to exercise in the morning or early afternoon, not within 4-6 hours of your bedtime. Only use your bed for sleeping & intimacy. Do not use your bed as an office, workroom or recreation room. Let your mind/body \"know\" that the bed is associated with sleeping. Develop sleep rituals. Give your body clues it is time to slow down and sleep. Dim the lights and turn off all screens!  Examples include; yoga, deep breathing, listen to relaxing music, a cup of caffeine-free hot tea, a hot bath or a few minutes of reading (in dim light). A hot bath ~90 mins before bed will raise your body temperature, but it is the drop in body temperature that can help you feel sleepy. Avoid heavy, spicy or sugary foods 4-6 hours before bedtime   Stay away from stimulants such as caffeine and nicotine for at least 4-6 hours before bed. Stimulants can interfere with your ability to fall asleep. Caffeine is found in tea, cola, coffee, cocoa and chocolate. Nicotine is found in tobacco products. Avoid alcohol 4-6 hours before bedtime. Alcohol has an immediate sleep-inducing effect, but after a few hours when alcohol levels fall there is a stimulant or \"wake-up\" effect and will cause fragmented sleep. Dont take worries to bed. Leave worries about life, work, school etc. behind you when you go to bed. Some people find it helpful to assign a worry period in the evening or late afternoon to talk or write down the worries and get them out of your system. Ensure your bedroom is quiet and comfortable. A cooler room along with enough blankets to stay warm is recommended. If your room is too noisy, try a white noise machine. If too bright, try black out shades or an eye mask. Uncomfortable bedding can prevent good sleep. Evaluate whether or not this is a source of your problem, and make appropriate changes.

## 2023-10-17 ENCOUNTER — HOSPITAL ENCOUNTER (OUTPATIENT)
Dept: PULMONOLOGY | Age: 63
Discharge: HOME | End: 2023-10-17
Payer: MEDICARE

## 2023-10-17 ENCOUNTER — OFFICE VISIT (OUTPATIENT)
Dept: PULMONOLOGY | Age: 63
End: 2023-10-17
Payer: MEDICARE

## 2023-10-17 ENCOUNTER — HOSPITAL ENCOUNTER (OUTPATIENT)
Dept: CT IMAGING | Age: 63
Discharge: HOME | End: 2023-10-17
Payer: MEDICARE

## 2023-10-17 VITALS
SYSTOLIC BLOOD PRESSURE: 126 MMHG | RESPIRATION RATE: 16 BRPM | OXYGEN SATURATION: 97 % | WEIGHT: 210.4 LBS | HEART RATE: 67 BPM | DIASTOLIC BLOOD PRESSURE: 72 MMHG | BODY MASS INDEX: 33.02 KG/M2 | HEIGHT: 67 IN

## 2023-10-17 VITALS — OXYGEN SATURATION: 96 %

## 2023-10-17 DIAGNOSIS — J84.9 ILD (INTERSTITIAL LUNG DISEASE) (HCC): ICD-10-CM

## 2023-10-17 DIAGNOSIS — Z72.0 TOBACCO USE: Primary | ICD-10-CM

## 2023-10-17 DIAGNOSIS — Z87.891 PERSONAL HISTORY OF TOBACCO USE: ICD-10-CM

## 2023-10-17 LAB
DLCO %PRED: 45 %
DLCO PRED: NORMAL
DLCO/VA %PRED: NORMAL
DLCO/VA PRED: NORMAL
DLCO/VA: NORMAL
DLCO: NORMAL
EXPIRATORY TIME-POST: NORMAL
EXPIRATORY TIME: NORMAL
FEF 25-75% %CHNG: NORMAL
FEF 25-75% %PRED-POST: NORMAL
FEF 25-75% %PRED-PRE: NORMAL
FEF 25-75% PRED: NORMAL
FEF 25-75%-POST: NORMAL
FEF 25-75%-PRE: NORMAL
FEV1 %PRED-POST: NORMAL
FEV1 %PRED-PRE: 74 %
FEV1 PRED: NORMAL
FEV1-POST: NORMAL
FEV1-PRE: NORMAL
FEV1/FVC %PRED-POST: NORMAL
FEV1/FVC %PRED-PRE: NORMAL
FEV1/FVC PRED: NORMAL
FEV1/FVC-POST: NORMAL
FEV1/FVC-PRE: 75 %
FVC %PRED-POST: NORMAL
FVC %PRED-PRE: NORMAL
FVC PRED: NORMAL
FVC-POST: NORMAL
FVC-PRE: NORMAL
GAW %PRED: NORMAL
GAW PRED: NORMAL
GAW: NORMAL
IC %PRED: NORMAL
IC PRED: NORMAL
IC: NORMAL
MEP: NORMAL
MIP: NORMAL
MVV %PRED-PRE: NORMAL
MVV PRED: NORMAL
MVV-PRE: NORMAL
PEF %PRED-POST: NORMAL
PEF %PRED-PRE: NORMAL
PEF PRED: NORMAL
PEF%CHNG: NORMAL
PEF-POST: NORMAL
PEF-PRE: NORMAL
RAW %PRED: NORMAL
RAW PRED: NORMAL
RAW: NORMAL
RV %PRED: NORMAL
RV PRED: NORMAL
RV: NORMAL
SVC %PRED: NORMAL
SVC PRED: NORMAL
SVC: NORMAL
TLC %PRED: 72 %
TLC PRED: NORMAL
TLC: NORMAL
VA %PRED: NORMAL
VA PRED: NORMAL
VA: NORMAL
VTG %PRED: NORMAL
VTG PRED: NORMAL
VTG: NORMAL

## 2023-10-17 PROCEDURE — 94729 DIFFUSING CAPACITY: CPT

## 2023-10-17 PROCEDURE — 3078F DIAST BP <80 MM HG: CPT | Performed by: INTERNAL MEDICINE

## 2023-10-17 PROCEDURE — 4004F PT TOBACCO SCREEN RCVD TLK: CPT | Performed by: INTERNAL MEDICINE

## 2023-10-17 PROCEDURE — 71271 CT THORAX LUNG CANCER SCR C-: CPT

## 2023-10-17 PROCEDURE — G8484 FLU IMMUNIZE NO ADMIN: HCPCS | Performed by: INTERNAL MEDICINE

## 2023-10-17 PROCEDURE — 94010 BREATHING CAPACITY TEST: CPT

## 2023-10-17 PROCEDURE — 99214 OFFICE O/P EST MOD 30 MIN: CPT | Performed by: INTERNAL MEDICINE

## 2023-10-17 PROCEDURE — G8427 DOCREV CUR MEDS BY ELIG CLIN: HCPCS | Performed by: INTERNAL MEDICINE

## 2023-10-17 PROCEDURE — G8417 CALC BMI ABV UP PARAM F/U: HCPCS | Performed by: INTERNAL MEDICINE

## 2023-10-17 PROCEDURE — 3017F COLORECTAL CA SCREEN DOC REV: CPT | Performed by: INTERNAL MEDICINE

## 2023-10-17 PROCEDURE — 94760 N-INVAS EAR/PLS OXIMETRY 1: CPT

## 2023-10-17 PROCEDURE — 3074F SYST BP LT 130 MM HG: CPT | Performed by: INTERNAL MEDICINE

## 2023-10-17 PROCEDURE — 94726 PLETHYSMOGRAPHY LUNG VOLUMES: CPT

## 2023-10-17 RX ORDER — TIZANIDINE 4 MG/1
TABLET ORAL
COMMUNITY
Start: 2023-09-23

## 2023-10-17 RX ORDER — ATORVASTATIN CALCIUM 40 MG/1
TABLET, FILM COATED ORAL
COMMUNITY
Start: 2023-10-13

## 2023-10-17 RX ORDER — FLUTICASONE PROPIONATE 50 MCG
SPRAY, SUSPENSION (ML) NASAL
COMMUNITY
Start: 2023-10-13

## 2023-10-17 RX ORDER — GABAPENTIN 300 MG/1
300 CAPSULE ORAL 3 TIMES DAILY
COMMUNITY

## 2023-10-17 RX ORDER — OXYCODONE AND ACETAMINOPHEN 10; 325 MG/1; MG/1
1 TABLET ORAL EVERY 4 HOURS PRN
COMMUNITY

## 2023-10-17 RX ORDER — OXYCODONE AND ACETAMINOPHEN 10; 325 MG/1; MG/1
TABLET ORAL
COMMUNITY
Start: 2023-09-25

## 2023-10-17 RX ORDER — NALOXONE HYDROCHLORIDE 4 MG/.1ML
1 SPRAY NASAL PRN
COMMUNITY
Start: 2021-04-07

## 2023-10-17 RX ORDER — FLUOXETINE HYDROCHLORIDE 40 MG/1
CAPSULE ORAL
COMMUNITY
Start: 2023-09-23

## 2023-10-17 ASSESSMENT — PULMONARY FUNCTION TESTS
FEV1_PERCENT_PREDICTED_PRE: 74
FEV1/FVC_PRE: 75

## 2023-10-17 NOTE — PROGRESS NOTES
Pulmonary Follow-up Note  Chief Complaint: cough, shortness of breath   Referring Provider: hospital f/u    Interval history: stable dyspnea on exertion, had Low dose chest CT for lung cancer screening       reports that she has been smoking cigarettes. She has a 35.00 pack-year smoking history. She has never used smokeless tobacco.     Review of Systems:    Physical Exam:  Blood pressure 126/72, pulse 67, resp. rate 16, height 5' 7\" (1.702 m), weight 210 lb 6.4 oz (95.4 kg), SpO2 97 %, not currently breastfeeding. Constitutional:  No acute distress. HENT:  Oropharynx is clear and moist.   Neck: No tracheal deviation present. Cardiovascular: Normal heart sounds. No lower extremity edema. Pulmonary/Chest: No wheezes. No rhonchi. No rales. No decreased breath sounds. No accessory muscle usage or stridor. Musculoskeletal: No cyanosis. No clubbing. Skin: Skin is warm and dry. Psychiatric: Normal mood and affect. Neurologic: speech fluent, alert and oriented, strength symmetric          Data:   LDCT 10/17/23  IMPRESSION:  Unchanged solid subcentimeter bilateral pulmonary nodules. HRCT 6/17/20  Scattered ground-glass opacities throughout the lungs bilaterally, new   compared to prior, favored to be postinflammatory-infectious. Early   pulmonary edema could also have a similar appearance.        Stable underlying pulmonary fibrosis       Stable small perifissural nodules, likely intrapulmonary lymph nodes by   location       PFT 10/2/19 FEV1 2.01 L 69% ratio 0.8 TLC 3.82 L 66%  DLCO 10.2 40%  PFT 6/17/20 FEV1 2.05 L 71%     TLC 3.88 L 67% DLCO 12.55  PFT 12/23/20 FEV1 2.16 L 80%  TLC 4.03 L 75% DLCO 11.1 46%  PFT 10/12/22 FEV1 2.2 L 83%  TLC 4.03 L 75%  DLCO 10.87 46%   PFT 10/17/23 FEV1 2.1 L 74%  TLC 4.06 L 72% DLCO 11.31 45%    ASSESSMENT:  Interstitial lung disease: abnormal CT chest with groundglass opacities which have waxed and waned, there is stable pulmonary fibrosis   H/O RA  Pulmonary Nodules

## 2023-10-20 NOTE — PROCEDURES
[address] [facility],[address], [address]                               PULMONARY FUNCTION    PATIENT NAME: Luz Elena Christianson                     :        1960  MED REC NO:   [id number]                          ROOM:  ACCOUNT NO:   [de-identified]                           ADMIT DATE: 10/17/2023  PROVIDER:     Radha Quezada MD    DATE OF PROCEDURE:  10/17/2023    ATTENDING PHYSICIAN:  Mary Jo Campoverde MD    INDICATION:  ILD. FINDINGS:  1. Spirometry: The FVC is 76% of predicted. The FEV1 is 74% of  predicted. The FEV1/FVC ratio is 0.75.  2.  Plethysmography:  The total lung capacity is 72% of predicted. 3.  The diffusion capacity of carbon monoxide is 45% of predicted. 4.  The flow volume loop is borderline for restrictive pattern. IMPRESSION:  This patient has mild restrictive lung disease and severely  decreased DLCO, which would be consistent with a given diagnosis of ILD.         Radha Quezada MD    D: 10/19/2023 17:38:48       T: 10/19/2023 23:19:19     [email or web address]  Job#: [id number]     Doc#: [id number]    CC:

## 2023-12-12 ENCOUNTER — TELEPHONE (OUTPATIENT)
Dept: PULMONOLOGY | Age: 63
End: 2023-12-12

## 2023-12-12 NOTE — TELEPHONE ENCOUNTER
Pt called needing clearance for upcoming surgery scheduled for 12/21 for shoulder replacement. Please advise for scheduling.

## 2023-12-12 NOTE — TELEPHONE ENCOUNTER
Anita From [facility] provided fax [phone] for the clearance to be faxed once received

## 2023-12-14 NOTE — TELEPHONE ENCOUNTER
Pt needs preop clearance, scheduled for surgery 12/21/23. Okay to schedule with [facility]?

## 2024-01-11 NOTE — PROGRESS NOTES
-Mgmt as per primary team   Significant other Alan Foster calls to check on status of his \"wife\", requests patient to call him at home, patient sleeping at present, will notify patient Alan Kaplans called when awake.

## 2024-03-13 ENCOUNTER — TELEPHONE (OUTPATIENT)
Dept: PULMONOLOGY | Age: 64
End: 2024-03-13

## 2024-03-13 DIAGNOSIS — J84.9 ILD (INTERSTITIAL LUNG DISEASE) (HCC): ICD-10-CM

## 2024-03-13 DIAGNOSIS — J45.20 MILD INTERMITTENT REACTIVE AIRWAY DISEASE WITHOUT COMPLICATION: Primary | ICD-10-CM

## 2024-03-13 DIAGNOSIS — R91.1 PULMONARY NODULE: ICD-10-CM

## 2024-03-13 DIAGNOSIS — J84.10 PULMONARY FIBROSIS (HCC): ICD-10-CM

## 2024-03-13 RX ORDER — ALBUTEROL SULFATE 2.5 MG/3ML
2.5 SOLUTION RESPIRATORY (INHALATION) EVERY 4 HOURS PRN
Qty: 360 ML | Refills: 5 | Status: SHIPPED | OUTPATIENT
Start: 2024-03-13

## 2024-03-13 NOTE — TELEPHONE ENCOUNTER
Pt called and is requesting an order for new nebulizer supplies and a script for albuterol nebulizer solution. Pt stated that though she hasn't used her neb in a long time, she feels like she needs it. Pt is missing the hose, mouth pieces, etc...     Pt uses [facility]. Pt is requesting a c/b for confirmation. Please advise.

## 2024-08-07 ENCOUNTER — TELEPHONE (OUTPATIENT)
Dept: PULMONOLOGY | Age: 64
End: 2024-08-07

## 2024-10-22 ENCOUNTER — TELEPHONE (OUTPATIENT)
Dept: PULMONOLOGY | Age: 64
End: 2024-10-22

## 2024-10-22 ENCOUNTER — HOSPITAL ENCOUNTER (OUTPATIENT)
Dept: PULMONOLOGY | Age: 64
Discharge: HOME | End: 2024-10-22
Payer: MEDICARE

## 2024-10-22 ENCOUNTER — OFFICE VISIT (OUTPATIENT)
Dept: PULMONOLOGY | Age: 64
End: 2024-10-22
Payer: MEDICARE

## 2024-10-22 ENCOUNTER — HOSPITAL ENCOUNTER (OUTPATIENT)
Dept: CT IMAGING | Age: 64
Discharge: HOME | End: 2024-10-22
Payer: MEDICARE

## 2024-10-22 VITALS
RESPIRATION RATE: 17 BRPM | BODY MASS INDEX: 36.61 KG/M2 | OXYGEN SATURATION: 98 % | DIASTOLIC BLOOD PRESSURE: 70 MMHG | HEIGHT: 63 IN | WEIGHT: 206.6 LBS | SYSTOLIC BLOOD PRESSURE: 118 MMHG | HEART RATE: 66 BPM

## 2024-10-22 DIAGNOSIS — J84.9 ILD (INTERSTITIAL LUNG DISEASE) (HCC): Primary | ICD-10-CM

## 2024-10-22 DIAGNOSIS — J45.20 MILD INTERMITTENT REACTIVE AIRWAY DISEASE WITHOUT COMPLICATION: Primary | ICD-10-CM

## 2024-10-22 DIAGNOSIS — Z72.0 TOBACCO USE: ICD-10-CM

## 2024-10-22 DIAGNOSIS — J45.20 MILD INTERMITTENT REACTIVE AIRWAY DISEASE WITHOUT COMPLICATION: ICD-10-CM

## 2024-10-22 LAB
DLCO %PRED: 37 %
DLCO PRED: NORMAL
DLCO/VA %PRED: NORMAL
DLCO/VA PRED: NORMAL
DLCO/VA: NORMAL
DLCO: NORMAL
EXPIRATORY TIME-POST: NORMAL
EXPIRATORY TIME: NORMAL
FEF 25-75 %CHNG: NORMAL
FEF 25-75 POST %PRED: NORMAL
FEF 25-75% %PRED-PRE: NORMAL
FEF 25-75% PRED: NORMAL
FEF 25-75-POST: NORMAL
FEF 25-75-PRE: NORMAL
FEV1 %PRED-POST: NORMAL
FEV1 %PRED-PRE: 63 %
FEV1 PRED: NORMAL
FEV1-POST: NORMAL
FEV1-PRE: NORMAL
FEV1/FVC %PRED-POST: NORMAL
FEV1/FVC %PRED-PRE: NORMAL
FEV1/FVC PRED: NORMAL
FEV1/FVC-POST: NORMAL
FEV1/FVC-PRE: 95 %
FVC %PRED-POST: NORMAL
FVC %PRED-PRE: NORMAL
FVC PRED: NORMAL
FVC-POST: NORMAL
FVC-PRE: NORMAL
GAW %PRED: NORMAL
GAW PRED: NORMAL
GAW: NORMAL
IC PRE %PRED: NORMAL
IC PRED: NORMAL
IC: NORMAL
MEP: NORMAL
MIP: NORMAL
MVV %PRED-PRE: NORMAL
MVV PRED: NORMAL
MVV-PRE: NORMAL
PEF %PRED-POST: NORMAL
PEF %PRED-PRE: NORMAL
PEF PRED: NORMAL
PEF%CHNG: NORMAL
PEF-POST: NORMAL
PEF-PRE: NORMAL
RAW %PRED: NORMAL
RAW PRED: NORMAL
RAW: NORMAL
RV PRE %PRED: NORMAL
RV PRED: NORMAL
RV: NORMAL
SVC %PRED: NORMAL
SVC PRED: NORMAL
SVC: NORMAL
TLC PRE %PRED: 74 %
TLC PRED: NORMAL
TLC: NORMAL
VA %PRED: NORMAL
VA PRED: NORMAL
VA: NORMAL
VTG %PRED: NORMAL
VTG PRED: NORMAL
VTG: NORMAL

## 2024-10-22 PROCEDURE — 94726 PLETHYSMOGRAPHY LUNG VOLUMES: CPT

## 2024-10-22 PROCEDURE — 99214 OFFICE O/P EST MOD 30 MIN: CPT | Performed by: INTERNAL MEDICINE

## 2024-10-22 PROCEDURE — 94760 N-INVAS EAR/PLS OXIMETRY 1: CPT

## 2024-10-22 PROCEDURE — 71271 CT THORAX LUNG CANCER SCR C-: CPT

## 2024-10-22 PROCEDURE — 94375 RESPIRATORY FLOW VOLUME LOOP: CPT

## 2024-10-22 PROCEDURE — 94729 DIFFUSING CAPACITY: CPT

## 2024-10-22 PROCEDURE — 3078F DIAST BP <80 MM HG: CPT | Performed by: INTERNAL MEDICINE

## 2024-10-22 PROCEDURE — 94010 BREATHING CAPACITY TEST: CPT

## 2024-10-22 PROCEDURE — 3074F SYST BP LT 130 MM HG: CPT | Performed by: INTERNAL MEDICINE

## 2024-10-22 ASSESSMENT — PULMONARY FUNCTION TESTS
FEV1_PERCENT_PREDICTED_PRE: 63
FEV1/FVC_PRE: 95

## 2024-10-22 NOTE — PROGRESS NOTES
Pulmonary Follow-up Note  Chief Complaint: cough, shortness of breath   Referring Provider: hospital f/u     Interval history: stable dyspnea on exertion, had Low dose chest CT for lung cancer screening         reports that she has been smoking cigarettes. She has a 35.00 pack-year smoking history. She has never used smokeless tobacco.     Review of Systems:     Physical Exam:  Blood pressure 126/72, pulse 67, resp. rate 16, height 5' 7\" (1.702 m), weight 210 lb 6.4 oz (95.4 kg), SpO2 97 %, not currently breastfeeding.   Constitutional:  No acute distress.   HENT:  Oropharynx is clear and moist.   Neck: No tracheal deviation present.   Cardiovascular: Normal heart sounds.  No lower extremity edema.  Pulmonary/Chest: No wheezes. No rhonchi. No rales. No decreased breath sounds.  No accessory muscle usage or stridor.   Musculoskeletal: No cyanosis. No clubbing.  Skin: Skin is warm and dry.   Psychiatric: Normal mood and affect.  Neurologic: speech fluent, alert and oriented, strength symmetric          Data:   LDCT 10/17/23  IMPRESSION:  Unchanged solid subcentimeter bilateral pulmonary nodules.        HRCT 6/17/20  Scattered ground-glass opacities throughout the lungs bilaterally, new   compared to prior, favored to be postinflammatory-infectious.  Early   pulmonary edema could also have a similar appearance.       Stable underlying pulmonary fibrosis       Stable small perifissural nodules, likely intrapulmonary lymph nodes by   location         PFT 10/2/19 FEV1 2.01 L 69% ratio 0.8       TLC 3.82 L 66%          DLCO 10.2 40%  PFT 6/17/20 FEV1 2.05 L 71%                     TLC 3.88 L 67%          DLCO 12.55  PFT 12/23/20 FEV1 2.16 L 80%                   TLC 4.03 L 75%          DLCO 11.1 46%  PFT 10/12/22 FEV1 2.2 L 83%                     TLC 4.03 L 75%          DLCO 10.87 46%   PFT 10/17/23 FEV1 2.1 L 74%                     TLC 4.06 L 72%          DLCO 11.31 45%   PFT 2024  2.44 65 and FVC 1.81 63 % and

## 2024-10-23 PROBLEM — J45.909 REACTIVE AIRWAY DISEASE: Status: ACTIVE | Noted: 2024-10-23

## 2024-10-23 NOTE — PROCEDURES
[address] [address]                           PULMONARY FUNCTION      PATIENT NAME: TONY ALCARAZ               : 1960  MED REC NO: [id number]                      ROOM:   ACCOUNT NO: [id number]                       ADMIT DATE: 10/22/2024  PROVIDER: Landry Rubio MD      DATE OF PROCEDURE: 10/22/2024    SURGEON:  Landry Rubio MD    REFERRING PHYSICIAN:  YANG SOFIA    INDICATION:  Interstitial pulmonary disease.    INTERPRETATION:    1. Spirometry revealed no evidence of obstructive defect.  FEV1 is 1.81 L which is 63% predicted.  FEV1/FVC ratio of 74%.  2. Lung volumes revealed mild restrictive defect.  Total lung capacity of 4.19 L which is 74% predicted.  3. Diffusion capacity is severely decreased at 10.16, which is 37% predicted.  4. Flow volume loops suggestive of restrictive defect.    CONCLUSION:    1. No evidence of obstructive defect.  2. Mild restrictive defect with severely decreased diffusion capacity goes with the given diagnosis of ILD.          LANDRY RUBIO MD      D:  10/22/2024 15:03:00     T:  10/22/2024 22:02:02     /[place]  Job #:  [id number]     Doc#:  [id number]

## 2025-04-25 ENCOUNTER — TELEPHONE (OUTPATIENT)
Dept: PULMONOLOGY | Age: 65
End: 2025-04-25

## 2025-04-28 ENCOUNTER — HOSPITAL ENCOUNTER (OUTPATIENT)
Dept: PULMONOLOGY | Age: 65
Discharge: HOME | End: 2025-04-28
Attending: INTERNAL MEDICINE
Payer: MEDICARE

## 2025-04-28 ENCOUNTER — HOSPITAL ENCOUNTER (OUTPATIENT)
Age: 65
Discharge: HOME | End: 2025-04-28
Attending: INTERNAL MEDICINE
Payer: MEDICARE

## 2025-04-28 ENCOUNTER — OFFICE VISIT (OUTPATIENT)
Dept: PULMONOLOGY | Age: 65
End: 2025-04-28
Payer: MEDICARE

## 2025-04-28 VITALS
RESPIRATION RATE: 17 BRPM | WEIGHT: 199.6 LBS | HEART RATE: 71 BPM | SYSTOLIC BLOOD PRESSURE: 128 MMHG | HEIGHT: 63 IN | OXYGEN SATURATION: 95 % | DIASTOLIC BLOOD PRESSURE: 80 MMHG | BODY MASS INDEX: 35.37 KG/M2

## 2025-04-28 DIAGNOSIS — J84.9 ILD (INTERSTITIAL LUNG DISEASE) (HCC): ICD-10-CM

## 2025-04-28 DIAGNOSIS — J84.9 ILD (INTERSTITIAL LUNG DISEASE) (HCC): Primary | ICD-10-CM

## 2025-04-28 DIAGNOSIS — R91.1 PULMONARY NODULE: ICD-10-CM

## 2025-04-28 LAB
DLCO %PRED: 46 %
DLCO PRED: NORMAL
DLCO/VA %PRED: NORMAL
DLCO/VA PRED: NORMAL
DLCO/VA: NORMAL
DLCO: 11.02 ML/MIN/MMHG
EXPIRATORY TIME-POST: NORMAL
EXPIRATORY TIME: NORMAL
FEF 25-75 %CHNG: NORMAL
FEF 25-75 POST %PRED: NORMAL
FEF 25-75% %PRED-PRE: NORMAL
FEF 25-75% PRED: NORMAL
FEF 25-75-POST: NORMAL
FEF 25-75-PRE: NORMAL
FEV1 %PRED-POST: 56 %
FEV1 %PRED-PRE: 47 %
FEV1 PRED: NORMAL
FEV1-POST: 1.34 L
FEV1-PRE: 1.13 L
FEV1/FVC %PRED-POST: 84 %
FEV1/FVC %PRED-PRE: 88 %
FEV1/FVC PRED: NORMAL
FEV1/FVC-POST: 0.65 %
FEV1/FVC-PRE: 0.68 %
FVC %PRED-POST: 66 L
FVC %PRED-PRE: 53 %
FVC PRED: NORMAL
FVC-POST: 2.05 L
FVC-PRE: 1.66 L
GAW %PRED: NORMAL
GAW PRED: NORMAL
GAW: NORMAL
IC PRE %PRED: NORMAL
IC PRED: NORMAL
IC: NORMAL
MEP: NORMAL
MIP: NORMAL
MVV %PRED-PRE: NORMAL
MVV PRED: NORMAL
MVV-PRE: NORMAL
PEF %PRED-POST: NORMAL
PEF %PRED-PRE: NORMAL
PEF PRED: NORMAL
PEF%CHNG: NORMAL
PEF-POST: NORMAL
PEF-PRE: NORMAL
RAW %PRED: NORMAL
RAW PRED: NORMAL
RAW: NORMAL
RV PRE %PRED: NORMAL
RV PRED: NORMAL
RV: NORMAL
SVC %PRED: NORMAL
SVC PRED: NORMAL
SVC: NORMAL
TLC PRE %PRED: 62 %
TLC PRED: NORMAL
TLC: 3.06 L
VA %PRED: NORMAL
VA PRED: NORMAL
VA: NORMAL
VTG %PRED: NORMAL
VTG PRED: NORMAL
VTG: NORMAL

## 2025-04-28 PROCEDURE — 3079F DIAST BP 80-89 MM HG: CPT | Performed by: INTERNAL MEDICINE

## 2025-04-28 PROCEDURE — 94726 PLETHYSMOGRAPHY LUNG VOLUMES: CPT

## 2025-04-28 PROCEDURE — 36415 COLL VENOUS BLD VENIPUNCTURE: CPT

## 2025-04-28 PROCEDURE — 99214 OFFICE O/P EST MOD 30 MIN: CPT | Performed by: INTERNAL MEDICINE

## 2025-04-28 PROCEDURE — 94640 AIRWAY INHALATION TREATMENT: CPT

## 2025-04-28 PROCEDURE — 94760 N-INVAS EAR/PLS OXIMETRY 1: CPT

## 2025-04-28 PROCEDURE — 3074F SYST BP LT 130 MM HG: CPT | Performed by: INTERNAL MEDICINE

## 2025-04-28 PROCEDURE — 86038 ANTINUCLEAR ANTIBODIES: CPT

## 2025-04-28 PROCEDURE — 86235 NUCLEAR ANTIGEN ANTIBODY: CPT

## 2025-04-28 PROCEDURE — 94729 DIFFUSING CAPACITY: CPT

## 2025-04-28 PROCEDURE — 6370000000 HC RX 637 (ALT 250 FOR IP): Performed by: INTERNAL MEDICINE

## 2025-04-28 PROCEDURE — 94060 EVALUATION OF WHEEZING: CPT

## 2025-04-28 RX ORDER — FLUTICASONE FUROATE, UMECLIDINIUM BROMIDE AND VILANTEROL TRIFENATATE 100; 62.5; 25 UG/1; UG/1; UG/1
1 POWDER RESPIRATORY (INHALATION) DAILY
Qty: 1 EACH | Refills: 3 | Status: SHIPPED | OUTPATIENT
Start: 2025-04-28

## 2025-04-28 RX ORDER — FLUTICASONE FUROATE, UMECLIDINIUM BROMIDE AND VILANTEROL TRIFENATATE 100; 62.5; 25 UG/1; UG/1; UG/1
1 POWDER RESPIRATORY (INHALATION) DAILY
Qty: 1 EACH | Refills: 0 | Status: SHIPPED | COMMUNITY
Start: 2025-04-28

## 2025-04-28 RX ORDER — ALBUTEROL SULFATE 90 UG/1
2 INHALANT RESPIRATORY (INHALATION) ONCE
Status: COMPLETED | OUTPATIENT
Start: 2025-04-28 | End: 2025-04-28

## 2025-04-28 RX ADMIN — ALBUTEROL SULFATE 2 PUFF: 90 AEROSOL, METERED RESPIRATORY (INHALATION) at 14:08

## 2025-04-28 ASSESSMENT — PULMONARY FUNCTION TESTS
FEV1_POST: 1.34
FVC_PRE: 1.66
FVC_POST: 2.05
FEV1_PRE: 1.13
FEV1_PERCENT_PREDICTED_PRE: 47
FEV1/FVC_POST: 0.65
FVC_PERCENT_PREDICTED_POST: 66
FEV1_PERCENT_PREDICTED_POST: 56
FVC_PERCENT_PREDICTED_PRE: 53
FEV1/FVC_PRE: 0.68
FEV1/FVC_PERCENT_PREDICTED_PRE: 88
FEV1/FVC_PERCENT_PREDICTED_POST: 84

## 2025-04-28 NOTE — PROGRESS NOTES
[facility]                                 Pulmonary Consult /Progress Note :                                                                  Chief Complaint: cough, shortness of breath   Referring Provider: hospital f/u     Interval history: stable dyspnea on exertion, had Low dose chest CT for lung cancer screening         reports that she has been smoking cigarettes. She has a 35.00 pack-year smoking history. She has never used smokeless tobacco.     Today visit   She has been doing fair  Has some SOB and YAP  Couples weeks ago she has worsening SOB  No CP had steroids     Review of Systems:     Physical Exam:  Blood pressure 126/72, pulse 67, resp. rate 16, height 5' 7\" (1.702 m), weight 210 lb 6.4 oz (95.4 kg), SpO2 97 %, not currently breastfeeding.   Constitutional:  No acute distress.   HENT:  Oropharynx is clear and moist.   Neck: No tracheal deviation present.   Cardiovascular: Normal heart sounds.  No lower extremity edema.  Pulmonary/Chest: No wheezes. No rhonchi. No rales. No decreased breath sounds.  No accessory muscle usage or stridor.   Musculoskeletal: No cyanosis. No clubbing.  Skin: Skin is warm and dry.   Psychiatric: Normal mood and affect.  Neurologic: speech fluent, alert and oriented, strength symmetric          Data:   LDCT 10/17/23  IMPRESSION:  Unchanged solid subcentimeter bilateral pulmonary nodules.        HRCT 6/17/20  Scattered ground-glass opacities throughout the lungs bilaterally, new   compared to prior, favored to be postinflammatory-infectious.  Early   pulmonary edema could also have a similar appearance.       Stable underlying pulmonary fibrosis       Stable small perifissural nodules, likely intrapulmonary lymph nodes by   location         PFT 10/2/19 FEV1 2.01 L 69% ratio 0.8       TLC 3.82 L 66%          DLCO 10.2 40%  PFT 6/17/20 FEV1 2.05 L 71%                     TLC 3.88 L 67%

## 2025-04-29 LAB
ANA SER QL IA: NEGATIVE
ENA JO1 AB SER IA-ACNC: <0.2 AI (ref 0–0.9)
ENA SCL70 IGG SER IA-ACNC: <0.2 AI (ref 0–0.9)
ENA SS-A AB SER IA-ACNC: <0.2 AI (ref 0–0.9)

## 2025-04-29 NOTE — PROCEDURES
[address] [address]                           PULMONARY FUNCTION      PATIENT NAME: TONY ALCARAZ               : 1960  MED REC NO: [id number]                      ROOM:   ACCOUNT NO: [id number]                       ADMIT DATE: 2025  PROVIDER: Lalo Morin MD      DATE OF PROCEDURE: 2025    SURGEON:  Lalo Morin MD    REFERRING PHYSICIAN:  IAN SERRATO    INDICATION:  ILD.    FINDINGS:    1. Spirometry:  The FVC is 53% of predicted.  The FEV1 is 47% of predicted.  The FEV1/FVC ratio is 0.68.  There is a positive response to bronchodilators.  2. Plethysmography:  The total lung capacity is 62% of predicted.  3. The diffusion capacity for carbon monoxide is 46% of predicted.  4. The flow volume loop shows an obstructive pattern.    IMPRESSION:  This patient has a mixed defect with moderate restrictive and severe obstructive defects.  There is a severely decreased DLCO.  In addition to interstitial lung disease, this patient qualifies for diagnosis of chronic obstructive pulmonary disease.          LALO MORIN MD      D:  2025 11:23:30     T:  2025 14:31:11     [place]  Job #:  [id number]     Doc#:  [id number]

## 2025-05-12 ENCOUNTER — TELEPHONE (OUTPATIENT)
Dept: PULMONOLOGY | Age: 65
End: 2025-05-12

## 2025-05-12 NOTE — TELEPHONE ENCOUNTER
Faxed Referral to  Pulmonology via [facility] fax on 4/30/25 and 5/12/25 to get a cryo biopsy at [phone]

## 2025-05-13 NOTE — TELEPHONE ENCOUNTER
Faxed Demographics and filled out demo page for [facility]. Faxed both to [facility] at [phone]

## (undated) DEVICE — GOWN,AURORA,NONREINF,RAGLAN,XXL,STERILE: Brand: MEDLINE

## (undated) DEVICE — APPLICATOR PREP 26ML 0.7% IOD POVACRYLEX 74% ISO ALC ST

## (undated) DEVICE — SYRINGE MED 10ML SLIP TIP BLNT FILL AND LUERLOCK DISP

## (undated) DEVICE — SYRINGE MED 50ML LUERSLIP TIP

## (undated) DEVICE — PAD ABSRB W8XL10IN ABD HYDROPHOBIC NONWOVEN THCK LAYR CELOS

## (undated) DEVICE — BANDAGE,GAUZE,4.5"X4.1YD,STERILE,LF: Brand: MEDLINE

## (undated) DEVICE — AIRLIFE™ ADULT AEROSOL MASK VINYL, UNDER-THE-CHIN STYLE: Brand: AIRLIFE™

## (undated) DEVICE — SINGLE USE BIOPSY VALVE MAJ-210: Brand: SINGLE USE BIOPSY VALVE (STERILE)

## (undated) DEVICE — GLOVE ORANGE PI 8 1/2   MSG9085

## (undated) DEVICE — SUTURE VCRL UD BR CT 3-0 18IN CT1 J838D

## (undated) DEVICE — SHEET,DRAPE,40X58,STERILE: Brand: MEDLINE

## (undated) DEVICE — NEBULIZER AEROSOL TBNG 7 FT FOR ACUTE CARE NEBUTECH

## (undated) DEVICE — SPONGE LAP W18XL18IN WHT COT 4 PLY FLD STRUNG RADPQ DISP ST

## (undated) DEVICE — MAJOR SET UP PK

## (undated) DEVICE — SINGLE USE SUCTION VALVE MAJ-209: Brand: SINGLE USE SUCTION VALVE (STERILE)

## (undated) DEVICE — GAUZE,SPONGE,4"X4",8PLY,STRL,LF,10/TRAY: Brand: MEDLINE